# Patient Record
Sex: FEMALE | Race: WHITE | NOT HISPANIC OR LATINO
[De-identification: names, ages, dates, MRNs, and addresses within clinical notes are randomized per-mention and may not be internally consistent; named-entity substitution may affect disease eponyms.]

---

## 2017-02-07 ENCOUNTER — RX RENEWAL (OUTPATIENT)
Age: 57
End: 2017-02-07

## 2017-03-28 ENCOUNTER — APPOINTMENT (OUTPATIENT)
Dept: OBGYN | Facility: CLINIC | Age: 57
End: 2017-03-28

## 2017-03-28 VITALS
BODY MASS INDEX: 47.32 KG/M2 | WEIGHT: 284 LBS | HEART RATE: 82 BPM | HEIGHT: 65 IN | RESPIRATION RATE: 18 BRPM | SYSTOLIC BLOOD PRESSURE: 122 MMHG | DIASTOLIC BLOOD PRESSURE: 78 MMHG | OXYGEN SATURATION: 98 %

## 2017-03-28 DIAGNOSIS — Z82.0 FAMILY HISTORY OF EPILEPSY AND OTHER DISEASES OF THE NERVOUS SYSTEM: ICD-10-CM

## 2017-03-30 LAB — HPV HIGH+LOW RISK DNA PNL CVX: NEGATIVE

## 2017-04-02 LAB — CYTOLOGY CVX/VAG DOC THIN PREP: NORMAL

## 2017-04-09 LAB
A VAGINAE DNA VAG QL NAA+PROBE: NORMAL
BVAB2 DNA VAG QL NAA+PROBE: NORMAL
C KRUSEI DNA VAG QL NAA+PROBE: NEGATIVE
C TRACH RRNA SPEC QL NAA+PROBE: NEGATIVE
MEGA1 DNA VAG QL NAA+PROBE: NORMAL
N GONORRHOEA RRNA SPEC QL NAA+PROBE: NEGATIVE
T VAGINALIS RRNA SPEC QL NAA+PROBE: NEGATIVE

## 2017-07-18 ENCOUNTER — APPOINTMENT (OUTPATIENT)
Dept: INTERNAL MEDICINE | Facility: CLINIC | Age: 57
End: 2017-07-18

## 2017-07-18 VITALS
HEIGHT: 65 IN | SYSTOLIC BLOOD PRESSURE: 128 MMHG | DIASTOLIC BLOOD PRESSURE: 78 MMHG | BODY MASS INDEX: 47.48 KG/M2 | RESPIRATION RATE: 14 BRPM | HEART RATE: 84 BPM | WEIGHT: 285 LBS

## 2017-07-18 RX ORDER — CLOTRIMAZOLE AND BETAMETHASONE DIPROPIONATE 10; .5 MG/G; MG/G
1-0.05 CREAM TOPICAL
Qty: 1 | Refills: 6 | Status: DISCONTINUED | COMMUNITY
Start: 2017-03-28 | End: 2017-07-18

## 2017-07-24 LAB
25(OH)D3 SERPL-MCNC: 18.8 NG/ML
ALBUMIN SERPL ELPH-MCNC: 4.3 G/DL
ALP BLD-CCNC: 106 U/L
ALT SERPL-CCNC: 37 U/L
ANION GAP SERPL CALC-SCNC: 20 MMOL/L
APPEARANCE: CLEAR
AST SERPL-CCNC: 28 U/L
BASOPHILS # BLD AUTO: 0.06 K/UL
BASOPHILS NFR BLD AUTO: 0.6 %
BILIRUB SERPL-MCNC: 0.8 MG/DL
BILIRUBIN URINE: NEGATIVE
BLOOD URINE: NEGATIVE
BUN SERPL-MCNC: 18 MG/DL
CALCIUM SERPL-MCNC: 10.2 MG/DL
CHLORIDE SERPL-SCNC: 99 MMOL/L
CHOLEST SERPL-MCNC: 186 MG/DL
CHOLEST/HDLC SERPL: 4.3 RATIO
CO2 SERPL-SCNC: 23 MMOL/L
COLOR: YELLOW
CREAT SERPL-MCNC: 0.71 MG/DL
CRP SERPL HS-MCNC: 3.3 MG/L
EOSINOPHIL # BLD AUTO: 0.33 K/UL
EOSINOPHIL NFR BLD AUTO: 3.6 %
GLUCOSE BS SERPL-MCNC: 107 MG/DL
GLUCOSE QUALITATIVE U: NORMAL MG/DL
GLUCOSE SERPL-MCNC: 86 MG/DL
HBA1C MFR BLD HPLC: 6 %
HCT VFR BLD CALC: 42.2 %
HDLC SERPL-MCNC: 43 MG/DL
HGB BLD-MCNC: 13.3 G/DL
IMM GRANULOCYTES NFR BLD AUTO: 0.5 %
KETONES URINE: NEGATIVE
LDLC SERPL CALC-MCNC: 102 MG/DL
LEUKOCYTE ESTERASE URINE: NEGATIVE
LYMPHOCYTES # BLD AUTO: 2.67 K/UL
LYMPHOCYTES NFR BLD AUTO: 28.9 %
MAN DIFF?: NORMAL
MCHC RBC-ENTMCNC: 30.3 PG
MCHC RBC-ENTMCNC: 31.5 GM/DL
MCV RBC AUTO: 96.1 FL
MONOCYTES # BLD AUTO: 0.45 K/UL
MONOCYTES NFR BLD AUTO: 4.9 %
NEUTROPHILS # BLD AUTO: 5.69 K/UL
NEUTROPHILS NFR BLD AUTO: 61.5 %
NITRITE URINE: NEGATIVE
PH URINE: 5.5
PLATELET # BLD AUTO: 445 K/UL
POTASSIUM SERPL-SCNC: 4.5 MMOL/L
PROT SERPL-MCNC: 7.3 G/DL
PROTEIN URINE: NEGATIVE MG/DL
RBC # BLD: 4.39 M/UL
RBC # FLD: 14.9 %
SODIUM SERPL-SCNC: 142 MMOL/L
SPECIFIC GRAVITY URINE: 1.02
T4 FREE SERPL-MCNC: 1.3 NG/DL
TRIGL SERPL-MCNC: 203 MG/DL
TSH SERPL-ACNC: 1.84 UIU/ML
UROBILINOGEN URINE: NORMAL MG/DL
VIT B12 SERPL-MCNC: >2000 PG/ML
WBC # FLD AUTO: 9.25 K/UL

## 2017-09-26 ENCOUNTER — RX RENEWAL (OUTPATIENT)
Age: 57
End: 2017-09-26

## 2017-11-20 ENCOUNTER — APPOINTMENT (OUTPATIENT)
Dept: INTERNAL MEDICINE | Facility: CLINIC | Age: 57
End: 2017-11-20

## 2017-11-20 ENCOUNTER — MEDICATION RENEWAL (OUTPATIENT)
Age: 57
End: 2017-11-20

## 2018-01-16 ENCOUNTER — MEDICATION RENEWAL (OUTPATIENT)
Age: 58
End: 2018-01-16

## 2018-01-23 ENCOUNTER — APPOINTMENT (OUTPATIENT)
Dept: INTERNAL MEDICINE | Facility: CLINIC | Age: 58
End: 2018-01-23
Payer: COMMERCIAL

## 2018-01-23 VITALS
HEART RATE: 76 BPM | DIASTOLIC BLOOD PRESSURE: 78 MMHG | HEIGHT: 65 IN | BODY MASS INDEX: 47.48 KG/M2 | WEIGHT: 285 LBS | RESPIRATION RATE: 14 BRPM | SYSTOLIC BLOOD PRESSURE: 126 MMHG

## 2018-01-23 DIAGNOSIS — R63.0 ANOREXIA: ICD-10-CM

## 2018-01-23 PROCEDURE — 99215 OFFICE O/P EST HI 40 MIN: CPT

## 2018-01-23 RX ORDER — VORTIOXETINE 20 MG/1
20 TABLET, FILM COATED ORAL
Refills: 0 | Status: DISCONTINUED | COMMUNITY
End: 2018-01-23

## 2018-02-05 ENCOUNTER — OUTPATIENT (OUTPATIENT)
Dept: OUTPATIENT SERVICES | Facility: HOSPITAL | Age: 58
LOS: 1 days | End: 2018-02-05
Payer: COMMERCIAL

## 2018-02-05 ENCOUNTER — APPOINTMENT (OUTPATIENT)
Dept: RADIOLOGY | Facility: HOSPITAL | Age: 58
End: 2018-02-05
Payer: COMMERCIAL

## 2018-02-05 DIAGNOSIS — Z00.8 ENCOUNTER FOR OTHER GENERAL EXAMINATION: ICD-10-CM

## 2018-02-05 DIAGNOSIS — R06.02 SHORTNESS OF BREATH: ICD-10-CM

## 2018-02-05 DIAGNOSIS — R07.9 CHEST PAIN, UNSPECIFIED: ICD-10-CM

## 2018-02-05 PROCEDURE — 71046 X-RAY EXAM CHEST 2 VIEWS: CPT | Mod: 26

## 2018-02-05 PROCEDURE — 71046 X-RAY EXAM CHEST 2 VIEWS: CPT

## 2018-03-01 LAB
25(OH)D3 SERPL-MCNC: 21.6 NG/ML
ALBUMIN SERPL ELPH-MCNC: 3.6 G/DL
ALP BLD-CCNC: 107 U/L
ALT SERPL-CCNC: 52 U/L
ANION GAP SERPL CALC-SCNC: 13 MMOL/L
APPEARANCE: CLEAR
AST SERPL-CCNC: 30 U/L
BASOPHILS # BLD AUTO: 0.04 K/UL
BASOPHILS NFR BLD AUTO: 0.4 %
BILIRUB SERPL-MCNC: 0.6 MG/DL
BILIRUBIN URINE: NEGATIVE
BLOOD URINE: NEGATIVE
BUN SERPL-MCNC: 18 MG/DL
CALCIUM SERPL-MCNC: 9.7 MG/DL
CHLORIDE SERPL-SCNC: 103 MMOL/L
CHOLEST SERPL-MCNC: 178 MG/DL
CHOLEST/HDLC SERPL: 5.2 RATIO
CO2 SERPL-SCNC: 25 MMOL/L
COLOR: YELLOW
CREAT SERPL-MCNC: 0.62 MG/DL
CRP SERPL HS-MCNC: 6 MG/L
EOSINOPHIL # BLD AUTO: 0.47 K/UL
EOSINOPHIL NFR BLD AUTO: 5.2 %
GLUCOSE BS SERPL-MCNC: 130 MG/DL
GLUCOSE QUALITATIVE U: NEGATIVE MG/DL
GLUCOSE SERPL-MCNC: 130 MG/DL
HBA1C MFR BLD HPLC: 6.8 %
HCT VFR BLD CALC: 39 %
HDLC SERPL-MCNC: 34 MG/DL
HGB BLD-MCNC: 12.6 G/DL
IMM GRANULOCYTES NFR BLD AUTO: 0.2 %
KETONES URINE: NEGATIVE
LDLC SERPL CALC-MCNC: 108 MG/DL
LEUKOCYTE ESTERASE URINE: NEGATIVE
LYMPHOCYTES # BLD AUTO: 2.86 K/UL
LYMPHOCYTES NFR BLD AUTO: 31.8 %
MAN DIFF?: NORMAL
MCHC RBC-ENTMCNC: 29.3 PG
MCHC RBC-ENTMCNC: 32.3 GM/DL
MCV RBC AUTO: 90.7 FL
MONOCYTES # BLD AUTO: 0.54 K/UL
MONOCYTES NFR BLD AUTO: 6 %
NEUTROPHILS # BLD AUTO: 5.06 K/UL
NEUTROPHILS NFR BLD AUTO: 56.4 %
NITRITE URINE: NEGATIVE
PH URINE: 6
PLATELET # BLD AUTO: 497 K/UL
POTASSIUM SERPL-SCNC: 4 MMOL/L
PROT SERPL-MCNC: 6.7 G/DL
PROTEIN URINE: NEGATIVE MG/DL
RBC # BLD: 4.3 M/UL
RBC # FLD: 15.6 %
SODIUM SERPL-SCNC: 141 MMOL/L
SPECIFIC GRAVITY URINE: 1.03
T4 FREE SERPL-MCNC: 1.2 NG/DL
TRIGL SERPL-MCNC: 179 MG/DL
TSH SERPL-ACNC: 1.72 UIU/ML
UROBILINOGEN URINE: NEGATIVE MG/DL
VIT B12 SERPL-MCNC: 920 PG/ML
WBC # FLD AUTO: 8.99 K/UL

## 2018-03-06 ENCOUNTER — OUTPATIENT (OUTPATIENT)
Dept: OUTPATIENT SERVICES | Facility: HOSPITAL | Age: 58
LOS: 1 days | End: 2018-03-06
Payer: COMMERCIAL

## 2018-03-06 ENCOUNTER — APPOINTMENT (OUTPATIENT)
Dept: MAMMOGRAPHY | Facility: HOSPITAL | Age: 58
End: 2018-03-06
Payer: COMMERCIAL

## 2018-03-06 DIAGNOSIS — Z12.31 ENCOUNTER FOR SCREENING MAMMOGRAM FOR MALIGNANT NEOPLASM OF BREAST: ICD-10-CM

## 2018-03-06 DIAGNOSIS — Z00.8 ENCOUNTER FOR OTHER GENERAL EXAMINATION: ICD-10-CM

## 2018-03-06 PROCEDURE — 77063 BREAST TOMOSYNTHESIS BI: CPT

## 2018-03-06 PROCEDURE — 77067 SCR MAMMO BI INCL CAD: CPT

## 2018-03-06 PROCEDURE — 77063 BREAST TOMOSYNTHESIS BI: CPT | Mod: 26

## 2018-03-06 PROCEDURE — 77067 SCR MAMMO BI INCL CAD: CPT | Mod: 26

## 2018-06-06 ENCOUNTER — TRANSCRIPTION ENCOUNTER (OUTPATIENT)
Age: 58
End: 2018-06-06

## 2018-06-07 ENCOUNTER — RESULT REVIEW (OUTPATIENT)
Age: 58
End: 2018-06-07

## 2018-06-07 ENCOUNTER — OUTPATIENT (OUTPATIENT)
Dept: OUTPATIENT SERVICES | Facility: HOSPITAL | Age: 58
LOS: 1 days | End: 2018-06-07
Payer: COMMERCIAL

## 2018-06-07 DIAGNOSIS — R10.13 EPIGASTRIC PAIN: ICD-10-CM

## 2018-06-07 DIAGNOSIS — D64.9 ANEMIA, UNSPECIFIED: ICD-10-CM

## 2018-06-07 DIAGNOSIS — K63.5 POLYP OF COLON: ICD-10-CM

## 2018-06-07 PROCEDURE — 45380 COLONOSCOPY AND BIOPSY: CPT

## 2018-06-07 PROCEDURE — 88305 TISSUE EXAM BY PATHOLOGIST: CPT

## 2018-06-07 PROCEDURE — 43239 EGD BIOPSY SINGLE/MULTIPLE: CPT

## 2018-06-07 PROCEDURE — 88312 SPECIAL STAINS GROUP 1: CPT | Mod: 26

## 2018-06-07 PROCEDURE — 88305 TISSUE EXAM BY PATHOLOGIST: CPT | Mod: 26

## 2018-06-07 PROCEDURE — 88312 SPECIAL STAINS GROUP 1: CPT

## 2018-06-18 DIAGNOSIS — Z91.040 LATEX ALLERGY STATUS: ICD-10-CM

## 2018-06-18 DIAGNOSIS — Z86.010 PERSONAL HISTORY OF COLONIC POLYPS: ICD-10-CM

## 2018-06-18 DIAGNOSIS — Z98.0 INTESTINAL BYPASS AND ANASTOMOSIS STATUS: ICD-10-CM

## 2018-06-18 DIAGNOSIS — D50.9 IRON DEFICIENCY ANEMIA, UNSPECIFIED: ICD-10-CM

## 2018-06-18 DIAGNOSIS — G47.33 OBSTRUCTIVE SLEEP APNEA (ADULT) (PEDIATRIC): ICD-10-CM

## 2018-06-18 DIAGNOSIS — Z79.82 LONG TERM (CURRENT) USE OF ASPIRIN: ICD-10-CM

## 2018-06-18 DIAGNOSIS — Z90.722 ACQUIRED ABSENCE OF OVARIES, BILATERAL: ICD-10-CM

## 2018-06-18 DIAGNOSIS — Z90.49 ACQUIRED ABSENCE OF OTHER SPECIFIED PARTS OF DIGESTIVE TRACT: ICD-10-CM

## 2018-06-18 DIAGNOSIS — K29.50 UNSPECIFIED CHRONIC GASTRITIS WITHOUT BLEEDING: ICD-10-CM

## 2018-06-18 DIAGNOSIS — K63.89 OTHER SPECIFIED DISEASES OF INTESTINE: ICD-10-CM

## 2018-06-18 DIAGNOSIS — I10 ESSENTIAL (PRIMARY) HYPERTENSION: ICD-10-CM

## 2018-06-18 DIAGNOSIS — K64.8 OTHER HEMORRHOIDS: ICD-10-CM

## 2018-06-18 DIAGNOSIS — K57.30 DIVERTICULOSIS OF LARGE INTESTINE WITHOUT PERFORATION OR ABSCESS WITHOUT BLEEDING: ICD-10-CM

## 2018-06-18 DIAGNOSIS — F32.9 MAJOR DEPRESSIVE DISORDER, SINGLE EPISODE, UNSPECIFIED: ICD-10-CM

## 2018-06-18 DIAGNOSIS — J45.909 UNSPECIFIED ASTHMA, UNCOMPLICATED: ICD-10-CM

## 2018-06-18 DIAGNOSIS — E03.9 HYPOTHYROIDISM, UNSPECIFIED: ICD-10-CM

## 2018-06-18 DIAGNOSIS — Z98.84 BARIATRIC SURGERY STATUS: ICD-10-CM

## 2018-06-18 DIAGNOSIS — K21.9 GASTRO-ESOPHAGEAL REFLUX DISEASE WITHOUT ESOPHAGITIS: ICD-10-CM

## 2018-07-28 PROBLEM — R63.0 POOR APPETITE: Status: ACTIVE | Noted: 2018-01-23

## 2018-08-02 ENCOUNTER — RX RENEWAL (OUTPATIENT)
Age: 58
End: 2018-08-02

## 2018-08-23 ENCOUNTER — APPOINTMENT (OUTPATIENT)
Dept: INTERNAL MEDICINE | Facility: CLINIC | Age: 58
End: 2018-08-23
Payer: COMMERCIAL

## 2018-08-23 VITALS
SYSTOLIC BLOOD PRESSURE: 126 MMHG | DIASTOLIC BLOOD PRESSURE: 78 MMHG | HEIGHT: 65 IN | RESPIRATION RATE: 15 BRPM | BODY MASS INDEX: 48.82 KG/M2 | HEART RATE: 78 BPM | WEIGHT: 293 LBS

## 2018-08-23 PROCEDURE — 99215 OFFICE O/P EST HI 40 MIN: CPT

## 2018-08-23 RX ORDER — ASPIRIN 325 MG/1
325 TABLET, FILM COATED ORAL
Refills: 0 | Status: DISCONTINUED | COMMUNITY
End: 2018-08-23

## 2018-08-23 NOTE — HISTORY OF PRESENT ILLNESS
[FreeTextEntry1] : Comes to the office for followup to review her medications and discuss her overall health major issues with depression fatigue obesity shortness of breath and chest pressure [de-identified] : 58-year-old woman comes to the office for followup with the history of depression sleep apnea hypertension hypothyroidism obesity and type 2 diabetes and complaints is that of fatigue exertional dyspnea and over the past months occasional chest pain patient has been depressed recently seen her psychiatrist regularly he denies temperature chills sweats myalgias or chest pains can be at rest they occur although with her level of obesity she does not do any heavy exertion he denies abdominal pain nausea occasionally he said no diarrhea constipation rectal bleeding or black stool. Her weight is up several pounds she has occasionally lower back and knee and hip pain is not monitoring her sugars and she is not using any apparatus for her sleep

## 2018-08-23 NOTE — HEALTH RISK ASSESSMENT
[No falls in past year] : Patient reported no falls in the past year [0] : 2) Feeling down, depressed, or hopeless: Not at all (0) [WBC2Lygdo] : 0

## 2018-08-23 NOTE — REVIEW OF SYSTEMS
[Negative] : Heme/Lymph [Fatigue] : fatigue [Chest Pain] : chest pain [Dyspnea on Exertion] : dyspnea on exertion [Nocturia] : nocturia [Joint Stiffness] : joint stiffness [Back Pain] : back pain [Anxiety] : anxiety [Depression] : depression [Fever] : no fever [Chills] : no chills [Hot Flashes] : no hot flashes [Night Sweats] : no night sweats [Recent Change In Weight] : ~T no recent weight change [Discharge] : no discharge [Pain] : no pain [Redness] : no redness [Dryness] : no dryness  [Vision Problems] : no vision problems [Itching] : no itching [Earache] : no earache [Hearing Loss] : no hearing loss [Nosebleed] : no nosebleeds [Hoarseness] : no hoarseness [Nasal Discharge] : no nasal discharge [Sore Throat] : no sore throat [Postnasal Drip] : no postnasal drip [Palpitations] : no palpitations [Leg Claudication] : no leg claudication [Lower Ext Edema] : no lower extremity edema [Orthopnea] : no orthopnea [Paroysmal Nocturnal Dyspnea] : no paroysmal nocturnal dyspnea [Shortness Of Breath] : no shortness of breath [Wheezing] : no wheezing [Cough] : no cough [Abdominal Pain] : no abdominal pain [Nausea] : no nausea [Constipation] : no constipation [Diarrhea] : diarrhea [Vomiting] : no vomiting [Heartburn] : no heartburn [Melena] : no melena [Dysuria] : no dysuria [Incontinence] : no incontinence [Poor Libido] : libido not poor [Hematuria] : no hematuria [Frequency] : no frequency [Vaginal Discharge] : no vaginal discharge [Dysmenorrhea] : no dysmenorrhea [Joint Pain] : no joint pain [Joint Swelling] : no joint swelling [Muscle Weakness] : no muscle weakness [Muscle Pain] : no muscle pain [Mole Changes] : no mole changes [Nail Changes] : no nail changes [Hair Changes] : no hair changes [Skin Rash] : no skin rash [Headache] : no headache [Dizziness] : no dizziness [Fainting] : no fainting [Confusion] : no confusion [Memory Loss] : no memory loss [Unsteady Walking] : no ataxia [Suicidal] : not suicidal [Insomnia] : no insomnia [Easy Bleeding] : no easy bleeding [Easy Bruising] : no easy bruising [Swollen Glands] : no swollen glands

## 2018-08-23 NOTE — PHYSICAL EXAM
[No Acute Distress] : no acute distress [Well Nourished] : well nourished [Well Developed] : well developed [Well-Appearing] : well-appearing [Normal Voice/Communication] : normal voice/communication [Normal Sclera/Conjunctiva] : normal sclera/conjunctiva [PERRL] : pupils equal round and reactive to light [EOMI] : extraocular movements intact [Normal Outer Ear/Nose] : the outer ears and nose were normal in appearance [Normal Oropharynx] : the oropharynx was normal [Normal TMs] : both tympanic membranes were normal [Normal Nasal Mucosa] : the nasal mucosa was normal [No JVD] : no jugular venous distention [Supple] : supple [No Lymphadenopathy] : no lymphadenopathy [Thyroid Normal, No Nodules] : the thyroid was normal and there were no nodules present [No Respiratory Distress] : no respiratory distress  [Clear to Auscultation] : lungs were clear to auscultation bilaterally [No Accessory Muscle Use] : no accessory muscle use [Normal Percussion] : the chest was normal to percussion [Normal Rate] : normal rate  [Regular Rhythm] : with a regular rhythm [Normal S1, S2] : normal S1 and S2 [No Murmur] : no murmur heard [No Carotid Bruits] : no carotid bruits [No Abdominal Bruit] : a ~M bruit was not heard ~T in the abdomen [No Varicosities] : no varicosities [Pedal Pulses Present] : the pedal pulses are present [No Edema] : there was no peripheral edema [No Extremity Clubbing/Cyanosis] : no extremity clubbing/cyanosis [No Palpable Aorta] : no palpable aorta [Declined Breast Exam] : declined breast exam  [Soft] : abdomen soft [Non Tender] : non-tender [Non-distended] : non-distended [No Masses] : no abdominal mass palpated [No HSM] : no HSM [Normal Bowel Sounds] : normal bowel sounds [No Hernias] : no hernias [Declined Rectal Exam] : declined rectal exam [Normal Supraclavicular Nodes] : no supraclavicular lymphadenopathy [Normal Axillary Nodes] : no axillary lymphadenopathy [Normal Posterior Cervical Nodes] : no posterior cervical lymphadenopathy [Normal Anterior Cervical Nodes] : no anterior cervical lymphadenopathy [Normal Inguinal Nodes] : no inguinal lymphadenopathy [No CVA Tenderness] : no CVA  tenderness [No Spinal Tenderness] : no spinal tenderness [No Joint Swelling] : no joint swelling [Grossly Normal Strength/Tone] : grossly normal strength/tone [No Rash] : no rash [Normal Gait] : normal gait [Coordination Grossly Intact] : coordination grossly intact [No Focal Deficits] : no focal deficits [Deep Tendon Reflexes (DTR)] : deep tendon reflexes were 2+ and symmetric [Speech Grossly Normal] : speech grossly normal [Memory Grossly Normal] : memory grossly normal [Normal Affect] : the affect was normal [Alert and Oriented x3] : oriented to person, place, and time [Normal Mood] : the mood was normal [Normal Insight/Judgement] : insight and judgment were intact

## 2018-08-23 NOTE — ASSESSMENT
[FreeTextEntry1] : Physical exam shows a obese woman in no acute distress weight is 305 pounds height of 5 foot 5 inches blood pressure 126/78 pulse is 78 respirations are 15 HEENT was unremarkable chest showed decreased breath sounds cardiovascular showed normal S1-S2 abdomen was soft extremities showed bilateral edema chronic neurologic nonfocal patient will be engaged the doctor that initiated her CPAP treatment he claims to be intolerable to that but is open to any new apparatus is that have become available patient is not interested in Weight Watchers are organized weight plan concerned about her shortness of breath on exertion and chest discomfort we'll make arrangements for her to see a cardiologist to investigate her symptoms if the symptoms worsen before her appointment to go immediately to the emergency room

## 2018-09-28 LAB
25(OH)D3 SERPL-MCNC: 25.6 NG/ML
ALBUMIN SERPL ELPH-MCNC: 4.1 G/DL
ALP BLD-CCNC: 119 U/L
ALT SERPL-CCNC: 47 U/L
ANION GAP SERPL CALC-SCNC: 18 MMOL/L
APPEARANCE: CLEAR
AST SERPL-CCNC: 33 U/L
BASOPHILS # BLD AUTO: 0.05 K/UL
BASOPHILS NFR BLD AUTO: 0.6 %
BILIRUB SERPL-MCNC: 0.8 MG/DL
BILIRUBIN URINE: NEGATIVE
BLOOD URINE: NEGATIVE
BUN SERPL-MCNC: 14 MG/DL
CALCIUM SERPL-MCNC: 9.8 MG/DL
CHLORIDE SERPL-SCNC: 97 MMOL/L
CHOLEST SERPL-MCNC: 175 MG/DL
CHOLEST/HDLC SERPL: 4.5 RATIO
CO2 SERPL-SCNC: 24 MMOL/L
COLOR: YELLOW
CREAT SERPL-MCNC: 0.59 MG/DL
CRP SERPL HS-MCNC: 5.5 MG/L
EOSINOPHIL # BLD AUTO: 0.19 K/UL
EOSINOPHIL NFR BLD AUTO: 2.1 %
GLUCOSE BS SERPL-MCNC: 145 MG/DL
GLUCOSE QUALITATIVE U: NEGATIVE MG/DL
GLUCOSE SERPL-MCNC: 149 MG/DL
HBA1C MFR BLD HPLC: 6.2 %
HCT VFR BLD CALC: 41.5 %
HCV AB SER QL: NONREACTIVE
HCV S/CO RATIO: 0.21 S/CO
HDLC SERPL-MCNC: 39 MG/DL
HGB BLD-MCNC: 12.6 G/DL
IMM GRANULOCYTES NFR BLD AUTO: 0.2 %
KETONES URINE: NEGATIVE
LDLC SERPL CALC-MCNC: 103 MG/DL
LEUKOCYTE ESTERASE URINE: NEGATIVE
LYMPHOCYTES # BLD AUTO: 2.26 K/UL
LYMPHOCYTES NFR BLD AUTO: 24.9 %
MAN DIFF?: NORMAL
MCHC RBC-ENTMCNC: 29 PG
MCHC RBC-ENTMCNC: 30.4 GM/DL
MCV RBC AUTO: 95.6 FL
MONOCYTES # BLD AUTO: 0.46 K/UL
MONOCYTES NFR BLD AUTO: 5.1 %
NEUTROPHILS # BLD AUTO: 6.09 K/UL
NEUTROPHILS NFR BLD AUTO: 67.1 %
NITRITE URINE: NEGATIVE
PH URINE: 6.5
PLATELET # BLD AUTO: 523 K/UL
POTASSIUM SERPL-SCNC: 3.7 MMOL/L
PROT SERPL-MCNC: 7.1 G/DL
PROTEIN URINE: NEGATIVE MG/DL
RBC # BLD: 4.34 M/UL
RBC # FLD: 15.7 %
SODIUM SERPL-SCNC: 139 MMOL/L
SPECIFIC GRAVITY URINE: 1.02
T4 FREE SERPL-MCNC: 1.3 NG/DL
TRIGL SERPL-MCNC: 164 MG/DL
TSH SERPL-ACNC: 1.45 UIU/ML
UROBILINOGEN URINE: 1 MG/DL
VIT B12 SERPL-MCNC: >2000 PG/ML
WBC # FLD AUTO: 9.07 K/UL

## 2018-12-06 ENCOUNTER — APPOINTMENT (OUTPATIENT)
Dept: CT IMAGING | Facility: HOSPITAL | Age: 58
End: 2018-12-06
Payer: COMMERCIAL

## 2018-12-06 ENCOUNTER — OUTPATIENT (OUTPATIENT)
Dept: OUTPATIENT SERVICES | Facility: HOSPITAL | Age: 58
LOS: 1 days | End: 2018-12-06
Payer: COMMERCIAL

## 2018-12-06 DIAGNOSIS — Z00.8 ENCOUNTER FOR OTHER GENERAL EXAMINATION: ICD-10-CM

## 2018-12-06 PROCEDURE — 74177 CT ABD & PELVIS W/CONTRAST: CPT

## 2018-12-06 PROCEDURE — 74177 CT ABD & PELVIS W/CONTRAST: CPT | Mod: 26

## 2018-12-06 PROCEDURE — 82565 ASSAY OF CREATININE: CPT

## 2018-12-17 ENCOUNTER — APPOINTMENT (OUTPATIENT)
Dept: PULMONOLOGY | Facility: CLINIC | Age: 58
End: 2018-12-17
Payer: COMMERCIAL

## 2018-12-17 VITALS
HEART RATE: 81 BPM | OXYGEN SATURATION: 97 % | RESPIRATION RATE: 18 BRPM | TEMPERATURE: 97.7 F | SYSTOLIC BLOOD PRESSURE: 148 MMHG | WEIGHT: 293 LBS | DIASTOLIC BLOOD PRESSURE: 84 MMHG | HEIGHT: 65 IN | BODY MASS INDEX: 48.82 KG/M2

## 2018-12-17 PROCEDURE — 99244 OFF/OP CNSLTJ NEW/EST MOD 40: CPT

## 2019-05-17 ENCOUNTER — INPATIENT (INPATIENT)
Facility: HOSPITAL | Age: 59
LOS: 3 days | Discharge: HOME | DRG: 204 | End: 2019-05-21
Attending: INTERNAL MEDICINE | Admitting: STUDENT IN AN ORGANIZED HEALTH CARE EDUCATION/TRAINING PROGRAM
Payer: COMMERCIAL

## 2019-05-17 ENCOUNTER — APPOINTMENT (OUTPATIENT)
Dept: INTERNAL MEDICINE | Facility: CLINIC | Age: 59
End: 2019-05-17
Payer: COMMERCIAL

## 2019-05-17 VITALS
DIASTOLIC BLOOD PRESSURE: 80 MMHG | RESPIRATION RATE: 17 BRPM | SYSTOLIC BLOOD PRESSURE: 150 MMHG | HEIGHT: 65 IN | WEIGHT: 293 LBS | HEART RATE: 95 BPM | TEMPERATURE: 98.2 F | OXYGEN SATURATION: 97 % | BODY MASS INDEX: 48.82 KG/M2

## 2019-05-17 VITALS
RESPIRATION RATE: 18 BRPM | SYSTOLIC BLOOD PRESSURE: 147 MMHG | TEMPERATURE: 98 F | HEART RATE: 100 BPM | OXYGEN SATURATION: 98 % | WEIGHT: 293 LBS | HEIGHT: 65 IN | DIASTOLIC BLOOD PRESSURE: 78 MMHG

## 2019-05-17 DIAGNOSIS — R06.09 OTHER FORMS OF DYSPNEA: ICD-10-CM

## 2019-05-17 DIAGNOSIS — E03.9 HYPOTHYROIDISM, UNSPECIFIED: ICD-10-CM

## 2019-05-17 DIAGNOSIS — G47.30 SLEEP APNEA, UNSPECIFIED: ICD-10-CM

## 2019-05-17 DIAGNOSIS — I10 ESSENTIAL (PRIMARY) HYPERTENSION: ICD-10-CM

## 2019-05-17 DIAGNOSIS — E11.9 TYPE 2 DIABETES MELLITUS WITHOUT COMPLICATIONS: ICD-10-CM

## 2019-05-17 LAB
ALBUMIN SERPL ELPH-MCNC: 3.1 G/DL — LOW (ref 3.3–5)
ALP SERPL-CCNC: 120 U/L — SIGNIFICANT CHANGE UP (ref 40–120)
ALT FLD-CCNC: 66 U/L DA — HIGH (ref 10–45)
ANION GAP SERPL CALC-SCNC: 11 MMOL/L — SIGNIFICANT CHANGE UP (ref 5–17)
APTT BLD: 25.1 SEC — LOW (ref 27.5–36.3)
AST SERPL-CCNC: 55 U/L — HIGH (ref 10–40)
BASOPHILS # BLD AUTO: 0.09 K/UL — SIGNIFICANT CHANGE UP (ref 0–0.2)
BASOPHILS NFR BLD AUTO: 1.1 % — SIGNIFICANT CHANGE UP (ref 0–2)
BILIRUB SERPL-MCNC: 0.7 MG/DL — SIGNIFICANT CHANGE UP (ref 0.2–1.2)
BUN SERPL-MCNC: 14 MG/DL — SIGNIFICANT CHANGE UP (ref 7–23)
CALCIUM SERPL-MCNC: 8.5 MG/DL — SIGNIFICANT CHANGE UP (ref 8.4–10.5)
CHLORIDE SERPL-SCNC: 102 MMOL/L — SIGNIFICANT CHANGE UP (ref 96–108)
CO2 SERPL-SCNC: 26 MMOL/L — SIGNIFICANT CHANGE UP (ref 22–31)
CREAT SERPL-MCNC: 0.68 MG/DL — SIGNIFICANT CHANGE UP (ref 0.5–1.3)
D DIMER BLD IA.RAPID-MCNC: 276 NG/ML DDU — HIGH
EOSINOPHIL # BLD AUTO: 0.39 K/UL — SIGNIFICANT CHANGE UP (ref 0–0.5)
EOSINOPHIL NFR BLD AUTO: 4.6 % — SIGNIFICANT CHANGE UP (ref 0–6)
GLUCOSE BLDC GLUCOMTR-MCNC: 168 MG/DL — HIGH (ref 70–99)
GLUCOSE BLDC GLUCOMTR-MCNC: 186 MG/DL — HIGH (ref 70–99)
GLUCOSE SERPL-MCNC: 238 MG/DL — HIGH (ref 70–99)
HCT VFR BLD CALC: 35.1 % — SIGNIFICANT CHANGE UP (ref 34.5–45)
HGB BLD-MCNC: 11 G/DL — LOW (ref 11.5–15.5)
IMM GRANULOCYTES NFR BLD AUTO: 0.4 % — SIGNIFICANT CHANGE UP (ref 0–1.5)
INR BLD: 0.9 RATIO — SIGNIFICANT CHANGE UP (ref 0.88–1.16)
LYMPHOCYTES # BLD AUTO: 2.35 K/UL — SIGNIFICANT CHANGE UP (ref 1–3.3)
LYMPHOCYTES # BLD AUTO: 27.9 % — SIGNIFICANT CHANGE UP (ref 13–44)
MCHC RBC-ENTMCNC: 27.7 PG — SIGNIFICANT CHANGE UP (ref 27–34)
MCHC RBC-ENTMCNC: 31.3 GM/DL — LOW (ref 32–36)
MCV RBC AUTO: 88.4 FL — SIGNIFICANT CHANGE UP (ref 80–100)
MONOCYTES # BLD AUTO: 0.5 K/UL — SIGNIFICANT CHANGE UP (ref 0–0.9)
MONOCYTES NFR BLD AUTO: 5.9 % — SIGNIFICANT CHANGE UP (ref 2–14)
NEUTROPHILS # BLD AUTO: 5.06 K/UL — SIGNIFICANT CHANGE UP (ref 1.8–7.4)
NEUTROPHILS NFR BLD AUTO: 60.1 % — SIGNIFICANT CHANGE UP (ref 43–77)
NRBC # BLD: 0 /100 WBCS — SIGNIFICANT CHANGE UP (ref 0–0)
NT-PROBNP SERPL-SCNC: 106 PG/ML — SIGNIFICANT CHANGE UP (ref 0–300)
PLATELET # BLD AUTO: 441 K/UL — HIGH (ref 150–400)
POTASSIUM SERPL-MCNC: 3.8 MMOL/L — SIGNIFICANT CHANGE UP (ref 3.5–5.3)
POTASSIUM SERPL-SCNC: 3.8 MMOL/L — SIGNIFICANT CHANGE UP (ref 3.5–5.3)
PROT SERPL-MCNC: 6.6 G/DL — SIGNIFICANT CHANGE UP (ref 6–8.3)
PROTHROM AB SERPL-ACNC: 10.1 SEC — SIGNIFICANT CHANGE UP (ref 10–12.9)
RBC # BLD: 3.97 M/UL — SIGNIFICANT CHANGE UP (ref 3.8–5.2)
RBC # FLD: 16.1 % — HIGH (ref 10.3–14.5)
SODIUM SERPL-SCNC: 139 MMOL/L — SIGNIFICANT CHANGE UP (ref 135–145)
TROPONIN I SERPL-MCNC: 0.02 NG/ML — SIGNIFICANT CHANGE UP (ref 0.02–0.06)
WBC # BLD: 8.42 K/UL — SIGNIFICANT CHANGE UP (ref 3.8–10.5)
WBC # FLD AUTO: 8.42 K/UL — SIGNIFICANT CHANGE UP (ref 3.8–10.5)

## 2019-05-17 PROCEDURE — 99213 OFFICE O/P EST LOW 20 MIN: CPT | Mod: 25

## 2019-05-17 PROCEDURE — 99223 1ST HOSP IP/OBS HIGH 75: CPT

## 2019-05-17 PROCEDURE — 71275 CT ANGIOGRAPHY CHEST: CPT | Mod: 26

## 2019-05-17 PROCEDURE — 93010 ELECTROCARDIOGRAM REPORT: CPT

## 2019-05-17 PROCEDURE — 99396 PREV VISIT EST AGE 40-64: CPT | Mod: 25

## 2019-05-17 PROCEDURE — 71045 X-RAY EXAM CHEST 1 VIEW: CPT | Mod: 26

## 2019-05-17 PROCEDURE — 99285 EMERGENCY DEPT VISIT HI MDM: CPT

## 2019-05-17 RX ORDER — HYDROCHLOROTHIAZIDE 25 MG
25 TABLET ORAL DAILY
Refills: 0 | Status: DISCONTINUED | OUTPATIENT
Start: 2019-05-17 | End: 2019-05-21

## 2019-05-17 RX ORDER — DEXTROSE 50 % IN WATER 50 %
15 SYRINGE (ML) INTRAVENOUS ONCE
Refills: 0 | Status: DISCONTINUED | OUTPATIENT
Start: 2019-05-17 | End: 2019-05-21

## 2019-05-17 RX ORDER — FLUOXETINE HYDROCHLORIDE 40 MG/1
40 CAPSULE ORAL
Refills: 0 | Status: DISCONTINUED | COMMUNITY
End: 2019-05-17

## 2019-05-17 RX ORDER — DEXTROSE 50 % IN WATER 50 %
25 SYRINGE (ML) INTRAVENOUS ONCE
Refills: 0 | Status: DISCONTINUED | OUTPATIENT
Start: 2019-05-17 | End: 2019-05-21

## 2019-05-17 RX ORDER — LEVOTHYROXINE SODIUM 125 MCG
75 TABLET ORAL DAILY
Refills: 0 | Status: DISCONTINUED | OUTPATIENT
Start: 2019-05-17 | End: 2019-05-21

## 2019-05-17 RX ORDER — DEXTROSE 50 % IN WATER 50 %
12.5 SYRINGE (ML) INTRAVENOUS ONCE
Refills: 0 | Status: DISCONTINUED | OUTPATIENT
Start: 2019-05-17 | End: 2019-05-21

## 2019-05-17 RX ORDER — LISINOPRIL 2.5 MG/1
40 TABLET ORAL DAILY
Refills: 0 | Status: DISCONTINUED | OUTPATIENT
Start: 2019-05-17 | End: 2019-05-21

## 2019-05-17 RX ORDER — INSULIN LISPRO 100/ML
VIAL (ML) SUBCUTANEOUS
Refills: 0 | Status: DISCONTINUED | OUTPATIENT
Start: 2019-05-17 | End: 2019-05-21

## 2019-05-17 RX ORDER — QUINAPRIL HYDROCHLORIDE 40 MG/1
1 TABLET, FILM COATED ORAL
Qty: 0 | Refills: 0 | DISCHARGE

## 2019-05-17 RX ORDER — SODIUM CHLORIDE 9 MG/ML
3 INJECTION INTRAMUSCULAR; INTRAVENOUS; SUBCUTANEOUS ONCE
Refills: 0 | Status: COMPLETED | OUTPATIENT
Start: 2019-05-17 | End: 2019-05-17

## 2019-05-17 RX ORDER — SODIUM CHLORIDE 9 MG/ML
1000 INJECTION, SOLUTION INTRAVENOUS
Refills: 0 | Status: DISCONTINUED | OUTPATIENT
Start: 2019-05-17 | End: 2019-05-21

## 2019-05-17 RX ORDER — INSULIN LISPRO 100/ML
VIAL (ML) SUBCUTANEOUS AT BEDTIME
Refills: 0 | Status: DISCONTINUED | OUTPATIENT
Start: 2019-05-17 | End: 2019-05-21

## 2019-05-17 RX ORDER — ENOXAPARIN SODIUM 100 MG/ML
40 INJECTION SUBCUTANEOUS EVERY 12 HOURS
Refills: 0 | Status: DISCONTINUED | OUTPATIENT
Start: 2019-05-17 | End: 2019-05-21

## 2019-05-17 RX ORDER — GLUCAGON INJECTION, SOLUTION 0.5 MG/.1ML
1 INJECTION, SOLUTION SUBCUTANEOUS ONCE
Refills: 0 | Status: DISCONTINUED | OUTPATIENT
Start: 2019-05-17 | End: 2019-05-21

## 2019-05-17 RX ADMIN — SODIUM CHLORIDE 3 MILLILITER(S): 9 INJECTION INTRAMUSCULAR; INTRAVENOUS; SUBCUTANEOUS at 13:40

## 2019-05-17 NOTE — HISTORY OF PRESENT ILLNESS
[FreeTextEntry1] : Comes to the office for a comprehensive physical examination to review her medications and discuss her overall health patient has recently been experiencing shortness of breath with minimal exertion [de-identified] : 59-year-old woman with a history of morbid obesity status post bariatric surgery in the past hypothyroidism hypertension sleep apnea prediabetes and chronic nausea and severe office for a comprehensive physical examination patient's main complaint has been that over the past 2 weeks she has been fatigued and with just minimal exertion short of breath the past he would take more aggressive exertion but now just walking across a room will make her short of breath she denies pains in legs whether these are chronically swollen denies temperature chills sweats or myalgias she says the headache sinus congestion sore throat cough wheezing pleurisy or chest pain she has had no lightheadedness or palpitations denies vertigo or syncope she has chronic intermittent nausea denies abdominal pain diarrhea constipation or blood per rectum or black stools her appetite has been good her weight is persistent around 310 pounds

## 2019-05-17 NOTE — H&P ADULT - NSHPLABSRESULTS_GEN_ALL_CORE
LABS:                        11.0   8.42  )-----------( 441      ( 17 May 2019 13:25 )             35.1     05-17    139  |  102  |  14  ----------------------------<  238<H>  3.8   |  26  |  0.68    Ca    8.5      17 May 2019 13:25    TPro  6.6  /  Alb  3.1<L>  /  TBili  0.7  /  DBili  x   /  AST  55<H>  /  ALT  66<H>  /  AlkPhos  120  05-17    PT/INR - ( 17 May 2019 13:25 )   PT: 10.1 sec;   INR: 0.90 ratio         PTT - ( 17 May 2019 13:25 )  PTT:25.1 sec     CAPILLARY BLOOD GLUCOSE      POCT Blood Glucose.: 168 mg/dL (17 May 2019 16:52)            RADIOLOGY & ADDITIONAL TESTS:  < from: CT Angio Chest w/ IV Cont (05.17.19 @ 14:28) >    INTERPRETATION:  CT angiogram chest with contrast    History shortness of breath    Contrast Omnipaque 90 cc; 10 cc discarded    Axial MIPS included    There is no pulmonary embolism. There is moderate cardiomegaly. There is   no acute consolidation or maikol central edema or pleural or pericardial   effusion. There is no hilar or mediastinal adenopathy. Incidental note is   made of a tiny 3 x 5 mm subpleural smooth oval nodule in the right lung   on image 54 of series 2, stable since 08/08/16.    IMPRESSION:    No acute findings.    Tiny right lung nodule, benign by appearance and stability    < end of copied text >      Consultant(s) Notes Reviewed:  [x ] YES  [ ] NO  Care Discussed with Consultants/Other Providers [ x] YES  [ ] NO  Imaging Personally Reviewed:  [ ] YES  [ ] NO

## 2019-05-17 NOTE — ED PROVIDER NOTE - OBJECTIVE STATEMENT
Patient presents c/o sob on exertion. She notes its onset 2 weeks ago but has been progressively worsening. H/O HTN , Obstructive sleep apnea, and bariatric surgery several years ago. No fever or cough .No chest pain. Pt notes she cannot perform minimal exertion without getting sob. She remains overweight at this time.   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   The primary concern is to r/o PE. She has not had a cardiac workup since her clearance for surgery several years ago.     Non smoker.

## 2019-05-17 NOTE — ED PROVIDER NOTE - CLINICAL SUMMARY MEDICAL DECISION MAKING FREE TEXT BOX
Patient presents c/o sob on exertion. She notes its onset 2 weeks ago but has been progressively worsening. H/O HTN , Obstructive sleep apnea, and bariatric surgery several years ago. No fever or cough .No chest pain. Pt notes she cannot perform minimal exertion without getting sob. She remains overweight at this time.   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   The primary concern is to r/o PE. She has not had a cardiac workup since her clearance for surgery several years ago.   Pt with elevated ddimer. normal trop and normal probnp. cxr with ? infiltrate. CT performed. No PE. No infiltrate; + stable nodule.   Will admit per Jose Fuller reccs. Pt needs cardiac w/u. Moderate to high risk. She has No exercise tolerance at this time.

## 2019-05-17 NOTE — HEALTH RISK ASSESSMENT
[No falls in past year] : Patient reported no falls in the past year [0] : 2) Feeling down, depressed, or hopeless: Not at all (0) [HIV test declined] : HIV test declined [Hepatitis C test declined] : Hepatitis C test declined [MammogramDate] : 03/18 [[id number]] : 0 [ColonoscopyDate] : 06/18

## 2019-05-17 NOTE — H&P ADULT - NSHPPHYSICALEXAM_GEN_ALL_CORE
T(C): 36.8 (05-17-19 @ 17:23), Max: 36.8 (05-17-19 @ 17:23)  HR: 93 (05-17-19 @ 17:23) (93 - 100)  BP: 133/68 (05-17-19 @ 17:23) (133/68 - 156/80)  RR: 16 (05-17-19 @ 17:23) (16 - 18)  SpO2: 96% (05-17-19 @ 17:23) (96% - 99%)  Wt(kg): --Vital Signs Last 24 Hrs  T(C): 36.8 (17 May 2019 17:23), Max: 36.8 (17 May 2019 17:23)  T(F): 98.3 (17 May 2019 17:23), Max: 98.3 (17 May 2019 17:23)  HR: 93 (17 May 2019 17:23) (93 - 100)  BP: 133/68 (17 May 2019 17:23) (133/68 - 156/80)  BP(mean): --  RR: 16 (17 May 2019 17:23) (16 - 18)  SpO2: 96% (17 May 2019 17:23) (96% - 99%)    PHYSICAL EXAM:  GENERAL: NAD, well-groomed, well-developed  HEAD:  Atraumatic, Normocephalic  EYES: EOMI, PERRLA, conjunctiva and sclera clear  ENMT: No tonsillar erythema, exudates, or enlargement; Moist mucous membranes, Good dentition, No lesions  NECK: Supple, No JVD, Normal thyroid  NERVOUS SYSTEM:  Alert & Oriented X3, Good concentration; Motor Strength 5/5 B/L upper and lower extremities;   CHEST/LUNG: Clear to percussion bilaterally; No rales, rhonchi, wheezing, or rubs  HEART: Regular rate and rhythm; 2/6 systolic murmur, rubs, or gallops  ABDOMEN: Soft, Nontender, Nondistended; morbid obesity  EXTREMITIES:  2+ Peripheral Pulses, No clubbing, cyanosis, or +2 edema  LYMPH: No lymphadenopathy noted  SKIN: No rashes or lesions

## 2019-05-17 NOTE — H&P ADULT - ASSESSMENT
Patient presents c/o sob on exertion. She notes its onset 2 weeks ago but has been progressively worsening. H/O HTN , Obstructive sleep apnea, and bariatric surgery several years ago. No fever or cough .No chest pain. Pt notes she cannot perform minimal exertion without getting sob. She remains overweight at this time.   	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   	The primary concern is to r/o PE which the ED has ruled out.     CAPRINI SCORE [CLOT]    AGE RELATED RISK FACTORS                                                       MOBILITY RELATED FACTORS  [ x] Age 41-60 years                                            (1 Point)                  [ ] Bed rest                                                        (1 Point)  [ ] Age: 61-74 years                                           (2 Points)                 [ ] Plaster cast                                                   (2 Points)  [ ] Age= 75 years                                              (3 Points)                 [ ] Bed bound for more than 72 hours                 (2 Points)    DISEASE RELATED RISK FACTORS                                               GENDER SPECIFIC FACTORS  [x ] Edema in the lower extremities                       (1 Point)                  [ ] Pregnancy                                                     (1 Point)  [ ] Varicose veins                                               (1 Point)                  [ ] Post-partum < 6 weeks                                   (1 Point)             [x ] BMI > 25 Kg/m2                                            (1 Point)                  [ ] Hormonal therapy  or oral contraception          (1 Point)                 [ ] Sepsis (in the previous month)                        (1 Point)                  [ ] History of pregnancy complications                 (1 point)  [ ] Pneumonia or serious lung disease                                               [ ] Unexplained or recurrent                     (1 Point)           (in the previous month)                               (1 Point)  [ ] Abnormal pulmonary function test                     (1 Point)                 SURGERY RELATED RISK FACTORS  [ ] Acute myocardial infarction                              (1 Point)                 [ ]  Section                                             (1 Point)  [ ] Congestive heart failure (in the previous month)  (1 Point)               [ ] Minor surgery                                                  (1 Point)   [ ] Inflammatory bowel disease                             (1 Point)                 [ ] Arthroscopic surgery                                        (2 Points)  [ ] Central venous access                                      (2 Points)                [ ] General surgery lasting more than 45 minutes   (2 Points)       [ ] Stroke (in the previous month)                          (5 Points)               [ ] Elective arthroplasty                                         (5 Points)                                                                                                                                               HEMATOLOGY RELATED FACTORS                                                 TRAUMA RELATED RISK FACTORS  [ ] Prior episodes of VTE                                     (3 Points)                [ ] Fracture of the hip, pelvis, or leg                       (5 Points)  [ ] Positive family history for VTE                         (3 Points)                 [ ] Acute spinal cord injury (in the previous month)  (5 Points)  [ ] Prothrombin 05095 A                                     (3 Points)                 [ ] Paralysis  (less than 1 month)                             (5 Points)  [ ] Factor V Leiden                                             (3 Points)                  [ ] Multiple Trauma within 1 month                        (5 Points)  [ ] Lupus anticoagulants                                     (3 Points)                                                           [ ] Anticardiolipin antibodies                               (3 Points)                                                       [ ] High homocysteine in the blood                      (3 Points)                                             [ ] Other congenital or acquired thrombophilia      (3 Points)                                                [ ] Heparin induced thrombocytopenia                  (3 Points)                                          Total Score [   3       ]-lovenox

## 2019-05-17 NOTE — H&P ADULT - NSHPREVIEWOFSYSTEMS_GEN_ALL_CORE
REVIEW OF SYSTEMS:  CONSTITUTIONAL: No fever, reports weight gain, +fatigue  EYES: No eye pain, visual disturbances, or discharge  ENMT:  No difficulty hearing, tinnitus, vertigo; No sinus or throat pain  NECK: No pain or stiffness  BREASTS: No pain, masses, or nipple discharge  RESPIRATORY: No cough, wheezing, chills or hemoptysis; progressive shortness of breath over the last 3-4 months  CARDIOVASCULAR: No chest pain, palpitations, dizziness, or leg swelling  GASTROINTESTINAL: No abdominal or epigastric pain. No nausea, vomiting, or hematemesis; No diarrhea or constipation. No melena or hematochezia.  GENITOURINARY: No dysuria, frequency, hematuria, or incontinence  NEUROLOGICAL: No headaches, memory loss, loss of strength, numbness, or tremors  SKIN: No itching, burning, rashes, or lesions   LYMPH NODES: No enlarged glands  ENDOCRINE: No heat or cold intolerance; No hair loss  MUSCULOSKELETAL: No joint pain or swelling; No muscle, back, or extremity pain  PSYCHIATRIC: No depression, anxiety, mood swings, or difficulty sleeping  HEME/LYMPH: No easy bruising, or bleeding gums  ALLERY AND IMMUNOLOGIC: No hives or eczema    ALL ROS REVIEWED AND NORMAL EXCEPT AS STATED ABOVE

## 2019-05-17 NOTE — H&P ADULT - HISTORY OF PRESENT ILLNESS
Patient presents c/o sob on exertion. She notes its onset 2 weeks ago but has been progressively worsening. H/O HTN , Obstructive sleep apnea, and bariatric surgery several years ago. No fever or cough .No chest pain. Pt notes she cannot perform minimal exertion without getting sob. She remains overweight at this time.   	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   	The primary concern is to r/o PE. She has not had a cardiac workup since her clearance for surgery several years ago. Patient presents c/o sob on exertion. She notes its onset 2 weeks ago but has been progressively worsening. H/O HTN , Obstructive sleep apnea, and bariatric surgery several years ago. No fever or cough .No chest pain. Pt notes she cannot perform minimal exertion without getting sob. She remains overweight at this time.   	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   	The primary concern is to r/o PE which the ED has ruled out.

## 2019-05-17 NOTE — H&P ADULT - PROBLEM SELECTOR PLAN 1
Tele monitor  TTE ordered  Pulm consult  Cards consult  PT consult CT angio in ED did not show any PE  Tele monitor  TTE ordered  Pulm consult  Cards consult  PT consult

## 2019-05-17 NOTE — PHYSICAL EXAM
[No Acute Distress] : no acute distress [Well Nourished] : well nourished [Well Developed] : well developed [Well-Appearing] : well-appearing [Normal Voice/Communication] : normal voice/communication [Normal Sclera/Conjunctiva] : normal sclera/conjunctiva [EOMI] : extraocular movements intact [PERRL] : pupils equal round and reactive to light [Normal Outer Ear/Nose] : the outer ears and nose were normal in appearance [Normal Oropharynx] : the oropharynx was normal [Normal TMs] : both tympanic membranes were normal [Normal Nasal Mucosa] : the nasal mucosa was normal [No JVD] : no jugular venous distention [Supple] : supple [No Lymphadenopathy] : no lymphadenopathy [Thyroid Normal, No Nodules] : the thyroid was normal and there were no nodules present [No Respiratory Distress] : no respiratory distress  [No Accessory Muscle Use] : no accessory muscle use [Clear to Auscultation] : lungs were clear to auscultation bilaterally [Normal Percussion] : the chest was normal to percussion [Normal Rate] : normal rate  [Normal S1, S2] : normal S1 and S2 [Regular Rhythm] : with a regular rhythm [No Murmur] : no murmur heard [No Abdominal Bruit] : a ~M bruit was not heard ~T in the abdomen [No Carotid Bruits] : no carotid bruits [Pedal Pulses Present] : the pedal pulses are present [No Varicosities] : no varicosities [No Extremity Clubbing/Cyanosis] : no extremity clubbing/cyanosis [No Palpable Aorta] : no palpable aorta [Declined Breast Exam] : declined breast exam  [Non Tender] : non-tender [Soft] : abdomen soft [No Masses] : no abdominal mass palpated [No HSM] : no HSM [Non-distended] : non-distended [No Hernias] : no hernias [Normal Bowel Sounds] : normal bowel sounds [Normal Supraclavicular Nodes] : no supraclavicular lymphadenopathy [Declined Rectal Exam] : declined rectal exam [Normal Axillary Nodes] : no axillary lymphadenopathy [Normal Posterior Cervical Nodes] : no posterior cervical lymphadenopathy [Normal Anterior Cervical Nodes] : no anterior cervical lymphadenopathy [Normal Inguinal Nodes] : no inguinal lymphadenopathy [No CVA Tenderness] : no CVA  tenderness [No Spinal Tenderness] : no spinal tenderness [No Joint Swelling] : no joint swelling [Grossly Normal Strength/Tone] : grossly normal strength/tone [No Rash] : no rash [Normal Gait] : normal gait [No Focal Deficits] : no focal deficits [Coordination Grossly Intact] : coordination grossly intact [Deep Tendon Reflexes (DTR)] : deep tendon reflexes were 2+ and symmetric [Speech Grossly Normal] : speech grossly normal [Memory Grossly Normal] : memory grossly normal [Normal Affect] : the affect was normal [Alert and Oriented x3] : oriented to person, place, and time [Normal Mood] : the mood was normal [Normal Insight/Judgement] : insight and judgment were intact [de-identified] : bilat edema

## 2019-05-17 NOTE — REVIEW OF SYSTEMS
[Fatigue] : fatigue [Shortness Of Breath] : shortness of breath [Dyspnea on Exertion] : dyspnea on exertion [Nocturia] : nocturia [Joint Stiffness] : joint stiffness [Back Pain] : back pain [Depression] : depression [Anxiety] : anxiety [Insomnia] : insomnia [Negative] : Heme/Lymph [Fever] : no fever [Chills] : no chills [Night Sweats] : no night sweats [Hot Flashes] : no hot flashes [Recent Change In Weight] : ~T no recent weight change [Discharge] : no discharge [Redness] : no redness [Pain] : no pain [Itching] : no itching [Dryness] : no dryness  [Vision Problems] : no vision problems [Hearing Loss] : no hearing loss [Nosebleed] : no nosebleeds [Earache] : no earache [Nasal Discharge] : no nasal discharge [Hoarseness] : no hoarseness [Postnasal Drip] : no postnasal drip [Sore Throat] : no sore throat [Chest Pain] : no chest pain [Palpitations] : no palpitations [Lower Ext Edema] : no lower extremity edema [Leg Claudication] : no leg claudication [Orthopnea] : no orthopnea [Cough] : no cough [Wheezing] : no wheezing [Paroysmal Nocturnal Dyspnea] : no paroysmal nocturnal dyspnea [Nausea] : no nausea [Constipation] : no constipation [Abdominal Pain] : no abdominal pain [Heartburn] : no heartburn [Diarrhea] : diarrhea [Vomiting] : no vomiting [Incontinence] : no incontinence [Melena] : no melena [Dysuria] : no dysuria [Poor Libido] : libido not poor [Hematuria] : no hematuria [Frequency] : no frequency [Vaginal Discharge] : no vaginal discharge [Joint Pain] : no joint pain [Dysmenorrhea] : no dysmenorrhea [Muscle Weakness] : no muscle weakness [Joint Swelling] : no joint swelling [Muscle Pain] : no muscle pain [Nail Changes] : no nail changes [Mole Changes] : no mole changes [Skin Rash] : no skin rash [Hair Changes] : no hair changes [Fainting] : no fainting [Dizziness] : no dizziness [Headache] : no headache [Confusion] : no confusion [Memory Loss] : no memory loss [Unsteady Walking] : no ataxia [Easy Bleeding] : no easy bleeding [Suicidal] : not suicidal [Easy Bruising] : no easy bruising [Swollen Glands] : no swollen glands [FreeTextEntry6] : minimal exertion

## 2019-05-17 NOTE — ASSESSMENT
[FreeTextEntry1] : Physical exam shows a obese female in no acute distress blood pressure was 150/80 height 5 foot 5 inches weight 11 pounds BMI 51.75 is afebrile at 98.2°F orally heart rate was between 95 and 100 respiratory rate of 18 HEENT was unremarkable chest showed decreased breath sounds no rales wheezing or dullness to percussion cardiovascular exam was tachycardic abdomen was soft extremities showed 1+ edema to the knee neurologic exam was nonfocal concerned in this morbidly obese female with her shortness of breath as this is pulmonary embolus until proven otherwise cardiac disease and lung disease are also possible she was sent immediately to the local emergency room for evaluation telephone call to the emergency room I spoke to the ER attending Dr. Sheehan.She also is behind on visits to the ophthalmologist gynecologist and dermatologist which she will cigarette once the acute situation is over

## 2019-05-18 LAB
ANION GAP SERPL CALC-SCNC: 10 MMOL/L — SIGNIFICANT CHANGE UP (ref 5–17)
BUN SERPL-MCNC: 11 MG/DL — SIGNIFICANT CHANGE UP (ref 7–23)
CALCIUM SERPL-MCNC: 9.3 MG/DL — SIGNIFICANT CHANGE UP (ref 8.4–10.5)
CHLORIDE SERPL-SCNC: 102 MMOL/L — SIGNIFICANT CHANGE UP (ref 96–108)
CO2 BLDA-SCNC: 30 MMOL/L — SIGNIFICANT CHANGE UP (ref 22–30)
CO2 SERPL-SCNC: 28 MMOL/L — SIGNIFICANT CHANGE UP (ref 22–31)
CREAT SERPL-MCNC: 0.63 MG/DL — SIGNIFICANT CHANGE UP (ref 0.5–1.3)
GAS PNL BLDA: SIGNIFICANT CHANGE UP
GLUCOSE BLDC GLUCOMTR-MCNC: 149 MG/DL — HIGH (ref 70–99)
GLUCOSE BLDC GLUCOMTR-MCNC: 165 MG/DL — HIGH (ref 70–99)
GLUCOSE BLDC GLUCOMTR-MCNC: 165 MG/DL — HIGH (ref 70–99)
GLUCOSE BLDC GLUCOMTR-MCNC: 216 MG/DL — HIGH (ref 70–99)
GLUCOSE SERPL-MCNC: 183 MG/DL — HIGH (ref 70–99)
HCT VFR BLD CALC: 36.3 % — SIGNIFICANT CHANGE UP (ref 34.5–45)
HCV AB S/CO SERPL IA: 0.12 S/CO — SIGNIFICANT CHANGE UP (ref 0–0.99)
HCV AB SERPL-IMP: SIGNIFICANT CHANGE UP
HGB BLD-MCNC: 11.6 G/DL — SIGNIFICANT CHANGE UP (ref 11.5–15.5)
MCHC RBC-ENTMCNC: 27.9 PG — SIGNIFICANT CHANGE UP (ref 27–34)
MCHC RBC-ENTMCNC: 32 GM/DL — SIGNIFICANT CHANGE UP (ref 32–36)
MCV RBC AUTO: 87.3 FL — SIGNIFICANT CHANGE UP (ref 80–100)
NRBC # BLD: 0 /100 WBCS — SIGNIFICANT CHANGE UP (ref 0–0)
PCO2 BLDA: 42 MMHG — SIGNIFICANT CHANGE UP (ref 32–46)
PH BLDA: 7.44 — SIGNIFICANT CHANGE UP (ref 7.35–7.45)
PLATELET # BLD AUTO: 475 K/UL — HIGH (ref 150–400)
PO2 BLDA: 77 MMHG — SIGNIFICANT CHANGE UP (ref 74–108)
POTASSIUM SERPL-MCNC: 3.4 MMOL/L — LOW (ref 3.5–5.3)
POTASSIUM SERPL-SCNC: 3.4 MMOL/L — LOW (ref 3.5–5.3)
RBC # BLD: 4.16 M/UL — SIGNIFICANT CHANGE UP (ref 3.8–5.2)
RBC # FLD: 16.5 % — HIGH (ref 10.3–14.5)
SAO2 % BLDA: 92 % — SIGNIFICANT CHANGE UP (ref 92–96)
SODIUM SERPL-SCNC: 140 MMOL/L — SIGNIFICANT CHANGE UP (ref 135–145)
TSH SERPL-MCNC: 1.83 UIU/ML — SIGNIFICANT CHANGE UP (ref 0.27–4.2)
WBC # BLD: 9.7 K/UL — SIGNIFICANT CHANGE UP (ref 3.8–10.5)
WBC # FLD AUTO: 9.7 K/UL — SIGNIFICANT CHANGE UP (ref 3.8–10.5)

## 2019-05-18 PROCEDURE — 99254 IP/OBS CNSLTJ NEW/EST MOD 60: CPT

## 2019-05-18 PROCEDURE — 99235 HOSP IP/OBS SAME DATE MOD 70: CPT

## 2019-05-18 RX ORDER — ACETAMINOPHEN 500 MG
650 TABLET ORAL EVERY 6 HOURS
Refills: 0 | Status: DISCONTINUED | OUTPATIENT
Start: 2019-05-18 | End: 2019-05-21

## 2019-05-18 RX ADMIN — Medication 650 MILLIGRAM(S): at 07:01

## 2019-05-18 RX ADMIN — Medication 1: at 07:43

## 2019-05-18 RX ADMIN — Medication 2: at 17:33

## 2019-05-18 RX ADMIN — Medication 650 MILLIGRAM(S): at 21:23

## 2019-05-18 RX ADMIN — ENOXAPARIN SODIUM 40 MILLIGRAM(S): 100 INJECTION SUBCUTANEOUS at 17:33

## 2019-05-18 RX ADMIN — LISINOPRIL 40 MILLIGRAM(S): 2.5 TABLET ORAL at 05:43

## 2019-05-18 RX ADMIN — ENOXAPARIN SODIUM 40 MILLIGRAM(S): 100 INJECTION SUBCUTANEOUS at 05:42

## 2019-05-18 RX ADMIN — Medication 650 MILLIGRAM(S): at 19:38

## 2019-05-18 RX ADMIN — Medication 25 MILLIGRAM(S): at 05:42

## 2019-05-18 RX ADMIN — Medication 1: at 12:00

## 2019-05-18 RX ADMIN — Medication 650 MILLIGRAM(S): at 08:00

## 2019-05-18 RX ADMIN — Medication 75 MICROGRAM(S): at 05:43

## 2019-05-18 NOTE — CONSULT NOTE ADULT - ASSESSMENT
Assessment and Plan:    Assessment:  · Assessment		   Patient presents c/o sob on exertion. She notes its onset 2 weeks ago but has been progressively worsening. H/O HTN , Obstructive sleep apnea, and bariatric surgery several years ago. No fever or cough .No chest pain. Pt notes she cannot perform minimal exertion without getting sob. She remains overweight at this time.   	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   	The primary concern is to r/o PE which the ED has ruled out.     CAPRINI SCORE [CLOT]    AGE RELATED RISK FACTORS                                                       MOBILITY RELATED FACTORS  [ x] Age 41-60 years                                            (1 Point)                  [ ] Bed rest                                                        (1 Point)  [ ] Age: 61-74 years                                           (2 Points)                 [ ] Plaster cast                                                   (2 Points)  [ ] Age= 75 years                                              (3 Points)                 [ ] Bed bound for more than 72 hours                 (2 Points)    DISEASE RELATED RISK FACTORS                                               GENDER SPECIFIC FACTORS  [x ] Edema in the lower extremities                       (1 Point)                  [ ] Pregnancy                                                     (1 Point)  [ ] Varicose veins                                               (1 Point)                  [ ] Post-partum < 6 weeks                                   (1 Point)             [x ] BMI > 25 Kg/m2                                            (1 Point)                  [ ] Hormonal therapy  or oral contraception          (1 Point)                 [ ] Sepsis (in the previous month)                        (1 Point)                  [ ] History of pregnancy complications                 (1 point)  [ ] Pneumonia or serious lung disease                                               [ ] Unexplained or recurrent                     (1 Point)           (in the previous month)                               (1 Point)  [ ] Abnormal pulmonary function test                     (1 Point)                 SURGERY RELATED RISK FACTORS  [ ] Acute myocardial infarction                              (1 Point)                 [ ]  Section                                             (1 Point)  [ ] Congestive heart failure (in the previous month)  (1 Point)               [ ] Minor surgery                                                  (1 Point)   [ ] Inflammatory bowel disease                             (1 Point)                 [ ] Arthroscopic surgery                                        (2 Points)  [ ] Central venous access                                      (2 Points)                [ ] General surgery lasting more than 45 minutes   (2 Points)       [ ] Stroke (in the previous month)                          (5 Points)               [ ] Elective arthroplasty                                         (5 Points)                                                                                                                                               HEMATOLOGY RELATED FACTORS                                                 TRAUMA RELATED RISK FACTORS  [ ] Prior episodes of VTE                                     (3 Points)                [ ] Fracture of the hip, pelvis, or leg                       (5 Points)  [ ] Positive family history for VTE                         (3 Points)                 [ ] Acute spinal cord injury (in the previous month)  (5 Points)  [ ] Prothrombin 04383 A                                     (3 Points)                 [ ] Paralysis  (less than 1 month)                             (5 Points)  [ ] Factor V Leiden                                             (3 Points)                  [ ] Multiple Trauma within 1 month                        (5 Points)  [ ] Lupus anticoagulants                                     (3 Points)                                                           [ ] Anticardiolipin antibodies                               (3 Points)                                                       [ ] High homocysteine in the blood                      (3 Points)                                             [ ] Other congenital or acquired thrombophilia      (3 Points)                                                [ ] Heparin induced thrombocytopenia                  (3 Points)                                          Total Score [   3       ]-lovenox     Problem/Plan - 1:  ·  Problem: Dyspnea on exertion.  Plan: CT angio in ED did not show any PE  Tele monitor  TTE ordered  Pulm consult  PT consult.pharmacological stress test to r/o CAD     Problem/Plan - 2:  ·  Problem: Essential hypertension.  Plan: continue home diuretic and ace.      Problem/Plan - 3:  ·  Problem: Hypothyroid.  Plan: AM TSH   continue home levo.      Problem/Plan - 4:  ·  Problem: Sleep apnea.  Plan: Pulm consult  AM abg ordred.      Problem/Plan - 5:  ·  Problem: Diabetes.  Plan: Not on any diabetic meds  Hem a1c in the AM  Insulin coverage written for.

## 2019-05-18 NOTE — PHYSICAL THERAPY INITIAL EVALUATION ADULT - ADDITIONAL COMMENTS
Pt lives in house with brother, 3 FLACO with 1 rail, 12 steps inside with 2 rails.  Pt was independent with ambulation, but recently becoming SOB with walking.  Independent ADLs, works and drives.

## 2019-05-18 NOTE — DIETITIAN INITIAL EVALUATION ADULT. - SIGNS/SYMPTOMS
AEB BMI 51.9, reports of overconsumption of high calorie food, hx of obesity (had bariatric surgery) Unknown if ever smoked

## 2019-05-18 NOTE — CONSULT NOTE ADULT - SUBJECTIVE AND OBJECTIVE BOX
PULMONARY CONSULT  Location of Patient :  [facility]LE 0225 D1 ( TELE)  Attending requesting Consult:Simone Fuller  Chief Complaint : NELSON  Reason For consult : NELSON      Initial HPI on admission:  HPI:  59 year old female with h/o Morbid obesity, ABBIE off Cpap 12 for over 1-2 years, h/o bariatric surgery, HTN, Hypothyroidism who p/w increasing NELSON x few weeks.  states unable to catch breath after few feet of walking. denies wheezing, chest pain.   states + Weight gain 30-40 llbs over past 6 mo  states has sedentary lifestyle at work,   denies smoking but  + second hand as child   ? h/o asthma where used nebs but never used for long time.    at this time she is at rest has no acute complaints.    PAST MEDICAL & SURGICAL HISTORY:  Diabetes: denies a past medical problem  Hypothyroid  Hypertension    Allergies    latex (Rash)  No Known Drug Allergies    Intolerances      FAMILY HISTORY:    Social history:      Smoking: never     Drinking: denies     Drug use:denies    Review of Systems:  CONSTITUTIONAL: No fever, No chills, +fatigue  EYES: No eye pain, No visual disturbances, No discharge  ENMT:  No difficulty hearing, No tinnitus, No vertigo; No sinus or throat pain  NECK: No pain or stiffness  RESPIRATORY: Per above  CARDIOVASCULAR: No chest pain, +palpitations, No dizziness, or +leg swelling  GASTROINTESTINAL: No abdominal or epigastric pain. No nausea, No vomiting, No hematemesis; No diarrhea or constipation. No melena, No hematochezia.  GENITOURINARY: No dysuria, No frequency, No hematuria, No incontinence  NEUROLOGICAL: No headaches, No memory loss, No loss of strength, No numbness, No tremors  SKIN: No itching, No burning, No rashes, No lesions   MUSCULOSKELETAL: No joint pain or swelling; No muscle, back, No extremity pain  PSYCHIATRIC: No depression, No anxiety, No mood swings, No difficulty sleeping    Medications:  MEDICATIONS  (STANDING):  dextrose 5%. 1000 milliLiter(s) (50 mL/Hr) IV Continuous <Continuous>  dextrose 50% Injectable 12.5 Gram(s) IV Push once  dextrose 50% Injectable 25 Gram(s) IV Push once  dextrose 50% Injectable 25 Gram(s) IV Push once  enoxaparin Injectable 40 milliGRAM(s) SubCutaneous every 12 hours  hydrochlorothiazide 25 milliGRAM(s) Oral daily  insulin lispro (HumaLOG) corrective regimen sliding scale   SubCutaneous three times a day before meals  insulin lispro (HumaLOG) corrective regimen sliding scale   SubCutaneous at bedtime  levothyroxine 75 MICROGram(s) Oral daily  lisinopril 40 milliGRAM(s) Oral daily    MEDICATIONS  (PRN):  acetaminophen   Tablet .. 650 milliGRAM(s) Oral every 6 hours PRN Temp greater or equal to 38C (100.4F), Mild Pain (1 - 3)  dextrose 40% Gel 15 Gram(s) Oral once PRN Blood Glucose LESS THAN 70 milliGRAM(s)/deciliter  glucagon  Injectable 1 milliGRAM(s) IntraMuscular once PRN Glucose LESS THAN 70 milligrams/deciliter      Home Medications:  Last Order Reconciliation Date: 05-17-19 @ 19:13 (Admission Reconciliation)  Accupril 40 mg oral tablet: 1 tab(s) orally once a day (05-17-19 @ 19:12)  hydroCHLOROthiazide 25 mg oral tablet: 1 tab(s) orally once a day (05-17-19 @ 16:04)  quinapril 40 mg oral tablet: 1 tab(s) orally once a day (05-17-19 @ 19:13)  Synthroid 75 mcg (0.075 mg) oral tablet: 1 tab(s) orally once a day (05-17-19 @ 16:04)      LABS:                        11.6   9.70  )-----------( 475      ( 18 May 2019 07:49 )             36.3     05-18    140  |  102  |  11  ----------------------------<  183<H>  3.4<L>   |  28  |  0.63    Ca    9.3      18 May 2019 07:35    TPro  6.6  /  Alb  3.1<L>  /  TBili  0.7  /  DBili  x   /  AST  55<H>  /  ALT  66<H>  /  AlkPhos  120  05-17    HIT ab -- 05-17 @ 13:25  HIT ab EIA --  D Dimer -276    CARDIAC MARKERS ( 17 May 2019 13:25 )  .019 ng/mL / x     / x     / x     / x            PT/INR - ( 17 May 2019 13:25 )   PT: 10.1 sec;   INR: 0.90 ratio         PTT - ( 17 May 2019 13:25 )  PTT:25.1 sec      Serum Pro-Brain Natriuretic Peptide: 106 pg/mL (05-17-19 @ 13:25)        CULTURES: (if applicable)        ABG - ( 18 May 2019 06:55 )  pH, Arterial: 7.44  pH, Blood: x     /  pCO2: 42    /  pO2: 77    / HCO3: x     / Base Excess: x     /  SaO2: 92                CAPILLARY BLOOD GLUCOSE      POCT Blood Glucose.: 165 mg/dL (18 May 2019 07:38)        RADIOLOGY  CXR:  < from: Xray Chest 1 View AP/PA (05.17.19 @ 13:47) >    Portable chest x-ray is compared to a previous examination dated 2/5/2018.    Impression: New density projecting over the left lower lung zone may be due to overlying soft tissue or may represent pulmonary infiltrate. If clinically indicated, PA and lateral chest x-rays may be pursued for   better evaluation. This finding is communicatedwith the emergency department via the PACS communication system.    No evidence for pleural effusion or pneumothorax.    The trachea is midline.    Cardiac silhouette is within normal limits.    < end of copied text >      CT:  < from: CT Angio Chest w/ IV Cont (05.17.19 @ 14:28) >  EXAM:  CT ANGIO CHEST (W)AW IC      PROCEDURE DATE:  05/17/2019        INTERPRETATION:  CT angiogram chest with contrast    History shortness of breath    Contrast Omnipaque 90 cc; 10 cc discarded    Axial MIPS included    There is no pulmonary embolism. There is moderate cardiomegaly. There is no acute consolidation or maikol central edema or pleural or pericardial effusion. There is no hilar or mediastinal adenopathy. Incidental note is   made of a tiny 3 x 5 mm subpleural smooth oval nodule in the right lung on image 54 of series 2, stable since 08/08/16.    IMPRESSION:    No acute findings.    Tiny right lung nodule, benign by appearance and stability    < end of copied text >    ECHO:  < from: TTE Echo Complete w/Doppler (05.18.19 @ 08:01) >  Summary:   1. Left ventricular ejection fraction, by visual estimation, is 50 to 55%.   2. Normal global left ventricular systolic function.   3. Normal left ventricular size and wall thicknesses, with normal systolic and diastolic function.   4. Spectral Doppler shows impaired relaxation pattern of left ventricular myocardial filling (Grade I diastolic dysfunction).   5. There is mild septal left ventricular hypertrophy.   6. There is no evidence of pericardial effusion.   7. Mild mitral annular calcification.   8. Thickening of the anterior and posterior mitral valve leaflets.   9. Structurally normal pulmonic valve, with normal leaflet excursion.  < end of copied text >        VITALS:  T(C): 37 (05-18-19 @ 07:39), Max: 37 (05-18-19 @ 06:00)  T(F): 98.6 (05-18-19 @ 07:39), Max: 98.6 (05-18-19 @ 06:00)  HR: 80 (05-18-19 @ 07:39) (79 - 100)  BP: 144/87 (05-18-19 @ 07:39) (118/64 - 156/80)  BP(mean): --  ABP: --  ABP(mean): --  RR: 15 (05-18-19 @ 07:39) (15 - 18)  SpO2: 97% (05-18-19 @ 07:39) (96% - 99%)  CVP(mm Hg): --  CVP(cm H2O): --    Ins and Outs       Height (cm): 165.1 (05-18-19 @ 09:15)  Weight (kg): 141.6 (05-18-19 @ 09:15)  BMI (kg/m2): 51.9 (05-18-19 @ 09:15)        I&O's Detail
Chief Complaint:     HPI:   History of Present Illness:  Reason for Admission: shortness of breath with ambulation	  History of Present Illness: 	   Patient presents c/o sob on exertion. She notes its onset 2 weeks ago but has been progressively worsening. H/O HTN , Obstructive sleep apnea, and bariatric surgery several years ago. No fever or cough .No chest pain. Pt notes she cannot perform minimal exertion without getting sob. She remains overweight at this time.   	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   	The primary concern is to r/o PE which the ED has ruled out.       PMH:   Diabetes  Hypothyroid  Hypertension    PSH:     Family History:  FAMILY HISTORY:      Social History:  Smoking:  Alcohol:  Drugs:    Allergies:  latex (Rash)  No Known Drug Allergies      Medications:  acetaminophen   Tablet .. 650 milliGRAM(s) Oral every 6 hours PRN  dextrose 40% Gel 15 Gram(s) Oral once PRN  dextrose 5%. 1000 milliLiter(s) IV Continuous <Continuous>  dextrose 50% Injectable 12.5 Gram(s) IV Push once  dextrose 50% Injectable 25 Gram(s) IV Push once  dextrose 50% Injectable 25 Gram(s) IV Push once  enoxaparin Injectable 40 milliGRAM(s) SubCutaneous every 12 hours  glucagon  Injectable 1 milliGRAM(s) IntraMuscular once PRN  hydrochlorothiazide 25 milliGRAM(s) Oral daily  insulin lispro (HumaLOG) corrective regimen sliding scale   SubCutaneous three times a day before meals  insulin lispro (HumaLOG) corrective regimen sliding scale   SubCutaneous at bedtime  levothyroxine 75 MICROGram(s) Oral daily  lisinopril 40 milliGRAM(s) Oral daily      REVIEW OF SYSTEMS:  CONSTITUTIONAL: No fever, weight loss, or fatigue  EYES: No eye pain, visual disturbances, or discharge  ENMT:  No difficulty hearing, tinnitus, vertigo; No sinus or throat pain  NECK: No pain or stiffness  BREASTS: No pain, masses, or nipple discharge  RESPIRATORY: No cough, wheezing, chills or hemoptysis; No shortness of breath  CARDIOVASCULAR: No chest pain, palpitations, dizziness, or leg swelling  GASTROINTESTINAL: No abdominal or epigastric pain. No nausea, vomiting, or hematemesis; No diarrhea or constipation. No melena or hematochezia.  GENITOURINARY: No dysuria, frequency, hematuria, or incontinence  NEUROLOGICAL: No headaches, memory loss, loss of strength, numbness, or tremors  SKIN: No itching, burning, rashes, or lesions   LYMPH NODES: No enlarged glands  ENDOCRINE: No heat or cold intolerance; No hair loss  MUSCULOSKELETAL: No joint pain or swelling; No muscle, back, or extremity pain  PSYCHIATRIC: No depression, anxiety, mood swings, or difficulty sleeping  HEME/LYMPH: No easy bruising, or bleeding gums  ALLERY AND IMMUNOLOGIC: No hives or eczema    Physical Exam:  T(C): 37 (05-18-19 @ 20:26), Max: 37 (05-18-19 @ 06:00)  HR: 91 (05-18-19 @ 20:26) (79 - 91)  BP: 142/88 (05-18-19 @ 20:26) (125/63 - 160/93)  RR: 15 (05-18-19 @ 20:26) (15 - 16)  SpO2: 96% (05-18-19 @ 20:26) (96% - 99%)  Wt(kg): --    GENERAL: NAD, well-groomed, well-developed  HEAD:  Atraumatic, Normocephalic  EYES: EOMI, conjunctiva and sclera clear  ENT: Moist mucous membranes,  NECK: Supple, No JVD, no bruits  CHEST/LUNG: Clear to percussion bilaterally; No rales, rhonchi, wheezing, or rubs  HEART: Regular rate and rhythm; No murmurs, rubs, or gallops PMI non displaced.  ABDOMEN: Soft, Nontender, Nondistended; Bowel sounds present  EXTREMITIES:  2+ Peripheral Pulses, No clubbing, cyanosis, or edema  SKIN: No rashes or lesions  NERVOUS SYSTEM:  Alert & Oriented X3, Good concentration; Motor Strength 5/5 B/L upper and lower extremities; DTRs 2+ intact and symmetric    Cardiovascular Diagnostic Testing:  ECG:  < from: 12 Lead ECG (05.17.19 @ 13:06) >  Diagnosis Line Normal sinus rhythm  Nonspecific ST abnormality  Abnormal ECG  When compared with ECG of 08-AUG-2016 17:10,  No significant change was found  Confirmed by HU NAGEL MD ([id number]) on 5/18/2019 1:13:54 PM    < end of copied text >      Labs:                        11.6   9.70  )-----------( 475      ( 18 May 2019 07:49 )             36.3     05-18    140  |  102  |  11  ----------------------------<  183<H>  3.4<L>   |  28  |  0.63    Ca    9.3      18 May 2019 07:35    TPro  6.6  /  Alb  3.1<L>  /  TBili  0.7  /  DBili  x   /  AST  55<H>  /  ALT  66<H>  /  AlkPhos  120  05-17    PT/INR - ( 17 May 2019 13:25 )   PT: 10.1 sec;   INR: 0.90 ratio         PTT - ( 17 May 2019 13:25 )  PTT:25.1 sec  CARDIAC MARKERS ( 17 May 2019 13:25 )  .019 ng/mL / x     / x     / x     / x          Serum Pro-Brain Natriuretic Peptide: 106 pg/mL (05-17 @ 13:25)        Thyroid Stimulating Hormone, Serum: 1.83 uIU/mL (05-18 @ 07:35)      Imaging:    ASSESMENT AND PLAN:

## 2019-05-18 NOTE — CONSULT NOTE ADULT - ASSESSMENT
Physical Examination:  GENERAL:               Alert, Oriented, No acute distress.    HEENT:                   enlarged neck circumference No JVD, Moist MM  PULM:                     Bilateral air entry, Clear to auscultation bilaterally, no significant sputum production, No Rales, No Rhonchi, No Wheezing  CVS:                         S1, S2,  2/6 Murmur  ABD:                        Soft, obese  nondistended, nontender, normoactive bowel sounds,   EXT:                         mild edema, nontender, No Cyanosis or Clubbing   Vascular:                Warm Extremities, Normal Capillary refill, Normal Distal Pulses  NEURO:                  Alert, oriented, interactive, nonfocal, follows commands  PSYC:                      Calm, + Insight.      Assessment  Dyspnea on Excretion  ABBIE  Morbid obesity s/p bariatric surgery  Chronic Lung nodule unchanged un repeat CT - no further workup    underlying HTN, Hypothyroidism    Plan  at this time workup grossly negative for cause.     CTA no PE/ PNA/ Atelectasis identified, Echo No acute CHF noted, ABG no chronic Hypercarbia noted.   would get Cardiac work up with Stress test  to evaluate for cardiac ischemic heart disease  will get PFT to evaluate for restrictive lung disease  will restart pt on nightly CPAP while in hospital PS 12  will follow. Physical Examination:  GENERAL:               Alert, Oriented, No acute distress.    HEENT:                   enlarged neck circumference No JVD, Moist MM  PULM:                     Bilateral air entry, Clear to auscultation bilaterally, no significant sputum production, No Rales, No Rhonchi, No Wheezing  CVS:                         S1, S2,  2/6 Murmur  ABD:                        Soft, obese  nondistended, nontender, normoactive bowel sounds,   EXT:                         mild edema, nontender, No Cyanosis or Clubbing   Vascular:                Warm Extremities, Normal Capillary refill, Normal Distal Pulses  NEURO:                  Alert, oriented, interactive, nonfocal, follows commands  PSYC:                      Calm, + Insight.      Assessment  Dyspnea on Excretion  ABBIE  Morbid obesity s/p bariatric surgery  Chronic Lung nodule unchanged un repeat CT - no further workup    underlying HTN, Hypothyroidism    Plan  at this time workup grossly negative for cause.     CTA no PE/ PNA/ Atelectasis identified, Echo No acute CHF noted, ABG no chronic Hypercarbia noted.   would get Cardiac work up with Stress test  to evaluate for cardiac ischemic heart disease  f/u overnight pulse oxymetry done 5/17-5/18 overnight - however unable to review results till Monday as per respiratory   will get PFT to evaluate for restrictive lung disease  will restart pt on nightly CPAP while in hospital PS 12  will follow.

## 2019-05-18 NOTE — DIETITIAN INITIAL EVALUATION ADULT. - PROBLEM SELECTOR PLAN 1
CT angio in ED did not show any PE  Tele monitor  TTE ordered  Pulm consult  Cards consult  PT consult

## 2019-05-18 NOTE — PROGRESS NOTE ADULT - ASSESSMENT
Chart reviewed and patient examined in with dyspnea on minimal exertion CTA reportedly negative for PE echocardiogram being done cardiology and pulmonary consults  pending concerned about underlying coronary artery disease

## 2019-05-18 NOTE — DIETITIAN INITIAL EVALUATION ADULT. - PERTINENT LABORATORY DATA
(5/18) Hgb 11.6 wnl, Hct 36.3 wnl, (5/17) Na 139 wnl, Cl 102 wnl, CO2 26 wnl, BUN 14 wnl, Creat 0.68 wnl, Glu 238 H, POCT 165 H

## 2019-05-18 NOTE — CHART NOTE - NSCHARTNOTEFT_GEN_A_CORE
Labs and radiology reviewed:    58 yo Female with h/o obesity and ABBIE presents with NELSON on minimal exertion x 2 weeks. CTA chest negative for PE. Walked with PT and O2 sat remained 94% RA with ambulation    Plan:  NELSON:  PE ruled out  TTE with EF 50-55%, nml; LV fxn  Appreciate pulm consult  May  need pulm PT  Cardiology consult       Plan d/w Dr Fuller  Anticipate discharge within 24 hrs Labs and radiology reviewed:    60 yo Female with h/o obesity and ABBIE presents with NELSON on minimal exertion x 2 weeks. CTA chest negative for PE. Walked with PT and O2 sat remained 94% RA with ambulation    Plan:  NELSON:  PE ruled out  TTE with EF 50-55%, nml; LV fxn  Appreciate pulm consult  May  need pulm PT  Cardiology consult   May need stress test     Plan d/w Dr Fuller  Anticipate discharge within 24 hrs

## 2019-05-18 NOTE — DIETITIAN INITIAL EVALUATION ADULT. - OTHER INFO
58 y/o F w/ hx of baritatric surgery seen by RD d/t obese BMI (51.9). Pt has past hx of DM; denies this. No HbA1c to assess at this time. Per chart review, pt gained 30-40 lbs over the past 6 months; discussed wt hx w/ pt who stated she wants to lose weight but has been feeling overwhelmed/no time to cook @ home; low energy d/t difficulty breathing. Admits to overeating high calorie/high salt foods for several months PTA. 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 Pt on DASH/TLC diet w/ good PO intakes per chart review. Discussed DASH/TLC diet; pt receptive to nutrition education; provided diet recommendations for pt after d/c (recommend pt continue on DASH/TLC diet & monitor portion sizes). No edema per chart review. Skin intact of PU. No c/o n/v/d/c per pt.

## 2019-05-19 LAB
GLUCOSE BLDC GLUCOMTR-MCNC: 122 MG/DL — HIGH (ref 70–99)
GLUCOSE BLDC GLUCOMTR-MCNC: 144 MG/DL — HIGH (ref 70–99)
GLUCOSE BLDC GLUCOMTR-MCNC: 164 MG/DL — HIGH (ref 70–99)
GLUCOSE BLDC GLUCOMTR-MCNC: 164 MG/DL — HIGH (ref 70–99)
HBA1C BLD-MCNC: 7.7 % — HIGH (ref 4–5.6)

## 2019-05-19 PROCEDURE — 99233 SBSQ HOSP IP/OBS HIGH 50: CPT

## 2019-05-19 RX ORDER — REGADENOSON 0.08 MG/ML
0.4 INJECTION, SOLUTION INTRAVENOUS ONCE
Refills: 0 | Status: COMPLETED | OUTPATIENT
Start: 2019-05-19 | End: 2019-05-20

## 2019-05-19 RX ADMIN — Medication 1: at 11:36

## 2019-05-19 RX ADMIN — Medication 75 MICROGRAM(S): at 05:34

## 2019-05-19 RX ADMIN — ENOXAPARIN SODIUM 40 MILLIGRAM(S): 100 INJECTION SUBCUTANEOUS at 17:00

## 2019-05-19 RX ADMIN — ENOXAPARIN SODIUM 40 MILLIGRAM(S): 100 INJECTION SUBCUTANEOUS at 05:34

## 2019-05-19 RX ADMIN — LISINOPRIL 40 MILLIGRAM(S): 2.5 TABLET ORAL at 05:34

## 2019-05-19 RX ADMIN — Medication 25 MILLIGRAM(S): at 05:34

## 2019-05-19 RX ADMIN — Medication 1: at 08:24

## 2019-05-19 NOTE — PROGRESS NOTE ADULT - SUBJECTIVE AND OBJECTIVE BOX
Patient is a 59y old  Female who presents with a chief complaint of shortness of breath with ambulation (18 May 2019 22:47)      INTERVAL HPI/OVERNIGHT EVENTS: No complaints this morning      REVIEW OF SYSTEMS:  CONSTITUTIONAL: No fever, weight loss, or fatigue  EYES: No eye pain, visual disturbances, or discharge  ENMT:  No difficulty hearing, tinnitus, vertigo; No sinus or throat pain  NECK: No pain or stiffness  BREASTS: No pain, masses, or nipple discharge  RESPIRATORY: No cough, wheezing, chills or hemoptysis; No shortness of breath  CARDIOVASCULAR: No chest pain, palpitations, dizziness, or leg swelling  GASTROINTESTINAL: No abdominal or epigastric pain. No nausea, vomiting, or hematemesis; No diarrhea or constipation. No melena or hematochezia.  GENITOURINARY: No dysuria, frequency, hematuria, or incontinence  NEUROLOGICAL: No headaches, memory loss, loss of strength, numbness, or tremors  SKIN: No itching, burning, rashes, or lesions   LYMPH NODES: No enlarged glands  ENDOCRINE: No heat or cold intolerance; No hair loss  MUSCULOSKELETAL: No joint pain or swelling; No muscle, back, or extremity pain  PSYCHIATRIC: No depression, anxiety, mood swings, or difficulty sleeping  HEME/LYMPH: No easy bruising, or bleeding gums  ALLERY AND IMMUNOLOGIC: No hives or eczema  FAMILY HISTORY:    T(C): 36.6 (05-19-19 @ 08:07), Max: 37 (05-18-19 @ 20:26)  HR: 81 (05-19-19 @ 08:07) (74 - 91)  BP: 144/84 (05-19-19 @ 08:07) (131/78 - 160/93)  RR: 15 (05-19-19 @ 08:07) (15 - 16)  SpO2: 97% (05-19-19 @ 08:07) (96% - 100%)  Wt(kg): --Vital Signs Last 24 Hrs  T(C): 36.6 (19 May 2019 08:07), Max: 37 (18 May 2019 20:26)  T(F): 97.9 (19 May 2019 08:07), Max: 98.6 (18 May 2019 20:26)  HR: 81 (19 May 2019 08:07) (74 - 91)  BP: 144/84 (19 May 2019 08:07) (131/78 - 160/93)  BP(mean): --  RR: 15 (19 May 2019 08:07) (15 - 16)  SpO2: 97% (19 May 2019 08:07) (96% - 100%)    T(F): 97.9 (05-19-19 @ 08:07), Max: 98.6 (05-18-19 @ 06:00)  HR: 81 (05-19-19 @ 08:07) (74 - 100)  BP: 144/84 (05-19-19 @ 08:07) (118/64 - 160/93)  RR: 15 (05-19-19 @ 08:07) (15 - 18)  SpO2: 97% (05-19-19 @ 08:07) (96% - 100%)    PHYSICAL EXAM:  GENERAL: NAD, well-groomed, well-developed  HEAD:  Atraumatic, Normocephalic  EYES: EOMI, PERRLA, conjunctiva and sclera clear  ENMT: No tonsillar erythema, exudates, or enlargement; Moist mucous membranes, Good dentition, No lesions  NECK: Supple, No JVD, Normal thyroid  NERVOUS SYSTEM:  Alert & Oriented X3, Good concentration; Motor Strength 5/5 B/L upper and lower extremities; DTRs 2+ intact and symmetric  CHEST/LUNG: Clear to percussion bilaterally; No rales, rhonchi, wheezing, or rubs  HEART: Regular rate and rhythm; No murmurs, rubs, or gallops  ABDOMEN: Soft, Nontender, Nondistended; Bowel sounds present  EXTREMITIES:  2+ Peripheral Pulses, No clubbing, cyanosis, or edema  LYMPH: No lymphadenopathy noted  SKIN: No rashes or lesions    Consultant(s) Notes Reviewed:  [x ] YES  [ ] NO  Care Discussed with Consultants/Other Providers [ x] YES  [ ] NO    LABS:  05-18    140  |  102  |  11  ----------------------------<  183<H>  3.4<L>   |  28  |  0.63    Ca    9.3      18 May 2019 07:35    TPro  6.6  /  Alb  3.1<L>  /  TBili  0.7  /  DBili  x   /  AST  55<H>  /  ALT  66<H>  /  AlkPhos  120  05-17                          11.6   9.70  )-----------( 475      ( 18 May 2019 07:49 )             36.3       Thyroid Stimulating Hormone, Serum: 1.83 uIU/mL (05-18 @ 07:35)    PT/INR - ( 17 May 2019 13:25 )   PT: 10.1 sec;   INR: 0.90 ratio         PTT - ( 17 May 2019 13:25 )  PTT:25.1 sec  LIVER FUNCTIONS - ( 17 May 2019 13:25 )  Alb: 3.1 g/dL / Pro: 6.6 g/dL / ALK PHOS: 120 U/L / ALT: 66 U/L DA / AST: 55 U/L / GGT: x           CARDIAC MARKERS ( 17 May 2019 13:25 )  .019 ng/mL / x     / x     / x     / x                       11.6   9.70  )-----------( 475      ( 05-18 @ 07:49 )             36.3                11.0   8.42  )-----------( 441      ( 05-17 @ 13:25 )             35.1               RADIOLOGY & ADDITIONAL TESTS:    Imaging Personally Reviewed:  [ ] YES  [ ] NO  acetaminophen   Tablet .. 650 milliGRAM(s) Oral every 6 hours PRN  dextrose 40% Gel 15 Gram(s) Oral once PRN  dextrose 5%. 1000 milliLiter(s) IV Continuous <Continuous>  dextrose 50% Injectable 12.5 Gram(s) IV Push once  dextrose 50% Injectable 25 Gram(s) IV Push once  dextrose 50% Injectable 25 Gram(s) IV Push once  enoxaparin Injectable 40 milliGRAM(s) SubCutaneous every 12 hours  glucagon  Injectable 1 milliGRAM(s) IntraMuscular once PRN  hydrochlorothiazide 25 milliGRAM(s) Oral daily  insulin lispro (HumaLOG) corrective regimen sliding scale   SubCutaneous three times a day before meals  insulin lispro (HumaLOG) corrective regimen sliding scale   SubCutaneous at bedtime  levothyroxine 75 MICROGram(s) Oral daily  lisinopril 40 milliGRAM(s) Oral daily  regadenoson Injectable 0.4 milliGRAM(s) IV Push once      HEALTH ISSUES - PROBLEM Dx:  Diabetes: Diabetes  Sleep apnea: Sleep apnea  Hypothyroid: Hypothyroid  Essential hypertension: Essential hypertension  Dyspnea on exertion: Dyspnea on exertion

## 2019-05-19 NOTE — CHART NOTE - NSCHARTNOTEFT_GEN_A_CORE
Labs and radiology reviewed:    60 yo Female with h/o obesity and ABBIE presents with NELSON on minimal exertion x 2 weeks. CTA chest negative for PE. Walked with PT and O2 sat remained 94% RA with ambulation    Plan:  NELSON:  PE ruled out  TTE with EF 50-55%, nml; LV fxn  Appreciate pulm consult  Nuclear stress tomorrow  Cardiology consult   PFT to eval for restrictive lung dz    Plan d/w Dr Fuller  Anticipate discharge within 24 hrs.

## 2019-05-19 NOTE — PROGRESS NOTE ADULT - SUBJECTIVE AND OBJECTIVE BOX
Follow-up Pulmonary Progress Note  Chief Complaint : Other form of dyspnea      pt seen and examined comfortable.   states used CPAP at night and this am states feeling much more energized and less sob/boss        Allergies :latex (Rash)  No Known Drug Allergies      PAST MEDICAL & SURGICAL HISTORY:  Diabetes: denies a past medical problem  Hypothyroid  Hypertension      Medications:  MEDICATIONS  (STANDING):  dextrose 5%. 1000 milliLiter(s) (50 mL/Hr) IV Continuous <Continuous>  dextrose 50% Injectable 12.5 Gram(s) IV Push once  dextrose 50% Injectable 25 Gram(s) IV Push once  dextrose 50% Injectable 25 Gram(s) IV Push once  enoxaparin Injectable 40 milliGRAM(s) SubCutaneous every 12 hours  hydrochlorothiazide 25 milliGRAM(s) Oral daily  insulin lispro (HumaLOG) corrective regimen sliding scale   SubCutaneous three times a day before meals  insulin lispro (HumaLOG) corrective regimen sliding scale   SubCutaneous at bedtime  levothyroxine 75 MICROGram(s) Oral daily  lisinopril 40 milliGRAM(s) Oral daily  regadenoson Injectable 0.4 milliGRAM(s) IV Push once    MEDICATIONS  (PRN):  acetaminophen   Tablet .. 650 milliGRAM(s) Oral every 6 hours PRN Temp greater or equal to 38C (100.4F), Mild Pain (1 - 3)  dextrose 40% Gel 15 Gram(s) Oral once PRN Blood Glucose LESS THAN 70 milliGRAM(s)/deciliter  glucagon  Injectable 1 milliGRAM(s) IntraMuscular once PRN Glucose LESS THAN 70 milligrams/deciliter      LABS:                        11.6   9.70  )-----------( 475      ( 18 May 2019 07:49 )             36.3     05-18    140  |  102  |  11  ----------------------------<  183<H>  3.4<L>   |  28  |  0.63    Ca    9.3      18 May 2019 07:35    TPro  6.6  /  Alb  3.1<L>  /  TBili  0.7  /  DBili  x   /  AST  55<H>  /  ALT  66<H>  /  AlkPhos  120  05-17    HIT ab -- 05-17 @ 13:25  HIT ab EIA --  D Dimer -276    CARDIAC MARKERS ( 17 May 2019 13:25 )  .019 ng/mL / x     / x     / x     / x            PT/INR - ( 17 May 2019 13:25 )   PT: 10.1 sec;   INR: 0.90 ratio         PTT - ( 17 May 2019 13:25 )  PTT:25.1 sec      Serum Pro-Brain Natriuretic Peptide: 106 pg/mL (05-17-19 @ 13:25)        CULTURES: (if applicable)        ABG - ( 18 May 2019 06:55 )  pH, Arterial: 7.44  pH, Blood: x     /  pCO2: 42    /  pO2: 77    / HCO3: x     / Base Excess: x     /  SaO2: 92                CAPILLARY BLOOD GLUCOSE      POCT Blood Glucose.: 164 mg/dL (19 May 2019 08:11)      VITALS:  T(C): 36.6 (05-19-19 @ 08:07), Max: 37 (05-18-19 @ 20:26)  T(F): 97.9 (05-19-19 @ 08:07), Max: 98.6 (05-18-19 @ 20:26)  HR: 81 (05-19-19 @ 08:07) (74 - 91)  BP: 144/84 (05-19-19 @ 08:07) (131/78 - 160/93)  BP(mean): --  ABP: --  ABP(mean): --  RR: 15 (05-19-19 @ 08:07) (15 - 16)  SpO2: 97% (05-19-19 @ 08:07) (96% - 100%)  CVP(mm Hg): --  CVP(cm H2O): --    Ins and Outs       Height (cm): 165.1 (05-19-19 @ 09:31)  Weight (kg): 141.6 (05-19-19 @ 09:31)  BMI (kg/m2): 51.9 (05-19-19 @ 09:31)        I&O's Detail  .

## 2019-05-19 NOTE — PROGRESS NOTE ADULT - ASSESSMENT
Physical Examination:  GENERAL:               Alert, Oriented, No acute distress.    HEENT:                   enlarged neck circumference No JVD, Moist MM  PULM:                     Bilateral air entry, Clear to auscultation bilaterally, no significant sputum production, No Rales, No Rhonchi, No Wheezing  CVS:                         S1, S2,  2/6 Murmur  ABD:                        Soft, obese  nondistended, nontender, normoactive bowel sounds,   EXT:                         mild edema, nontender, No Cyanosis or Clubbing   NEURO:                  Alert, oriented, interactive, nonfocal, follows commands  PSYC:                      Calm, + Insight.      Assessment  Dyspnea on Excretion  ABBIE  Morbid obesity s/p bariatric surgery  Chronic Lung nodule unchanged un repeat CT - no further workup    underlying HTN, Hypothyroidism    Plan  at this time workup grossly negative for cause.     CTA no PE/ PNA/ Atelectasis identified, Echo No acute CHF noted, ABG no chronic Hypercarbia noted.   would get Cardiac work up with Stress test  to evaluate for cardiac ischemic heart disease  f/u overnight pulse oxymetry done 5/17-5/18 overnight - however unable to review results till Monday as per respiratory   will get PFT to evaluate for restrictive lung disease to be done in AM  Continue CPAP while in hospital PS 12    as patient has clinically feels better with CPAP, will need new Titration study on discharge to be able to get CPAP at home    she agrees she needs it and wants to use it on discharge  Dr. Alina Leon.

## 2019-05-19 NOTE — PROGRESS NOTE ADULT - ASSESSMENT
Cardiology and pulmonary notes appreciated or chemically induced stress test on Monday and further workup pending

## 2019-05-20 LAB
GLUCOSE BLDC GLUCOMTR-MCNC: 152 MG/DL — HIGH (ref 70–99)
GLUCOSE BLDC GLUCOMTR-MCNC: 155 MG/DL — HIGH (ref 70–99)
GLUCOSE BLDC GLUCOMTR-MCNC: 156 MG/DL — HIGH (ref 70–99)
GLUCOSE BLDC GLUCOMTR-MCNC: 173 MG/DL — HIGH (ref 70–99)

## 2019-05-20 PROCEDURE — 99232 SBSQ HOSP IP/OBS MODERATE 35: CPT | Mod: 25

## 2019-05-20 PROCEDURE — 78452 HT MUSCLE IMAGE SPECT MULT: CPT | Mod: 26

## 2019-05-20 PROCEDURE — 93016 CV STRESS TEST SUPVJ ONLY: CPT

## 2019-05-20 PROCEDURE — 99232 SBSQ HOSP IP/OBS MODERATE 35: CPT

## 2019-05-20 PROCEDURE — 93018 CV STRESS TEST I&R ONLY: CPT

## 2019-05-20 RX ORDER — ALBUTEROL 90 UG/1
2.5 AEROSOL, METERED ORAL ONCE
Refills: 0 | Status: COMPLETED | OUTPATIENT
Start: 2019-05-20 | End: 2019-05-20

## 2019-05-20 RX ADMIN — Medication 1: at 17:30

## 2019-05-20 RX ADMIN — ENOXAPARIN SODIUM 40 MILLIGRAM(S): 100 INJECTION SUBCUTANEOUS at 17:30

## 2019-05-20 RX ADMIN — ALBUTEROL 2.5 MILLIGRAM(S): 90 AEROSOL, METERED ORAL at 11:41

## 2019-05-20 RX ADMIN — REGADENOSON 0.4 MILLIGRAM(S): 0.08 INJECTION, SOLUTION INTRAVENOUS at 10:55

## 2019-05-20 RX ADMIN — Medication 25 MILLIGRAM(S): at 05:46

## 2019-05-20 RX ADMIN — Medication 1: at 12:30

## 2019-05-20 RX ADMIN — ENOXAPARIN SODIUM 40 MILLIGRAM(S): 100 INJECTION SUBCUTANEOUS at 05:47

## 2019-05-20 RX ADMIN — Medication 650 MILLIGRAM(S): at 17:30

## 2019-05-20 RX ADMIN — LISINOPRIL 40 MILLIGRAM(S): 2.5 TABLET ORAL at 05:47

## 2019-05-20 RX ADMIN — Medication 1: at 05:55

## 2019-05-20 RX ADMIN — Medication 75 MICROGRAM(S): at 05:46

## 2019-05-20 NOTE — PROGRESS NOTE ADULT - SUBJECTIVE AND OBJECTIVE BOX
Follow up for cardiac assessment  SUBJ: patient feeling well. no symptoms at rest  PMH  Diabetes  Hypothyroid  Hypertension      MEDICATIONS  (STANDING):  ALBUTerol    0.083%. 2.5 milliGRAM(s) Nebulizer once  dextrose 5%. 1000 milliLiter(s) (50 mL/Hr) IV Continuous <Continuous>  dextrose 50% Injectable 12.5 Gram(s) IV Push once  dextrose 50% Injectable 25 Gram(s) IV Push once  dextrose 50% Injectable 25 Gram(s) IV Push once  enoxaparin Injectable 40 milliGRAM(s) SubCutaneous every 12 hours  hydrochlorothiazide 25 milliGRAM(s) Oral daily  insulin lispro (HumaLOG) corrective regimen sliding scale   SubCutaneous three times a day before meals  insulin lispro (HumaLOG) corrective regimen sliding scale   SubCutaneous at bedtime  levothyroxine 75 MICROGram(s) Oral daily  lisinopril 40 milliGRAM(s) Oral daily  regadenoson Injectable 0.4 milliGRAM(s) IV Push once    MEDICATIONS  (PRN):  acetaminophen   Tablet .. 650 milliGRAM(s) Oral every 6 hours PRN Temp greater or equal to 38C (100.4F), Mild Pain (1 - 3)  dextrose 40% Gel 15 Gram(s) Oral once PRN Blood Glucose LESS THAN 70 milliGRAM(s)/deciliter  glucagon  Injectable 1 milliGRAM(s) IntraMuscular once PRN Glucose LESS THAN 70 milligrams/deciliter        PHYSICAL EXAM:  Vital Signs Last 24 Hrs  T(C): 36.6 (20 May 2019 08:38), Max: 37.2 (19 May 2019 20:16)  T(F): 97.8 (20 May 2019 08:38), Max: 98.9 (19 May 2019 20:16)  HR: 85 (20 May 2019 08:38) (73 - 85)  BP: 152/86 (20 May 2019 08:38) (126/57 - 155/94)  BP(mean): --  RR: 14 (20 May 2019 08:38) (14 - 16)  SpO2: 100% (20 May 2019 08:38) (95% - 100%)    GENERAL: NAD, well-groomed, well-developed  HEAD:  Atraumatic, Normocephalic  EYES: EOMI, PERRLA, conjunctiva and sclera clear  ENT: Moist mucous membranes,  NECK: Supple, No JVD, no bruits  CHEST/LUNG: Clear to percussion bilaterally; No rales, rhonchi, wheezing, or rubs  HEART: Regular rate and rhythm; No murmurs, rubs, or gallops PMI non displaced.  ABDOMEN: Soft, Nontender, Nondistended; Bowel sounds present  EXTREMITIES:  2+ Peripheral Pulses, No clubbing, cyanosis, or edema  SKIN: No rashes or lesions  NERVOUS SYSTEM:  Alert & Oriented X3, Good concentration; Motor Strength 5/5 B/L upper and lower extremities; DTRs 2+ intact and symmetric      TELEMETRY:rsr    ECG:    LABS:                  I&O's Summary    BNP    RADIOLOGY & ADDITIONAL STUDIES:    ECHO:

## 2019-05-20 NOTE — PROGRESS NOTE ADULT - SUBJECTIVE AND OBJECTIVE BOX
Patient is a 59y old  Female who presents with a chief complaint of shortness of breath with ambulation (19 May 2019 10:17)      INTERVAL HPI/OVERNIGHT EVENTS: No complaints for nuclear stress test today and tomorrow      REVIEW OF SYSTEMS:  CONSTITUTIONAL: No fever, weight loss, or fatigue  EYES: No eye pain, visual disturbances, or discharge  ENMT:  No difficulty hearing, tinnitus, vertigo; No sinus or throat pain  NECK: No pain or stiffness  BREASTS: No pain, masses, or nipple discharge  RESPIRATORY: No cough, wheezing, chills or hemoptysis; No shortness of breath  CARDIOVASCULAR: No chest pain, palpitations, dizziness, or leg swelling  GASTROINTESTINAL: No abdominal or epigastric pain. No nausea, vomiting, or hematemesis; No diarrhea or constipation. No melena or hematochezia.  GENITOURINARY: No dysuria, frequency, hematuria, or incontinence  NEUROLOGICAL: No headaches, memory loss, loss of strength, numbness, or tremors  SKIN: No itching, burning, rashes, or lesions   LYMPH NODES: No enlarged glands  ENDOCRINE: No heat or cold intolerance; No hair loss  MUSCULOSKELETAL: No joint pain or swelling; No muscle, back, or extremity pain  PSYCHIATRIC: No depression, anxiety, mood swings, or difficulty sleeping  HEME/LYMPH: No easy bruising, or bleeding gums  ALLERY AND IMMUNOLOGIC: No hives or eczema  FAMILY HISTORY:    T(C): 36.6 (05-20-19 @ 08:38), Max: 37.2 (05-19-19 @ 20:16)  HR: 85 (05-20-19 @ 08:38) (73 - 85)  BP: 152/86 (05-20-19 @ 08:38) (126/57 - 155/94)  RR: 14 (05-20-19 @ 08:38) (14 - 16)  SpO2: 100% (05-20-19 @ 08:38) (95% - 100%)  Wt(kg): --Vital Signs Last 24 Hrs  T(C): 36.6 (20 May 2019 08:38), Max: 37.2 (19 May 2019 20:16)  T(F): 97.8 (20 May 2019 08:38), Max: 98.9 (19 May 2019 20:16)  HR: 85 (20 May 2019 08:38) (73 - 85)  BP: 152/86 (20 May 2019 08:38) (126/57 - 155/94)  BP(mean): --  RR: 14 (20 May 2019 08:38) (14 - 16)  SpO2: 100% (20 May 2019 08:38) (95% - 100%)    T(F): 97.8 (05-20-19 @ 08:38), Max: 98.9 (05-19-19 @ 20:16)  HR: 85 (05-20-19 @ 08:38) (73 - 100)  BP: 152/86 (05-20-19 @ 08:38) (118/64 - 160/93)  RR: 14 (05-20-19 @ 08:38) (14 - 18)  SpO2: 100% (05-20-19 @ 08:38) (95% - 100%)    PHYSICAL EXAM:  GENERAL: NAD, well-groomed, well-developed  HEAD:  Atraumatic, Normocephalic  EYES: EOMI, PERRLA, conjunctiva and sclera clear  ENMT: No tonsillar erythema, exudates, or enlargement; Moist mucous membranes, Good dentition, No lesions  NECK: Supple, No JVD, Normal thyroid  NERVOUS SYSTEM:  Alert & Oriented X3, Good concentration; Motor Strength 5/5 B/L upper and lower extremities; DTRs 2+ intact and symmetric  CHEST/LUNG: Clear to percussion bilaterally; No rales, rhonchi, wheezing, or rubs  HEART: Regular rate and rhythm; No murmurs, rubs, or gallops  ABDOMEN: Soft, Nontender, Nondistended; Bowel sounds present  EXTREMITIES:  2+ Peripheral Pulses, No clubbing, cyanosis, or edema  LYMPH: No lymphadenopathy noted  SKIN: No rashes or lesions    Consultant(s) Notes Reviewed:  [x ] YES  [ ] NO  Care Discussed with Consultants/Other Providers [ x] YES  [ ] NO    LABS:            Thyroid Stimulating Hormone, Serum: 1.83 uIU/mL (05-18 @ 07:35)                         11.6   9.70  )-----------( 475      ( 05-18 @ 07:49 )             36.3                11.0   8.42  )-----------( 441      ( 05-17 @ 13:25 )             35.1       Hemoglobin A1C, Whole Blood: 7.7 % (05-18-19 @ 07:35)          RADIOLOGY & ADDITIONAL TESTS:    Imaging Personally Reviewed:  [ ] YES  [ ] NO  acetaminophen   Tablet .. 650 milliGRAM(s) Oral every 6 hours PRN  ALBUTerol    0.083%. 2.5 milliGRAM(s) Nebulizer once  dextrose 40% Gel 15 Gram(s) Oral once PRN  dextrose 5%. 1000 milliLiter(s) IV Continuous <Continuous>  dextrose 50% Injectable 12.5 Gram(s) IV Push once  dextrose 50% Injectable 25 Gram(s) IV Push once  dextrose 50% Injectable 25 Gram(s) IV Push once  enoxaparin Injectable 40 milliGRAM(s) SubCutaneous every 12 hours  glucagon  Injectable 1 milliGRAM(s) IntraMuscular once PRN  hydrochlorothiazide 25 milliGRAM(s) Oral daily  insulin lispro (HumaLOG) corrective regimen sliding scale   SubCutaneous three times a day before meals  insulin lispro (HumaLOG) corrective regimen sliding scale   SubCutaneous at bedtime  levothyroxine 75 MICROGram(s) Oral daily  lisinopril 40 milliGRAM(s) Oral daily  regadenoson Injectable 0.4 milliGRAM(s) IV Push once      HEALTH ISSUES - PROBLEM Dx:  Diabetes: Diabetes  Sleep apnea: Sleep apnea  Hypothyroid: Hypothyroid  Essential hypertension: Essential hypertension  Dyspnea on exertion: Dyspnea on exertion

## 2019-05-20 NOTE — PROGRESS NOTE ADULT - ASSESSMENT
Physical Examination:  GENERAL:               Alert, Oriented, No acute distress.    HEENT:                   enlarged neck circumference No JVD, Moist MM  PULM:                     Bilateral air entry, Clear to auscultation bilaterally, no significant sputum production, No Rales, No Rhonchi, No Wheezing  CVS:                         S1, S2,  2/6 Murmur  ABD:                        Soft, obese  nondistended, nontender, normoactive bowel sounds,   NEURO:                  Alert, oriented, interactive, nonfocal, follows commands  PSYC:                      Calm, + Insight.      Assessment  Dyspnea on Excretion  ABBIE  Morbid obesity s/p bariatric surgery  Chronic Lung nodule unchanged un repeat CT - no further workup    underlying HTN, Hypothyroidism    Plan  at this time workup grossly negative for cause.   PFT today shows mild restriction with FVC- 73%, FEV1 - 79% wiht FEV/FVC 78,  SVC 67%, DLCO 61% DLCO/  Overnight pulse ox noted 48 Desaturations lowest to 81%  CTA no PE/ PNA/ Atelectasis identified, Echo No acute CHF noted, ABG no chronic Hypercarbia noted.   would get Cardiac work up with Stress test  to evaluate for cardiac ischemic heart disease  Continue CPAP while in hospital PS 12    as patient has clinically feels better with CPAP, will need new Titration study on discharge to be able to get CPAP at home    she agrees she needs it and wants to use it on discharge  If cardiac workup negative pt likely deconditioned

## 2019-05-20 NOTE — CHART NOTE - NSCHARTNOTEFT_GEN_A_CORE
Labs and radiology reviewed:    60 yo Female with h/o obesity and ABBIE presents with NELSON on minimal exertion x 2 weeks. CTA chest negative for PE. Walked with PT and O2 sat remained 94% RA with ambulation    Plan:  NELSON:  PE ruled out  TTE with EF 50-55%, nml; LV fxn  Appreciate pulm consult  First part of nuclear stress today--second part tomorrow  Cardiology consult   PFT today shows mild restriction with FVC- 73%, FEV1 - 79% wiht FEV/FVC 78,  SVC 67%, DLCO 61% DLCO/      Plan d/w Dr Fuller  Anticipate discharge within 24 hrs. Will need new titration study for CPAP at discharge

## 2019-05-20 NOTE — PROGRESS NOTE ADULT - ASSESSMENT
Imp  Patient admitted for dyspnea and hypoxemia.  underwent vasodilator myocardial perfusion imaging  results pending

## 2019-05-20 NOTE — PROGRESS NOTE ADULT - SUBJECTIVE AND OBJECTIVE BOX
Follow-up Pulmonary Progress Note  Chief Complaint : Other form of dyspnea      used cpap overnight liked it and states feeling better  no cp, sob, palp  NELSON slightly improved    first part of stress test done today, second part in am planned      Allergies :latex (Rash)  No Known Drug Allergies      PAST MEDICAL & SURGICAL HISTORY:  Diabetes: denies a past medical problem  Hypothyroid  Hypertension      Medications:  MEDICATIONS  (STANDING):  dextrose 5%. 1000 milliLiter(s) (50 mL/Hr) IV Continuous <Continuous>  dextrose 50% Injectable 12.5 Gram(s) IV Push once  dextrose 50% Injectable 25 Gram(s) IV Push once  dextrose 50% Injectable 25 Gram(s) IV Push once  enoxaparin Injectable 40 milliGRAM(s) SubCutaneous every 12 hours  hydrochlorothiazide 25 milliGRAM(s) Oral daily  insulin lispro (HumaLOG) corrective regimen sliding scale   SubCutaneous three times a day before meals  insulin lispro (HumaLOG) corrective regimen sliding scale   SubCutaneous at bedtime  levothyroxine 75 MICROGram(s) Oral daily  lisinopril 40 milliGRAM(s) Oral daily    MEDICATIONS  (PRN):  acetaminophen   Tablet .. 650 milliGRAM(s) Oral every 6 hours PRN Temp greater or equal to 38C (100.4F), Mild Pain (1 - 3)  dextrose 40% Gel 15 Gram(s) Oral once PRN Blood Glucose LESS THAN 70 milliGRAM(s)/deciliter  glucagon  Injectable 1 milliGRAM(s) IntraMuscular once PRN Glucose LESS THAN 70 milligrams/deciliter      LABS:          HIT ab -- 05-17 @ 13:25  HIT ab EIA --  D Dimer -276    VITALS:  T(C): 36.6 (05-20-19 @ 08:38), Max: 37.2 (05-19-19 @ 20:16)  T(F): 97.8 (05-20-19 @ 08:38), Max: 98.9 (05-19-19 @ 20:16)  HR: 85 (05-20-19 @ 08:38) (73 - 85)  BP: 152/86 (05-20-19 @ 08:38) (126/57 - 152/86)  BP(mean): --  ABP: --  ABP(mean): --  RR: 14 (05-20-19 @ 08:38) (14 - 16)  SpO2: 100% (05-20-19 @ 08:38) (96% - 100%)  CVP(mm Hg): --  CVP(cm H2O): --    Ins and Outs       Height (cm): 165.1 (05-20-19 @ 10:01)  Weight (kg): 141.6 (05-20-19 @ 10:01)  BMI (kg/m2): 51.9 (05-20-19 @ 10:01)        I&O's Detail

## 2019-05-21 ENCOUNTER — TRANSCRIPTION ENCOUNTER (OUTPATIENT)
Age: 59
End: 2019-05-21

## 2019-05-21 VITALS
DIASTOLIC BLOOD PRESSURE: 76 MMHG | TEMPERATURE: 98 F | SYSTOLIC BLOOD PRESSURE: 117 MMHG | HEART RATE: 80 BPM | OXYGEN SATURATION: 97 % | RESPIRATION RATE: 16 BRPM

## 2019-05-21 LAB
ANION GAP SERPL CALC-SCNC: 7 MMOL/L — SIGNIFICANT CHANGE UP (ref 5–17)
BUN SERPL-MCNC: 14 MG/DL — SIGNIFICANT CHANGE UP (ref 7–23)
CALCIUM SERPL-MCNC: 9.5 MG/DL — SIGNIFICANT CHANGE UP (ref 8.4–10.5)
CHLORIDE SERPL-SCNC: 104 MMOL/L — SIGNIFICANT CHANGE UP (ref 96–108)
CO2 SERPL-SCNC: 29 MMOL/L — SIGNIFICANT CHANGE UP (ref 22–31)
CREAT SERPL-MCNC: 0.6 MG/DL — SIGNIFICANT CHANGE UP (ref 0.5–1.3)
GLUCOSE BLDC GLUCOMTR-MCNC: 147 MG/DL — HIGH (ref 70–99)
GLUCOSE BLDC GLUCOMTR-MCNC: 152 MG/DL — HIGH (ref 70–99)
GLUCOSE SERPL-MCNC: 179 MG/DL — HIGH (ref 70–99)
HCT VFR BLD CALC: 35.6 % — SIGNIFICANT CHANGE UP (ref 34.5–45)
HGB BLD-MCNC: 11.4 G/DL — LOW (ref 11.5–15.5)
MCHC RBC-ENTMCNC: 27.8 PG — SIGNIFICANT CHANGE UP (ref 27–34)
MCHC RBC-ENTMCNC: 32 GM/DL — SIGNIFICANT CHANGE UP (ref 32–36)
MCV RBC AUTO: 86.8 FL — SIGNIFICANT CHANGE UP (ref 80–100)
NRBC # BLD: 0 /100 WBCS — SIGNIFICANT CHANGE UP (ref 0–0)
PLATELET # BLD AUTO: 430 K/UL — HIGH (ref 150–400)
POTASSIUM SERPL-MCNC: 3.6 MMOL/L — SIGNIFICANT CHANGE UP (ref 3.5–5.3)
POTASSIUM SERPL-SCNC: 3.6 MMOL/L — SIGNIFICANT CHANGE UP (ref 3.5–5.3)
RBC # BLD: 4.1 M/UL — SIGNIFICANT CHANGE UP (ref 3.8–5.2)
RBC # FLD: 16.5 % — HIGH (ref 10.3–14.5)
SODIUM SERPL-SCNC: 140 MMOL/L — SIGNIFICANT CHANGE UP (ref 135–145)
WBC # BLD: 8.86 K/UL — SIGNIFICANT CHANGE UP (ref 3.8–10.5)
WBC # FLD AUTO: 8.86 K/UL — SIGNIFICANT CHANGE UP (ref 3.8–10.5)

## 2019-05-21 PROCEDURE — 86803 HEPATITIS C AB TEST: CPT

## 2019-05-21 PROCEDURE — 94729 DIFFUSING CAPACITY: CPT

## 2019-05-21 PROCEDURE — 93005 ELECTROCARDIOGRAM TRACING: CPT

## 2019-05-21 PROCEDURE — 93017 CV STRESS TEST TRACING ONLY: CPT

## 2019-05-21 PROCEDURE — 78452 HT MUSCLE IMAGE SPECT MULT: CPT

## 2019-05-21 PROCEDURE — 82803 BLOOD GASES ANY COMBINATION: CPT

## 2019-05-21 PROCEDURE — 83036 HEMOGLOBIN GLYCOSYLATED A1C: CPT

## 2019-05-21 PROCEDURE — 99285 EMERGENCY DEPT VISIT HI MDM: CPT | Mod: 25

## 2019-05-21 PROCEDURE — 94640 AIRWAY INHALATION TREATMENT: CPT

## 2019-05-21 PROCEDURE — 94762 N-INVAS EAR/PLS OXIMTRY CONT: CPT

## 2019-05-21 PROCEDURE — 85027 COMPLETE CBC AUTOMATED: CPT

## 2019-05-21 PROCEDURE — 82962 GLUCOSE BLOOD TEST: CPT

## 2019-05-21 PROCEDURE — 84443 ASSAY THYROID STIM HORMONE: CPT

## 2019-05-21 PROCEDURE — 85730 THROMBOPLASTIN TIME PARTIAL: CPT

## 2019-05-21 PROCEDURE — 99239 HOSP IP/OBS DSCHRG MGMT >30: CPT

## 2019-05-21 PROCEDURE — 94060 EVALUATION OF WHEEZING: CPT

## 2019-05-21 PROCEDURE — 99233 SBSQ HOSP IP/OBS HIGH 50: CPT

## 2019-05-21 PROCEDURE — 94726 PLETHYSMOGRAPHY LUNG VOLUMES: CPT

## 2019-05-21 PROCEDURE — 71275 CT ANGIOGRAPHY CHEST: CPT

## 2019-05-21 PROCEDURE — 71045 X-RAY EXAM CHEST 1 VIEW: CPT

## 2019-05-21 PROCEDURE — 83880 ASSAY OF NATRIURETIC PEPTIDE: CPT

## 2019-05-21 PROCEDURE — 80053 COMPREHEN METABOLIC PANEL: CPT

## 2019-05-21 PROCEDURE — 94660 CPAP INITIATION&MGMT: CPT

## 2019-05-21 PROCEDURE — 80048 BASIC METABOLIC PNL TOTAL CA: CPT

## 2019-05-21 PROCEDURE — 97161 PT EVAL LOW COMPLEX 20 MIN: CPT

## 2019-05-21 PROCEDURE — 85610 PROTHROMBIN TIME: CPT

## 2019-05-21 PROCEDURE — 36415 COLL VENOUS BLD VENIPUNCTURE: CPT

## 2019-05-21 PROCEDURE — A9500: CPT

## 2019-05-21 PROCEDURE — 93306 TTE W/DOPPLER COMPLETE: CPT

## 2019-05-21 PROCEDURE — 85379 FIBRIN DEGRADATION QUANT: CPT

## 2019-05-21 PROCEDURE — 84484 ASSAY OF TROPONIN QUANT: CPT

## 2019-05-21 RX ORDER — METFORMIN HYDROCHLORIDE 850 MG/1
1 TABLET ORAL
Qty: 30 | Refills: 0
Start: 2019-05-21 | End: 2019-06-19

## 2019-05-21 RX ORDER — HYDROCHLOROTHIAZIDE 25 MG
1 TABLET ORAL
Refills: 0 | DISCHARGE
Start: 2019-05-21

## 2019-05-21 RX ADMIN — Medication 75 MICROGRAM(S): at 05:21

## 2019-05-21 RX ADMIN — LISINOPRIL 40 MILLIGRAM(S): 2.5 TABLET ORAL at 05:21

## 2019-05-21 RX ADMIN — Medication 650 MILLIGRAM(S): at 10:21

## 2019-05-21 RX ADMIN — Medication 650 MILLIGRAM(S): at 15:09

## 2019-05-21 RX ADMIN — Medication 650 MILLIGRAM(S): at 15:44

## 2019-05-21 RX ADMIN — Medication 650 MILLIGRAM(S): at 07:37

## 2019-05-21 RX ADMIN — ENOXAPARIN SODIUM 40 MILLIGRAM(S): 100 INJECTION SUBCUTANEOUS at 05:20

## 2019-05-21 RX ADMIN — Medication 1: at 05:20

## 2019-05-21 RX ADMIN — Medication 25 MILLIGRAM(S): at 05:20

## 2019-05-21 NOTE — PROGRESS NOTE ADULT - SUBJECTIVE AND OBJECTIVE BOX
Follow-up Pulmonary Progress Note  Chief Complaint : Other form of dyspnea      pt feeling better   NELSON improving  no cough, palp, n/v      Allergies :latex (Rash)  No Known Drug Allergies      PAST MEDICAL & SURGICAL HISTORY:  Diabetes: denies a past medical problem  Hypothyroid  Hypertension      Medications:  MEDICATIONS  (STANDING):  dextrose 5%. 1000 milliLiter(s) (50 mL/Hr) IV Continuous <Continuous>  dextrose 50% Injectable 12.5 Gram(s) IV Push once  dextrose 50% Injectable 25 Gram(s) IV Push once  dextrose 50% Injectable 25 Gram(s) IV Push once  enoxaparin Injectable 40 milliGRAM(s) SubCutaneous every 12 hours  hydrochlorothiazide 25 milliGRAM(s) Oral daily  insulin lispro (HumaLOG) corrective regimen sliding scale   SubCutaneous three times a day before meals  insulin lispro (HumaLOG) corrective regimen sliding scale   SubCutaneous at bedtime  levothyroxine 75 MICROGram(s) Oral daily  lisinopril 40 milliGRAM(s) Oral daily    MEDICATIONS  (PRN):  acetaminophen   Tablet .. 650 milliGRAM(s) Oral every 6 hours PRN Temp greater or equal to 38C (100.4F), Mild Pain (1 - 3)  dextrose 40% Gel 15 Gram(s) Oral once PRN Blood Glucose LESS THAN 70 milliGRAM(s)/deciliter  glucagon  Injectable 1 milliGRAM(s) IntraMuscular once PRN Glucose LESS THAN 70 milligrams/deciliter      LABS:                        11.4   8.86  )-----------( 430      ( 21 May 2019 06:18 )             35.6     05-21    140  |  104  |  14  ----------------------------<  179<H>  3.6   |  29  |  0.60    Ca    9.5      21 May 2019 06:18      HIT ab -- 05-17 @ 13:25  HIT ab EIA --  D Dimer -276    VITALS:  T(C): 36.6 (05-21-19 @ 11:07), Max: 36.7 (05-20-19 @ 21:56)  T(F): 97.9 (05-21-19 @ 11:07), Max: 98.1 (05-20-19 @ 21:56)  HR: 75 (05-21-19 @ 11:07) (75 - 82)  BP: 111/63 (05-21-19 @ 11:07) (107/65 - 142/80)  BP(mean): --  ABP: --  ABP(mean): --  RR: 16 (05-21-19 @ 11:07) (14 - 18)  SpO2: 97% (05-21-19 @ 11:07) (95% - 100%)  CVP(mm Hg): --  CVP(cm H2O): --    Ins and Outs     05-20-19 @ 07:01  -  05-21-19 @ 07:00  --------------------------------------------------------  IN: 300 mL / OUT: 0 mL / NET: 300 mL        Height (cm): 165.1 (05-21-19 @ 09:26)  Weight (kg): 141.6 (05-21-19 @ 09:26)  BMI (kg/m2): 51.9 (05-21-19 @ 09:26)        I&O's Detail    20 May 2019 07:01  -  21 May 2019 07:00  --------------------------------------------------------  IN:    Oral Fluid: 300 mL  Total IN: 300 mL    OUT:  Total OUT: 0 mL    Total NET: 300 mL      .

## 2019-05-21 NOTE — PROGRESS NOTE ADULT - ASSESSMENT
Physical Examination:  GENERAL:               Alert, Oriented, No acute distress.    HEENT:                   enlarged neck circumference No JVD, Moist MM  PULM:                     Bilateral air entry, Clear to auscultation bilaterally, no significant sputum production, No Rales, No Rhonchi, No Wheezing  CVS:                         S1, S2,  2/6 Murmur  ABD:                        Soft, obese  nondistended, nontender, normoactive bowel sounds,   NEURO:                  Alert, oriented, interactive, nonfocal, follows commands  PSYC:                      Calm, + Insight.      Assessment  Dyspnea on Excretion  ABBIE  Morbid obesity s/p bariatric surgery  Chronic Lung nodule unchanged un repeat CT - no further workup    underlying HTN, Hypothyroidism    Plan  at this time workup grossly negative for cause.   PFT today shows mild restriction with FVC- 73%, FEV1 - 79% wiht FEV/FVC 78,  SVC 67%, DLCO 61% DLCO/  Overnight pulse ox noted 48 Desaturations lowest to 81%  CTA no PE/ PNA/ Atelectasis identified, Echo No acute CHF noted, ABG no chronic Hypercarbia noted.   would get Cardiac work up with Stress test  to evaluate for cardiac ischemic heart disease  Continue CPAP while in hospital PS 12    as patient has clinically feels better with CPAP, will need new Titration study on discharge to be able to get CPAP at home    she agrees she needs it and wants to use it on discharge  If cardiac workup negative pt likely deconditioned    awaiting stress test results, dipo home based on results.

## 2019-05-21 NOTE — DISCHARGE NOTE PROVIDER - NSDCCPCAREPLAN_GEN_ALL_CORE_FT
PRINCIPAL DISCHARGE DIAGNOSIS  Diagnosis: Dyspnea on exertion  Assessment and Plan of Treatment: Continue with Ace and Diuretic   weight loss program   continue to follow with cardio and pulmonary      SECONDARY DISCHARGE DIAGNOSES  Diagnosis: Morbid obesity  Assessment and Plan of Treatment: weight loss and excerise program , Dietary instruction   BMI 47.7    Diagnosis: Hypothyroid  Assessment and Plan of Treatment: Continue with synthroid and monitor TSH last 1.87    Diagnosis: HTN (hypertension)  Assessment and Plan of Treatment: continue ace and diuretics    Diagnosis: Obstructive sleep apnea  Assessment and Plan of Treatment: continue current plan    Diagnosis: DM (diabetes mellitus)  Assessment and Plan of Treatment: Hgba1c at 7.7   control and follow sugars    endocrine referral PRINCIPAL DISCHARGE DIAGNOSIS  Diagnosis: Dyspnea on exertion  Assessment and Plan of Treatment: Continue with Ace and Diuretic   weight loss program   continue to follow with cardio and pulmonary      SECONDARY DISCHARGE DIAGNOSES  Diagnosis: Morbid obesity  Assessment and Plan of Treatment: weight loss and excerise program , Dietary instruction   BMI 47.7    Diagnosis: Hypothyroid  Assessment and Plan of Treatment: Continue with synthroid and monitor TSH last 1.87    Diagnosis: HTN (hypertension)  Assessment and Plan of Treatment: continue ace and diuretics    Diagnosis: Obstructive sleep apnea  Assessment and Plan of Treatment: continue current plan    Diagnosis: DM (diabetes mellitus)  Assessment and Plan of Treatment: Hgba1c at 7.7   control and follow sugars    will start on glucophage  daily   possible endocrine referral

## 2019-05-21 NOTE — DISCHARGE NOTE PROVIDER - HOSPITAL COURSE
· Assessment        58 y/o female presents c/o sob on exertion.  Onset 2 weeks ago with progressive worsening. H/O HTN , Obstructive sleep apnea, and bariatric surgery several years ago. No fever or cough .No chest pain. Pt notes she cannot perform minimal exertion without getting sob. She remains overweight at this time.   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 Pt Had CTA to r/o PEwas followed by cardio, with serial trops  EKGs Echo and Nuclear stress test Pt also was seen by Pulmonary .  Pt had nuclear stress which is normal · Assessment        60 y/o female presents c/o sob on exertion.  Onset 2 weeks ago with progressive worsening. H/O HTN , Obstructive sleep apnea, and bariatric surgery several years ago. No fever or cough .No chest pain. Pt notes she cannot perform minimal exertion without getting sob. She remains overweight at this time.   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 Pt Had CTA to r/o PEwas followed by cardio, with serial trops  EKGs Echo and Nuclear stress test Pt also was seen by Pulmonary .  Pt had nuclear stress which is normal    added glucophage to po med regimen     Discussed with Dr Fuller and will follow in office

## 2019-05-21 NOTE — PROGRESS NOTE ADULT - SUBJECTIVE AND OBJECTIVE BOX
Follow up for sob  SUBJ:    no current cardia complains    PMH  Diabetes  Hypothyroid  Hypertension      MEDICATIONS  (STANDING):  dextrose 5%. 1000 milliLiter(s) (50 mL/Hr) IV Continuous <Continuous>  dextrose 50% Injectable 12.5 Gram(s) IV Push once  dextrose 50% Injectable 25 Gram(s) IV Push once  dextrose 50% Injectable 25 Gram(s) IV Push once  enoxaparin Injectable 40 milliGRAM(s) SubCutaneous every 12 hours  hydrochlorothiazide 25 milliGRAM(s) Oral daily  insulin lispro (HumaLOG) corrective regimen sliding scale   SubCutaneous three times a day before meals  insulin lispro (HumaLOG) corrective regimen sliding scale   SubCutaneous at bedtime  levothyroxine 75 MICROGram(s) Oral daily  lisinopril 40 milliGRAM(s) Oral daily    MEDICATIONS  (PRN):  acetaminophen   Tablet .. 650 milliGRAM(s) Oral every 6 hours PRN Temp greater or equal to 38C (100.4F), Mild Pain (1 - 3)  dextrose 40% Gel 15 Gram(s) Oral once PRN Blood Glucose LESS THAN 70 milliGRAM(s)/deciliter  glucagon  Injectable 1 milliGRAM(s) IntraMuscular once PRN Glucose LESS THAN 70 milligrams/deciliter        PHYSICAL EXAM:  Vital Signs Last 24 Hrs  T(C): 36.6 (21 May 2019 16:09), Max: 36.7 (20 May 2019 21:56)  T(F): 97.8 (21 May 2019 16:09), Max: 98.1 (20 May 2019 21:56)  HR: 80 (21 May 2019 16:09) (75 - 82)  BP: 117/76 (21 May 2019 16:09) (107/65 - 142/80)  BP(mean): --  RR: 16 (21 May 2019 16:09) (16 - 18)  SpO2: 97% (21 May 2019 16:09) (95% - 100%)    GENERAL: NAD, well-groomed, well-developed  HEAD:  Atraumatic, Normocephalic  EYES: EOMI, PERRLA, conjunctiva and sclera clear  ENT: Moist mucous membranes,  NECK: Supple, No JVD, no bruits  CHEST/LUNG: Clear to percussion bilaterally; No rales, rhonchi, wheezing, or rubs  HEART: Regular rate and rhythm; No murmurs, rubs, or gallops PMI non displaced.  ABDOMEN: Soft, Nontender, Nondistended; Bowel sounds present  EXTREMITIES:  2+ Peripheral Pulses, No clubbing, cyanosis, or edema  SKIN: No rashes or lesions  NERVOUS SYSTEM:  Cranial Nerves II-XII intact      TELEMETRY:    ECG:    < from: 12 Lead ECG (05.17.19 @ 13:06) >  Ventricular Rate 94 BPM    Atrial Rate 94 BPM    P-R Interval 146 ms    QRS Duration 90 ms    Q-T Interval 372 ms    QTC Calculation(Bezet) 465 ms    P Axis 32 degrees    R Axis -9 degrees    T Axis 32 degrees    Diagnosis Line Normal sinus rhythm  Nonspecific ST abnormality  Abnormal ECG  When compared with ECG of 08-AUG-2016 17:10,  No significant change was found  Confirmed by ROBE PATEL, HU SANTO ([id number]) on 5/18/2019 1:13:54 PM    < end of copied text >    ECHO:    < from: TTE Echo Complete w/Doppler (05.18.19 @ 08:01) >  Summary:   1. Left ventricular ejection fraction, by visual estimation, is 50 to   55%.   2. Normal global left ventricular systolic function.   3. Normal left ventricular size and wall thicknesses, with normal   systolic and diastolic function.   4. Spectral Doppler shows impaired relaxation pattern of left   ventricular myocardial filling (Grade I diastolic dysfunction).   5. There is mild septal left ventricular hypertrophy.   6. There is no evidence of pericardial effusion.   7. Mild mitral annular calcification.   8. Thickening of the anterior and posterior mitral valve leaflets.   9. Structurally normal pulmonic valve, with normal leaflet excursion.    [id number] Hu Diez MD, [facility] , Electronically signed on 5/18/2019 at   9:28:30 AM         *** Final ***    < end of copied text >      LABS:                        11.4   8.86  )-----------( 430      ( 21 May 2019 06:18 )             35.6     05-21    140  |  104  |  14  ----------------------------<  179<H>  3.6   |  29  |  0.60    Ca    9.5      21 May 2019 06:18              I&O's Summary    20 May 2019 07:01  -  21 May 2019 07:00  --------------------------------------------------------  IN: 300 mL / OUT: 0 mL / NET: 300 mL    21 May 2019 07:01  -  21 May 2019 16:35  --------------------------------------------------------  IN: 120 mL / OUT: 0 mL / NET: 120 mL      BNP    RADIOLOGY & ADDITIONAL STUDIES:    < from: NM Nuclear Stress Pharmacologic Multiple (05.21.19 @ 11:03) >  EXAM:  NM NUCLEAR STRESS MULTI PHARM    EXAM:  NM MULTI DAY PROCEDURE      PROCEDURE DATE:  05/21/2019        INTERPRETATION:  Two Day Study  Agent:  Tc99m Sestamibi (rest)     Dose:  29.8 mCi  Agent:  Tc99m Sestamibi (Regadenoson)     Dose:  29.9 mCi    History:  This is a 59 year old woman with dyspnea.    Procedure:  Following injection of radiotracer and 0.4 mg of Regadenoson,   post vasodilator and post rest imaging of the heart was obtained in 180   degrees and reconstructed into three orthogonal planes and slices.    Post Vasodilator :  There is homogeneous appearance of radiotracer in the   left ventricle myocardium. No defect is noted.    Post Rest:  There is homogeneous appearance of radiotracer in the left   ventricular myocardium.  No defect is noted.    Resting gated cardiac wall motion was performed and shows normal   thickening and brightening of the left ventricular endocardium.  The left   ventricular ejection fraction is 75%.    Impression:  Normal cardiac perfusion rest and vasodilator study         SUDEEP JEFF M.D., ATTENDING CARDIOLOGIST  This document has been electronically signed. May 21 2019  2:21PM    < end of copied text >      < from: Xray Chest 1 View AP/PA (05.17.19 @ 13:47) >  EXAM:  XR CHEST AP OR PA 1V      PROCEDURE DATE:  05/17/2019        INTERPRETATION:  Portable chest x-ray    Indication: Exertional shortness of breath.    Portable chest x-ray is compared to a previous examination dated 2/5/2018.    Impression: New density projecting over the left lower lung zone may be   due to overlying soft tissue or may represent pulmonary infiltrate. If   clinically indicated, PA and lateral chest x-rays may be pursued for   better evaluation. This finding is communicated with the emergency   department via the PACS communication system.    No evidence for pleural effusion or pneumothorax.    The trachea is midline.    Cardiac silhouette is within normal limits.          JESSICA NOGUEIRA M.D., ATTENDING RADIOLOGIST  This document has been electronically signed. May 17 2019  2:45PM    < end of copied text >      ECHO:

## 2019-05-21 NOTE — DISCHARGE NOTE NURSING/CASE MANAGEMENT/SOCIAL WORK - NSDCDPATPORTLINK_GEN_ALL_CORE
You can access the "[employer] Patient Portal, offered by [facility], by registering with the following website: [email or web address]

## 2019-05-21 NOTE — DISCHARGE NOTE PROVIDER - CARE PROVIDER_API CALL
Simone León)  Gastroenterology; Internal Medicine  [address] [place] [address], [address]  [place], [place] [id number]  Phone: [phone]  Fax: [phone]  Follow Up Time:

## 2019-05-21 NOTE — PROGRESS NOTE ADULT - SUBJECTIVE AND OBJECTIVE BOX
Patient is a 59y old  Female who presents with a chief complaint of shortness of breath with ambulation (20 May 2019 13:35)      INTERVAL HPI/OVERNIGHT EVENTS:   no complaints      REVIEW OF SYSTEMS:  CONSTITUTIONAL: No fever, weight loss, or fatigue  EYES: No eye pain, visual disturbances, or discharge  ENMT:  No difficulty hearing, tinnitus, vertigo; No sinus or throat pain  NECK: No pain or stiffness  BREASTS: No pain, masses, or nipple discharge  RESPIRATORY: No cough, wheezing, chills or hemoptysis; No shortness of breath  CARDIOVASCULAR: No chest pain, palpitations, dizziness, or leg swelling  GASTROINTESTINAL: No abdominal or epigastric pain. No nausea, vomiting, or hematemesis; No diarrhea or constipation. No melena or hematochezia.  GENITOURINARY: No dysuria, frequency, hematuria, or incontinence  NEUROLOGICAL: No headaches, memory loss, loss of strength, numbness, or tremors  SKIN: No itching, burning, rashes, or lesions   LYMPH NODES: No enlarged glands  ENDOCRINE: No heat or cold intolerance; No hair loss  MUSCULOSKELETAL: No joint pain or swelling; No muscle, back, or extremity pain  PSYCHIATRIC: No depression, anxiety, mood swings, or difficulty sleeping  HEME/LYMPH: No easy bruising, or bleeding gums  ALLERY AND IMMUNOLOGIC: No hives or eczema  FAMILY HISTORY:    T(C): 36.4 (05-21-19 @ 05:16), Max: 36.7 (05-20-19 @ 21:56)  HR: 80 (05-21-19 @ 05:16) (80 - 82)  BP: 107/65 (05-21-19 @ 05:16) (107/65 - 142/80)  RR: 16 (05-21-19 @ 05:16) (14 - 18)  SpO2: 100% (05-21-19 @ 05:16) (95% - 100%)  Wt(kg): --Vital Signs Last 24 Hrs  T(C): 36.4 (21 May 2019 05:16), Max: 36.7 (20 May 2019 21:56)  T(F): 97.5 (21 May 2019 05:16), Max: 98.1 (20 May 2019 21:56)  HR: 80 (21 May 2019 05:16) (80 - 82)  BP: 107/65 (21 May 2019 05:16) (107/65 - 142/80)  BP(mean): --  RR: 16 (21 May 2019 05:16) (14 - 18)  SpO2: 100% (21 May 2019 05:16) (95% - 100%)    T(F): 97.5 (05-21-19 @ 05:16), Max: 98.9 (05-19-19 @ 20:16)  HR: 80 (05-21-19 @ 05:16) (73 - 91)  BP: 107/65 (05-21-19 @ 05:16) (107/65 - 160/93)  RR: 16 (05-21-19 @ 05:16) (14 - 18)  SpO2: 100% (05-21-19 @ 05:16) (95% - 100%)    PHYSICAL EXAM:  GENERAL: NAD, well-groomed, well-developed  HEAD:  Atraumatic, Normocephalic  EYES: EOMI, PERRLA, conjunctiva and sclera clear  ENMT: No tonsillar erythema, exudates, or enlargement; Moist mucous membranes, Good dentition, No lesions  NECK: Supple, No JVD, Normal thyroid  NERVOUS SYSTEM:  Alert & Oriented X3, Good concentration; Motor Strength 5/5 B/L upper and lower extremities; DTRs 2+ intact and symmetric  CHEST/LUNG: Clear to percussion bilaterally; No rales, rhonchi, wheezing, or rubs  HEART: Regular rate and rhythm; No murmurs, rubs, or gallops  ABDOMEN: Soft, Nontender, Nondistended; Bowel sounds present  EXTREMITIES:  2+ Peripheral Pulses, No clubbing, cyanosis, or edema  LYMPH: No lymphadenopathy noted  SKIN: No rashes or lesions    Consultant(s) Notes Reviewed:  [x ] YES  [ ] NO  Care Discussed with Consultants/Other Providers [ x] YES  [ ] NO    LABS:  05-21    140  |  104  |  14  ----------------------------<  179<H>  3.6   |  29  |  0.60    Ca    9.5      21 May 2019 06:18                            11.4   8.86  )-----------( 430      ( 21 May 2019 06:18 )             35.6       Thyroid Stimulating Hormone, Serum: 1.83 uIU/mL (05-18 @ 07:35)                         11.4   8.86  )-----------( 430      ( 05-21 @ 06:18 )             35.6                11.6   9.70  )-----------( 475      ( 05-18 @ 07:49 )             36.3                11.0   8.42  )-----------( 441      ( 05-17 @ 13:25 )             35.1       Hemoglobin A1C, Whole Blood: 7.7 % (05-18-19 @ 07:35)          RADIOLOGY & ADDITIONAL TESTS:    Imaging Personally Reviewed:  [ ] YES  [ ] NO  acetaminophen   Tablet .. 650 milliGRAM(s) Oral every 6 hours PRN  dextrose 40% Gel 15 Gram(s) Oral once PRN  dextrose 5%. 1000 milliLiter(s) IV Continuous <Continuous>  dextrose 50% Injectable 12.5 Gram(s) IV Push once  dextrose 50% Injectable 25 Gram(s) IV Push once  dextrose 50% Injectable 25 Gram(s) IV Push once  enoxaparin Injectable 40 milliGRAM(s) SubCutaneous every 12 hours  glucagon  Injectable 1 milliGRAM(s) IntraMuscular once PRN  hydrochlorothiazide 25 milliGRAM(s) Oral daily  insulin lispro (HumaLOG) corrective regimen sliding scale   SubCutaneous three times a day before meals  insulin lispro (HumaLOG) corrective regimen sliding scale   SubCutaneous at bedtime  levothyroxine 75 MICROGram(s) Oral daily  lisinopril 40 milliGRAM(s) Oral daily      HEALTH ISSUES - PROBLEM Dx:  Diabetes: Diabetes  Sleep apnea: Sleep apnea  Hypothyroid: Hypothyroid  Essential hypertension: Essential hypertension  Dyspnea on exertion: Dyspnea on exertion

## 2019-05-21 NOTE — CHART NOTE - NSCHARTNOTEFT_GEN_A_CORE
Reviewed prior progress notes, labs and imaging.    Discussed with Mau    Primary Diagnosis: Dyspnea on exertion       Plan: Nuclear stress today   Cardiology following         Anticipated Discharge: too soon await nuclear stress

## 2019-05-21 NOTE — PROGRESS NOTE ADULT - REASON FOR ADMISSION
shortness of breath with ambulation

## 2019-05-24 PROBLEM — E03.9 HYPOTHYROIDISM, UNSPECIFIED: Chronic | Status: ACTIVE | Noted: 2019-05-17

## 2019-05-24 PROBLEM — I10 ESSENTIAL (PRIMARY) HYPERTENSION: Chronic | Status: ACTIVE | Noted: 2019-05-17

## 2019-05-29 ENCOUNTER — RX RENEWAL (OUTPATIENT)
Age: 59
End: 2019-05-29

## 2019-05-30 ENCOUNTER — APPOINTMENT (OUTPATIENT)
Dept: INTERNAL MEDICINE | Facility: CLINIC | Age: 59
End: 2019-05-30
Payer: COMMERCIAL

## 2019-05-30 VITALS
WEIGHT: 293 LBS | DIASTOLIC BLOOD PRESSURE: 84 MMHG | RESPIRATION RATE: 16 BRPM | HEART RATE: 88 BPM | HEIGHT: 65 IN | BODY MASS INDEX: 48.82 KG/M2 | SYSTOLIC BLOOD PRESSURE: 138 MMHG

## 2019-05-30 DIAGNOSIS — Z09 ENCOUNTER FOR FOLLOW-UP EXAMINATION AFTER COMPLETED TREATMENT FOR CONDITIONS OTHER THAN MALIGNANT NEOPLASM: ICD-10-CM

## 2019-05-30 PROCEDURE — 99495 TRANSJ CARE MGMT MOD F2F 14D: CPT

## 2019-05-30 NOTE — HISTORY OF PRESENT ILLNESS
[Post-hospitalization from ___ Hospital] : Post-hospitalization from [unfilled] Hospital [Discharged on ___] : discharged on [unfilled] [FreeTextEntry2] : 59-year-old woman comes to the office for transitional care after an admission to [facility] for shortness of breath patient's workup included a CT angiogram of the chest which ruled out pulmonary embolus and chemical-induced stress test which ruled out ischemia and an echocardiogram patient improved in the hospital somewhat on CPAP machine she had not been using her CPAP at home for many years he is in the process of reestablishing connections to a CPAP machine and she is in a progressive mode to lose weight having already lost 10 pounds in the last month she denies temperature chills sweats or myalgias she said no headache sinus congestion sore throat cough wheezing pleurisy chest pain shortness of breath exertional dyspnea lightheadedness palpitations dizziness vertigo or syncope. She denies abdominal pain nausea vomiting diarrhea constipation bright red blood per rectum or black stools she is severely reducing portions and making better choices in attempt to lose weight she has had no problems urinating she denies claudication

## 2019-05-30 NOTE — PHYSICAL EXAM
[No Acute Distress] : no acute distress [Well Nourished] : well nourished [Well Developed] : well developed [Well-Appearing] : well-appearing [Normal Voice/Communication] : normal voice/communication [Normal Sclera/Conjunctiva] : normal sclera/conjunctiva [PERRL] : pupils equal round and reactive to light [EOMI] : extraocular movements intact [Normal Outer Ear/Nose] : the outer ears and nose were normal in appearance [Normal Oropharynx] : the oropharynx was normal [Normal TMs] : both tympanic membranes were normal [Normal Nasal Mucosa] : the nasal mucosa was normal [No JVD] : no jugular venous distention [Supple] : supple [No Lymphadenopathy] : no lymphadenopathy [Thyroid Normal, No Nodules] : the thyroid was normal and there were no nodules present [No Respiratory Distress] : no respiratory distress  [Clear to Auscultation] : lungs were clear to auscultation bilaterally [No Accessory Muscle Use] : no accessory muscle use [Normal Percussion] : the chest was normal to percussion [Normal Rate] : normal rate  [Regular Rhythm] : with a regular rhythm [Normal S1, S2] : normal S1 and S2 [No Murmur] : no murmur heard [No Carotid Bruits] : no carotid bruits [No Abdominal Bruit] : a ~M bruit was not heard ~T in the abdomen [No Varicosities] : no varicosities [Pedal Pulses Present] : the pedal pulses are present [No Extremity Clubbing/Cyanosis] : no extremity clubbing/cyanosis [No Palpable Aorta] : no palpable aorta [Declined Breast Exam] : declined breast exam  [Soft] : abdomen soft [Non Tender] : non-tender [Non-distended] : non-distended [No Masses] : no abdominal mass palpated [No HSM] : no HSM [Normal Bowel Sounds] : normal bowel sounds [No Hernias] : no hernias [Declined Rectal Exam] : declined rectal exam [Normal Supraclavicular Nodes] : no supraclavicular lymphadenopathy [Normal Axillary Nodes] : no axillary lymphadenopathy [Normal Posterior Cervical Nodes] : no posterior cervical lymphadenopathy [Normal Anterior Cervical Nodes] : no anterior cervical lymphadenopathy [Normal Inguinal Nodes] : no inguinal lymphadenopathy [No CVA Tenderness] : no CVA  tenderness [No Spinal Tenderness] : no spinal tenderness [No Joint Swelling] : no joint swelling [Grossly Normal Strength/Tone] : grossly normal strength/tone [No Rash] : no rash [Normal Gait] : normal gait [Coordination Grossly Intact] : coordination grossly intact [No Focal Deficits] : no focal deficits [Deep Tendon Reflexes (DTR)] : deep tendon reflexes were 2+ and symmetric [Speech Grossly Normal] : speech grossly normal [Memory Grossly Normal] : memory grossly normal [Normal Affect] : the affect was normal [Alert and Oriented x3] : oriented to person, place, and time [Normal Mood] : the mood was normal [Normal Insight/Judgement] : insight and judgment were intact [de-identified] : bilat edema

## 2019-05-30 NOTE — ASSESSMENT
[FreeTextEntry1] : Physical exam shows an obese woman in no acute distress at rest blood pressure 138/84 height 5 feet 5 inches weight 298 pounds BMI of 49.59... pulse of 88 respirations of 16 HEENT was unremarkable chest was clear cardiovascular exam shows a normal S1 and S2 without murmurs abdomen was obese soft extremities show trace bilateral edema neurologic exam was nonfocal.............. patient with extensive cardiopulmonary workup while in hospital patient realizes that she needs to lose weight and get back on her CPAP machine as soon as possible she is up-to-date with her ophthalmologist..She needs to make an appointment with her gynecologist Dr. Clayton to set up a mammogram ...colonoscopy was performed within the last several years

## 2019-05-30 NOTE — REVIEW OF SYSTEMS
[Fatigue] : fatigue [Shortness Of Breath] : shortness of breath [Dyspnea on Exertion] : dyspnea on exertion [Nocturia] : nocturia [Joint Stiffness] : joint stiffness [Back Pain] : back pain [Insomnia] : insomnia [Anxiety] : anxiety [Depression] : depression [Negative] : Heme/Lymph [Chills] : no chills [Hot Flashes] : no hot flashes [Night Sweats] : no night sweats [Recent Change In Weight] : ~T no recent weight change [Discharge] : no discharge [Pain] : no pain [Redness] : no redness [Dryness] : no dryness  [Vision Problems] : no vision problems [Itching] : no itching [Earache] : no earache [Hearing Loss] : no hearing loss [Nosebleed] : no nosebleeds [Hoarseness] : no hoarseness [Nasal Discharge] : no nasal discharge [Sore Throat] : no sore throat [Postnasal Drip] : no postnasal drip [Chest Pain] : no chest pain [Palpitations] : no palpitations [Leg Claudication] : no leg claudication [Lower Ext Edema] : no lower extremity edema [Orthopnea] : no orthopnea [Paroysmal Nocturnal Dyspnea] : no paroysmal nocturnal dyspnea [Wheezing] : no wheezing [Cough] : no cough [Abdominal Pain] : no abdominal pain [Nausea] : no nausea [Constipation] : no constipation [Diarrhea] : diarrhea [Vomiting] : no vomiting [Heartburn] : no heartburn [Melena] : no melena [Dysuria] : no dysuria [Incontinence] : no incontinence [Poor Libido] : libido not poor [Hematuria] : no hematuria [Frequency] : no frequency [Vaginal Discharge] : no vaginal discharge [Dysmenorrhea] : no dysmenorrhea [Joint Pain] : no joint pain [Joint Swelling] : no joint swelling [Muscle Weakness] : no muscle weakness [Muscle Pain] : no muscle pain [Mole Changes] : no mole changes [Nail Changes] : no nail changes [Hair Changes] : no hair changes [Skin Rash] : no skin rash [Headache] : no headache [Dizziness] : no dizziness [Fainting] : no fainting [Confusion] : no confusion [Memory Loss] : no memory loss [Unsteady Walking] : no ataxia [Suicidal] : not suicidal [Easy Bleeding] : no easy bleeding [Easy Bruising] : no easy bruising [Swollen Glands] : no swollen glands [FreeTextEntry6] : minimal exertion

## 2019-05-30 NOTE — HEALTH RISK ASSESSMENT
[No falls in past year] : Patient reported no falls in the past year [0] : 2) Feeling down, depressed, or hopeless: Not at all (0) [HIV test declined] : HIV test declined [Hepatitis C test declined] : Hepatitis C test declined [[id number]] : 0 [MammogramDate] : 03/18 [ColonoscopyDate] : 01/19

## 2019-06-21 ENCOUNTER — RX RENEWAL (OUTPATIENT)
Age: 59
End: 2019-06-21

## 2019-08-19 ENCOUNTER — MEDICATION RENEWAL (OUTPATIENT)
Age: 59
End: 2019-08-19

## 2019-08-21 ENCOUNTER — MEDICATION RENEWAL (OUTPATIENT)
Age: 59
End: 2019-08-21

## 2019-10-03 ENCOUNTER — APPOINTMENT (OUTPATIENT)
Dept: SLEEP CENTER | Facility: CLINIC | Age: 59
End: 2019-10-03
Payer: COMMERCIAL

## 2019-10-03 ENCOUNTER — OUTPATIENT (OUTPATIENT)
Dept: OUTPATIENT SERVICES | Facility: HOSPITAL | Age: 59
LOS: 1 days | End: 2019-10-03
Payer: COMMERCIAL

## 2019-10-03 PROCEDURE — 95810 POLYSOM 6/> YRS 4/> PARAM: CPT

## 2019-10-03 PROCEDURE — 95810 POLYSOM 6/> YRS 4/> PARAM: CPT | Mod: 26

## 2019-10-04 DIAGNOSIS — G47.33 OBSTRUCTIVE SLEEP APNEA (ADULT) (PEDIATRIC): ICD-10-CM

## 2019-10-17 ENCOUNTER — APPOINTMENT (OUTPATIENT)
Dept: INTERNAL MEDICINE | Facility: CLINIC | Age: 59
End: 2019-10-17
Payer: COMMERCIAL

## 2019-10-17 VITALS
OXYGEN SATURATION: 95 % | RESPIRATION RATE: 15 BRPM | HEART RATE: 83 BPM | TEMPERATURE: 98.3 F | WEIGHT: 290 LBS | DIASTOLIC BLOOD PRESSURE: 88 MMHG | HEIGHT: 65 IN | SYSTOLIC BLOOD PRESSURE: 140 MMHG | BODY MASS INDEX: 48.32 KG/M2

## 2019-10-17 VITALS
SYSTOLIC BLOOD PRESSURE: 132 MMHG | RESPIRATION RATE: 15 BRPM | HEART RATE: 82 BPM | DIASTOLIC BLOOD PRESSURE: 78 MMHG | WEIGHT: 293 LBS | HEIGHT: 65 IN | BODY MASS INDEX: 48.82 KG/M2

## 2019-10-17 PROCEDURE — 90686 IIV4 VACC NO PRSV 0.5 ML IM: CPT

## 2019-10-17 PROCEDURE — G0447 BEHAVIOR COUNSEL OBESITY 15M: CPT

## 2019-10-17 PROCEDURE — G0008: CPT

## 2019-10-17 PROCEDURE — 99215 OFFICE O/P EST HI 40 MIN: CPT | Mod: 25

## 2019-10-17 NOTE — HISTORY OF PRESENT ILLNESS
[FreeTextEntry1] : Constantly office for followup to review her medications and discuss her overall health look for complete blood test as well as getting the influenza vaccine [de-identified] : 59-year-old female comes to the office for followup with a history of morbid obesity hypertension hypothyroidism sleep apnea type 2 diabetes. She has been fatigued denies temperature chills sweats or myalgias she has had no headache sinus congestion sore throat cough wheezing pleurisy chest pain shortness of breath exertional dyspnea lightheadedness palpitations dizziness vertigo or syncope denies abdominal pain has mild nausea at times but no vomiting denies diarrhea constipation bright red blood per rectum or black stools her appetite has been good her weight has been stable she denies significant musculoskeletal complaints she has had no urinary symptoms leg edema or recent skin rashes

## 2019-10-17 NOTE — HEALTH RISK ASSESSMENT
[No] : No [No falls in past year] : Patient reported no falls in the past year [0] : 2) Feeling down, depressed, or hopeless: Not at all (0) [] : No [[id number]] : 0

## 2019-10-17 NOTE — REVIEW OF SYSTEMS
[Fatigue] : fatigue [Shortness Of Breath] : shortness of breath [Dyspnea on Exertion] : dyspnea on exertion [Nocturia] : nocturia [Joint Stiffness] : joint stiffness [Back Pain] : back pain [Insomnia] : insomnia [Depression] : depression [Anxiety] : anxiety [Negative] : Heme/Lymph [Chills] : no chills [Hot Flashes] : no hot flashes [Night Sweats] : no night sweats [Recent Change In Weight] : ~T no recent weight change [Pain] : no pain [Discharge] : no discharge [Redness] : no redness [Vision Problems] : no vision problems [Itching] : no itching [Dryness] : no dryness  [Earache] : no earache [Hearing Loss] : no hearing loss [Nosebleed] : no nosebleeds [Nasal Discharge] : no nasal discharge [Hoarseness] : no hoarseness [Sore Throat] : no sore throat [Postnasal Drip] : no postnasal drip [Chest Pain] : no chest pain [Palpitations] : no palpitations [Lower Ext Edema] : no lower extremity edema [Orthopnea] : no orthopnea [Leg Claudication] : no leg claudication [Cough] : no cough [Wheezing] : no wheezing [Abdominal Pain] : no abdominal pain [Nausea] : no nausea [Constipation] : no constipation [Diarrhea] : diarrhea [Heartburn] : no heartburn [Vomiting] : no vomiting [Dysuria] : no dysuria [Melena] : no melena [Incontinence] : no incontinence [Poor Libido] : libido not poor [Hematuria] : no hematuria [Vaginal Discharge] : no vaginal discharge [Frequency] : no frequency [Dysmenorrhea] : no dysmenorrhea [Joint Pain] : no joint pain [Joint Swelling] : no joint swelling [Muscle Weakness] : no muscle weakness [Mole Changes] : no mole changes [Muscle Pain] : no muscle pain [Skin Rash] : no skin rash [Hair Changes] : no hair changes [Nail Changes] : no nail changes [Headache] : no headache [Dizziness] : no dizziness [Fainting] : no fainting [Confusion] : no confusion [Memory Loss] : no memory loss [Unsteady Walking] : no ataxia [Suicidal] : not suicidal [Swollen Glands] : no swollen glands [Easy Bleeding] : no easy bleeding [Easy Bruising] : no easy bruising [FreeTextEntry6] : minimal exertion

## 2019-10-17 NOTE — ASSESSMENT
[FreeTextEntry1] : Physical exam she is a morbidly obese female in no acute distress blood pressure 140/88 repeated 132/78 pulse 83 respiratory rate of 15 HEENT was unremarkable chest was clear cardiovascular was regular abdomen soft extremities show trace bilateral edema neurologic exam was nonfocal long discussion with patient greater than 20 minutes concerning her weight and options she is aware of the serious medical consequences of her obesity will cause she is also experiencing orthopedic discomforts and most if not all joints bariatric surgery was also discussed patient claims that she will successfully lose weight with caution management proper choices challenged her to Weight Watchers may be an idea that she should consider a slip was given for complete blood tests including CBC comprehensive metabolic profile hemoglobin A1c lipid profile thyroid profile urinalysis CRP vitamin D3 B12 and measles mumps and rubella antibodies she is up-to-date with her ophthalmologist and dermatologist a slip was given for mammography and breast sonography . She is up to date she claims with her colonoscopies performed by Dr. Ruvalcaba  the influenza vaccine was administered

## 2019-10-17 NOTE — COUNSELING
[Benefits of weight loss discussed] : Benefits of weight loss discussed [Potential consequences of obesity discussed] : Potential consequences of obesity discussed [Encouraged to increase physical activity] : Encouraged to increase physical activity [Structured Weight Management Program suggested:] : Structured weight management program suggested [Good understanding] : Patient has a good understanding of disease, goals and obesity follow-up plan [FreeTextEntry1] : weight watchers [FreeTextEntry4] : 15

## 2019-10-17 NOTE — PHYSICAL EXAM
[No Acute Distress] : no acute distress [Well Nourished] : well nourished [Well Developed] : well developed [Well-Appearing] : well-appearing [Normal Voice/Communication] : normal voice/communication [Normal Sclera/Conjunctiva] : normal sclera/conjunctiva [PERRL] : pupils equal round and reactive to light [EOMI] : extraocular movements intact [Normal Outer Ear/Nose] : the outer ears and nose were normal in appearance [Normal Oropharynx] : the oropharynx was normal [Normal TMs] : both tympanic membranes were normal [Normal Nasal Mucosa] : the nasal mucosa was normal [No JVD] : no jugular venous distention [No Lymphadenopathy] : no lymphadenopathy [Supple] : supple [No Respiratory Distress] : no respiratory distress  [Thyroid Normal, No Nodules] : the thyroid was normal and there were no nodules present [No Accessory Muscle Use] : no accessory muscle use [Clear to Auscultation] : lungs were clear to auscultation bilaterally [Normal Rate] : normal rate  [Regular Rhythm] : with a regular rhythm [No Murmur] : no murmur heard [Normal S1, S2] : normal S1 and S2 [No Carotid Bruits] : no carotid bruits [No Abdominal Bruit] : a ~M bruit was not heard ~T in the abdomen [Pedal Pulses Present] : the pedal pulses are present [No Varicosities] : no varicosities [No Edema] : there was no peripheral edema [No Extremity Clubbing/Cyanosis] : no extremity clubbing/cyanosis [No Palpable Aorta] : no palpable aorta [Declined Breast Exam] : declined breast exam  [Soft] : abdomen soft [Non Tender] : non-tender [No Masses] : no abdominal mass palpated [Non-distended] : non-distended [Normal Bowel Sounds] : normal bowel sounds [No Hernias] : no hernias [No HSM] : no HSM [Normal Supraclavicular Nodes] : no supraclavicular lymphadenopathy [Declined Rectal Exam] : declined rectal exam [Normal Posterior Cervical Nodes] : no posterior cervical lymphadenopathy [Normal Axillary Nodes] : no axillary lymphadenopathy [Normal Femoral Nodes] : no femoral lymphadenopathy [Normal Anterior Cervical Nodes] : no anterior cervical lymphadenopathy [Normal Inguinal Nodes] : no inguinal lymphadenopathy [No CVA Tenderness] : no CVA  tenderness [No Spinal Tenderness] : no spinal tenderness [No Joint Swelling] : no joint swelling [No Rash] : no rash [Grossly Normal Strength/Tone] : grossly normal strength/tone [Coordination Grossly Intact] : coordination grossly intact [No Focal Deficits] : no focal deficits [Normal Gait] : normal gait [Memory Grossly Normal] : memory grossly normal [Speech Grossly Normal] : speech grossly normal [Deep Tendon Reflexes (DTR)] : deep tendon reflexes were 2+ and symmetric [Normal Affect] : the affect was normal [Normal Mood] : the mood was normal [Alert and Oriented x3] : oriented to person, place, and time [Normal Insight/Judgement] : insight and judgment were intact [Kyphosis] : no kyphosis [Scoliosis] : no scoliosis

## 2019-10-27 ENCOUNTER — INPATIENT (INPATIENT)
Facility: HOSPITAL | Age: 59
LOS: 7 days | Discharge: HOME | DRG: 176 | End: 2019-11-04
Attending: INTERNAL MEDICINE | Admitting: STUDENT IN AN ORGANIZED HEALTH CARE EDUCATION/TRAINING PROGRAM
Payer: COMMERCIAL

## 2019-10-27 VITALS
DIASTOLIC BLOOD PRESSURE: 97 MMHG | SYSTOLIC BLOOD PRESSURE: 164 MMHG | OXYGEN SATURATION: 93 % | HEART RATE: 105 BPM | RESPIRATION RATE: 22 BRPM

## 2019-10-27 DIAGNOSIS — G47.30 SLEEP APNEA, UNSPECIFIED: ICD-10-CM

## 2019-10-27 DIAGNOSIS — E11.9 TYPE 2 DIABETES MELLITUS WITHOUT COMPLICATIONS: ICD-10-CM

## 2019-10-27 DIAGNOSIS — I26.94 MULTIPLE SUBSEGMENTAL PULMONARY EMBOLI WITHOUT ACUTE COR PULMONALE: ICD-10-CM

## 2019-10-27 DIAGNOSIS — I26.99 OTHER PULMONARY EMBOLISM WITHOUT ACUTE COR PULMONALE: ICD-10-CM

## 2019-10-27 DIAGNOSIS — I10 ESSENTIAL (PRIMARY) HYPERTENSION: ICD-10-CM

## 2019-10-27 DIAGNOSIS — E03.9 HYPOTHYROIDISM, UNSPECIFIED: ICD-10-CM

## 2019-10-27 LAB
25(OH)D3 SERPL-MCNC: 19.8 NG/ML
ALBUMIN SERPL ELPH-MCNC: 3.2 G/DL — LOW (ref 3.3–5)
ALBUMIN SERPL ELPH-MCNC: 4.1 G/DL
ALP BLD-CCNC: 123 U/L
ALP SERPL-CCNC: 130 U/L — HIGH (ref 40–120)
ALT FLD-CCNC: 50 U/L — HIGH (ref 10–45)
ALT SERPL-CCNC: 48 U/L
ANION GAP SERPL CALC-SCNC: 10 MMOL/L — SIGNIFICANT CHANGE UP (ref 5–17)
ANION GAP SERPL CALC-SCNC: 15 MMOL/L
APPEARANCE: CLEAR
APTT BLD: 113.5 SEC — HIGH (ref 27.5–36.3)
APTT BLD: 27.2 SEC — LOW (ref 27.5–36.3)
APTT BLD: 71.1 SEC — HIGH (ref 27.5–36.3)
AST SERPL-CCNC: 39 U/L
AST SERPL-CCNC: 39 U/L — SIGNIFICANT CHANGE UP (ref 10–40)
BASOPHILS # BLD AUTO: 0.11 K/UL
BASOPHILS NFR BLD AUTO: 1.2 %
BILIRUB SERPL-MCNC: 0.6 MG/DL
BILIRUB SERPL-MCNC: 0.7 MG/DL — SIGNIFICANT CHANGE UP (ref 0.2–1.2)
BILIRUBIN URINE: NEGATIVE
BLOOD URINE: NEGATIVE
BUN SERPL-MCNC: 16 MG/DL — SIGNIFICANT CHANGE UP (ref 7–23)
BUN SERPL-MCNC: 17 MG/DL
CALCIUM SERPL-MCNC: 10.5 MG/DL
CALCIUM SERPL-MCNC: 9.3 MG/DL — SIGNIFICANT CHANGE UP (ref 8.4–10.5)
CHLORIDE SERPL-SCNC: 103 MMOL/L — SIGNIFICANT CHANGE UP (ref 96–108)
CHLORIDE SERPL-SCNC: 99 MMOL/L
CHOLEST SERPL-MCNC: 180 MG/DL
CHOLEST/HDLC SERPL: 4.4 RATIO
CO2 SERPL-SCNC: 25 MMOL/L
CO2 SERPL-SCNC: 26 MMOL/L — SIGNIFICANT CHANGE UP (ref 22–31)
COLOR: NORMAL
CREAT SERPL-MCNC: 0.54 MG/DL
CREAT SERPL-MCNC: 0.73 MG/DL — SIGNIFICANT CHANGE UP (ref 0.5–1.3)
CRP SERPL HS-MCNC: 4.45 MG/L
EOSINOPHIL # BLD AUTO: 0.3 K/UL
EOSINOPHIL NFR BLD AUTO: 3.2 %
ESTIMATED AVERAGE GLUCOSE: 177 MG/DL
GLUCOSE BLDC GLUCOMTR-MCNC: 150 MG/DL — HIGH (ref 70–99)
GLUCOSE BLDC GLUCOMTR-MCNC: 165 MG/DL — HIGH (ref 70–99)
GLUCOSE BS SERPL-MCNC: 146 MG/DL
GLUCOSE QUALITATIVE U: NEGATIVE
GLUCOSE SERPL-MCNC: 146 MG/DL
GLUCOSE SERPL-MCNC: 202 MG/DL — HIGH (ref 70–99)
HBA1C MFR BLD HPLC: 7.8 %
HCT VFR BLD CALC: 34.2 % — LOW (ref 34.5–45)
HCT VFR BLD CALC: 36 % — SIGNIFICANT CHANGE UP (ref 34.5–45)
HCT VFR BLD CALC: 39.8 %
HDLC SERPL-MCNC: 41 MG/DL
HGB BLD-MCNC: 11.1 G/DL — LOW (ref 11.5–15.5)
HGB BLD-MCNC: 11.6 G/DL — SIGNIFICANT CHANGE UP (ref 11.5–15.5)
HGB BLD-MCNC: 12.7 G/DL
IMM GRANULOCYTES NFR BLD AUTO: 0.3 %
INR BLD: 1 RATIO — SIGNIFICANT CHANGE UP (ref 0.88–1.16)
KETONES URINE: NEGATIVE
LDLC SERPL CALC-MCNC: 95 MG/DL
LEUKOCYTE ESTERASE URINE: NEGATIVE
LIDOCAIN IGE QN: 172 U/L — SIGNIFICANT CHANGE UP (ref 73–393)
LYMPHOCYTES # BLD AUTO: 2.88 K/UL
LYMPHOCYTES NFR BLD AUTO: 30.7 %
MAGNESIUM SERPL-MCNC: 1.7 MG/DL — SIGNIFICANT CHANGE UP (ref 1.6–2.6)
MAN DIFF?: NORMAL
MCHC RBC-ENTMCNC: 28.7 PG
MCHC RBC-ENTMCNC: 29 PG — SIGNIFICANT CHANGE UP (ref 27–34)
MCHC RBC-ENTMCNC: 29.2 PG — SIGNIFICANT CHANGE UP (ref 27–34)
MCHC RBC-ENTMCNC: 31.9 GM/DL
MCHC RBC-ENTMCNC: 32.2 GM/DL — SIGNIFICANT CHANGE UP (ref 32–36)
MCHC RBC-ENTMCNC: 32.5 GM/DL — SIGNIFICANT CHANGE UP (ref 32–36)
MCV RBC AUTO: 90 FL
MCV RBC AUTO: 90 FL — SIGNIFICANT CHANGE UP (ref 80–100)
MCV RBC AUTO: 90 FL — SIGNIFICANT CHANGE UP (ref 80–100)
MEV IGG FLD QL IA: >300 AU/ML
MEV IGG+IGM SER-IMP: POSITIVE
MONOCYTES # BLD AUTO: 0.53 K/UL
MONOCYTES NFR BLD AUTO: 5.6 %
MUV AB SER-ACNC: NEGATIVE
MUV IGG SER QL IA: 6.3 AU/ML
NEUTROPHILS # BLD AUTO: 5.54 K/UL
NEUTROPHILS NFR BLD AUTO: 59 %
NITRITE URINE: NEGATIVE
NRBC # BLD: 0 /100 WBCS — SIGNIFICANT CHANGE UP (ref 0–0)
NRBC # BLD: 0 /100 WBCS — SIGNIFICANT CHANGE UP (ref 0–0)
NT-PROBNP SERPL-SCNC: 508 PG/ML — HIGH (ref 0–300)
PH URINE: 6
PLATELET # BLD AUTO: 221 K/UL — SIGNIFICANT CHANGE UP (ref 150–400)
PLATELET # BLD AUTO: 248 K/UL — SIGNIFICANT CHANGE UP (ref 150–400)
PLATELET # BLD AUTO: 486 K/UL
POTASSIUM SERPL-MCNC: 3.8 MMOL/L — SIGNIFICANT CHANGE UP (ref 3.5–5.3)
POTASSIUM SERPL-SCNC: 3.8 MMOL/L — SIGNIFICANT CHANGE UP (ref 3.5–5.3)
POTASSIUM SERPL-SCNC: 4.1 MMOL/L
PROT SERPL-MCNC: 6.5 G/DL
PROT SERPL-MCNC: 6.8 G/DL — SIGNIFICANT CHANGE UP (ref 6–8.3)
PROTEIN URINE: NEGATIVE
PROTHROM AB SERPL-ACNC: 11.2 SEC — SIGNIFICANT CHANGE UP (ref 10–12.9)
RBC # BLD: 3.8 M/UL — SIGNIFICANT CHANGE UP (ref 3.8–5.2)
RBC # BLD: 4 M/UL — SIGNIFICANT CHANGE UP (ref 3.8–5.2)
RBC # BLD: 4.42 M/UL
RBC # FLD: 14.8 %
RBC # FLD: 15.4 % — HIGH (ref 10.3–14.5)
RBC # FLD: 15.5 % — HIGH (ref 10.3–14.5)
RUBV IGG FLD-ACNC: 25.2 INDEX
RUBV IGG SER-IMP: POSITIVE
SODIUM SERPL-SCNC: 139 MMOL/L
SODIUM SERPL-SCNC: 139 MMOL/L — SIGNIFICANT CHANGE UP (ref 135–145)
SPECIFIC GRAVITY URINE: 1.02
T4 FREE SERPL-MCNC: 1.3 NG/DL
TRIGL SERPL-MCNC: 220 MG/DL
TROPONIN I SERPL-MCNC: 0.02 NG/ML — SIGNIFICANT CHANGE UP (ref 0.02–0.06)
TSH SERPL-ACNC: 1.48 UIU/ML
UROBILINOGEN URINE: NORMAL
VIT B12 SERPL-MCNC: 723 PG/ML
WBC # BLD: 11.78 K/UL — HIGH (ref 3.8–10.5)
WBC # BLD: 13.22 K/UL — HIGH (ref 3.8–10.5)
WBC # FLD AUTO: 11.78 K/UL — HIGH (ref 3.8–10.5)
WBC # FLD AUTO: 13.22 K/UL — HIGH (ref 3.8–10.5)
WBC # FLD AUTO: 9.39 K/UL

## 2019-10-27 PROCEDURE — 99285 EMERGENCY DEPT VISIT HI MDM: CPT

## 2019-10-27 PROCEDURE — 99223 1ST HOSP IP/OBS HIGH 75: CPT

## 2019-10-27 PROCEDURE — 93010 ELECTROCARDIOGRAM REPORT: CPT

## 2019-10-27 PROCEDURE — 71275 CT ANGIOGRAPHY CHEST: CPT | Mod: 26

## 2019-10-27 RX ORDER — SODIUM CHLORIDE 9 MG/ML
1000 INJECTION, SOLUTION INTRAVENOUS
Refills: 0 | Status: DISCONTINUED | OUTPATIENT
Start: 2019-10-27 | End: 2019-11-04

## 2019-10-27 RX ORDER — HEPARIN SODIUM 5000 [USP'U]/ML
10000 INJECTION INTRAVENOUS; SUBCUTANEOUS EVERY 6 HOURS
Refills: 0 | Status: DISCONTINUED | OUTPATIENT
Start: 2019-10-27 | End: 2019-10-29

## 2019-10-27 RX ORDER — LEVOTHYROXINE SODIUM 125 MCG
75 TABLET ORAL DAILY
Refills: 0 | Status: DISCONTINUED | OUTPATIENT
Start: 2019-10-27 | End: 2019-11-04

## 2019-10-27 RX ORDER — DEXTROSE 50 % IN WATER 50 %
25 SYRINGE (ML) INTRAVENOUS ONCE
Refills: 0 | Status: DISCONTINUED | OUTPATIENT
Start: 2019-10-27 | End: 2019-11-04

## 2019-10-27 RX ORDER — HEPARIN SODIUM 5000 [USP'U]/ML
10000 INJECTION INTRAVENOUS; SUBCUTANEOUS ONCE
Refills: 0 | Status: COMPLETED | OUTPATIENT
Start: 2019-10-27 | End: 2019-10-27

## 2019-10-27 RX ORDER — GLUCAGON INJECTION, SOLUTION 0.5 MG/.1ML
1 INJECTION, SOLUTION SUBCUTANEOUS ONCE
Refills: 0 | Status: DISCONTINUED | OUTPATIENT
Start: 2019-10-27 | End: 2019-11-04

## 2019-10-27 RX ORDER — HEPARIN SODIUM 5000 [USP'U]/ML
5000 INJECTION INTRAVENOUS; SUBCUTANEOUS EVERY 6 HOURS
Refills: 0 | Status: DISCONTINUED | OUTPATIENT
Start: 2019-10-27 | End: 2019-10-29

## 2019-10-27 RX ORDER — INSULIN LISPRO 100/ML
VIAL (ML) SUBCUTANEOUS AT BEDTIME
Refills: 0 | Status: DISCONTINUED | OUTPATIENT
Start: 2019-10-27 | End: 2019-11-04

## 2019-10-27 RX ORDER — DEXTROSE 50 % IN WATER 50 %
15 SYRINGE (ML) INTRAVENOUS ONCE
Refills: 0 | Status: DISCONTINUED | OUTPATIENT
Start: 2019-10-27 | End: 2019-11-04

## 2019-10-27 RX ORDER — HYDROCHLOROTHIAZIDE 25 MG
25 TABLET ORAL DAILY
Refills: 0 | Status: DISCONTINUED | OUTPATIENT
Start: 2019-10-27 | End: 2019-11-04

## 2019-10-27 RX ORDER — HEPARIN SODIUM 5000 [USP'U]/ML
INJECTION INTRAVENOUS; SUBCUTANEOUS
Qty: 25000 | Refills: 0 | Status: DISCONTINUED | OUTPATIENT
Start: 2019-10-27 | End: 2019-10-29

## 2019-10-27 RX ORDER — INSULIN LISPRO 100/ML
VIAL (ML) SUBCUTANEOUS
Refills: 0 | Status: DISCONTINUED | OUTPATIENT
Start: 2019-10-27 | End: 2019-11-04

## 2019-10-27 RX ORDER — DEXTROSE 50 % IN WATER 50 %
12.5 SYRINGE (ML) INTRAVENOUS ONCE
Refills: 0 | Status: DISCONTINUED | OUTPATIENT
Start: 2019-10-27 | End: 2019-11-04

## 2019-10-27 RX ORDER — LISINOPRIL 2.5 MG/1
40 TABLET ORAL DAILY
Refills: 0 | Status: DISCONTINUED | OUTPATIENT
Start: 2019-10-27 | End: 2019-11-04

## 2019-10-27 RX ADMIN — HEPARIN SODIUM 2100 UNIT(S)/HR: 5000 INJECTION INTRAVENOUS; SUBCUTANEOUS at 20:41

## 2019-10-27 RX ADMIN — HEPARIN SODIUM 2100 UNIT(S)/HR: 5000 INJECTION INTRAVENOUS; SUBCUTANEOUS at 19:32

## 2019-10-27 RX ADMIN — HEPARIN SODIUM 10000 UNIT(S): 5000 INJECTION INTRAVENOUS; SUBCUTANEOUS at 14:05

## 2019-10-27 RX ADMIN — HEPARIN SODIUM 2400 UNIT(S)/HR: 5000 INJECTION INTRAVENOUS; SUBCUTANEOUS at 14:05

## 2019-10-27 NOTE — H&P ADULT - PROBLEM SELECTOR PLAN 5
Currently not on any home meds  patient reports recent outpatient hema1c taken by PCP  Will start with monitoring and coverage

## 2019-10-27 NOTE — H&P ADULT - NSICDXPASTMEDICALHX_GEN_ALL_CORE_FT
PAST MEDICAL HISTORY:  Diabetes denies a past medical problem    Hypertension     Hypothyroid     Sleep apnea

## 2019-10-27 NOTE — ED PROVIDER NOTE - OBJECTIVE STATEMENT
Patient presents to the ED with reports of sudden onset SOB, tachycardia and hypoxia since Tuesday. Since then, with exertion, there is worsening and severe dyspnea.  Now, while at rest, she still feels some dyspnea and has baseline tachycardia while at rest. She denies chest pain but notes that she is extremely sedentary. No recent travel or hospitalizations. PCP Dr León.     No leg pain or swelling. No new back pain. No hematuria. No trauma. No fever or cough.

## 2019-10-27 NOTE — ED ADULT NURSE NOTE - OBJECTIVE STATEMENT
Py came in complaining of sudden onset of shortness of breath and tachycardia with hypoxia for since Tuesday. Pt states she has worsening dyspnea on excretion. Pt states she was seen by MD in july for similar symptoms and r/o PE but testing came back negative. pt states symptoms are worse at this time then in july. Pt with o2 sat 94% on room air, MD espinal at bedside. pt denies any O2 use at home. Pt with PMH of sleep apnea, DM2, HTN and hypothyroid. Pt denies any n/v/d, chest pain, blurred vision, headache, dizziness, fever, chills or any other complaint at this time.

## 2019-10-27 NOTE — H&P ADULT - ASSESSMENT
59 year old history of HTN, sleep apnea, bariatric surgery comes with 5 day history of progressive tachycardia, tachypnea, exercise intolerance and is found to have bilateral pulmonary emboli.  Patient states recently having a colonscopy, she states she is due for mammogram, never had any issues with her cervix.  IMPROVE VTE Individual Risk Assessment    RISK                                                                Points    [  ] Previous VTE                                                  3    [  ] Thrombophilia                                               2    [  ] Lower limb paralysis                                      2        (unable to hold up >15 seconds)      [  ] Current Cancer                                              2         (within 6 months)    [  1] Immobilization > 24 hrs                                1    [  ] ICU/CCU stay > 24 hours                              1    [  ] Age > 60                                                      1    IMPROVE VTE Score _______1-heaprin drip__    IMPROVE Score 0-1: Low Risk, No VTE prophylaxis required for most patients, encourage ambulation.   IMPROVE Score 2-3: At risk, pharmacologic VTE prophylaxis is indicated for most patients (in the absence of a contraindication)  IMPROVE Score > or = 4: High Risk, pharmacologic VTE prophylaxis is indicated for most patients (in the absence of a contraindication)

## 2019-10-27 NOTE — ED PROVIDER NOTE - CLINICAL SUMMARY MEDICAL DECISION MAKING FREE TEXT BOX
Patient presents to the ED with reports of sudden onset SOB, tachycardia and hypoxia since Tuesday. Since then, with exertion, there is worsening and severe dyspnea.  Now, while at rest, she still feels some dyspnea and has baseline tachycardia while at rest. She denies chest pain but notes that she is extremely sedentary. No recent travel or hospitalizations. PCP Dr León.     No leg pain or swelling. No new back pain. No hematuria. No trauma. No fever or cough.    Pt history is concerning for PE> WIll not get CXR. Lungs clear on examination. Will r/o PE with CT. No need for DDimer.     +PE. Heparin started. Admitted since multiple. No fam history.

## 2019-10-27 NOTE — H&P ADULT - NSHPREVIEWOFSYSTEMS_GEN_ALL_CORE
REVIEW OF SYSTEMS:  CONSTITUTIONAL: No fever, weight loss, or fatigue  EYES: No eye pain, visual disturbances, or discharge  ENMT:  No difficulty hearing, tinnitus, vertigo; No sinus or throat pain  NECK: No pain or stiffness  BREASTS: No pain, masses, or nipple discharge  RESPIRATORY: No cough, wheezing, chills or hemoptysis; + shortness of breath  CARDIOVASCULAR: No chest pain, +palpitations, no dizziness, or leg swelling  GASTROINTESTINAL: No abdominal or epigastric pain. No nausea, vomiting, or hematemesis; No diarrhea or constipation. No melena or hematochezia.  GENITOURINARY: No dysuria, frequency, hematuria, or incontinence  NEUROLOGICAL: No headaches, memory loss, loss of strength, numbness, or tremors  SKIN: No itching, burning, rashes, or lesions   LYMPH NODES: No enlarged glands  ENDOCRINE: No heat or cold intolerance; No hair loss  MUSCULOSKELETAL: No joint pain or swelling; No muscle, back, or extremity pain  PSYCHIATRIC: No depression, anxiety, mood swings, or difficulty sleeping  HEME/LYMPH: No easy bruising, or bleeding gums  ALLERY AND IMMUNOLOGIC: No hives or eczema    ALL ROS REVIEWED AND NORMAL EXCEPT AS STATED ABOVE

## 2019-10-27 NOTE — H&P ADULT - NSICDXPASTSURGICALHX_GEN_ALL_CORE_FT
Was the patient seen in the last year in this department? Yes     Does patient have an active prescription for medications requested? Yes     Received Request Via: Pharmacy   PAST SURGICAL HISTORY:  No significant past surgical history

## 2019-10-27 NOTE — H&P ADULT - HISTORY OF PRESENT ILLNESS
59 year old history of HTN, sleep apnea, bariatric surgery comes with 5 day history of progressive tachycardia, tachypnea, exercise intolerance and is found to have bilateral pulmonary emboli. 59 year old history of HTN, sleep apnea, bariatric surgery comes with 5 day history of progressive tachycardia, tachypnea, exercise intolerance and is found to have bilateral pulmonary emboli.  Patient states recently having a colonscopy, she states she is due for mammogram, never had any issues with her cervix.

## 2019-10-27 NOTE — H&P ADULT - NSHPPHYSICALEXAM_GEN_ALL_CORE
T(C): 36.6 (10-27-19 @ 11:18), Max: 36.6 (10-27-19 @ 11:18)  HR: 95 (10-27-19 @ 14:04) (95 - 112)  BP: 149/96 (10-27-19 @ 14:04) (127/80 - 175/112)  RR: 23 (10-27-19 @ 14:04) (20 - 24)  SpO2: 95% (10-27-19 @ 14:04) (93% - 95%)  Wt(kg): --Vital Signs Last 24 Hrs  T(C): 36.6 (27 Oct 2019 11:18), Max: 36.6 (27 Oct 2019 11:18)  T(F): 97.9 (27 Oct 2019 11:18), Max: 97.9 (27 Oct 2019 11:18)  HR: 95 (27 Oct 2019 14:04) (95 - 112)  BP: 149/96 (27 Oct 2019 14:04) (127/80 - 175/112)  BP(mean): 102 (27 Oct 2019 14:04) (92 - 102)  RR: 23 (27 Oct 2019 14:04) (20 - 24)  SpO2: 95% (27 Oct 2019 14:04) (93% - 95%)    PHYSICAL EXAM:  GENERAL: NAD, well-groomed, well-developed  HEAD:  Atraumatic, Normocephalic  EYES: EOMI, PERRLA, conjunctiva and sclera clear  ENMT: No tonsillar erythema, exudates, or enlargement; Moist mucous membranes, Good dentition, No lesions  NECK: Supple, No JVD, Normal thyroid  NERVOUS SYSTEM:  Alert & Oriented X3, Good concentration; Motor Strength 5/5 B/L upper and lower extremities  CHEST/LUNG: Clear to percussion bilaterally; No rales, rhonchi, wheezing, or rubs  HEART: Regular rate and rhythm; No murmurs, rubs, or gallops  ABDOMEN: Soft, Nontender, Nondistended; obese  EXTREMITIES:  2+ Peripheral Pulses, No clubbing, cyanosis, trace bilateral edema  LYMPH: No lymphadenopathy noted  SKIN: No rashes or lesions

## 2019-10-28 DIAGNOSIS — E66.9 OBESITY, UNSPECIFIED: ICD-10-CM

## 2019-10-28 LAB
ANION GAP SERPL CALC-SCNC: 12 MMOL/L — SIGNIFICANT CHANGE UP (ref 5–17)
APTT BLD: 59.2 SEC — HIGH (ref 27.5–36.3)
APTT BLD: 60 SEC — HIGH (ref 27.5–36.3)
BUN SERPL-MCNC: 14 MG/DL — SIGNIFICANT CHANGE UP (ref 7–23)
CALCIUM SERPL-MCNC: 9.5 MG/DL — SIGNIFICANT CHANGE UP (ref 8.4–10.5)
CHLORIDE SERPL-SCNC: 100 MMOL/L — SIGNIFICANT CHANGE UP (ref 96–108)
CO2 SERPL-SCNC: 26 MMOL/L — SIGNIFICANT CHANGE UP (ref 22–31)
CREAT SERPL-MCNC: 0.72 MG/DL — SIGNIFICANT CHANGE UP (ref 0.5–1.3)
GLUCOSE BLDC GLUCOMTR-MCNC: 133 MG/DL — HIGH (ref 70–99)
GLUCOSE BLDC GLUCOMTR-MCNC: 147 MG/DL — HIGH (ref 70–99)
GLUCOSE BLDC GLUCOMTR-MCNC: 164 MG/DL — HIGH (ref 70–99)
GLUCOSE BLDC GLUCOMTR-MCNC: 179 MG/DL — HIGH (ref 70–99)
GLUCOSE SERPL-MCNC: 194 MG/DL — HIGH (ref 70–99)
HBA1C BLD-MCNC: 7.9 % — HIGH (ref 4–5.6)
HCT VFR BLD CALC: 37.7 % — SIGNIFICANT CHANGE UP (ref 34.5–45)
HGB BLD-MCNC: 12.1 G/DL — SIGNIFICANT CHANGE UP (ref 11.5–15.5)
MCHC RBC-ENTMCNC: 28.9 PG — SIGNIFICANT CHANGE UP (ref 27–34)
MCHC RBC-ENTMCNC: 32.1 GM/DL — SIGNIFICANT CHANGE UP (ref 32–36)
MCV RBC AUTO: 90.2 FL — SIGNIFICANT CHANGE UP (ref 80–100)
NRBC # BLD: 0 /100 WBCS — SIGNIFICANT CHANGE UP (ref 0–0)
PLATELET # BLD AUTO: 291 K/UL — SIGNIFICANT CHANGE UP (ref 150–400)
POTASSIUM SERPL-MCNC: 3.6 MMOL/L — SIGNIFICANT CHANGE UP (ref 3.5–5.3)
POTASSIUM SERPL-SCNC: 3.6 MMOL/L — SIGNIFICANT CHANGE UP (ref 3.5–5.3)
RBC # BLD: 4.18 M/UL — SIGNIFICANT CHANGE UP (ref 3.8–5.2)
RBC # FLD: 15.4 % — HIGH (ref 10.3–14.5)
SODIUM SERPL-SCNC: 138 MMOL/L — SIGNIFICANT CHANGE UP (ref 135–145)
WBC # BLD: 12.87 K/UL — HIGH (ref 3.8–10.5)
WBC # FLD AUTO: 12.87 K/UL — HIGH (ref 3.8–10.5)

## 2019-10-28 PROCEDURE — 93970 EXTREMITY STUDY: CPT | Mod: 26

## 2019-10-28 PROCEDURE — 99235 HOSP IP/OBS SAME DATE MOD 70: CPT

## 2019-10-28 PROCEDURE — 93306 TTE W/DOPPLER COMPLETE: CPT | Mod: 26

## 2019-10-28 RX ORDER — ACETAMINOPHEN 500 MG
650 TABLET ORAL EVERY 6 HOURS
Refills: 0 | Status: DISCONTINUED | OUTPATIENT
Start: 2019-10-28 | End: 2019-11-04

## 2019-10-28 RX ORDER — ACETAMINOPHEN 500 MG
650 TABLET ORAL ONCE
Refills: 0 | Status: COMPLETED | OUTPATIENT
Start: 2019-10-28 | End: 2019-10-28

## 2019-10-28 RX ADMIN — Medication 650 MILLIGRAM(S): at 03:49

## 2019-10-28 RX ADMIN — LISINOPRIL 40 MILLIGRAM(S): 2.5 TABLET ORAL at 05:28

## 2019-10-28 RX ADMIN — Medication 1: at 11:50

## 2019-10-28 RX ADMIN — Medication 75 MICROGRAM(S): at 05:28

## 2019-10-28 RX ADMIN — Medication 650 MILLIGRAM(S): at 12:16

## 2019-10-28 RX ADMIN — Medication 25 MILLIGRAM(S): at 05:28

## 2019-10-28 RX ADMIN — Medication 650 MILLIGRAM(S): at 13:00

## 2019-10-28 RX ADMIN — Medication 1: at 07:32

## 2019-10-28 RX ADMIN — Medication 650 MILLIGRAM(S): at 04:55

## 2019-10-28 RX ADMIN — HEPARIN SODIUM 2100 UNIT(S)/HR: 5000 INJECTION INTRAVENOUS; SUBCUTANEOUS at 03:46

## 2019-10-28 NOTE — CONSULT NOTE ADULT - ASSESSMENT
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 She never had a clotting problem and is not on hormonal agents. The pt is obese and weighs about 290 lbs and had a bypass stomach procedure a few years ago. The pt has no family past of thrombophilia. The inr and the ptt were normal here and the platelets are normal and she has review of systems that is negative for searching for a malignancy. The pt was brought into the Er with 5 days of shortness of breath without chest pain and was found on cta to have bilateral PEs without RV strain. The pt was placed on iv heparin and the question now is mgt from here on in.      The pt never had a clot and with the labs above, the use of a thrombophilic panel here would in my opinion not be helpful long term. The fact that she had a gastric bypass makes the use of noacs not reliable as this class of drugs need stomach acid and stomach acid activation. Also she is above the 250 lb level that we use to reliably recommend these drugs to pts. She also is about 130 kg and this is above the 120 mg sq Lovenox dose we would use here twice a day. 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 She will try to lose some weight as this appears to be the main cause for her clotting issues now and at the end of 3 months her situation with clotting meds can be reassessed. The pt should have a doppler of the LEs as well while here.
Physical Examination:  GENERAL:               Alert, Oriented, No acute distress.    HEENT:                   No JVD, Moist MM  PULM:                     Bilateral air entry, Clear to auscultation bilaterally, no significant sputum production, No Rales, No Rhonchi, No Wheezing  CVS:                         S1, S2,  No Murmur  ABD:                        Soft, nondistended, nontender, normoactive bowel sounds, obese  EXT:                         mild edema, nontender, No Cyanosis or Clubbing   Vascular:                Warm Extremities, Normal Capillary refill, Normal Distal Pulses  SKIN:                       Warm and well perfused, no rashes noted.   NEURO:                  Alert, oriented, interactive, nonfocal, follows commands  PSYC:                      Calm, + Insight.      Assessment  Acute B/L PE   ABBIE not on home CPAP - awaiting home titration study   Morbid obesity s/p bariatric surgery  Chronic Lung nodule unchanged un repeat CT - no further workup    underlying HTN, Hypothyroidism    Plan  Appreciate Heme onc eval - will need a/c with Lovenox for at least 3 months  Check O2 on RA and ambulation, taper fio2 to off.   will check echo and US LE  pt with low PESI Score can have out pt f/u.

## 2019-10-28 NOTE — PROGRESS NOTE ADULT - SUBJECTIVE AND OBJECTIVE BOX
Patient is a 59y old  Female who presents with a chief complaint of tachycardia, tachypnea (27 Oct 2019 15:07)      INTERVAL HPI/OVERNIGHT EVENTS:  Short of breath with minimal excursion      REVIEW OF SYSTEMS:  CONSTITUTIONAL: No fever, weight loss, or fatigue  EYES: No eye pain, visual disturbances, or discharge  ENMT:  No difficulty hearing, tinnitus, vertigo; No sinus or throat pain  NECK: No pain or stiffness  BREASTS: No pain, masses, or nipple discharge  RESPIRATORY: No cough, wheezing, chills or hemoptysis; No shortness of breath  CARDIOVASCULAR: No chest pain, palpitations, dizziness, or leg swelling  GASTROINTESTINAL: No abdominal or epigastric pain. No nausea, vomiting, or hematemesis; No diarrhea or constipation. No melena or hematochezia.  GENITOURINARY: No dysuria, frequency, hematuria, or incontinence  NEUROLOGICAL: No headaches, memory loss, loss of strength, numbness, or tremors  SKIN: No itching, burning, rashes, or lesions   LYMPH NODES: No enlarged glands  ENDOCRINE: No heat or cold intolerance; No hair loss  MUSCULOSKELETAL: No joint pain or swelling; No muscle, back, or extremity pain  PSYCHIATRIC: No depression, anxiety, mood swings, or difficulty sleeping  HEME/LYMPH: No easy bruising, or bleeding gums  ALLERY AND IMMUNOLOGIC: No hives or eczema  FAMILY HISTORY:    T(C): 36.7 (10-28-19 @ 05:21), Max: 37 (10-27-19 @ 16:10)  HR: 86 (10-28-19 @ 05:21) (86 - 112)  BP: 139/78 (10-28-19 @ 05:21) (110/72 - 175/112)  RR: 18 (10-28-19 @ 05:21) (18 - 24)  SpO2: 96% (10-28-19 @ 05:21) (92% - 98%)  Wt(kg): --Vital Signs Last 24 Hrs  T(C): 36.7 (28 Oct 2019 05:21), Max: 37 (27 Oct 2019 16:10)  T(F): 98 (28 Oct 2019 05:21), Max: 98.6 (27 Oct 2019 16:10)  HR: 86 (28 Oct 2019 05:21) (86 - 112)  BP: 139/78 (28 Oct 2019 05:21) (110/72 - 175/112)  BP(mean): 109 (27 Oct 2019 16:10) (92 - 109)  RR: 18 (28 Oct 2019 05:21) (18 - 24)  SpO2: 96% (28 Oct 2019 05:21) (92% - 98%)    T(F): 98 (10-28-19 @ 05:21), Max: 98.6 (10-27-19 @ 16:10)  HR: 86 (10-28-19 @ 05:21) (86 - 112)  BP: 139/78 (10-28-19 @ 05:21) (110/72 - 175/112)  RR: 18 (10-28-19 @ 05:21) (18 - 24)  SpO2: 96% (10-28-19 @ 05:21) (92% - 98%)    PHYSICAL EXAM:  GENERAL: NAD, well-groomed, well-developed  HEAD:  Atraumatic, Normocephalic  EYES: EOMI, PERRLA, conjunctiva and sclera clear  ENMT: No tonsillar erythema, exudates, or enlargement; Moist mucous membranes, Good dentition, No lesions  NECK: Supple, No JVD, Normal thyroid  NERVOUS SYSTEM:  Alert & Oriented X3, Good concentration; Motor Strength 5/5 B/L upper and lower extremities; DTRs 2+ intact and symmetric  CHEST/LUNG: Clear to percussion bilaterally; No rales, rhonchi, wheezing, or rubs  HEART: Regular rate and rhythm; No murmurs, rubs, or gallops  ABDOMEN: Soft, Nontender, Nondistended; Bowel sounds present  EXTREMITIES:  2+ Peripheral Pulses, No clubbing, cyanosis, or edema  LYMPH: No lymphadenopathy noted  SKIN: No rashes or lesions    Consultant(s) Notes Reviewed:  [x ] YES  [ ] NO  Care Discussed with Consultants/Other Providers [ x] YES  [ ] NO    LABS:  10-28    138  |  100  |  14  ----------------------------<  194<H>  3.6   |  26  |  0.72    Ca    9.5      28 Oct 2019 05:41  Mg     1.7     10-27    TPro  6.8  /  Alb  3.2<L>  /  TBili  0.7  /  DBili  x   /  AST  39  /  ALT  50<H>  /  AlkPhos  130<H>  10-27                          12.1   12.87 )-----------( 291      ( 28 Oct 2019 05:41 )             37.7         PT/INR - ( 27 Oct 2019 11:50 )   PT: 11.2 sec;   INR: 1.00 ratio         PTT - ( 28 Oct 2019 05:40 )  PTT:59.2 sec  LIVER FUNCTIONS - ( 27 Oct 2019 11:50 )  Alb: 3.2 g/dL / Pro: 6.8 g/dL / ALK PHOS: 130 U/L / ALT: 50 U/L / AST: 39 U/L / GGT: x           CARDIAC MARKERS ( 27 Oct 2019 11:50 )  .024 ng/mL / x     / x     / x     / x                       12.1   12.87 )-----------( 291      ( 10-28 @ 05:41 )             37.7                11.1   13.22 )-----------( 221      ( 10-27 @ 20:05 )             34.2                11.6   11.78 )-----------( 248      ( 10-27 @ 11:50 )             36.0       Hemoglobin A1C, Whole Blood: 7.9 % (10-28-19 @ 05:41)  Hemoglobin A1C, Whole Blood: 7.7 % (05-18-19 @ 07:35)          RADIOLOGY & ADDITIONAL TESTS:    Imaging Personally Reviewed:  [ ] YES  [ ] NO  dextrose 40% Gel 15 Gram(s) Oral once PRN  dextrose 5%. 1000 milliLiter(s) IV Continuous <Continuous>  dextrose 50% Injectable 12.5 Gram(s) IV Push once  dextrose 50% Injectable 25 Gram(s) IV Push once  dextrose 50% Injectable 25 Gram(s) IV Push once  glucagon  Injectable 1 milliGRAM(s) IntraMuscular once PRN  heparin  Infusion.  Unit(s)/Hr IV Continuous <Continuous>  heparin  Injectable 13816 Unit(s) IV Push every 6 hours PRN  heparin  Injectable 5000 Unit(s) IV Push every 6 hours PRN  hydrochlorothiazide 25 milliGRAM(s) Oral daily  insulin lispro (HumaLOG) corrective regimen sliding scale   SubCutaneous three times a day before meals  insulin lispro (HumaLOG) corrective regimen sliding scale   SubCutaneous at bedtime  levothyroxine 75 MICROGram(s) Oral daily  lisinopril 40 milliGRAM(s) Oral daily      HEALTH ISSUES - PROBLEM Dx:  Diabetes: Diabetes  Hypothyroid: Hypothyroid  Hypertension: Hypertension  Sleep apnea: Sleep apnea  Multiple subsegmental pulmonary emboli without acute cor pulmonale: Multiple subsegmental pulmonary emboli without acute cor pulmonale  Pulmonary thromboembolism

## 2019-10-28 NOTE — PROGRESS NOTE ADULT - ASSESSMENT
Chart reviewed and patient examined discussed with pulmonary CTA showing bilateral pulmonary emboli await lower extremity ultrasound probably etiology related to her weight and venous stasis will also ask hematology to evaluate for hypercoagulable states will eventually switched to p.o. anticoagulation

## 2019-10-28 NOTE — CHART NOTE - NSCHARTNOTEFT_GEN_A_CORE
Reason for Admission: Bilateral PE    59 year old history of HTN, sleep apnea, hypothyroidism, bariatric surgery comes with 5 day history of progressive tachycardia, tachypnea, exercise intolerance and is found to have bilateral pulmonary emboli. Pt reports that she feels better this AM    #Pulmonary Embolism  - Continue heparin infusion   - Pulmonary Consult  - Hem-onc consult  - LE ultrasound negative for DVT  - f/u repeat echo    #HTN  - c/w home meds HCTZ and lisinopril    #Hypothyroidism  - c/w synthroid     #Type 2 DM  - HbAIC 7.9  - C/w ISS    #Sleep apnea  - F/u outpatient    Discussed with Dr Fuller Reason for Admission: Bilateral PE    59 year old history of HTN, sleep apnea, hypothyroidism, bariatric surgery comes with 5 day history of progressive tachycardia, tachypnea, exercise intolerance and is found to have bilateral pulmonary emboli. Pt reports that she feels better this AM    #Pulmonary Embolism  - Continue heparin infusion   - Pulmonary Consult  - Hem-onc consult  - LE ultrasound negative for DVT  - echo    #HTN  - c/w home meds HCTZ and lisinopril    #Hypothyroidism  - c/w synthroid     #Type 2 DM  - HbAIC 7.9  - C/w ISS    #Sleep apnea  - F/u outpatient    Discussed with Dr Fuller Reason for Admission: Bilateral PE    59 year old history of HTN, sleep apnea, hypothyroidism, bariatric surgery comes with 5 day history of progressive tachycardia, tachypnea, exercise intolerance and is found to have bilateral pulmonary emboli. Pt reports that she feels better this AM    #Pulmonary Embolism  - Continue heparin infusion   - Pulmonary Consult  - Hem-onc consult  - LE ultrasound negative for DVT  - echo indicates pulmonary htn     #HTN  - c/w home meds HCTZ and lisinopril    #Hypothyroidism  - c/w synthroid     #Type 2 DM  - HbAIC 7.9  - C/w ISS    #Sleep apnea  - F/u outpatient    Discussed with Dr Fuller

## 2019-10-29 LAB
APTT BLD: 57.6 SEC — HIGH (ref 27.5–36.3)
GLUCOSE BLDC GLUCOMTR-MCNC: 124 MG/DL — HIGH (ref 70–99)
GLUCOSE BLDC GLUCOMTR-MCNC: 125 MG/DL — HIGH (ref 70–99)
GLUCOSE BLDC GLUCOMTR-MCNC: 137 MG/DL — HIGH (ref 70–99)
GLUCOSE BLDC GLUCOMTR-MCNC: 186 MG/DL — HIGH (ref 70–99)
HCT VFR BLD CALC: 36.6 % — SIGNIFICANT CHANGE UP (ref 34.5–45)
HGB BLD-MCNC: 11.6 G/DL — SIGNIFICANT CHANGE UP (ref 11.5–15.5)
INR BLD: 1.04 RATIO — SIGNIFICANT CHANGE UP (ref 0.88–1.16)
MCHC RBC-ENTMCNC: 28.7 PG — SIGNIFICANT CHANGE UP (ref 27–34)
MCHC RBC-ENTMCNC: 31.7 GM/DL — LOW (ref 32–36)
MCV RBC AUTO: 90.6 FL — SIGNIFICANT CHANGE UP (ref 80–100)
NRBC # BLD: 0 /100 WBCS — SIGNIFICANT CHANGE UP (ref 0–0)
PLATELET # BLD AUTO: 295 K/UL — SIGNIFICANT CHANGE UP (ref 150–400)
PROTHROM AB SERPL-ACNC: 11.7 SEC — SIGNIFICANT CHANGE UP (ref 10–12.9)
RBC # BLD: 4.04 M/UL — SIGNIFICANT CHANGE UP (ref 3.8–5.2)
RBC # FLD: 15.1 % — HIGH (ref 10.3–14.5)
WBC # BLD: 10.18 K/UL — SIGNIFICANT CHANGE UP (ref 3.8–10.5)
WBC # FLD AUTO: 10.18 K/UL — SIGNIFICANT CHANGE UP (ref 3.8–10.5)

## 2019-10-29 PROCEDURE — 99233 SBSQ HOSP IP/OBS HIGH 50: CPT

## 2019-10-29 RX ORDER — ENOXAPARIN SODIUM 100 MG/ML
120 INJECTION SUBCUTANEOUS
Refills: 0 | Status: DISCONTINUED | OUTPATIENT
Start: 2019-10-29 | End: 2019-11-04

## 2019-10-29 RX ORDER — WARFARIN SODIUM 2.5 MG/1
10 TABLET ORAL ONCE
Refills: 0 | Status: COMPLETED | OUTPATIENT
Start: 2019-10-29 | End: 2019-10-29

## 2019-10-29 RX ADMIN — WARFARIN SODIUM 10 MILLIGRAM(S): 2.5 TABLET ORAL at 22:44

## 2019-10-29 RX ADMIN — ENOXAPARIN SODIUM 120 MILLIGRAM(S): 100 INJECTION SUBCUTANEOUS at 13:45

## 2019-10-29 RX ADMIN — Medication 75 MICROGRAM(S): at 07:09

## 2019-10-29 RX ADMIN — HEPARIN SODIUM 2100 UNIT(S)/HR: 5000 INJECTION INTRAVENOUS; SUBCUTANEOUS at 00:40

## 2019-10-29 RX ADMIN — LISINOPRIL 40 MILLIGRAM(S): 2.5 TABLET ORAL at 07:09

## 2019-10-29 RX ADMIN — Medication 25 MILLIGRAM(S): at 07:09

## 2019-10-29 RX ADMIN — Medication 1: at 08:25

## 2019-10-29 NOTE — PROGRESS NOTE ADULT - SUBJECTIVE AND OBJECTIVE BOX
Patient is a 59y old  Female who presents with a chief complaint of tachycardia, tachypnea (28 Oct 2019 09:24)      HPI:  59 year old history of HTN, sleep apnea, bariatric surgery comes with 5 day history of progressive tachycardia, tachypnea, exercise intolerance and is found to have bilateral pulmonary emboli.  Patient states recently having a colonscopy, she states she is due for mammogram, never had any issues with her cervix. 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 She never had a clotting problem and is not on hormonal agents. The pt is obese and weighs about 290 lbs and had a bypass stomach procedure a few years ago. The pt has no family past of thrombophilia. The inr and the ptt were normal here and the platelets are normal and she has review of systems that is negative for searching for a malignancy. The pt was brought into the Er with 5 days of shortness of breath without chest pain and was found on cta to have bilateral PEs without RV strain. The pt was placed on iv heparin and the question now is mgt from here on in.      The pt never had a clot and with the labs above, the use of a thrombophilic panel here would in my opinion not be helpful long term. The fact that she had a gastric bypass makes the use of noacs not reliable as this class of drugs need stomach acid and stomach acid activation. Also she is above the 250 lb level that we use to reliably recommend these drugs to pts. She also is about 130 kg and this is above the 120 mg sq Lovenox dose we would use here twice a day. 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 She will try to lose some weight as this appears to be the main cause for her clotting issues now and at the end of 3 months her situation with clotting meds can be reassessed. The pt should have a doppler of the LEs as well while here.      10/29 pt's insurance company will not approve the Lovenox so she will be started on coumadin and this was discussed with her and the medical team.    MEDICATIONS  (STANDING):  dextrose 5%. 1000 milliLiter(s) (50 mL/Hr) IV Continuous <Continuous>  dextrose 50% Injectable 12.5 Gram(s) IV Push once  dextrose 50% Injectable 25 Gram(s) IV Push once  dextrose 50% Injectable 25 Gram(s) IV Push once  heparin  Infusion.  Unit(s)/Hr (24 mL/Hr) IV Continuous <Continuous>  hydrochlorothiazide 25 milliGRAM(s) Oral daily  insulin lispro (HumaLOG) corrective regimen sliding scale   SubCutaneous three times a day before meals  insulin lispro (HumaLOG) corrective regimen sliding scale   SubCutaneous at bedtime  levothyroxine 75 MICROGram(s) Oral daily  lisinopril 40 milliGRAM(s) Oral daily    MEDICATIONS  (PRN):  dextrose 40% Gel 15 Gram(s) Oral once PRN Blood Glucose LESS THAN 70 milliGRAM(s)/deciliter  glucagon  Injectable 1 milliGRAM(s) IntraMuscular once PRN Glucose LESS THAN 70 milligrams/deciliter  heparin  Injectable 83009 Unit(s) IV Push every 6 hours PRN For aPTT less than 40  heparin  Injectable 5000 Unit(s) IV Push every 6 hours PRN For aPTT between 40 - 57  ICU Vital Signs Last 24 Hrs  T(C): 36.4 (29 Oct 2019 10:27), Max: 36.9 (29 Oct 2019 05:21)  T(F): 97.6 (29 Oct 2019 10:27), Max: 98.5 (29 Oct 2019 05:21)  HR: 81 (29 Oct 2019 10:27) (77 - 86)  BP: 109/81 (29 Oct 2019 10:27) (109/81 - 147/74)  BP(mean): --  ABP: --  ABP(mean): --  RR: 16 (29 Oct 2019 10:27) (16 - 18)  SpO2: 66% (29 Oct 2019 10:27) (66% - 98%)    LABS:                          11.6   10.18 )-----------( 295      ( 29 Oct 2019 06:30 )             36.6     10-28    138  |  100  |  14  ----------------------------<  194<H>  3.6   |  26  |  0.72    Ca    9.5      28 Oct 2019 05:41  Mg     1.7     10-27    TPro  6.8  /  Alb  3.2<L>  /  TBili  0.7  /  DBili  x   /  AST  39  /  ALT  50<H>  /  AlkPhos  130<H>  10-27    PT/INR - ( 27 Oct 2019 11:50 )   PT: 11.2 sec;   INR: 1.00 ratio         PTT - ( 28 Oct 2019 05:40 )  PTT:59.2 sec      ROS:  Negative except for:    PAST MEDICAL & SURGICAL HISTORY:  Sleep apnea  Diabetes: denies a past medical problem  Hypothyroid  Hypertension  No significant past surgical history      SOCIAL HISTORY:    FAMILY HISTORY:      Allergies    latex (Rash)  No Known Drug Allergies    Intolerances        PHYSICAL EXAM  General: adult in NAD  HEENT: clear oropharynx, anicteric sclera, pink conjunctivae  Neck: supple  CV: normal S1S2 with no murmur rubs or gallops  Lungs: clear to auscultation, no wheezes, no rales  Abdomen: soft non-tender non-distended, no hepatosplenomegaly  Ext: no clubbing cyanosis or edema  Skin: no rashes and no petechiae  Neuro: alert and oriented X3 no focal deficits

## 2019-10-29 NOTE — PROGRESS NOTE ADULT - SUBJECTIVE AND OBJECTIVE BOX
Follow-up Pulmonary Progress Note  Chief Complaint : Multiple subsegmental pulmonary emboli without acute cor pulmonale      pt feeling ok  no cp, palp, palpitations  no weakness.       Allergies :latex (Rash)  No Known Drug Allergies      PAST MEDICAL & SURGICAL HISTORY:  Sleep apnea  Diabetes: denies a past medical problem  Hypothyroid  Hypertension  No significant past surgical history      Medications:  MEDICATIONS  (STANDING):  dextrose 5%. 1000 milliLiter(s) (50 mL/Hr) IV Continuous <Continuous>  dextrose 50% Injectable 12.5 Gram(s) IV Push once  dextrose 50% Injectable 25 Gram(s) IV Push once  dextrose 50% Injectable 25 Gram(s) IV Push once  heparin  Infusion.  Unit(s)/Hr (24 mL/Hr) IV Continuous <Continuous>  hydrochlorothiazide 25 milliGRAM(s) Oral daily  insulin lispro (HumaLOG) corrective regimen sliding scale   SubCutaneous three times a day before meals  insulin lispro (HumaLOG) corrective regimen sliding scale   SubCutaneous at bedtime  levothyroxine 75 MICROGram(s) Oral daily  lisinopril 40 milliGRAM(s) Oral daily    MEDICATIONS  (PRN):  acetaminophen   Tablet .. 650 milliGRAM(s) Oral every 6 hours PRN Temp greater or equal to 38C (100.4F), Mild Pain (1 - 3), Moderate Pain (4 - 6)  dextrose 40% Gel 15 Gram(s) Oral once PRN Blood Glucose LESS THAN 70 milliGRAM(s)/deciliter  glucagon  Injectable 1 milliGRAM(s) IntraMuscular once PRN Glucose LESS THAN 70 milligrams/deciliter  heparin  Injectable 79598 Unit(s) IV Push every 6 hours PRN For aPTT less than 40  heparin  Injectable 5000 Unit(s) IV Push every 6 hours PRN For aPTT between 40 - 57      LABS:                        11.6   10.18 )-----------( 295      ( 29 Oct 2019 06:30 )             36.6     10-28    138  |  100  |  14  ----------------------------<  194<H>  3.6   |  26  |  0.72    Ca    9.5      28 Oct 2019 05:41  Mg     1.7     10-27    TPro  6.8  /  Alb  3.2<L>  /  TBili  0.7  /  DBili  x   /  AST  39  /  ALT  50<H>  /  AlkPhos  130<H>  10-27      CARDIAC MARKERS ( 27 Oct 2019 11:50 )  .024 ng/mL / x     / x     / x     / x            PT/INR - ( 29 Oct 2019 05:26 )   PT: 11.7 sec;   INR: 1.04 ratio         PTT - ( 29 Oct 2019 05:26 )  PTT:57.6 sec      Serum Pro-Brain Natriuretic Peptide: 508 pg/mL (10-27-19 @ 11:50)        CULTURES: (if applicable)          CAPILLARY BLOOD GLUCOSE      POCT Blood Glucose.: 186 mg/dL (29 Oct 2019 08:02)      RADIOLOGY  US:  < from: US Duplex Venous Lower Ext Complete, Bilateral (10.28.19 @ 13:02) >    IMPRESSION:  No ultrasound evidence of DVT.    Thank you for this referral.    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 Left ventricular ejection fraction, by visual estimation, is 63%.   3. Normal global left ventricular systolic function.   4. Spectral Doppler shows impaired relaxation pattern of left ventricular myocardial filling (Grade I diastolic dysfunction).   5. Estimated pulmonary artery systolic pressure is 47.0 mmHg assuming a right atrial pressure of 10 mmHg, which is consistent with mild pulmonary hypertension.   6. Pulmonary hypertension is present.   7. LA volume Index is 25.3 ml/m² ml/m2.    < end of copied text >    VITALS:  T(C): 36.4 (10-29-19 @ 10:27), Max: 36.9 (10-29-19 @ 05:21)  T(F): 97.6 (10-29-19 @ 10:27), Max: 98.5 (10-29-19 @ 05:21)  HR: 81 (10-29-19 @ 10:27) (77 - 86)  BP: 109/81 (10-29-19 @ 10:27) (109/81 - 147/74)  BP(mean): --  ABP: --  ABP(mean): --  RR: 16 (10-29-19 @ 10:27) (16 - 18)  SpO2: 66% (10-29-19 @ 10:27) (66% - 98%)  CVP(mm Hg): --  CVP(cm H2O): --    Ins and Outs     10-28-19 @ 07:01  -  10-29-19 @ 07:00  --------------------------------------------------------  IN: 650 mL / OUT: 2 mL / NET: 648 mL    10-29-19 @ 07:01  -  10-29-19 @ 11:13  --------------------------------------------------------  IN: 360 mL / OUT: 0 mL / NET: 360 mL        Height (cm): 165.1 (10-29-19 @ 07:52)  Weight (kg): 132 (10-29-19 @ 07:52)  BMI (kg/m2): 48.4 (10-29-19 @ 07:52)        I&O's Detail    28 Oct 2019 07:01  -  29 Oct 2019 07:00  --------------------------------------------------------  IN:    heparin  Infusion.: 250 mL    Oral Fluid: 400 mL  Total IN: 650 mL    OUT:    Voided: 2 mL  Total OUT: 2 mL    Total NET: 648 mL      29 Oct 2019 07:01  -  29 Oct 2019 11:13  --------------------------------------------------------  IN:    Oral Fluid: 360 mL  Total IN: 360 mL    OUT:  Total OUT: 0 mL    Total NET: 360 mL

## 2019-10-29 NOTE — PROGRESS NOTE ADULT - SUBJECTIVE AND OBJECTIVE BOX
Patient is a 59y old  Female who presents with a chief complaint of tachycardia, tachypnea (28 Oct 2019 12:55)      INTERVAL HPI/OVERNIGHT EVENTS: still claims to be short of breath with minimal exertion      REVIEW OF SYSTEMS:  CONSTITUTIONAL: No fever, weight loss, or fatigue  EYES: No eye pain, visual disturbances, or discharge  ENMT:  No difficulty hearing, tinnitus, vertigo; No sinus or throat pain  NECK: No pain or stiffness  BREASTS: No pain, masses, or nipple discharge  RESPIRATORY: No cough, wheezing, chills or hemoptysis; No shortness of breath  CARDIOVASCULAR: No chest pain, palpitations, dizziness, or leg swelling  GASTROINTESTINAL: No abdominal or epigastric pain. No nausea, vomiting, or hematemesis; No diarrhea or constipation. No melena or hematochezia.  GENITOURINARY: No dysuria, frequency, hematuria, or incontinence  NEUROLOGICAL: No headaches, memory loss, loss of strength, numbness, or tremors  SKIN: No itching, burning, rashes, or lesions   LYMPH NODES: No enlarged glands  ENDOCRINE: No heat or cold intolerance; No hair loss  MUSCULOSKELETAL: No joint pain or swelling; No muscle, back, or extremity pain  PSYCHIATRIC: No depression, anxiety, mood swings, or difficulty sleeping  HEME/LYMPH: No easy bruising, or bleeding gums  ALLERY AND IMMUNOLOGIC: No hives or eczema  FAMILY HISTORY:    T(C): 36.9 (10-29-19 @ 05:21), Max: 36.9 (10-29-19 @ 05:21)  HR: 81 (10-29-19 @ 05:21) (77 - 86)  BP: 144/98 (10-29-19 @ 05:21) (134/83 - 147/74)  RR: 16 (10-29-19 @ 05:21) (16 - 18)  SpO2: 98% (10-29-19 @ 05:21) (97% - 98%)  Wt(kg): --Vital Signs Last 24 Hrs  T(C): 36.9 (29 Oct 2019 05:21), Max: 36.9 (29 Oct 2019 05:21)  T(F): 98.5 (29 Oct 2019 05:21), Max: 98.5 (29 Oct 2019 05:21)  HR: 81 (29 Oct 2019 05:21) (77 - 86)  BP: 144/98 (29 Oct 2019 05:21) (134/83 - 147/74)  BP(mean): --  RR: 16 (29 Oct 2019 05:21) (16 - 18)  SpO2: 98% (29 Oct 2019 05:21) (97% - 98%)    T(F): 98.5 (10-29-19 @ 05:21), Max: 98.6 (10-27-19 @ 16:10)  HR: 81 (10-29-19 @ 05:21) (77 - 112)  BP: 144/98 (10-29-19 @ 05:21) (110/72 - 175/112)  RR: 16 (10-29-19 @ 05:21) (16 - 24)  SpO2: 98% (10-29-19 @ 05:21) (92% - 98%)    PHYSICAL EXAM:  GENERAL: NAD, well-groomed, well-developed  HEAD:  Atraumatic, Normocephalic  EYES: EOMI, PERRLA, conjunctiva and sclera clear  ENMT: No tonsillar erythema, exudates, or enlargement; Moist mucous membranes, Good dentition, No lesions  NECK: Supple, No JVD, Normal thyroid  NERVOUS SYSTEM:  Alert & Oriented X3, Good concentration; Motor Strength 5/5 B/L upper and lower extremities; DTRs 2+ intact and symmetric  CHEST/LUNG: Clear to percussion bilaterally; No rales, rhonchi, wheezing, or rubs  HEART: Regular rate and rhythm; No murmurs, rubs, or gallops  ABDOMEN: Soft, Nontender, Nondistended; Bowel sounds present  EXTREMITIES:  2+ Peripheral Pulses, No clubbing, cyanosis, or edema  LYMPH: No lymphadenopathy noted  SKIN: No rashes or lesions    Consultant(s) Notes Reviewed:  [x ] YES  [ ] NO  Care Discussed with Consultants/Other Providers [ x] YES  [ ] NO    LABS:  10-28    138  |  100  |  14  ----------------------------<  194<H>  3.6   |  26  |  0.72    Ca    9.5      28 Oct 2019 05:41  Mg     1.7     10-27    TPro  6.8  /  Alb  3.2<L>  /  TBili  0.7  /  DBili  x   /  AST  39  /  ALT  50<H>  /  AlkPhos  130<H>  10-27                          11.6   10.18 )-----------( 295      ( 29 Oct 2019 06:30 )             36.6         PT/INR - ( 27 Oct 2019 11:50 )   PT: 11.2 sec;   INR: 1.00 ratio         PTT - ( 29 Oct 2019 05:26 )  PTT:57.6 sec  LIVER FUNCTIONS - ( 27 Oct 2019 11:50 )  Alb: 3.2 g/dL / Pro: 6.8 g/dL / ALK PHOS: 130 U/L / ALT: 50 U/L / AST: 39 U/L / GGT: x           CARDIAC MARKERS ( 27 Oct 2019 11:50 )  .024 ng/mL / x     / x     / x     / x                       11.6   10.18 )-----------( 295      ( 10-29 @ 06:30 )             36.6                12.1   12.87 )-----------( 291      ( 10-28 @ 05:41 )             37.7                11.1   13.22 )-----------( 221      ( 10-27 @ 20:05 )             34.2                11.6   11.78 )-----------( 248      ( 10-27 @ 11:50 )             36.0       Hemoglobin A1C, Whole Blood: 7.9 % (10-28-19 @ 05:41)  Hemoglobin A1C, Whole Blood: 7.7 % (05-18-19 @ 07:35)          RADIOLOGY & ADDITIONAL TESTS:    Imaging Personally Reviewed:  [ ] YES  [ ] NO  acetaminophen   Tablet .. 650 milliGRAM(s) Oral every 6 hours PRN  dextrose 40% Gel 15 Gram(s) Oral once PRN  dextrose 5%. 1000 milliLiter(s) IV Continuous <Continuous>  dextrose 50% Injectable 12.5 Gram(s) IV Push once  dextrose 50% Injectable 25 Gram(s) IV Push once  dextrose 50% Injectable 25 Gram(s) IV Push once  glucagon  Injectable 1 milliGRAM(s) IntraMuscular once PRN  heparin  Infusion.  Unit(s)/Hr IV Continuous <Continuous>  heparin  Injectable 05011 Unit(s) IV Push every 6 hours PRN  heparin  Injectable 5000 Unit(s) IV Push every 6 hours PRN  hydrochlorothiazide 25 milliGRAM(s) Oral daily  insulin lispro (HumaLOG) corrective regimen sliding scale   SubCutaneous three times a day before meals  insulin lispro (HumaLOG) corrective regimen sliding scale   SubCutaneous at bedtime  levothyroxine 75 MICROGram(s) Oral daily  lisinopril 40 milliGRAM(s) Oral daily      HEALTH ISSUES - PROBLEM Dx:  Obesity: Obesity  Diabetes: Diabetes  Hypothyroid: Hypothyroid  Hypertension: Hypertension  Sleep apnea: Sleep apnea  Multiple subsegmental pulmonary emboli without acute cor pulmonale: Multiple subsegmental pulmonary emboli without acute cor pulmonale  Pulmonary thromboembolism

## 2019-10-29 NOTE — CHART NOTE - NSCHARTNOTEFT_GEN_A_CORE
Reason for Admission: Bilateral PE    59 year old history of HTN, sleep apnea, hypothyroidism, bariatric surgery comes with 5 day history of progressive tachycardia, tachypnea, exercise intolerance and is found to have bilateral pulmonary emboli.     #Pulmonary Embolism  - DC heparin infusion, transition to lovenox for coumadin bridge  - Will start coumadin   - F/u Pulmonary Consult  - F/u Hem-onc consult  - LE ultrasound negative for DVT  - echo indicates pulmonary htn     #HTN  - c/w home meds HCTZ and lisinopril    #Hypothyroidism  - c/w synthroid     #Type 2 DM  - HbAIC 7.9  - C/w ISS    #Sleep apnea  - F/u outpatient    Discussed with Dr Fuller

## 2019-10-29 NOTE — PROGRESS NOTE ADULT - ASSESSMENT
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 She never had a clotting problem and is not on hormonal agents. The pt is obese and weighs about 290 lbs and had a bypass stomach procedure a few years ago. The pt has no family past of thrombophilia. The inr and the ptt were normal here and the platelets are normal and she has review of systems that is negative for searching for a malignancy. The pt was brought into the Er with 5 days of shortness of breath without chest pain and was found on cta to have bilateral PEs without RV strain. The pt was placed on iv heparin and the question now is mgt from here on in.      The pt never had a clot and with the labs above, the use of a thrombophilic panel here would in my opinion not be helpful long term. The fact that she had a gastric bypass makes the use of noacs not reliable as this class of drugs need stomach acid and stomach acid activation. Also she is above the 250 lb level that we use to reliably recommend these drugs to pts. She also is about 130 kg and this is above the 120 mg sq Lovenox dose we would use here twice a day. 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 She will try to lose some weight as this appears to be the main cause for her clotting issues now and at the end of 3 months her situation with clotting meds can be reassessed. The pt should have a doppler of the LEs as well while here.    10/29 pt's insurance company will not approve the Lovenox so she will be started on coumadin and this was discussed with her and the medical team.

## 2019-10-29 NOTE — PROGRESS NOTE ADULT - ASSESSMENT
Long discussion with hematology as patient is status post bariatric surgery limits coagulation to Lovenox heparin or warfarin patient contacting insurance today to see about coverage for Lovenox lower extremity Doppler negative

## 2019-10-29 NOTE — PROGRESS NOTE ADULT - ASSESSMENT
Physical Examination:  GENERAL:               Alert, Oriented, No acute distress.    HEENT:                   No JVD, Moist MM  PULM:                     Bilateral air entry, Clear to auscultation bilaterally, no significant sputum production, No Rales, No Rhonchi, No Wheezing  CVS:                         S1, S2,  No Murmur  ABD:                        Soft, nondistended, nontender, normoactive bowel sounds, obese  EXT:                         mild edema, nontender, No Cyanosis or Clubbing   SKIN:                       Warm and well perfused, no rashes noted.   NEURO:                  Alert, oriented, interactive, nonfocal, follows commands  PSYC:                      Calm, + Insight.      Assessment  Acute B/L PE   ABBIE not on home CPAP - awaiting home titration study   Morbid obesity s/p bariatric surgery  Chronic Lung nodule unchanged un repeat CT - no further workup    underlying HTN, Hypothyroidism    Plan  Appreciate Heme onc eval -d/w Dr. Vaca this am  pt not a candidate for NOAC due to bariatric surgery and insurance will not pay for lovenox as out pt  currently on heparin drip, will recommend to switch over to Lovenox with coumadin bridge  while in hospital   will need 48hr overlap with INR 2-3   Check O2 on RA and ambulation, taper fio2 to off.   will follow

## 2019-10-30 LAB
APTT BLD: 34.3 SEC — SIGNIFICANT CHANGE UP (ref 27.5–36.3)
GLUCOSE BLDC GLUCOMTR-MCNC: 120 MG/DL — HIGH (ref 70–99)
GLUCOSE BLDC GLUCOMTR-MCNC: 123 MG/DL — HIGH (ref 70–99)
GLUCOSE BLDC GLUCOMTR-MCNC: 123 MG/DL — HIGH (ref 70–99)
GLUCOSE BLDC GLUCOMTR-MCNC: 156 MG/DL — HIGH (ref 70–99)
HCT VFR BLD CALC: 37.8 % — SIGNIFICANT CHANGE UP (ref 34.5–45)
HGB BLD-MCNC: 12.3 G/DL — SIGNIFICANT CHANGE UP (ref 11.5–15.5)
INR BLD: 1.03 RATIO — SIGNIFICANT CHANGE UP (ref 0.88–1.16)
MCHC RBC-ENTMCNC: 28.7 PG — SIGNIFICANT CHANGE UP (ref 27–34)
MCHC RBC-ENTMCNC: 32.5 GM/DL — SIGNIFICANT CHANGE UP (ref 32–36)
MCV RBC AUTO: 88.1 FL — SIGNIFICANT CHANGE UP (ref 80–100)
NRBC # BLD: 0 /100 WBCS — SIGNIFICANT CHANGE UP (ref 0–0)
PLATELET # BLD AUTO: 319 K/UL — SIGNIFICANT CHANGE UP (ref 150–400)
PROTHROM AB SERPL-ACNC: 11.5 SEC — SIGNIFICANT CHANGE UP (ref 10–12.9)
RBC # BLD: 4.29 M/UL — SIGNIFICANT CHANGE UP (ref 3.8–5.2)
RBC # FLD: 15.6 % — HIGH (ref 10.3–14.5)
WBC # BLD: 10.86 K/UL — HIGH (ref 3.8–10.5)
WBC # FLD AUTO: 10.86 K/UL — HIGH (ref 3.8–10.5)

## 2019-10-30 PROCEDURE — 99232 SBSQ HOSP IP/OBS MODERATE 35: CPT

## 2019-10-30 PROCEDURE — 76700 US EXAM ABDOM COMPLETE: CPT | Mod: 26

## 2019-10-30 PROCEDURE — 76856 US EXAM PELVIC COMPLETE: CPT | Mod: 26

## 2019-10-30 PROCEDURE — 76830 TRANSVAGINAL US NON-OB: CPT | Mod: 26

## 2019-10-30 RX ORDER — WARFARIN SODIUM 2.5 MG/1
7.5 TABLET ORAL ONCE
Refills: 0 | Status: COMPLETED | OUTPATIENT
Start: 2019-10-30 | End: 2019-10-30

## 2019-10-30 RX ADMIN — ENOXAPARIN SODIUM 120 MILLIGRAM(S): 100 INJECTION SUBCUTANEOUS at 01:03

## 2019-10-30 RX ADMIN — ENOXAPARIN SODIUM 120 MILLIGRAM(S): 100 INJECTION SUBCUTANEOUS at 12:23

## 2019-10-30 RX ADMIN — WARFARIN SODIUM 7.5 MILLIGRAM(S): 2.5 TABLET ORAL at 21:26

## 2019-10-30 RX ADMIN — Medication 1: at 07:03

## 2019-10-30 RX ADMIN — Medication 75 MICROGRAM(S): at 06:40

## 2019-10-30 RX ADMIN — Medication 650 MILLIGRAM(S): at 16:55

## 2019-10-30 RX ADMIN — Medication 650 MILLIGRAM(S): at 15:55

## 2019-10-30 RX ADMIN — Medication 25 MILLIGRAM(S): at 06:40

## 2019-10-30 RX ADMIN — LISINOPRIL 40 MILLIGRAM(S): 2.5 TABLET ORAL at 06:40

## 2019-10-30 NOTE — PROGRESS NOTE ADULT - SUBJECTIVE AND OBJECTIVE BOX
Patient is a 59y old  Female who presents with a chief complaint of tachycardia, tachypnea (28 Oct 2019 09:24)      HPI:  59 year old history of HTN, sleep apnea, bariatric surgery comes with 5 day history of progressive tachycardia, tachypnea, exercise intolerance and is found to have bilateral pulmonary emboli.  Patient states recently having a colonscopy, she states she is due for mammogram, never had any issues with her cervix. 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 The pt is obese and weighs about 290 lbs and had a bypass stomach procedure a few years ago. The pt has no family past of thrombophilia. The inr and the ptt were normal here and the platelets are normal and she has review of systems that is negative for searching for a malignancy. The pt was brought into the Er with 5 days of shortness of breath without chest pain and was found on cta to have bilateral PEs without RV strain. The pt was placed on iv heparin and the question now is mgt from here on in.      The pt never had a clot and with the labs above, the use of a thrombophilic panel here would in my opinion not be helpful long term. The fact that she had a gastric bypass makes the use of noacs not reliable as this class of drugs need stomach acid and stomach acid activation. Also she is above the 250 lb level that we use to reliably recommend these drugs to pts. She also is about 130 kg and this is above the 120 mg sq Lovenox dose we would use here twice a day. 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 She will try to lose some weight as this appears to be the main cause for her clotting issues now and at the end of 3 months her situation with clotting meds can be reassessed. The pt should have a doppler of the LEs as well while here.      10/29 pt's insurance company will not approve the Lovenox so she will be started on coumadin and this was discussed with her and the medical team. 10/30 pt is stable and is less short of breath and will continue on the coumadin and will have dopplers of the LEs.    MEDICATIONS  (STANDING):  dextrose 5%. 1000 milliLiter(s) (50 mL/Hr) IV Continuous <Continuous>  dextrose 50% Injectable 12.5 Gram(s) IV Push once  dextrose 50% Injectable 25 Gram(s) IV Push once  dextrose 50% Injectable 25 Gram(s) IV Push once  heparin  Infusion.  Unit(s)/Hr (24 mL/Hr) IV Continuous <Continuous>  hydrochlorothiazide 25 milliGRAM(s) Oral daily  insulin lispro (HumaLOG) corrective regimen sliding scale   SubCutaneous three times a day before meals  insulin lispro (HumaLOG) corrective regimen sliding scale   SubCutaneous at bedtime  levothyroxine 75 MICROGram(s) Oral daily  lisinopril 40 milliGRAM(s) Oral daily    MEDICATIONS  (PRN):  dextrose 40% Gel 15 Gram(s) Oral once PRN Blood Glucose LESS THAN 70 milliGRAM(s)/deciliter  glucagon  Injectable 1 milliGRAM(s) IntraMuscular once PRN Glucose LESS THAN 70 milligrams/deciliter  heparin  Injectable 02558 Unit(s) IV Push every 6 hours PRN For aPTT less than 40  heparin  Injectable 5000 Unit(s) IV Push every 6 hours PRN For aPTT between 40 - 57  ICU Vital Signs Last 24 Hrs  T(C): 36.4 (29 Oct 2019 10:27), Max: 36.9 (29 Oct 2019 05:21)  T(F): 97.6 (29 Oct 2019 10:27), Max: 98.5 (29 Oct 2019 05:21)  HR: 81 (29 Oct 2019 10:27) (77 - 86)  BP: 109/81 (29 Oct 2019 10:27) (109/81 - 147/74)  BP(mean): --  ABP: --  ABP(mean): --  RR: 16 (29 Oct 2019 10:27) (16 - 18)  SpO2: 66% (29 Oct 2019 10:27) (66% - 98%)    LABS:                            12.3   10.86 )-----------( 319      ( 30 Oct 2019 06:05 )             37.8     10-28    138  |  100  |  14  ----------------------------<  194<H>  3.6   |  26  |  0.72    Ca    9.5      28 Oct 2019 05:41  Mg     1.7     10-27    TPro  6.8  /  Alb  3.2<L>  /  TBili  0.7  /  DBili  x   /  AST  39  /  ALT  50<H>  /  AlkPhos  130<H>  10-27    PT/INR - ( 27 Oct 2019 11:50 )   PT: 11.2 sec;   INR: 1.00 ratio         PTT - ( 28 Oct 2019 05:40 )  PTT:59.2 sec      ROS:  Negative except for:    PAST MEDICAL & SURGICAL HISTORY:  Sleep apnea  Diabetes: denies a past medical problem  Hypothyroid  Hypertension  No significant past surgical history      SOCIAL HISTORY:    FAMILY HISTORY:      Allergies    latex (Rash)  No Known Drug Allergies    Intolerances        PHYSICAL EXAM  General: adult in NAD  HEENT: clear oropharynx, anicteric sclera, pink conjunctivae  Neck: supple  CV: normal S1S2 with no murmur rubs or gallops  Lungs: clear to auscultation, no wheezes, no rales  Abdomen: soft non-tender non-distended, no hepatosplenomegaly  Ext: no clubbing cyanosis or edema  Skin: no rashes and no petechiae  Neuro: alert and oriented X3 no focal deficits

## 2019-10-30 NOTE — PROGRESS NOTE ADULT - ASSESSMENT
Patient receiving second dose of warfarin today continue on Lovenox injections patient with lower abdominal complaints would like sonography discussed with Dr. Vaca /hematology

## 2019-10-30 NOTE — PROGRESS NOTE ADULT - SUBJECTIVE AND OBJECTIVE BOX
Follow-up Critical Care Progress Note  Chief Complaint : Multiple subsegmental pulmonary emboli without acute cor pulmonale      pt seen and examined   states pt had episode of hypoxia overnight while sleeping and placed on n/c  pt comfortable at this time sat 97% RA        Allergies :latex (Rash)  No Known Drug Allergies      PAST MEDICAL & SURGICAL HISTORY:  Sleep apnea  Diabetes: denies a past medical problem  Hypothyroid  Hypertension  No significant past surgical history      Medications:  MEDICATIONS  (STANDING):  dextrose 5%. 1000 milliLiter(s) (50 mL/Hr) IV Continuous <Continuous>  dextrose 50% Injectable 12.5 Gram(s) IV Push once  dextrose 50% Injectable 25 Gram(s) IV Push once  dextrose 50% Injectable 25 Gram(s) IV Push once  enoxaparin Injectable 120 milliGRAM(s) SubCutaneous <User Schedule>  hydrochlorothiazide 25 milliGRAM(s) Oral daily  insulin lispro (HumaLOG) corrective regimen sliding scale   SubCutaneous three times a day before meals  insulin lispro (HumaLOG) corrective regimen sliding scale   SubCutaneous at bedtime  levothyroxine 75 MICROGram(s) Oral daily  lisinopril 40 milliGRAM(s) Oral daily  warfarin 7.5 milliGRAM(s) Oral once    MEDICATIONS  (PRN):  acetaminophen   Tablet .. 650 milliGRAM(s) Oral every 6 hours PRN Temp greater or equal to 38C (100.4F), Mild Pain (1 - 3), Moderate Pain (4 - 6)  dextrose 40% Gel 15 Gram(s) Oral once PRN Blood Glucose LESS THAN 70 milliGRAM(s)/deciliter  glucagon  Injectable 1 milliGRAM(s) IntraMuscular once PRN Glucose LESS THAN 70 milligrams/deciliter      LABS:                        12.3   10.86 )-----------( 319      ( 30 Oct 2019 06:05 )             37.8                   PT/INR - ( 30 Oct 2019 06:05 )   PT: 11.5 sec;   INR: 1.03 ratio         PTT - ( 30 Oct 2019 06:05 )  PTT:34.3 sec      Serum Pro-Brain Natriuretic Peptide: 508 pg/mL (10-27-19 @ 11:50)        VITALS:  T(C): 36.6 (10-30-19 @ 05:00), Max: 36.8 (10-29-19 @ 21:00)  T(F): 97.8 (10-30-19 @ 05:00), Max: 98.3 (10-29-19 @ 21:00)  HR: 72 (10-30-19 @ 05:00) (72 - 84)  BP: 130/85 (10-30-19 @ 05:00) (128/85 - 130/85)  BP(mean): --  ABP: --  ABP(mean): --  RR: 14 (10-30-19 @ 05:00) (14 - 16)  SpO2: 97% (10-30-19 @ 05:00) (94% - 97%)  CVP(mm Hg): --  CVP(cm H2O): --    Ins and Outs     10-29-19 @ 07:01  -  10-30-19 @ 07:00  --------------------------------------------------------  IN: 700 mL / OUT: 0 mL / NET: 700 mL        Height (cm): 165.1 (10-29-19 @ 11:17)  Weight (kg): 132 (10-29-19 @ 11:17)  BMI (kg/m2): 48.4 (10-29-19 @ 11:17)        I&O's Detail    29 Oct 2019 07:01  -  30 Oct 2019 07:00  --------------------------------------------------------  IN:    Oral Fluid: 700 mL  Total IN: 700 mL    OUT:  Total OUT: 0 mL    Total NET: 700 mL

## 2019-10-30 NOTE — PROGRESS NOTE ADULT - SUBJECTIVE AND OBJECTIVE BOX
Patient is a 59y old  Female who presents with a chief complaint of tachycardia, tachypnea (29 Oct 2019 11:17)      INTERVAL HPI/OVERNIGHT EVENTS:  without complaints      REVIEW OF SYSTEMS:  CONSTITUTIONAL: No fever, weight loss, or fatigue  EYES: No eye pain, visual disturbances, or discharge  ENMT:  No difficulty hearing, tinnitus, vertigo; No sinus or throat pain  NECK: No pain or stiffness  BREASTS: No pain, masses, or nipple discharge  RESPIRATORY: No cough, wheezing, chills or hemoptysis; No shortness of breath  CARDIOVASCULAR: No chest pain, palpitations, dizziness, or leg swelling  GASTROINTESTINAL: No abdominal or epigastric pain. No nausea, vomiting, or hematemesis; No diarrhea or constipation. No melena or hematochezia.  GENITOURINARY: No dysuria, frequency, hematuria, or incontinence  NEUROLOGICAL: No headaches, memory loss, loss of strength, numbness, or tremors  SKIN: No itching, burning, rashes, or lesions   LYMPH NODES: No enlarged glands  ENDOCRINE: No heat or cold intolerance; No hair loss  MUSCULOSKELETAL: No joint pain or swelling; No muscle, back, or extremity pain  PSYCHIATRIC: No depression, anxiety, mood swings, or difficulty sleeping  HEME/LYMPH: No easy bruising, or bleeding gums  ALLERY AND IMMUNOLOGIC: No hives or eczema  FAMILY HISTORY:    T(C): 36.6 (10-30-19 @ 05:00), Max: 36.8 (10-29-19 @ 21:00)  HR: 72 (10-30-19 @ 05:00) (72 - 84)  BP: 130/85 (10-30-19 @ 05:00) (128/85 - 130/85)  RR: 14 (10-30-19 @ 05:00) (14 - 16)  SpO2: 97% (10-30-19 @ 05:00) (94% - 97%)  Wt(kg): --Vital Signs Last 24 Hrs  T(C): 36.6 (30 Oct 2019 05:00), Max: 36.8 (29 Oct 2019 21:00)  T(F): 97.8 (30 Oct 2019 05:00), Max: 98.3 (29 Oct 2019 21:00)  HR: 72 (30 Oct 2019 05:00) (72 - 84)  BP: 130/85 (30 Oct 2019 05:00) (128/85 - 130/85)  BP(mean): --  RR: 14 (30 Oct 2019 05:00) (14 - 16)  SpO2: 97% (30 Oct 2019 05:00) (94% - 97%)    T(F): 97.8 (10-30-19 @ 05:00), Max: 98.6 (10-27-19 @ 16:10)  HR: 72 (10-30-19 @ 05:00) (72 - 112)  BP: 130/85 (10-30-19 @ 05:00) (109/81 - 175/112)  RR: 14 (10-30-19 @ 05:00) (14 - 24)  SpO2: 97% (10-30-19 @ 05:00) (66% - 98%)    PHYSICAL EXAM:  GENERAL: NAD, well-groomed, well-developed  HEAD:  Atraumatic, Normocephalic  EYES: EOMI, PERRLA, conjunctiva and sclera clear  ENMT: No tonsillar erythema, exudates, or enlargement; Moist mucous membranes, Good dentition, No lesions  NECK: Supple, No JVD, Normal thyroid  NERVOUS SYSTEM:  Alert & Oriented X3, Good concentration; Motor Strength 5/5 B/L upper and lower extremities; DTRs 2+ intact and symmetric  CHEST/LUNG: Clear to percussion bilaterally; No rales, rhonchi, wheezing, or rubs  HEART: Regular rate and rhythm; No murmurs, rubs, or gallops  ABDOMEN: Soft, Nontender, Nondistended; Bowel sounds present  EXTREMITIES:  2+ Peripheral Pulses, No clubbing, cyanosis, or edema  LYMPH: No lymphadenopathy noted  SKIN: No rashes or lesions    Consultant(s) Notes Reviewed:  [x ] YES  [ ] NO  Care Discussed with Consultants/Other Providers [ x] YES  [ ] NO    LABS:                              12.3   10.86 )-----------( 319      ( 30 Oct 2019 06:05 )             37.8         PT/INR - ( 30 Oct 2019 06:05 )   PT: 11.5 sec;   INR: 1.03 ratio         PTT - ( 30 Oct 2019 06:05 )  PTT:34.3 sec                     12.3   10.86 )-----------( 319      ( 10-30 @ 06:05 )             37.8                11.6   10.18 )-----------( 295      ( 10-29 @ 06:30 )             36.6                12.1   12.87 )-----------( 291      ( 10-28 @ 05:41 )             37.7                11.1   13.22 )-----------( 221      ( 10-27 @ 20:05 )             34.2                11.6   11.78 )-----------( 248      ( 10-27 @ 11:50 )             36.0       Hemoglobin A1C, Whole Blood: 7.9 % (10-28-19 @ 05:41)  Hemoglobin A1C, Whole Blood: 7.7 % (05-18-19 @ 07:35)          RADIOLOGY & ADDITIONAL TESTS:    Imaging Personally Reviewed:  [ ] YES  [ ] NO  acetaminophen   Tablet .. 650 milliGRAM(s) Oral every 6 hours PRN  dextrose 40% Gel 15 Gram(s) Oral once PRN  dextrose 5%. 1000 milliLiter(s) IV Continuous <Continuous>  dextrose 50% Injectable 12.5 Gram(s) IV Push once  dextrose 50% Injectable 25 Gram(s) IV Push once  dextrose 50% Injectable 25 Gram(s) IV Push once  enoxaparin Injectable 120 milliGRAM(s) SubCutaneous <User Schedule>  glucagon  Injectable 1 milliGRAM(s) IntraMuscular once PRN  hydrochlorothiazide 25 milliGRAM(s) Oral daily  insulin lispro (HumaLOG) corrective regimen sliding scale   SubCutaneous three times a day before meals  insulin lispro (HumaLOG) corrective regimen sliding scale   SubCutaneous at bedtime  levothyroxine 75 MICROGram(s) Oral daily  lisinopril 40 milliGRAM(s) Oral daily  warfarin 7.5 milliGRAM(s) Oral once      HEALTH ISSUES - PROBLEM Dx:  Obesity: Obesity  Diabetes: Diabetes  Hypothyroid: Hypothyroid  Hypertension: Hypertension  Sleep apnea: Sleep apnea  Multiple subsegmental pulmonary emboli without acute cor pulmonale: Multiple subsegmental pulmonary emboli without acute cor pulmonale  Pulmonary thromboembolism

## 2019-10-30 NOTE — PROGRESS NOTE ADULT - ASSESSMENT
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 The pt is obese and weighs about 290 lbs and had a bypass stomach procedure a few years ago. The pt has no family past of thrombophilia. The inr and the ptt were normal here and the platelets are normal and she has review of systems that is negative for searching for a malignancy. The pt was brought into the Er with 5 days of shortness of breath without chest pain and was found on cta to have bilateral PEs without RV strain. The pt was placed on iv heparin and the question now is mgt from here on in.      The pt never had a clot and with the labs above, the use of a thrombophilic panel here would in my opinion not be helpful long term. The fact that she had a gastric bypass makes the use of noacs not reliable as this class of drugs need stomach acid and stomach acid activation. Also she is above the 250 lb level that we use to reliably recommend these drugs to pts. She also is about 130 kg and this is above the 120 mg sq Lovenox dose we would use here twice a day. 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 She will try to lose some weight as this appears to be the main cause for her clotting issues now and at the end of 3 months her situation with clotting meds can be reassessed. The pt should have a doppler of the LEs as well while here.    10/29 pt's insurance company will not approve the Lovenox so she will be started on coumadin and this was discussed with her and the medical team.   10/30 pt is stable and is less short of breath and will continue on the coumadin and will have dopplers of the LEs.

## 2019-10-30 NOTE — CHART NOTE - NSCHARTNOTEFT_GEN_A_CORE
Reason for Admission: Bilateral PE    59 year old history of HTN, sleep apnea, hypothyroidism, bariatric surgery comes with 5 day history of progressive tachycardia, tachypnea, exercise intolerance and is found to have bilateral pulmonary emboli. Patient reports that she had episode of desaturation last night to 88%, improved with oxygen. Currently resting comfortably     #Pulmonary Embolism  - On lovenox for coumadin bridge until INR therapeutic  - Will continue coumadin - got 10mg last night, will get 7.5 mg tonight  - INR this AM was 1.03, will check again tomorrow    #HTN  - c/w home meds HCTZ and lisinopril    #Hypothyroidism  - c/w synthroid     #Type 2 DM  - HbAIC 7.9  - C/w ISS    #Sleep apnea  - F/u outpatient    Discussed with Dr Fuller

## 2019-10-30 NOTE — PROGRESS NOTE ADULT - ASSESSMENT
Physical Examination:  GENERAL:               Alert, Oriented, No acute distress.    HEENT:                   No JVD, Moist MM  PULM:                     Bilateral air entry, Clear to auscultation bilaterally, no significant sputum production, No Rales, No Rhonchi, No Wheezing  CVS:                         S1, S2,  No Murmur  ABD:                        Soft, nondistended, nontender, normoactive bowel sounds, obese  EXT:                         mild edema, nontender, No Cyanosis or Clubbing   SKIN:                       Warm and well perfused, no rashes noted.   NEURO:                  Alert, oriented, interactive, nonfocal, follows commands  PSYC:                      Calm, + Insight.      Assessment  Acute B/L PE   ABBIE not on home CPAP - awaiting home titration study   Morbid obesity s/p bariatric surgery  Chronic Lung nodule unchanged un repeat CT - no further workup    underlying HTN, Hypothyroidism    Plan  CPAP qhs for hypoxia at night, suspect from abbie    awaiting titration study.     pt not a candidate for NOAC due to bariatric surgery and insurance will not pay for Lovenox as out pt  Coumadin /  Lovenox bridge  will need 48hr overlap with INR 2-3     Check O2 on RA and ambulation, taper fio2 to off.     will follow

## 2019-10-31 DIAGNOSIS — D21.9 BENIGN NEOPLASM OF CONNECTIVE AND OTHER SOFT TISSUE, UNSPECIFIED: ICD-10-CM

## 2019-10-31 PROBLEM — G47.30 SLEEP APNEA, UNSPECIFIED: Chronic | Status: ACTIVE | Noted: 2019-10-27

## 2019-10-31 LAB
GLUCOSE BLDC GLUCOMTR-MCNC: 121 MG/DL — HIGH (ref 70–99)
GLUCOSE BLDC GLUCOMTR-MCNC: 126 MG/DL — HIGH (ref 70–99)
GLUCOSE BLDC GLUCOMTR-MCNC: 133 MG/DL — HIGH (ref 70–99)
GLUCOSE BLDC GLUCOMTR-MCNC: 150 MG/DL — HIGH (ref 70–99)
HCT VFR BLD CALC: 38.9 % — SIGNIFICANT CHANGE UP (ref 34.5–45)
HGB BLD-MCNC: 12.5 G/DL — SIGNIFICANT CHANGE UP (ref 11.5–15.5)
INR BLD: 1.13 RATIO — SIGNIFICANT CHANGE UP (ref 0.88–1.16)
MCHC RBC-ENTMCNC: 28.4 PG — SIGNIFICANT CHANGE UP (ref 27–34)
MCHC RBC-ENTMCNC: 32.1 GM/DL — SIGNIFICANT CHANGE UP (ref 32–36)
MCV RBC AUTO: 88.4 FL — SIGNIFICANT CHANGE UP (ref 80–100)
NRBC # BLD: 0 /100 WBCS — SIGNIFICANT CHANGE UP (ref 0–0)
PLATELET # BLD AUTO: 297 K/UL — SIGNIFICANT CHANGE UP (ref 150–400)
PROTHROM AB SERPL-ACNC: 12.7 SEC — SIGNIFICANT CHANGE UP (ref 10–12.9)
RBC # BLD: 4.4 M/UL — SIGNIFICANT CHANGE UP (ref 3.8–5.2)
RBC # FLD: 15.4 % — HIGH (ref 10.3–14.5)
WBC # BLD: 8.81 K/UL — SIGNIFICANT CHANGE UP (ref 3.8–10.5)
WBC # FLD AUTO: 8.81 K/UL — SIGNIFICANT CHANGE UP (ref 3.8–10.5)

## 2019-10-31 PROCEDURE — 99231 SBSQ HOSP IP/OBS SF/LOW 25: CPT

## 2019-10-31 PROCEDURE — 99232 SBSQ HOSP IP/OBS MODERATE 35: CPT

## 2019-10-31 RX ORDER — WARFARIN SODIUM 2.5 MG/1
10 TABLET ORAL ONCE
Refills: 0 | Status: COMPLETED | OUTPATIENT
Start: 2019-10-31 | End: 2019-10-31

## 2019-10-31 RX ADMIN — LISINOPRIL 40 MILLIGRAM(S): 2.5 TABLET ORAL at 05:34

## 2019-10-31 RX ADMIN — ENOXAPARIN SODIUM 120 MILLIGRAM(S): 100 INJECTION SUBCUTANEOUS at 11:48

## 2019-10-31 RX ADMIN — Medication 75 MICROGRAM(S): at 05:33

## 2019-10-31 RX ADMIN — ENOXAPARIN SODIUM 120 MILLIGRAM(S): 100 INJECTION SUBCUTANEOUS at 00:33

## 2019-10-31 RX ADMIN — Medication 25 MILLIGRAM(S): at 05:34

## 2019-10-31 RX ADMIN — ENOXAPARIN SODIUM 120 MILLIGRAM(S): 100 INJECTION SUBCUTANEOUS at 23:44

## 2019-10-31 RX ADMIN — WARFARIN SODIUM 10 MILLIGRAM(S): 2.5 TABLET ORAL at 21:59

## 2019-10-31 NOTE — PROGRESS NOTE ADULT - SUBJECTIVE AND OBJECTIVE BOX
Patient is a 59y old  Female who presents with a chief complaint of tachycardia, tachypnea (31 Oct 2019 09:05)      INTERVAL HPI/OVERNIGHT EVENTS:no new complaints      REVIEW OF SYSTEMS:  CONSTITUTIONAL: No fever, weight loss, or fatigue  EYES: No eye pain, visual disturbances, or discharge  ENMT:  No difficulty hearing, tinnitus, vertigo; No sinus or throat pain  NECK: No pain or stiffness  BREASTS: No pain, masses, or nipple discharge  RESPIRATORY: No cough, wheezing, chills or hemoptysis; No shortness of breath  CARDIOVASCULAR: No chest pain, palpitations, dizziness, or leg swelling  GASTROINTESTINAL: No abdominal or epigastric pain. No nausea, vomiting, or hematemesis; No diarrhea or constipation. No melena or hematochezia.  GENITOURINARY: No dysuria, frequency, hematuria, or incontinence  NEUROLOGICAL: No headaches, memory loss, loss of strength, numbness, or tremors  SKIN: No itching, burning, rashes, or lesions   LYMPH NODES: No enlarged glands  ENDOCRINE: No heat or cold intolerance; No hair loss  MUSCULOSKELETAL: No joint pain or swelling; No muscle, back, or extremity pain  PSYCHIATRIC: No depression, anxiety, mood swings, or difficulty sleeping  HEME/LYMPH: No easy bruising, or bleeding gums  ALLERY AND IMMUNOLOGIC: No hives or eczema  FAMILY HISTORY:    T(C): 36.8 (10-31-19 @ 05:28), Max: 37 (10-30-19 @ 11:09)  HR: 86 (10-31-19 @ 05:28) (75 - 86)  BP: 138/71 (10-31-19 @ 05:28) (112/71 - 138/71)  RR: 16 (10-31-19 @ 05:28) (16 - 16)  SpO2: 98% (10-31-19 @ 05:28) (95% - 98%)  Wt(kg): --Vital Signs Last 24 Hrs  T(C): 36.8 (31 Oct 2019 05:28), Max: 37 (30 Oct 2019 11:09)  T(F): 98.2 (31 Oct 2019 05:28), Max: 98.6 (30 Oct 2019 11:09)  HR: 86 (31 Oct 2019 05:28) (75 - 86)  BP: 138/71 (31 Oct 2019 05:28) (112/71 - 138/71)  BP(mean): --  RR: 16 (31 Oct 2019 05:28) (16 - 16)  SpO2: 98% (31 Oct 2019 05:28) (95% - 98%)    T(F): 98.2 (10-31-19 @ 05:28), Max: 98.6 (10-30-19 @ 11:09)  HR: 86 (10-31-19 @ 05:28) (72 - 86)  BP: 138/71 (10-31-19 @ 05:28) (109/81 - 147/74)  RR: 16 (10-31-19 @ 05:28) (14 - 18)  SpO2: 98% (10-31-19 @ 05:28) (66% - 98%)    PHYSICAL EXAM:  GENERAL: NAD, well-groomed, well-developed  HEAD:  Atraumatic, Normocephalic  EYES: EOMI, PERRLA, conjunctiva and sclera clear  ENMT: No tonsillar erythema, exudates, or enlargement; Moist mucous membranes, Good dentition, No lesions  NECK: Supple, No JVD, Normal thyroid  NERVOUS SYSTEM:  Alert & Oriented X3, Good concentration; Motor Strength 5/5 B/L upper and lower extremities; DTRs 2+ intact and symmetric  CHEST/LUNG: Clear to percussion bilaterally; No rales, rhonchi, wheezing, or rubs  HEART: Regular rate and rhythm; No murmurs, rubs, or gallops  ABDOMEN: Soft, Nontender, Nondistended; Bowel sounds present  EXTREMITIES:  2+ Peripheral Pulses, No clubbing, cyanosis, or edema  LYMPH: No lymphadenopathy noted  SKIN: No rashes or lesions    Consultant(s) Notes Reviewed:  [x ] YES  [ ] NO  Care Discussed with Consultants/Other Providers [ x] YES  [ ] NO    LABS:                              12.5   8.81  )-----------( 297      ( 31 Oct 2019 05:30 )             38.9         PT/INR - ( 31 Oct 2019 05:30 )   PT: 12.7 sec;   INR: 1.13 ratio         PTT - ( 30 Oct 2019 06:05 )  PTT:34.3 sec                     12.5   8.81  )-----------( 297      ( 10-31 @ 05:30 )             38.9                12.3   10.86 )-----------( 319      ( 10-30 @ 06:05 )             37.8                11.6   10.18 )-----------( 295      ( 10-29 @ 06:30 )             36.6                12.1   12.87 )-----------( 291      ( 10-28 @ 05:41 )             37.7                11.1   13.22 )-----------( 221      ( 10-27 @ 20:05 )             34.2                11.6   11.78 )-----------( 248      ( 10-27 @ 11:50 )             36.0       Hemoglobin A1C, Whole Blood: 7.9 % (10-28-19 @ 05:41)  Hemoglobin A1C, Whole Blood: 7.7 % (05-18-19 @ 07:35)          RADIOLOGY & ADDITIONAL TESTS:    Imaging Personally Reviewed:  [ ] YES  [ ] NO  acetaminophen   Tablet .. 650 milliGRAM(s) Oral every 6 hours PRN  dextrose 40% Gel 15 Gram(s) Oral once PRN  dextrose 5%. 1000 milliLiter(s) IV Continuous <Continuous>  dextrose 50% Injectable 12.5 Gram(s) IV Push once  dextrose 50% Injectable 25 Gram(s) IV Push once  dextrose 50% Injectable 25 Gram(s) IV Push once  enoxaparin Injectable 120 milliGRAM(s) SubCutaneous <User Schedule>  glucagon  Injectable 1 milliGRAM(s) IntraMuscular once PRN  hydrochlorothiazide 25 milliGRAM(s) Oral daily  insulin lispro (HumaLOG) corrective regimen sliding scale   SubCutaneous three times a day before meals  insulin lispro (HumaLOG) corrective regimen sliding scale   SubCutaneous at bedtime  levothyroxine 75 MICROGram(s) Oral daily  lisinopril 40 milliGRAM(s) Oral daily  warfarin 10 milliGRAM(s) Oral once      HEALTH ISSUES - PROBLEM Dx:  Obesity: Obesity  Diabetes: Diabetes  Hypothyroid: Hypothyroid  Hypertension: Hypertension  Sleep apnea: Sleep apnea  Multiple subsegmental pulmonary emboli without acute cor pulmonale: Multiple subsegmental pulmonary emboli without acute cor pulmonale  Pulmonary thromboembolism

## 2019-10-31 NOTE — PROGRESS NOTE ADULT - SUBJECTIVE AND OBJECTIVE BOX
Follow-up Pulmonary Progress Note  Chief Complaint : Multiple subsegmental pulmonary emboli without acute cor pulmonale      pt seen and examined  comfortable   didnt use bipap overnight  states sob much improved  feels better using o2       Allergies :latex (Rash)  No Known Drug Allergies      PAST MEDICAL & SURGICAL HISTORY:  Sleep apnea  Diabetes: denies a past medical problem  Hypothyroid  Hypertension  No significant past surgical history      Medications:  MEDICATIONS  (STANDING):  dextrose 5%. 1000 milliLiter(s) (50 mL/Hr) IV Continuous <Continuous>  dextrose 50% Injectable 12.5 Gram(s) IV Push once  dextrose 50% Injectable 25 Gram(s) IV Push once  dextrose 50% Injectable 25 Gram(s) IV Push once  enoxaparin Injectable 120 milliGRAM(s) SubCutaneous <User Schedule>  hydrochlorothiazide 25 milliGRAM(s) Oral daily  insulin lispro (HumaLOG) corrective regimen sliding scale   SubCutaneous three times a day before meals  insulin lispro (HumaLOG) corrective regimen sliding scale   SubCutaneous at bedtime  levothyroxine 75 MICROGram(s) Oral daily  lisinopril 40 milliGRAM(s) Oral daily  warfarin 10 milliGRAM(s) Oral once    MEDICATIONS  (PRN):  acetaminophen   Tablet .. 650 milliGRAM(s) Oral every 6 hours PRN Temp greater or equal to 38C (100.4F), Mild Pain (1 - 3), Moderate Pain (4 - 6)  dextrose 40% Gel 15 Gram(s) Oral once PRN Blood Glucose LESS THAN 70 milliGRAM(s)/deciliter  glucagon  Injectable 1 milliGRAM(s) IntraMuscular once PRN Glucose LESS THAN 70 milligrams/deciliter      LABS:                        12.5   8.81  )-----------( 297      ( 31 Oct 2019 05:30 )             38.9                   PT/INR - ( 31 Oct 2019 05:30 )   PT: 12.7 sec;   INR: 1.13 ratio         PTT - ( 30 Oct 2019 06:05 )  PTT:34.3 sec      < from: US Pelvis Complete (10.30.19 @ 18:06) >  Impression: Fibroid uterus.    Apparent new small fluid in the endometrium and this is abnormal for postmenopausal status. If clinically indicated hysteroscopy may be pursued to rule out an obstructive endometrial mass/cancer.    < end of copied text >        VITALS:  T(C): 36.8 (10-31-19 @ 05:28), Max: 37 (10-30-19 @ 11:09)  T(F): 98.2 (10-31-19 @ 05:28), Max: 98.6 (10-30-19 @ 11:09)  HR: 86 (10-31-19 @ 05:28) (75 - 86)  BP: 138/71 (10-31-19 @ 05:28) (112/71 - 138/71)  BP(mean): --  ABP: --  ABP(mean): --  RR: 16 (10-31-19 @ 05:28) (16 - 16)  SpO2: 98% (10-31-19 @ 05:28) (95% - 98%)  CVP(mm Hg): --  CVP(cm H2O): --    Ins and Outs     10-30-19 @ 07:01  -  10-31-19 @ 07:00  --------------------------------------------------------  IN: 1080 mL / OUT: 0 mL / NET: 1080 mL        Height (cm): 165.1 (10-30-19 @ 13:28)  Weight (kg): 132 (10-30-19 @ 13:28)  BMI (kg/m2): 48.4 (10-30-19 @ 13:28)        I&O's Detail    30 Oct 2019 07:01  -  31 Oct 2019 07:00  --------------------------------------------------------  IN:    Oral Fluid: 1080 mL  Total IN: 1080 mL    OUT:  Total OUT: 0 mL    Total NET: 1080 mL

## 2019-10-31 NOTE — PROGRESS NOTE ADULT - ASSESSMENT
Physical Examination:  GENERAL:               Alert, Oriented, No acute distress.    HEENT:                   No JVD, Moist MM  PULM:                     Bilateral air entry, Clear to auscultation bilaterally, no significant sputum production, No Rales, No Rhonchi, No Wheezing  CVS:                         S1, S2,  No Murmur  ABD:                        Soft, nondistended, nontender, normoactive bowel sounds, obese  EXT:                         mild edema, nontender, No Cyanosis or Clubbing   SKIN:                       Warm and well perfused, no rashes noted.   NEURO:                  Alert, oriented, interactive, nonfocal, follows commands  PSYC:                      Calm, + Insight.      Assessment  Acute B/L PE   ABBIE not on home CPAP - awaiting home titration study   Morbid obesity s/p bariatric surgery  Chronic Lung nodule unchanged un repeat CT - no further workup    underlying HTN, Hypothyroidism    Plan  CPAP qhs for hypoxia at night, suspect from abbie    awaiting titration study.   Check o2 on RA rest and ambulation daily  pt not a candidate for NOAC due to bariatric surgery and insurance will not pay for Lovenox as out pt  Coumadin /  Lovenox bridge  will need 48hr overlap with INR 2-3     Noted abnormal US pelvis - GYN eval    Check O2 on RA and ambulation, taper fio2 to off.     will follow Physical Examination:  GENERAL:               Alert, Oriented, No acute distress.    HEENT:                   No JVD, Moist MM  PULM:                     Bilateral air entry, Clear to auscultation bilaterally, no significant sputum production, No Rales, No Rhonchi, No Wheezing  CVS:                         S1, S2,  No Murmur  ABD:                        Soft, nondistended, nontender, normoactive bowel sounds, obese  EXT:                         mild edema, nontender, No Cyanosis or Clubbing   SKIN:                       Warm and well perfused, no rashes noted.   NEURO:                  Alert, oriented, interactive, nonfocal, follows commands  PSYC:                      Calm, + Insight.      Assessment  Acute B/L PE   ABBIE not on home CPAP - awaiting home titration study   Morbid obesity s/p bariatric surgery  Chronic Lung nodule unchanged un repeat CT - no further workup    underlying HTN, Hypothyroidism    Plan  CPAP qhs for hypoxia at night, suspect from abbie    awaiting titration study.   Check o2 on RA rest and ambulation daily  pt not a candidate for NOAC due to bariatric surgery and insurance will not pay for Lovenox as out pt  Coumadin /  Lovenox bridge  will need 48hr overlap with INR 2-3     Noted abnormal US pelvis - GYN eval    Check O2 on RA and ambulation, taper fio2 to off.     will follow   D/w Dr. Fuller this AM

## 2019-10-31 NOTE — CHART NOTE - NSCHARTNOTEFT_GEN_A_CORE
Reviewed prior progress notes, labs and imaging.    Discussed with Dr Fuller    Primary Diagnosis:  Bilateral PE    59 year old history of HTN, sleep apnea, hypothyroidism, bariatric surgery comes with 5 day history of progressive tachycardia, tachypnea, exercise intolerance and is found to have bilateral pulmonary emboli. Currently resting comfortably.  Pt had pelvic sono yesterday with finding of fibroid uterus and free fluid in uterus-  to be worked up as out pt    #Pulmonary Embolism  - On lovenox for coumadin bridge until INR therapeutic  - Will continue coumadin - got 7.5 mg last night, will get 10mg  mg tonight  - INR this AM was 1.13, will continue to follow     #HTN  - c/w home meds HCTZ and lisinopril    #Hypothyroidism  - c/w synthroid     #Type 2 DM  - HbAIC 7.9  - C/w ISS    #Sleep apnea  - F/u outpatient    # morbid obesity  BMI 48.8   - weight loss regimen      Discussed with Dr Fuller.          Continue coumadin Lovenox bridge until theraputic         Anticipated Discharge:

## 2019-10-31 NOTE — PROGRESS NOTE ADULT - ASSESSMENT
INR 1.1 further doses of Coumadin and continue Lovenox therapeutic abdominal and pelvic ultrasounds reviewed fibroid uterus fluid in the endometrium sloping directly to her gynecologist Dr. Kiran Clayton who will consult and advise

## 2019-11-01 DIAGNOSIS — N85.00 ENDOMETRIAL HYPERPLASIA, UNSPECIFIED: ICD-10-CM

## 2019-11-01 LAB
GLUCOSE BLDC GLUCOMTR-MCNC: 113 MG/DL — HIGH (ref 70–99)
GLUCOSE BLDC GLUCOMTR-MCNC: 137 MG/DL — HIGH (ref 70–99)
GLUCOSE BLDC GLUCOMTR-MCNC: 140 MG/DL — HIGH (ref 70–99)
GLUCOSE BLDC GLUCOMTR-MCNC: 147 MG/DL — HIGH (ref 70–99)
HCT VFR BLD CALC: 39.1 % — SIGNIFICANT CHANGE UP (ref 34.5–45)
HGB BLD-MCNC: 12.3 G/DL — SIGNIFICANT CHANGE UP (ref 11.5–15.5)
INR BLD: 1.32 RATIO — HIGH (ref 0.88–1.16)
MCHC RBC-ENTMCNC: 28.6 PG — SIGNIFICANT CHANGE UP (ref 27–34)
MCHC RBC-ENTMCNC: 31.5 GM/DL — LOW (ref 32–36)
MCV RBC AUTO: 90.9 FL — SIGNIFICANT CHANGE UP (ref 80–100)
NRBC # BLD: 0 /100 WBCS — SIGNIFICANT CHANGE UP (ref 0–0)
PLATELET # BLD AUTO: 331 K/UL — SIGNIFICANT CHANGE UP (ref 150–400)
PROTHROM AB SERPL-ACNC: 14.9 SEC — HIGH (ref 10–12.9)
RBC # BLD: 4.3 M/UL — SIGNIFICANT CHANGE UP (ref 3.8–5.2)
RBC # FLD: 15.5 % — HIGH (ref 10.3–14.5)
WBC # BLD: 9.43 K/UL — SIGNIFICANT CHANGE UP (ref 3.8–10.5)
WBC # FLD AUTO: 9.43 K/UL — SIGNIFICANT CHANGE UP (ref 3.8–10.5)

## 2019-11-01 PROCEDURE — 99232 SBSQ HOSP IP/OBS MODERATE 35: CPT

## 2019-11-01 RX ORDER — WARFARIN SODIUM 2.5 MG/1
10 TABLET ORAL ONCE
Refills: 0 | Status: COMPLETED | OUTPATIENT
Start: 2019-11-01 | End: 2019-11-01

## 2019-11-01 RX ADMIN — ENOXAPARIN SODIUM 120 MILLIGRAM(S): 100 INJECTION SUBCUTANEOUS at 11:30

## 2019-11-01 RX ADMIN — Medication 75 MICROGRAM(S): at 06:02

## 2019-11-01 RX ADMIN — WARFARIN SODIUM 10 MILLIGRAM(S): 2.5 TABLET ORAL at 21:20

## 2019-11-01 RX ADMIN — LISINOPRIL 40 MILLIGRAM(S): 2.5 TABLET ORAL at 06:02

## 2019-11-01 NOTE — CONSULT NOTE ADULT - CONSULT REASON
Endometrial fluid
PE
bilateral PE in the setting of obesity and no family history of thrombophilia. She has a past that includes bypass surgery

## 2019-11-01 NOTE — PROGRESS NOTE ADULT - SUBJECTIVE AND OBJECTIVE BOX
Follow-up Critical Care Progress Note  Chief Complaint : Multiple subsegmental pulmonary emboli without acute cor pulmonale      pt feels well  no complaints  awaiting therapeutic inr  does not want to use CPAP here in hospital       Allergies :latex (Rash)  No Known Drug Allergies      PAST MEDICAL & SURGICAL HISTORY:  Sleep apnea  Diabetes: denies a past medical problem  Hypothyroid  Hypertension  No significant past surgical history      Medications:  MEDICATIONS  (STANDING):  dextrose 5%. 1000 milliLiter(s) (50 mL/Hr) IV Continuous <Continuous>  dextrose 50% Injectable 12.5 Gram(s) IV Push once  dextrose 50% Injectable 25 Gram(s) IV Push once  dextrose 50% Injectable 25 Gram(s) IV Push once  enoxaparin Injectable 120 milliGRAM(s) SubCutaneous <User Schedule>  hydrochlorothiazide 25 milliGRAM(s) Oral daily  insulin lispro (HumaLOG) corrective regimen sliding scale   SubCutaneous three times a day before meals  insulin lispro (HumaLOG) corrective regimen sliding scale   SubCutaneous at bedtime  levothyroxine 75 MICROGram(s) Oral daily  lisinopril 40 milliGRAM(s) Oral daily  warfarin 10 milliGRAM(s) Oral once    MEDICATIONS  (PRN):  acetaminophen   Tablet .. 650 milliGRAM(s) Oral every 6 hours PRN Temp greater or equal to 38C (100.4F), Mild Pain (1 - 3), Moderate Pain (4 - 6)  dextrose 40% Gel 15 Gram(s) Oral once PRN Blood Glucose LESS THAN 70 milliGRAM(s)/deciliter  glucagon  Injectable 1 milliGRAM(s) IntraMuscular once PRN Glucose LESS THAN 70 milligrams/deciliter      LABS:                        12.3   9.43  )-----------( 331      ( 01 Nov 2019 06:06 )             39.1                   PT/INR - ( 01 Nov 2019 05:06 )   PT: 14.9 sec;   INR: 1.32 ratio           VITALS:  T(C): 36.4 (11-01-19 @ 04:56), Max: 36.8 (10-31-19 @ 10:49)  T(F): 97.5 (11-01-19 @ 04:56), Max: 98.2 (10-31-19 @ 10:49)  HR: 77 (11-01-19 @ 04:56) (77 - 89)  BP: 98/72 (11-01-19 @ 04:56) (98/72 - 129/91)  BP(mean): --  ABP: --  ABP(mean): --  RR: 16 (11-01-19 @ 04:56) (16 - 16)  SpO2: 99% (11-01-19 @ 04:56) (96% - 99%)  CVP(mm Hg): --  CVP(cm H2O): --    Ins and Outs     10-31-19 @ 07:01  -  11-01-19 @ 07:00  --------------------------------------------------------  IN: 1100 mL / OUT: 0 mL / NET: 1100 mL        Height (cm): 165.1 (10-31-19 @ 09:11)  Weight (kg): 132 (10-31-19 @ 09:11)  BMI (kg/m2): 48.4 (10-31-19 @ 09:11)        I&O's Detail    31 Oct 2019 07:01  -  01 Nov 2019 07:00  --------------------------------------------------------  IN:    Oral Fluid: 1100 mL  Total IN: 1100 mL    OUT:  Total OUT: 0 mL    Total NET: 1100 mL

## 2019-11-01 NOTE — CHART NOTE - NSCHARTNOTEFT_GEN_A_CORE
Reviewed prior progress notes, labs and imaging.    Discussed with Dr Fuller.    Primary Diagnosis:  Bilateral PE    59 year old history of HTN, sleep apnea, hypothyroidism, bariatric surgery comes with 5 day history of progressive tachycardia, tachypnea, exercise intolerance and is found to have bilateral pulmonary emboli. Currently resting comfortably.  Pt had pelvic sono yesterday with finding of fibroid uterus and free fluid in uterus-  to be worked up as out pt    #Pulmonary Embolism  - On lovenox for coumadin bridge until INR therapeutic  - Will continue coumadin - got 10 mg last night, will get 10mg  mg tonight  - INR this AM was 1.33, will continue to follow  until theraputic     #ovarian fluid   - gyn consult pending     #HTN  - c/w home meds HCTZ and lisinopril    #Hypothyroidism  - c/w synthroid     #Type 2 DM  - HbAIC 7.9  - C/w ISS    #Sleep apnea  - F/u outpatient    # morbid obesity  BMI 48.8   - weight loss regimen       Discussed with Dr Fuller.          Plan:        Anticipated Discharge: Reviewed prior progress notes, labs and imaging.    Discussed with Dr Fuller.    Primary Diagnosis:  Bilateral PE    59 year old history of HTN, sleep apnea, hypothyroidism, bariatric surgery comes with 5 day history of progressive tachycardia, tachypnea, exercise intolerance and is found to have bilateral pulmonary emboli. Currently resting comfortably.  Pt had pelvic sono yesterday with finding of fibroid uterus and free fluid in uterus-  to be worked up as out pt    #Pulmonary Embolism  - On lovenox for coumadin bridge until INR therapeutic  - Will continue coumadin - got 10 mg last night, will get 10mg  mg tonight  - INR this AM was 1.33, will continue to follow  until theraputic     #ovarian fluid   - gyn consult appreciated   -Endometrial hyperplasia. Recommendation: Outpatient evaluation  (Acute operative diagnostic evaluation in setting of recent PE is contraindicated.)    #HTN  - c/w home meds HCTZ and lisinopril    #Hypothyroidism  - c/w synthroid     #Type 2 DM  - HbAIC 7.9  - C/w ISS    #Sleep apnea  - F/u outpatient    # morbid obesity  BMI 48.8   - weight loss regimen       Discussed with Dr Fuller.          Plan:        Anticipated Discharge:

## 2019-11-01 NOTE — CONSULT NOTE ADULT - SUBJECTIVE AND OBJECTIVE BOX
PULMONARY CONSULT  Location of Patient :  [facility] W[facility] ( TELE)  Attending requesting Consult:Lee Ann Brunson  Chief Complaint :  SOB  Reason For consult : PE      Initial HPI on admission:  HPI:  59 year old female with h/o Morbid obesity, ABBIE was on cpap 12 x 1-2 years now off, h/o bariatric surg, HTN, hypothyroidism who p/w increasing NELSON, and increasing sob.  denies any travel, sick contacts, wheezing, le swelling, trauma.   work up shows PE and started on heparin drip    pt at this time feels better no complaints.    PAST MEDICAL & SURGICAL HISTORY:  Sleep apnea  Diabetes: denies a past medical problem  Hypothyroid  Hypertension  No significant past surgical history    Allergies    latex (Rash)  No Known Drug Allergies    Intolerances      FAMILY HISTORY:    Social history:      Smoking: denies      Drinking: denies      Drug use: denies    Review of Systems: as stated above    CONSTITUTIONAL: No fever, No chills, No fatigue  EYES: No eye pain, No visual disturbances, No discharge  ENMT:  No difficulty hearing, No tinnitus, No vertigo; No sinus or throat pain  NECK: No pain, No stiffness  RESPIRATORY: No Cough, +SOB, No Secretions  CARDIOVASCULAR: No chest pain, No palpitations, No dizziness, or No leg swelling  GASTROINTESTINAL: No abdominal or epigastric pain. No nausea, No vomiting, No hematemesis; No diarrhea, No constipation. No melena, No hematochezia.  GENITOURINARY: No dysuria, No frequency, No hematuria, No incontinence  NEUROLOGICAL: No headaches, No memory loss, No loss of strength, No numbness, No tremors  SKIN: No itching, No burning, No rashes, No lesions   MUSCULOSKELETAL: No joint pain or swelling; No muscle, back, No extremity pain  PSYCHIATRIC: No depression, No anxiety, No mood swings, No difficulty sleeping      Medications:  MEDICATIONS  (STANDING):  dextrose 5%. 1000 milliLiter(s) (50 mL/Hr) IV Continuous <Continuous>  dextrose 50% Injectable 12.5 Gram(s) IV Push once  dextrose 50% Injectable 25 Gram(s) IV Push once  dextrose 50% Injectable 25 Gram(s) IV Push once  heparin  Infusion.  Unit(s)/Hr (24 mL/Hr) IV Continuous <Continuous>  hydrochlorothiazide 25 milliGRAM(s) Oral daily  insulin lispro (HumaLOG) corrective regimen sliding scale   SubCutaneous three times a day before meals  insulin lispro (HumaLOG) corrective regimen sliding scale   SubCutaneous at bedtime  levothyroxine 75 MICROGram(s) Oral daily  lisinopril 40 milliGRAM(s) Oral daily    MEDICATIONS  (PRN):  acetaminophen   Tablet .. 650 milliGRAM(s) Oral every 6 hours PRN Temp greater or equal to 38C (100.4F), Mild Pain (1 - 3), Moderate Pain (4 - 6)  dextrose 40% Gel 15 Gram(s) Oral once PRN Blood Glucose LESS THAN 70 milliGRAM(s)/deciliter  glucagon  Injectable 1 milliGRAM(s) IntraMuscular once PRN Glucose LESS THAN 70 milligrams/deciliter  heparin  Injectable 42087 Unit(s) IV Push every 6 hours PRN For aPTT less than 40  heparin  Injectable 5000 Unit(s) IV Push every 6 hours PRN For aPTT between 40 - 57      Home Medications:  Last Order Reconciliation Date: 10-27-19 @ 15:13 (Admission Reconciliation)  hydroCHLOROthiazide 25 mg oral tablet: 1 tab(s) orally once a day (10-27-19 @ 12:04)  quinapril 40 mg oral tablet: 1 tab(s) orally once a day (10-27-19 @ 12:04)  Synthroid 75 mcg (0.075 mg) oral tablet: 1 tab(s) orally once a day (10-27-19 @ 12:04)      LABS:                        12.1   12.87 )-----------( 291      ( 28 Oct 2019 05:41 )             37.7     10-28    138  |  100  |  14  ----------------------------<  194<H>  3.6   |  26  |  0.72    Ca    9.5      28 Oct 2019 05:41  Mg     1.7     10-27    TPro  6.8  /  Alb  3.2<L>  /  TBili  0.7  /  DBili  x   /  AST  39  /  ALT  50<H>  /  AlkPhos  130<H>  10-27      CARDIAC MARKERS ( 27 Oct 2019 11:50 )  .024 ng/mL / x     / x     / x     / x            PT/INR - ( 27 Oct 2019 11:50 )   PT: 11.2 sec;   INR: 1.00 ratio         PTT - ( 28 Oct 2019 05:40 )  PTT:59.2 sec      Serum Pro-Brain Natriuretic Peptide: 508 pg/mL (10-27-19 @ 11:50)        CULTURES: (if applicable)          CAPILLARY BLOOD GLUCOSE      POCT Blood Glucose.: 164 mg/dL (28 Oct 2019 11:22)      RADIOLOGY  CT:    < from: CT Angio Chest w/ IV Cont (10.27.19 @ 13:35) >    EXAM:  CT ANGIO CHEST (W)AW IC      PROCEDURE DATE:  10/27/2019        INTERPRETATION:  CLINICAL HISTORY:  Shortness of breath, tachycardia. Evaluate for pulmonary embolism.    Multiple axial images of the chest were obtained from the lung apices through upper abdomen with the administration of intravascular contrast. 90cc of Omnipaque 350 was administered intravenously without   complication. Reformatted coronal and sagittal images are submitted. MIP images were created utilizing dedicated computer software.    COMPARISON: 5/17/2019.    FINDINGS: Bilateral pulmonary emboli are identified without CT evidence for significant enlargement of the right ventricle with deviation of the cardiac intraventricular septum.    No abnormallyenlarged mediastinal or hilar lymph nodes are noted. There is no evidence for axillary lymphadenopathy. The heart size appears within normal limits. No pericardial effusion is identified. Thoracic   aortic caliber demonstrates unremarkable caliber.    The central bronchial anatomy appears patent.    There is no evidence for lung parenchymal infiltrate, consolidation or suspicious mass lesion. There are stable scattered nodular foci noted within the bilateral lung parenchyma measuring up to 4 mm. There is no   evidence for pleural effusion or pneumothorax. No mediastinal shift is noted.    The patient is status post gastric revision surgery. A 1.5 cm left adrenal nodule, likely adrenal adenoma, is stable. Nodular prominence of the visualized portion of the right adrenal gland is stable. Degenerative   changes of the thoracic spine noted.    IMPRESSION:  Bilateral pulmonary emboli. No evidence for lung parenchymal infiltrate, consolidation or suspicious mass lesion.    Critical result discussed by telephone with Dr. Sheehan at 1:42 PM with read back.    < end of copied text >      ECHO:  awaiting Echo repeat    VITALS:  T(C): 36.7 (10-28-19 @ 05:21), Max: 37 (10-27-19 @ 16:10)  T(F): 98 (10-28-19 @ 05:21), Max: 98.6 (10-27-19 @ 16:10)  HR: 86 (10-28-19 @ 05:21) (86 - 96)  BP: 139/78 (10-28-19 @ 05:21) (110/72 - 151/91)  BP(mean): 109 (10-27-19 @ 16:10) (92 - 109)  ABP: --  ABP(mean): --  RR: 18 (10-28-19 @ 05:21) (18 - 23)  SpO2: 96% (10-28-19 @ 05:21) (92% - 98%)  CVP(mm Hg): --  CVP(cm H2O): --    Ins and Outs     10-28-19 @ 07:01  -  10-28-19 @ 12:55  --------------------------------------------------------  IN: 100 mL / OUT: 2 mL / NET: 98 mL        Height (cm): 165.1 (10-28-19 @ 09:24)  Weight (kg): 132 (10-28-19 @ 09:24)  BMI (kg/m2): 48.4 (10-28-19 @ 09:24)        I&O's Detail    28 Oct 2019 07:01  -  28 Oct 2019 12:55  --------------------------------------------------------  IN:    Oral Fluid: 100 mL  Total IN: 100 mL    OUT:    Voided: 2 mL  Total OUT: 2 mL    Total NET: 98 mL
SHABBIR YOUNG  59y  Female [id number]    CC:  Patient is a 59y old  Female P 0000 admitted 10/27 for symptomatic PE  Found to have 4 mm endometrium with fluid in endometrial cavity  Patient denies vaginal bleeding      HPI:  Patient known to me.  Last pap 2017 was WNL        REVIEW OF SYSTEMS:  CONSTITUTIONAL: No weakness, fevers or chills  EYES/ENT: No visual changes;  No vertigo or throat pain   NECK: No pain or stiffness  RESPIRATORY: No cough, wheezing, hemoptysis; No shortness of breath  CARDIOVASCULAR: No chest pain or palpitations  GASTROINTESTINAL: No abdominal or epigastric pain. No nausea, vomiting, or hematemesis; No diarrhea or constipation. No melena or hematochezia.  GENITOURINARY: No dysuria, frequency or hematuria  NEUROLOGICAL: No numbness or weakness  SKIN: No itching, burning, rashes, or lesions   All other review of systems is negative unless indicated above    MEDICATIONS  (STANDING):  dextrose 5%. 1000 milliLiter(s) (50 mL/Hr) IV Continuous <Continuous>  dextrose 50% Injectable 12.5 Gram(s) IV Push once  dextrose 50% Injectable 25 Gram(s) IV Push once  dextrose 50% Injectable 25 Gram(s) IV Push once  enoxaparin Injectable 120 milliGRAM(s) SubCutaneous <User Schedule>  hydrochlorothiazide 25 milliGRAM(s) Oral daily  insulin lispro (HumaLOG) corrective regimen sliding scale   SubCutaneous three times a day before meals  insulin lispro (HumaLOG) corrective regimen sliding scale   SubCutaneous at bedtime  levothyroxine 75 MICROGram(s) Oral daily  lisinopril 40 milliGRAM(s) Oral daily  warfarin 10 milliGRAM(s) Oral once    MEDICATIONS  (PRN):  acetaminophen   Tablet .. 650 milliGRAM(s) Oral every 6 hours PRN Temp greater or equal to 38C (100.4F), Mild Pain (1 - 3), Moderate Pain (4 - 6)  dextrose 40% Gel 15 Gram(s) Oral once PRN Blood Glucose LESS THAN 70 milliGRAM(s)/deciliter  glucagon  Injectable 1 milliGRAM(s) IntraMuscular once PRN Glucose LESS THAN 70 milligrams/deciliter      Allergies    latex (Rash)  No Known Drug Allergies    Intolerances        PAST MEDICAL & SURGICAL HISTORY:  Sleep apnea  Diabetes: denies a past medical problem  Hypothyroid  Hypertension  No significant past surgical history      OB/GYN HISTORY:   Menarchy           Cycle Length              Days Flow                                       Last Menstrual Period                                         G    P                                       Last Pap smear:   [id number]                                            FAMILY HISTORY:      SOCIAL HISTORY:    Vital Signs Last 24 Hrs  T(C): 36.7 (01 Nov 2019 09:00), Max: 36.8 (31 Oct 2019 10:49)  T(F): 98 (01 Nov 2019 09:00), Max: 98.2 (31 Oct 2019 10:49)  HR: 90 (01 Nov 2019 09:00) (77 - 90)  BP: 94/75 (01 Nov 2019 09:00) (94/75 - 129/91)  BP(mean): --  RR: 16 (01 Nov 2019 09:00) (16 - 16)  SpO2: 94% (01 Nov 2019 09:00) (94% - 99%)    Last Menstrual Period      PHYSICAL EXAM:  Constitutional: NAD, awake and alert, well-developed  HEENT: PERR, EOMI, Normal Hearing, MMM  Neck: Soft and supple, No LAD, No JVD  Respiratory: Breath sounds are clear bilaterally, No wheezing, rales or rhonchi  Cardiovascular: S1 and S2, regular rate and rhythm, no Murmurs, gallops or rubs  Gastrointestinal: Bowel Sounds present, soft, nontender, nondistended, no guarding, no rebound  Extremities: No peripheral edema  Vascular: 2+ peripheral pulses  Neurological: A/O x 3, no focal deficits  Musculoskeletal: 5/5 strength b/l upper and lower extremities  Skin: No rashes    LABS:  Pregnancy Test:                        12.3   9.43  )-----------( 331      ( 01 Nov 2019 06:06 )             39.1           I&O's Detail    31 Oct 2019 07:01  -  01 Nov 2019 07:00  --------------------------------------------------------  IN:    Oral Fluid: 1100 mL  Total IN: 1100 mL    OUT:  Total OUT: 0 mL    Total NET: 1100 mL      < from: US Transvaginal (10.30.19 @ 18:03) >    EXAM:  US TRANSVAGINAL    EXAM:  US PELVIC COMPLETE      PROCEDURE DATE:  10/30/2019        INTERPRETATION:  Transabdominal and transvaginal pelvic ultrasounds    Indication: Intermittent pelvic pain.    Transabdominal and transvaginal pelvic ultrasounds are performed and they   are reported together. Comparison is made with a previous examination   dated 8/28/2012. The uterus measures 6.8 x 4.1 x 5.5 cm. The endometrial   stripe measures 4 mm in thickness which is within normal limits. Apparent   new small fluid in the endometrium and this is abnormal for   postmenopausal status. Multiple uterine fibroids are again seen with the   largest measuring up to 2.3 cm on the right.    The right ovary measures 1.8 x 1.4 x 1.9 cm and appears grossly   unremarkable. Duplex Doppler flow is demonstrated for the right ovary.   The left ovary is not visualized.    Trace pelvic free fluid.    Impression: Fibroid uterus.    Apparent new small fluid in the endometrium and this is abnormal for   postmenopausal status. If clinically indicated hysteroscopy may be   pursued to rule out an obstructive endometrial mass/cancer.                  JESSICA NOGUEIRA M.D., ATTENDING RADIOLOGIST  This document has been electronically signed. Oct 30 2019  6:29PM    < end of copied text >    PT/INR - ( 01 Nov 2019 05:06 )   PT: 14.9 sec;   INR: 1.32 ratio                     RADIOLOGY & ADDITIONAL STUDIES:
Patient is a 59y old  Female who presents with a chief complaint of tachycardia, tachypnea (28 Oct 2019 09:24)      HPI:  59 year old history of HTN, sleep apnea, bariatric surgery comes with 5 day history of progressive tachycardia, tachypnea, exercise intolerance and is found to have bilateral pulmonary emboli.  Patient states recently having a colonscopy, she states she is due for mammogram, never had any issues with her cervix. 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. She never had a clotting problem and is not on hormonal agents. The pt is obese and weighs about 290 lbs and had a bypass stomach procedure a few years ago. The pt has no family past of thrombophilia. The inr and the ptt were normal here and the platelets are normal and she has review of systems that is negative for searching for a malignancy. The pt was brought into the Er with 5 days of shortness of breath without chest pain and was found on cta to have bilateral PEs without RV strain. The pt was placed on iv heparin and the question now is mgt from here on in.      The pt never had a clot and with the labs above, the use of a thrombophilic panel here would in my opinion not be helpful long term. The fact that she had a gastric bypass makes the use of noacs not reliable as this class of drugs need stomach acid and stomach acid activation. Also she is above the 250 lb level that we use to reliably recommend these drugs to pts. She also is about 130 kg and this is above the 120 mg sq Lovenox dose we would use here twice a day. 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 She will try to lose some weight as this appears to be the main cause for her clotting issues now and at the end of 3 months her situation with clotting meds can be reassessed. The pt should have a doppler of the LEs as well while here.       MEDICATIONS  (STANDING):  dextrose 5%. 1000 milliLiter(s) (50 mL/Hr) IV Continuous <Continuous>  dextrose 50% Injectable 12.5 Gram(s) IV Push once  dextrose 50% Injectable 25 Gram(s) IV Push once  dextrose 50% Injectable 25 Gram(s) IV Push once  heparin  Infusion.  Unit(s)/Hr (24 mL/Hr) IV Continuous <Continuous>  hydrochlorothiazide 25 milliGRAM(s) Oral daily  insulin lispro (HumaLOG) corrective regimen sliding scale   SubCutaneous three times a day before meals  insulin lispro (HumaLOG) corrective regimen sliding scale   SubCutaneous at bedtime  levothyroxine 75 MICROGram(s) Oral daily  lisinopril 40 milliGRAM(s) Oral daily    MEDICATIONS  (PRN):  dextrose 40% Gel 15 Gram(s) Oral once PRN Blood Glucose LESS THAN 70 milliGRAM(s)/deciliter  glucagon  Injectable 1 milliGRAM(s) IntraMuscular once PRN Glucose LESS THAN 70 milligrams/deciliter  heparin  Injectable 86501 Unit(s) IV Push every 6 hours PRN For aPTT less than 40  heparin  Injectable 5000 Unit(s) IV Push every 6 hours PRN For aPTT between 40 - 57      T(F): 98 (10-28-19 @ 05:21), Max: 98.6 (10-27-19 @ 16:10)  HR: 86 (10-28-19 @ 05:21) (86 - 112)  BP: 139/78 (10-28-19 @ 05:21) (110/72 - 175/112)  RR: 18 (10-28-19 @ 05:21) (18 - 24)  SpO2: 96% (10-28-19 @ 05:21) (92% - 98%)    LABS:    CBC Full  -  ( 28 Oct 2019 05:41 )  WBC Count : 12.87 K/uL  RBC Count : 4.18 M/uL  Hemoglobin : 12.1 g/dL  Hematocrit : 37.7 %  Platelet Count - Automated : 291 K/uL  Mean Cell Volume : 90.2 fl  Mean Cell Hemoglobin : 28.9 pg  Mean Cell Hemoglobin Concentration : 32.1 gm/dL  Auto Neutrophil # : x  Auto Lymphocyte # : x  Auto Monocyte # : x  Auto Eosinophil # : x  Auto Basophil # : x  Auto Neutrophil % : x  Auto Lymphocyte % : x  Auto Monocyte % : x  Auto Eosinophil % : x  Auto Basophil % : x    10-28    138  |  100  |  14  ----------------------------<  194<H>  3.6   |  26  |  0.72    Ca    9.5      28 Oct 2019 05:41  Mg     1.7     10-27    TPro  6.8  /  Alb  3.2<L>  /  TBili  0.7  /  DBili  x   /  AST  39  /  ALT  50<H>  /  AlkPhos  130<H>  10-27    PT/INR - ( 27 Oct 2019 11:50 )   PT: 11.2 sec;   INR: 1.00 ratio         PTT - ( 28 Oct 2019 05:40 )  PTT:59.2 sec      ROS:  Negative except for:    PAST MEDICAL & SURGICAL HISTORY:  Sleep apnea  Diabetes: denies a past medical problem  Hypothyroid  Hypertension  No significant past surgical history      SOCIAL HISTORY:    FAMILY HISTORY:      Allergies    latex (Rash)  No Known Drug Allergies    Intolerances        PHYSICAL EXAM  General: adult in NAD  HEENT: clear oropharynx, anicteric sclera, pink conjunctivae  Neck: supple  CV: normal S1S2 with no murmur rubs or gallops  Lungs: clear to auscultation, no wheezes, no rhales  Abdomen: soft non-tender non-distended, no hepato/splenomegaly  Ext: no clubbing cyanosis or edema  Skin: no rashes and no petichiae  Neuro: alert and oriented X3 no focal deficits

## 2019-11-01 NOTE — PROGRESS NOTE ADULT - ASSESSMENT
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 The pt is obese and weighs about 290 lbs and had a bypass stomach procedure a few years ago. The pt has no family past of thrombophilia. The inr and the ptt were normal here and the platelets are normal and she has review of systems that is negative for searching for a malignancy. The pt was brought into the Er with 5 days of shortness of breath without chest pain and was found on cta to have bilateral PEs without RV strain. The pt was placed on iv heparin and the question now is mgt from here on in.      The pt never had a clot and with the labs above, the use of a thrombophilic panel here would in my opinion not be helpful long term. The fact that she had a gastric bypass makes the use of noacs not reliable as this class of drugs need stomach acid and stomach acid activation. Also she is above the 250 lb level that we use to reliably recommend these drugs to pts. She also is about 130 kg and this is above the 120 mg sq Lovenox dose we would use here twice a day. 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 She will try to lose some weight as this appears to be the main cause for her clotting issues now and at the end of 3 months her situation with clotting meds can be reassessed. The pt should have a doppler of the LEs as well while here.    10/29 pt's insurance company will not approve the Lovenox so she will be started on coumadin and this was discussed with her and the medical team.   10/30 pt is stable and is less short of breath and will continue on the coumadin and will have dopplers of the LEs.  11/1 pt out of room at time of visit but she continues on coumadin and inr was noted to be 1.3. US of pelvis shows fibroids and ? fluid in uterus that will need to be followed by gyn will discuss with staff and pt when able.

## 2019-11-01 NOTE — CONSULT NOTE ADULT - PROBLEM SELECTOR RECOMMENDATION 9
Outpatient evaluation  Acute operative diagnostic evaluation in setting of recent PE is contraindicated

## 2019-11-01 NOTE — PROGRESS NOTE ADULT - SUBJECTIVE AND OBJECTIVE BOX
Patient is a 59y old  Female who presents with a chief complaint of tachycardia, tachypnea (28 Oct 2019 09:24)      HPI:  59 year old history of HTN, sleep apnea, bariatric surgery comes with 5 day history of progressive tachycardia, tachypnea, exercise intolerance and is found to have bilateral pulmonary emboli.  Patient states recently having a colonscopy, she states she is due for mammogram, never had any issues with her cervix. 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 She never had a clotting problem and is not on hormonal agents. The pt is obese and weighs about 290 lbs and had a bypass stomach procedure a few years ago. The pt has no family past of thrombophilia. The inr and the ptt were normal here and the platelets are normal and she has review of systems that is negative for searching for a malignancy. The pt was brought into the Er with 5 days of shortness of breath without chest pain and was found on cta to have bilateral PEs without RV strain. The pt was placed on iv heparin and the question now is mgt from here on in.      The pt never had a clot and with the labs above, the use of a thrombophilic panel here would in my opinion not be helpful long term. The fact that she had a gastric bypass makes the use of noacs not reliable as this class of drugs need stomach acid and stomach acid activation. Also she is above the 250 lb level that we use to reliably recommend these drugs to pts. She also is about 130 kg and this is above the 120 mg sq Lovenox dose we would use here twice a day. 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 She will try to lose some weight as this appears to be the main cause for her clotting issues now and at the end of 3 months her situation with clotting meds can be reassessed. The pt should have a doppler of the LEs as well while here.      10/29 pt's insurance company will not approve the Lovenox so she will be started on coumadin and this was discussed with her and the medical team. 10/30 pt is stable and is less short of breath and will continue on the coumadin and will have dopplers of the LEs.  MEDICATIONS  (STANDING):  dextrose 5%. 1000 milliLiter(s) (50 mL/Hr) IV Continuous <Continuous>  dextrose 50% Injectable 12.5 Gram(s) IV Push once  dextrose 50% Injectable 25 Gram(s) IV Push once  dextrose 50% Injectable 25 Gram(s) IV Push once  enoxaparin Injectable 120 milliGRAM(s) SubCutaneous <User Schedule>  hydrochlorothiazide 25 milliGRAM(s) Oral daily  insulin lispro (HumaLOG) corrective regimen sliding scale   SubCutaneous three times a day before meals  insulin lispro (HumaLOG) corrective regimen sliding scale   SubCutaneous at bedtime  levothyroxine 75 MICROGram(s) Oral daily  lisinopril 40 milliGRAM(s) Oral daily  warfarin 10 milliGRAM(s) Oral once  ICU Vital Signs Last 24 Hrs  T(C): 36.4 (01 Nov 2019 04:56), Max: 36.8 (31 Oct 2019 10:49)  T(F): 97.5 (01 Nov 2019 04:56), Max: 98.2 (31 Oct 2019 10:49)  HR: 77 (01 Nov 2019 04:56) (77 - 89)  BP: 98/72 (01 Nov 2019 04:56) (98/72 - 129/91)  BP(mean): --  ABP: --  ABP(mean): --  RR: 16 (01 Nov 2019 04:56) (16 - 16)  SpO2: 99% (01 Nov 2019 04:56) (96% - 99%)    LABS:                            12.3   10.86 )-----------( 319      ( 30 Oct 2019 06:05 )             37.8     10-28    138  |  100  |  14  ----------------------------<  194<H>  3.6   |  26  |  0.72    Ca    9.5      28 Oct 2019 05:41  Mg     1.7     10-27    TPro  6.8  /  Alb  3.2<L>  /  TBili  0.7  /  DBili  x   /  AST  39  /  ALT  50<H>  /  AlkPhos  130<H>  10-27    PT/INR - ( 01 Nov 2019 05:06 )   PT: 14.9 sec;   INR: 1.32 ratio             ROS:  Negative except for:    PAST MEDICAL & SURGICAL HISTORY:  Sleep apnea  Diabetes: denies a past medical problem  Hypothyroid  Hypertension  No significant past surgical history      SOCIAL HISTORY:    FAMILY HISTORY:      Allergies    latex (Rash)  No Known Drug Allergies    Intolerances        PHYSICAL EXAM of the floor and I will have to return.  General: adult in NAD  HEENT: clear oropharynx, anicteric sclera, pink conjunctivae  Neck: supple  CV: normal S1S2 with no murmur rubs or gallops  Lungs: clear to auscultation, no wheezes, no rales  Abdomen: soft non-tender non-distended, no hepatosplenomegaly  Ext: no clubbing cyanosis or edema  Skin: no rashes and no petechiae  Neuro: alert and oriented X3 no focal deficits

## 2019-11-01 NOTE — PROGRESS NOTE ADULT - ASSESSMENT
Physical Examination:  GENERAL:               Alert, Oriented, No acute distress.    HEENT:                   No JVD, Moist MM  PULM:                     Bilateral air entry, Clear to auscultation bilaterally, no significant sputum production, No Rales, No Rhonchi, No Wheezing  CVS:                         S1, S2,  No Murmur  ABD:                        Soft, nondistended, nontender, normoactive bowel sounds, obese  NEURO:                  Alert, oriented, interactive, nonfocal, follows commands  PSYC:                      Calm, + Insight.      Assessment  Acute B/L PE   ABBIE not on home CPAP - awaiting home titration study   Morbid obesity s/p bariatric surgery  Chronic Lung nodule unchanged un repeat CT - no further workup    underlying HTN, Hypothyroidism    Plan  Pt does not want CPAP qhs for hypoxia at night,   awaiting titration study as out patient, set up for dec 2019  Check o2 on RA rest and ambulation daily  pt not a candidate for NOAC due to bariatric surgery and insurance will not pay for Lovenox as out pt  Coumadin /  Lovenox bridge  will need 48hr overlap with INR 2-3     Noted abnormal US pelvis - GYN eval    Check O2 on RA and ambulation, taper fio2 to off.     will follow

## 2019-11-02 LAB
GLUCOSE BLDC GLUCOMTR-MCNC: 117 MG/DL — HIGH (ref 70–99)
GLUCOSE BLDC GLUCOMTR-MCNC: 117 MG/DL — HIGH (ref 70–99)
GLUCOSE BLDC GLUCOMTR-MCNC: 140 MG/DL — HIGH (ref 70–99)
GLUCOSE BLDC GLUCOMTR-MCNC: 145 MG/DL — HIGH (ref 70–99)
HCT VFR BLD CALC: 36.4 % — SIGNIFICANT CHANGE UP (ref 34.5–45)
HGB BLD-MCNC: 11.8 G/DL — SIGNIFICANT CHANGE UP (ref 11.5–15.5)
INR BLD: 1.72 RATIO — HIGH (ref 0.88–1.16)
MCHC RBC-ENTMCNC: 28.9 PG — SIGNIFICANT CHANGE UP (ref 27–34)
MCHC RBC-ENTMCNC: 32.4 GM/DL — SIGNIFICANT CHANGE UP (ref 32–36)
MCV RBC AUTO: 89 FL — SIGNIFICANT CHANGE UP (ref 80–100)
NRBC # BLD: 0 /100 WBCS — SIGNIFICANT CHANGE UP (ref 0–0)
PLATELET # BLD AUTO: 329 K/UL — SIGNIFICANT CHANGE UP (ref 150–400)
PROTHROM AB SERPL-ACNC: 19.6 SEC — HIGH (ref 10–12.9)
RBC # BLD: 4.09 M/UL — SIGNIFICANT CHANGE UP (ref 3.8–5.2)
RBC # FLD: 15.3 % — HIGH (ref 10.3–14.5)
WBC # BLD: 9.18 K/UL — SIGNIFICANT CHANGE UP (ref 3.8–10.5)
WBC # FLD AUTO: 9.18 K/UL — SIGNIFICANT CHANGE UP (ref 3.8–10.5)

## 2019-11-02 PROCEDURE — 99232 SBSQ HOSP IP/OBS MODERATE 35: CPT

## 2019-11-02 RX ORDER — WARFARIN SODIUM 2.5 MG/1
7.5 TABLET ORAL ONCE
Refills: 0 | Status: COMPLETED | OUTPATIENT
Start: 2019-11-02 | End: 2019-11-02

## 2019-11-02 RX ADMIN — Medication 75 MICROGRAM(S): at 05:32

## 2019-11-02 RX ADMIN — ENOXAPARIN SODIUM 120 MILLIGRAM(S): 100 INJECTION SUBCUTANEOUS at 00:32

## 2019-11-02 RX ADMIN — Medication 25 MILLIGRAM(S): at 05:32

## 2019-11-02 RX ADMIN — WARFARIN SODIUM 7.5 MILLIGRAM(S): 2.5 TABLET ORAL at 21:31

## 2019-11-02 RX ADMIN — ENOXAPARIN SODIUM 120 MILLIGRAM(S): 100 INJECTION SUBCUTANEOUS at 11:56

## 2019-11-02 RX ADMIN — LISINOPRIL 40 MILLIGRAM(S): 2.5 TABLET ORAL at 05:32

## 2019-11-02 NOTE — PROGRESS NOTE ADULT - SUBJECTIVE AND OBJECTIVE BOX
Patient is a 59y old  Female who presents with a chief complaint of tachycardia, tachypnea (01 Nov 2019 10:33)      INTERVAL HPI/OVERNIGHT EVENTS: no complaints      REVIEW OF SYSTEMS:  CONSTITUTIONAL: No fever, weight loss, or fatigue  EYES: No eye pain, visual disturbances, or discharge  ENMT:  No difficulty hearing, tinnitus, vertigo; No sinus or throat pain  NECK: No pain or stiffness  BREASTS: No pain, masses, or nipple discharge  RESPIRATORY: No cough, wheezing, chills or hemoptysis; No shortness of breath  CARDIOVASCULAR: No chest pain, palpitations, dizziness, or leg swelling  GASTROINTESTINAL: No abdominal or epigastric pain. No nausea, vomiting, or hematemesis; No diarrhea or constipation. No melena or hematochezia.  GENITOURINARY: No dysuria, frequency, hematuria, or incontinence  NEUROLOGICAL: No headaches, memory loss, loss of strength, numbness, or tremors  SKIN: No itching, burning, rashes, or lesions   LYMPH NODES: No enlarged glands  ENDOCRINE: No heat or cold intolerance; No hair loss  MUSCULOSKELETAL: No joint pain or swelling; No muscle, back, or extremity pain  PSYCHIATRIC: No depression, anxiety, mood swings, or difficulty sleeping  HEME/LYMPH: No easy bruising, or bleeding gums  ALLERY AND IMMUNOLOGIC: No hives or eczema  FAMILY HISTORY:    T(C): 36.6 (11-02-19 @ 05:20), Max: 36.7 (11-01-19 @ 09:00)  HR: 82 (11-02-19 @ 05:20) (76 - 90)  BP: 122/78 (11-02-19 @ 05:20) (94/75 - 126/70)  RR: 16 (11-02-19 @ 05:20) (16 - 16)  SpO2: 95% (11-02-19 @ 05:20) (94% - 98%)  Wt(kg): --Vital Signs Last 24 Hrs  T(C): 36.6 (02 Nov 2019 05:20), Max: 36.7 (01 Nov 2019 09:00)  T(F): 97.9 (02 Nov 2019 05:20), Max: 98 (01 Nov 2019 09:00)  HR: 82 (02 Nov 2019 05:20) (76 - 90)  BP: 122/78 (02 Nov 2019 05:20) (94/75 - 126/70)  BP(mean): --  RR: 16 (02 Nov 2019 05:20) (16 - 16)  SpO2: 95% (02 Nov 2019 05:20) (94% - 98%)    T(F): 97.9 (11-02-19 @ 05:20), Max: 98.6 (10-30-19 @ 11:09)  HR: 82 (11-02-19 @ 05:20) (75 - 90)  BP: 122/78 (11-02-19 @ 05:20) (94/75 - 138/71)  RR: 16 (11-02-19 @ 05:20) (16 - 16)  SpO2: 95% (11-02-19 @ 05:20) (94% - 99%)    PHYSICAL EXAM:  GENERAL: NAD, well-groomed, well-developed  HEAD:  Atraumatic, Normocephalic  EYES: EOMI, PERRLA, conjunctiva and sclera clear  ENMT: No tonsillar erythema, exudates, or enlargement; Moist mucous membranes, Good dentition, No lesions  NECK: Supple, No JVD, Normal thyroid  NERVOUS SYSTEM:  Alert & Oriented X3, Good concentration; Motor Strength 5/5 B/L upper and lower extremities; DTRs 2+ intact and symmetric  CHEST/LUNG: Clear to percussion bilaterally; No rales, rhonchi, wheezing, or rubs  HEART: Regular rate and rhythm; No murmurs, rubs, or gallops  ABDOMEN: Soft, Nontender, Nondistended; Bowel sounds present  EXTREMITIES:  2+ Peripheral Pulses, No clubbing, cyanosis, or edema  LYMPH: No lymphadenopathy noted  SKIN: No rashes or lesions    Consultant(s) Notes Reviewed:  [x ] YES  [ ] NO  Care Discussed with Consultants/Other Providers [ x] YES  [ ] NO    LABS:                              11.8   9.18  )-----------( 329      ( 02 Nov 2019 06:03 )             36.4         PT/INR - ( 02 Nov 2019 06:03 )   PT: 19.6 sec;   INR: 1.72 ratio                              11.8   9.18  )-----------( 329      ( 11-02 @ 06:03 )             36.4                12.3   9.43  )-----------( 331      ( 11-01 @ 06:06 )             39.1                12.5   8.81  )-----------( 297      ( 10-31 @ 05:30 )             38.9                12.3   10.86 )-----------( 319      ( 10-30 @ 06:05 )             37.8                11.6   10.18 )-----------( 295      ( 10-29 @ 06:30 )             36.6                12.1   12.87 )-----------( 291      ( 10-28 @ 05:41 )             37.7                11.1   13.22 )-----------( 221      ( 10-27 @ 20:05 )             34.2                11.6   11.78 )-----------( 248      ( 10-27 @ 11:50 )             36.0       Hemoglobin A1C, Whole Blood: 7.9 % (10-28-19 @ 05:41)  Hemoglobin A1C, Whole Blood: 7.7 % (05-18-19 @ 07:35)          RADIOLOGY & ADDITIONAL TESTS:    Imaging Personally Reviewed:  [ ] YES  [ ] NO  acetaminophen   Tablet .. 650 milliGRAM(s) Oral every 6 hours PRN  dextrose 40% Gel 15 Gram(s) Oral once PRN  dextrose 5%. 1000 milliLiter(s) IV Continuous <Continuous>  dextrose 50% Injectable 12.5 Gram(s) IV Push once  dextrose 50% Injectable 25 Gram(s) IV Push once  dextrose 50% Injectable 25 Gram(s) IV Push once  enoxaparin Injectable 120 milliGRAM(s) SubCutaneous <User Schedule>  glucagon  Injectable 1 milliGRAM(s) IntraMuscular once PRN  hydrochlorothiazide 25 milliGRAM(s) Oral daily  insulin lispro (HumaLOG) corrective regimen sliding scale   SubCutaneous three times a day before meals  insulin lispro (HumaLOG) corrective regimen sliding scale   SubCutaneous at bedtime  levothyroxine 75 MICROGram(s) Oral daily  lisinopril 40 milliGRAM(s) Oral daily  warfarin 7.5 milliGRAM(s) Oral once      HEALTH ISSUES - PROBLEM Dx:  Endometrial hyperplasia: Endometrial hyperplasia  Fibroids: Fibroids  Obesity: Obesity  Diabetes: Diabetes  Hypothyroid: Hypothyroid  Hypertension: Hypertension  Sleep apnea: Sleep apnea  Multiple subsegmental pulmonary emboli without acute cor pulmonale: Multiple subsegmental pulmonary emboli without acute cor pulmonale  Pulmonary thromboembolism

## 2019-11-02 NOTE — PROGRESS NOTE ADULT - SUBJECTIVE AND OBJECTIVE BOX
Follow-up Pulmonary Progress Note  Chief Complaint : Multiple subsegmental pulmonary emboli without acute cor pulmonale        No new events overnight.  Denies SOB/CP.   Comfortable   no complaints      Allergies :latex (Rash)  No Known Drug Allergies      PAST MEDICAL & SURGICAL HISTORY:  Sleep apnea  Diabetes: denies a past medical problem  Hypothyroid  Hypertension  No significant past surgical history      Medications:  MEDICATIONS  (STANDING):  dextrose 5%. 1000 milliLiter(s) (50 mL/Hr) IV Continuous <Continuous>  dextrose 50% Injectable 12.5 Gram(s) IV Push once  dextrose 50% Injectable 25 Gram(s) IV Push once  dextrose 50% Injectable 25 Gram(s) IV Push once  enoxaparin Injectable 120 milliGRAM(s) SubCutaneous <User Schedule>  hydrochlorothiazide 25 milliGRAM(s) Oral daily  insulin lispro (HumaLOG) corrective regimen sliding scale   SubCutaneous three times a day before meals  insulin lispro (HumaLOG) corrective regimen sliding scale   SubCutaneous at bedtime  levothyroxine 75 MICROGram(s) Oral daily  lisinopril 40 milliGRAM(s) Oral daily  warfarin 7.5 milliGRAM(s) Oral once    MEDICATIONS  (PRN):  acetaminophen   Tablet .. 650 milliGRAM(s) Oral every 6 hours PRN Temp greater or equal to 38C (100.4F), Mild Pain (1 - 3), Moderate Pain (4 - 6)  dextrose 40% Gel 15 Gram(s) Oral once PRN Blood Glucose LESS THAN 70 milliGRAM(s)/deciliter  glucagon  Injectable 1 milliGRAM(s) IntraMuscular once PRN Glucose LESS THAN 70 milligrams/deciliter      LABS:                        11.8   9.18  )-----------( 329      ( 02 Nov 2019 06:03 )             36.4       VITALS:  T(C): 36.6 (11-02-19 @ 05:20), Max: 36.7 (11-01-19 @ 13:00)  T(F): 97.9 (11-02-19 @ 05:20), Max: 98 (11-01-19 @ 13:00)  HR: 82 (11-02-19 @ 05:20) (76 - 83)  BP: 122/78 (11-02-19 @ 05:20) (110/60 - 126/70)  BP(mean): --  ABP: --  ABP(mean): --  RR: 15 (11-02-19 @ 08:13) (15 - 16)  SpO2: 98% (11-02-19 @ 08:13) (95% - 98%)  CVP(mm Hg): --  CVP(cm H2O): --    Ins and Outs     11-01-19 @ 07:01  -  11-02-19 @ 07:00  --------------------------------------------------------  IN: 1260 mL / OUT: 0 mL / NET: 1260 mL    11-02-19 @ 08:01  -  11-02-19 @ 11:29  --------------------------------------------------------  IN: 200 mL / OUT: 0 mL / NET: 200 mL        Height (cm): 165.1 (11-02-19 @ 07:28)  Weight (kg): 132 (11-02-19 @ 07:28)  BMI (kg/m2): 48.4 (11-02-19 @ 07:28)        I&O's Detail    01 Nov 2019 07:01  -  02 Nov 2019 07:00  --------------------------------------------------------  IN:    Oral Fluid: 1260 mL  Total IN: 1260 mL    OUT:  Total OUT: 0 mL    Total NET: 1260 mL      02 Nov 2019 08:01  -  02 Nov 2019 11:29  --------------------------------------------------------  IN:    Oral Fluid: 200 mL  Total IN: 200 mL    OUT:  Total OUT: 0 mL    Total NET: 200 mL

## 2019-11-02 NOTE — CHART NOTE - NSCHARTNOTEFT_GEN_A_CORE
Reviewed prior progress notes, labs and imaging.        Primary Diagnosis:  Bilateral PE    59 year old history of HTN, sleep apnea, hypothyroidism, bariatric surgery comes with 5 day history of progressive tachycardia, tachypnea, exercise intolerance and is found to have bilateral pulmonary emboli. Currently resting comfortably.  Recent pelvic sono noting fibroid uterus and free fluid in uterus-  to be worked up as out-patient.    #Pulmonary Embolism  - On lovenox for coumadin bridge until INR therapeutic  - Will continue coumadin - got 10 mg last night, will get 7.5 mg tonight  - INR this AM was 1.72, will continue to follow until therapeutic     #ovarian fluid   - gyn consult appreciated   -Endometrial hyperplasia. Recommendation: Outpatient evaluation  (Acute operative diagnostic evaluation in setting of recent PE is contraindicated.)    #HTN  - continue on home meds HCTZ and lisinopril    #Hypothyroidism  - continue with synthroid     #Type 2 DM  - HbAIC 7.9  - ISS    #Sleep apnea  - F/u outpatient    # morbid obesity  BMI 48.8   - weight loss regimen       Discussed with Dr Fuller.      Anticipated Discharge: 24-48 hrs once INR is therapeutic lvl

## 2019-11-02 NOTE — DIETITIAN INITIAL EVALUATION ADULT. - OTHER INFO
59 year old history of HTN, sleep apnea, bariatric surgery comes with 5 day history of progressive tachycardia, tachypnea, exercise intolerance and is found to have bilateral pulmonary emboli.     Pt tolerating current diet with report of good appetite. Noted 100% intake of meals. Reports that she had bariatric surgery about 10 years ago. 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 States that she frequently snacks of chips, crackers, sometimes brings a sandwich and usually eats convenience foods when she gets home. Reviewed importance of eating balanced meals regularly, consistent carbohydrate plan. Current A1c 7.9% (10/28). Reviewed coumadin/vitamin K interaction +handout. Pt denies food allergies/intolerances. Denies GI distress- last BM 11/2. No chewing/swallowing difficulty.     Height: 5'5", Weight: (11/1) 292.1lbs, BMI: 48.6  Skin: intact, Edema: none  IBW range: 113-138lbs

## 2019-11-03 LAB
GLUCOSE BLDC GLUCOMTR-MCNC: 118 MG/DL — HIGH (ref 70–99)
GLUCOSE BLDC GLUCOMTR-MCNC: 121 MG/DL — HIGH (ref 70–99)
GLUCOSE BLDC GLUCOMTR-MCNC: 135 MG/DL — HIGH (ref 70–99)
GLUCOSE BLDC GLUCOMTR-MCNC: 148 MG/DL — HIGH (ref 70–99)
HCT VFR BLD CALC: 37 % — SIGNIFICANT CHANGE UP (ref 34.5–45)
HGB BLD-MCNC: 11.8 G/DL — SIGNIFICANT CHANGE UP (ref 11.5–15.5)
INR BLD: 1.9 RATIO — HIGH (ref 0.88–1.16)
MCHC RBC-ENTMCNC: 28.4 PG — SIGNIFICANT CHANGE UP (ref 27–34)
MCHC RBC-ENTMCNC: 31.9 GM/DL — LOW (ref 32–36)
MCV RBC AUTO: 88.9 FL — SIGNIFICANT CHANGE UP (ref 80–100)
NRBC # BLD: 0 /100 WBCS — SIGNIFICANT CHANGE UP (ref 0–0)
PLATELET # BLD AUTO: 338 K/UL — SIGNIFICANT CHANGE UP (ref 150–400)
PROTHROM AB SERPL-ACNC: 21.7 SEC — HIGH (ref 10–12.9)
RBC # BLD: 4.16 M/UL — SIGNIFICANT CHANGE UP (ref 3.8–5.2)
RBC # FLD: 15.4 % — HIGH (ref 10.3–14.5)
WBC # BLD: 8.26 K/UL — SIGNIFICANT CHANGE UP (ref 3.8–10.5)
WBC # FLD AUTO: 8.26 K/UL — SIGNIFICANT CHANGE UP (ref 3.8–10.5)

## 2019-11-03 PROCEDURE — 99232 SBSQ HOSP IP/OBS MODERATE 35: CPT

## 2019-11-03 RX ORDER — ALPRAZOLAM 0.25 MG
0.25 TABLET ORAL ONCE
Refills: 0 | Status: DISCONTINUED | OUTPATIENT
Start: 2019-11-03 | End: 2019-11-03

## 2019-11-03 RX ORDER — WARFARIN SODIUM 2.5 MG/1
10 TABLET ORAL ONCE
Refills: 0 | Status: COMPLETED | OUTPATIENT
Start: 2019-11-03 | End: 2019-11-03

## 2019-11-03 RX ADMIN — WARFARIN SODIUM 10 MILLIGRAM(S): 2.5 TABLET ORAL at 22:15

## 2019-11-03 RX ADMIN — Medication 25 MILLIGRAM(S): at 05:33

## 2019-11-03 RX ADMIN — Medication 75 MICROGRAM(S): at 05:33

## 2019-11-03 RX ADMIN — ENOXAPARIN SODIUM 120 MILLIGRAM(S): 100 INJECTION SUBCUTANEOUS at 11:21

## 2019-11-03 RX ADMIN — LISINOPRIL 40 MILLIGRAM(S): 2.5 TABLET ORAL at 05:33

## 2019-11-03 RX ADMIN — ENOXAPARIN SODIUM 120 MILLIGRAM(S): 100 INJECTION SUBCUTANEOUS at 00:34

## 2019-11-03 NOTE — CHART NOTE - NSCHARTNOTEFT_GEN_A_CORE
Reviewed prior progress notes, labs and imaging.    Primary Diagnosis:  Bilateral PE    59 year old history of HTN, sleep apnea, hypothyroidism, bariatric surgery comes with 5 day history of progressive tachycardia, tachypnea, exercise intolerance and is found to have bilateral pulmonary emboli. Currently resting comfortably.  Recent pelvic sono noting fibroid uterus and free fluid in uterus-  to be worked up as out-patient.    #Pulmonary Embolism  - continue on lovenox for coumadin bridge until INR therapeutic  - Will continue coumadin - got 7.5mg last night, will get 10 mg tonight  - INR this AM was 1.90, will continue to follow until therapeutic     #ovarian fluid   - gyn consult appreciated   -Endometrial hyperplasia. Recommendation: Outpatient evaluation  (Acute operative diagnostic evaluation in setting of recent PE is contraindicated.)    #HTN  - Stable (124/72)  -  c/w home meds HCTZ and lisinopril    #Hypothyroidism  - continue with synthroid     #Type 2 DM (121-139 BG) stable  - HbAIC 7.9  - c/w ISS    #Sleep apnea  - F/u outpatient    # morbid obesity  BMI 48.8   - weight loss regimen       Discussed with Dr Fuller.      Anticipated Discharge: 24-48 hrs once INR is therapeutic lvl

## 2019-11-03 NOTE — PROGRESS NOTE ADULT - SUBJECTIVE AND OBJECTIVE BOX
Follow-up Pulmonary Progress Note  Chief Complaint : Multiple subsegmental pulmonary emboli without acute cor pulmonale    pt feels well  no sob, cp.      Allergies :latex (Rash)  No Known Drug Allergies      PAST MEDICAL & SURGICAL HISTORY:  Sleep apnea  Diabetes: denies a past medical problem  Hypothyroid  Hypertension  No significant past surgical history      Medications:  MEDICATIONS  (STANDING):  dextrose 5%. 1000 milliLiter(s) (50 mL/Hr) IV Continuous <Continuous>  dextrose 50% Injectable 12.5 Gram(s) IV Push once  dextrose 50% Injectable 25 Gram(s) IV Push once  dextrose 50% Injectable 25 Gram(s) IV Push once  enoxaparin Injectable 120 milliGRAM(s) SubCutaneous <User Schedule>  hydrochlorothiazide 25 milliGRAM(s) Oral daily  insulin lispro (HumaLOG) corrective regimen sliding scale   SubCutaneous three times a day before meals  insulin lispro (HumaLOG) corrective regimen sliding scale   SubCutaneous at bedtime  levothyroxine 75 MICROGram(s) Oral daily  lisinopril 40 milliGRAM(s) Oral daily  warfarin 10 milliGRAM(s) Oral once    MEDICATIONS  (PRN):  acetaminophen   Tablet .. 650 milliGRAM(s) Oral every 6 hours PRN Temp greater or equal to 38C (100.4F), Mild Pain (1 - 3), Moderate Pain (4 - 6)  dextrose 40% Gel 15 Gram(s) Oral once PRN Blood Glucose LESS THAN 70 milliGRAM(s)/deciliter  glucagon  Injectable 1 milliGRAM(s) IntraMuscular once PRN Glucose LESS THAN 70 milligrams/deciliter      LABS:                        11.8   8.26  )-----------( 338      ( 03 Nov 2019 06:15 )             37.0     PT/INR - ( 03 Nov 2019 06:15 )   PT: 21.7 sec;   INR: 1.90 ratio             VITALS:  T(C): 36.5 (11-03-19 @ 05:20), Max: 36.7 (11-02-19 @ 21:29)  T(F): 97.7 (11-03-19 @ 05:20), Max: 98.1 (11-02-19 @ 21:29)  HR: 73 (11-03-19 @ 05:20) (73 - 79)  BP: 124/72 (11-03-19 @ 05:20) (119/70 - 124/72)  BP(mean): --  ABP: --  ABP(mean): --  RR: 16 (11-03-19 @ 05:20) (15 - 16)  SpO2: 98% (11-03-19 @ 05:20) (97% - 99%)  CVP(mm Hg): --  CVP(cm H2O): --    Ins and Outs     11-02-19 @ 08:01  -  11-03-19 @ 07:00  --------------------------------------------------------  IN: 400 mL / OUT: 0 mL / NET: 400 mL        Height (cm): 165.1 (11-03-19 @ 08:42)  Weight (kg): 132 (11-03-19 @ 08:42)  BMI (kg/m2): 48.4 (11-03-19 @ 08:42)        I&O's Detail    02 Nov 2019 08:01  -  03 Nov 2019 07:00  --------------------------------------------------------  IN:    Oral Fluid: 400 mL  Total IN: 400 mL    OUT:  Total OUT: 0 mL    Total NET: 400 mL

## 2019-11-03 NOTE — PROGRESS NOTE ADULT - SUBJECTIVE AND OBJECTIVE BOX
Patient is a 59y old  Female who presents with a chief complaint of tachycardia, tachypnea (02 Nov 2019 11:29)      INTERVAL HPI/OVERNIGHT EVENTS:no complaints      REVIEW OF SYSTEMS:  CONSTITUTIONAL: No fever, weight loss, or fatigue  EYES: No eye pain, visual disturbances, or discharge  ENMT:  No difficulty hearing, tinnitus, vertigo; No sinus or throat pain  NECK: No pain or stiffness  BREASTS: No pain, masses, or nipple discharge  RESPIRATORY: No cough, wheezing, chills or hemoptysis; No shortness of breath  CARDIOVASCULAR: No chest pain, palpitations, dizziness, or leg swelling  GASTROINTESTINAL: No abdominal or epigastric pain. No nausea, vomiting, or hematemesis; No diarrhea or constipation. No melena or hematochezia.  GENITOURINARY: No dysuria, frequency, hematuria, or incontinence  NEUROLOGICAL: No headaches, memory loss, loss of strength, numbness, or tremors  SKIN: No itching, burning, rashes, or lesions   LYMPH NODES: No enlarged glands  ENDOCRINE: No heat or cold intolerance; No hair loss  MUSCULOSKELETAL: No joint pain or swelling; No muscle, back, or extremity pain  PSYCHIATRIC: No depression, anxiety, mood swings, or difficulty sleeping  HEME/LYMPH: No easy bruising, or bleeding gums  ALLERY AND IMMUNOLOGIC: No hives or eczema  FAMILY HISTORY:    T(C): 36.5 (11-03-19 @ 05:20), Max: 36.7 (11-02-19 @ 21:29)  HR: 73 (11-03-19 @ 05:20) (73 - 79)  BP: 124/72 (11-03-19 @ 05:20) (119/70 - 124/72)  RR: 16 (11-03-19 @ 05:20) (15 - 16)  SpO2: 98% (11-03-19 @ 05:20) (97% - 99%)  Wt(kg): --Vital Signs Last 24 Hrs  T(C): 36.5 (03 Nov 2019 05:20), Max: 36.7 (02 Nov 2019 21:29)  T(F): 97.7 (03 Nov 2019 05:20), Max: 98.1 (02 Nov 2019 21:29)  HR: 73 (03 Nov 2019 05:20) (73 - 79)  BP: 124/72 (03 Nov 2019 05:20) (119/70 - 124/72)  BP(mean): --  RR: 16 (03 Nov 2019 05:20) (15 - 16)  SpO2: 98% (03 Nov 2019 05:20) (97% - 99%)    T(F): 97.7 (11-03-19 @ 05:20), Max: 98.2 (10-31-19 @ 10:49)  HR: 73 (11-03-19 @ 05:20) (73 - 90)  BP: 124/72 (11-03-19 @ 05:20) (94/75 - 129/91)  RR: 16 (11-03-19 @ 05:20) (15 - 16)  SpO2: 98% (11-03-19 @ 05:20) (94% - 99%)    PHYSICAL EXAM:  GENERAL: NAD, well-groomed, well-developed  HEAD:  Atraumatic, Normocephalic  EYES: EOMI, PERRLA, conjunctiva and sclera clear  ENMT: No tonsillar erythema, exudates, or enlargement; Moist mucous membranes, Good dentition, No lesions  NECK: Supple, No JVD, Normal thyroid  NERVOUS SYSTEM:  Alert & Oriented X3, Good concentration; Motor Strength 5/5 B/L upper and lower extremities; DTRs 2+ intact and symmetric  CHEST/LUNG: Clear to percussion bilaterally; No rales, rhonchi, wheezing, or rubs  HEART: Regular rate and rhythm; No murmurs, rubs, or gallops  ABDOMEN: Soft, Nontender, Nondistended; Bowel sounds present  EXTREMITIES:  2+ Peripheral Pulses, No clubbing, cyanosis, or edema  LYMPH: No lymphadenopathy noted  SKIN: No rashes or lesions    Consultant(s) Notes Reviewed:  [x ] YES  [ ] NO  Care Discussed with Consultants/Other Providers [ x] YES  [ ] NO    LABS:                              11.8   8.26  )-----------( 338      ( 03 Nov 2019 06:15 )             37.0         PT/INR - ( 03 Nov 2019 06:15 )   PT: 21.7 sec;   INR: 1.90 ratio                              11.8   8.26  )-----------( 338      ( 11-03 @ 06:15 )             37.0                11.8   9.18  )-----------( 329      ( 11-02 @ 06:03 )             36.4                12.3   9.43  )-----------( 331      ( 11-01 @ 06:06 )             39.1                12.5   8.81  )-----------( 297      ( 10-31 @ 05:30 )             38.9                12.3   10.86 )-----------( 319      ( 10-30 @ 06:05 )             37.8                11.6   10.18 )-----------( 295      ( 10-29 @ 06:30 )             36.6                12.1   12.87 )-----------( 291      ( 10-28 @ 05:41 )             37.7                11.1   13.22 )-----------( 221      ( 10-27 @ 20:05 )             34.2                11.6   11.78 )-----------( 248      ( 10-27 @ 11:50 )             36.0       Hemoglobin A1C, Whole Blood: 7.9 % (10-28-19 @ 05:41)  Hemoglobin A1C, Whole Blood: 7.7 % (05-18-19 @ 07:35)          RADIOLOGY & ADDITIONAL TESTS:    Imaging Personally Reviewed:  [ ] YES  [ ] NO  acetaminophen   Tablet .. 650 milliGRAM(s) Oral every 6 hours PRN  dextrose 40% Gel 15 Gram(s) Oral once PRN  dextrose 5%. 1000 milliLiter(s) IV Continuous <Continuous>  dextrose 50% Injectable 12.5 Gram(s) IV Push once  dextrose 50% Injectable 25 Gram(s) IV Push once  dextrose 50% Injectable 25 Gram(s) IV Push once  enoxaparin Injectable 120 milliGRAM(s) SubCutaneous <User Schedule>  glucagon  Injectable 1 milliGRAM(s) IntraMuscular once PRN  hydrochlorothiazide 25 milliGRAM(s) Oral daily  insulin lispro (HumaLOG) corrective regimen sliding scale   SubCutaneous three times a day before meals  insulin lispro (HumaLOG) corrective regimen sliding scale   SubCutaneous at bedtime  levothyroxine 75 MICROGram(s) Oral daily  lisinopril 40 milliGRAM(s) Oral daily  warfarin 10 milliGRAM(s) Oral once      HEALTH ISSUES - PROBLEM Dx:  Endometrial hyperplasia: Endometrial hyperplasia  Fibroids: Fibroids  Obesity: Obesity  Diabetes: Diabetes  Hypothyroid: Hypothyroid  Hypertension: Hypertension  Sleep apnea: Sleep apnea  Multiple subsegmental pulmonary emboli without acute cor pulmonale: Multiple subsegmental pulmonary emboli without acute cor pulmonale  Pulmonary thromboembolism

## 2019-11-03 NOTE — PROGRESS NOTE ADULT - ASSESSMENT
INR 1.9 warfarin 10 mg ordered will check a morning if INR greater than 2 consider dc/ing Lovenox and discharge

## 2019-11-03 NOTE — PROGRESS NOTE ADULT - ASSESSMENT
Physical Examination:  GENERAL:               Alert, Oriented, No acute distress.    HEENT:                   No JVD, Moist MM  PULM:                     Bilateral air entry, Clear to auscultation bilaterally, no significant sputum production, No Rales, No Rhonchi, No Wheezing  CVS:                         S1, S2,  No Murmur  ABD:                        Soft, nondistended, nontender, normoactive bowel sounds, obese  NEURO:                  Alert, oriented, interactive, nonfocal, follows commands  PSYC:                      Calm, + Insight.      Assessment  Acute B/L PE   ABBIE not on home CPAP - awaiting home titration study   Morbid obesity s/p bariatric surgery  Chronic Lung nodule unchanged un repeat CT - no further workup    underlying HTN, Hypothyroidism    Plan  Pt does not want CPAP qhs for hypoxia at night,   awaiting titration study as out patient, set up for dec 2019  Check o2 on RA rest and ambulation daily  pt not a candidate for NOAC due to bariatric surgery and insurance will not pay for Lovenox as out pt  Coumadin /  Lovenox bridge  will need 48hr overlap with INR 2-3     Noted abnormal US pelvis - GYN f/u as out pt       will follow

## 2019-11-04 ENCOUNTER — TRANSCRIPTION ENCOUNTER (OUTPATIENT)
Age: 59
End: 2019-11-04

## 2019-11-04 VITALS — WEIGHT: 291.01 LBS | HEIGHT: 65 IN

## 2019-11-04 LAB
GLUCOSE BLDC GLUCOMTR-MCNC: 139 MG/DL — HIGH (ref 70–99)
HCT VFR BLD CALC: 38.7 % — SIGNIFICANT CHANGE UP (ref 34.5–45)
HGB BLD-MCNC: 12.5 G/DL — SIGNIFICANT CHANGE UP (ref 11.5–15.5)
INR BLD: 1.87 RATIO — HIGH (ref 0.88–1.16)
MCHC RBC-ENTMCNC: 28.6 PG — SIGNIFICANT CHANGE UP (ref 27–34)
MCHC RBC-ENTMCNC: 32.3 GM/DL — SIGNIFICANT CHANGE UP (ref 32–36)
MCV RBC AUTO: 88.6 FL — SIGNIFICANT CHANGE UP (ref 80–100)
NRBC # BLD: 0 /100 WBCS — SIGNIFICANT CHANGE UP (ref 0–0)
PLATELET # BLD AUTO: 360 K/UL — SIGNIFICANT CHANGE UP (ref 150–400)
PROTHROM AB SERPL-ACNC: 21.4 SEC — HIGH (ref 10–12.9)
RBC # BLD: 4.37 M/UL — SIGNIFICANT CHANGE UP (ref 3.8–5.2)
RBC # FLD: 15.4 % — HIGH (ref 10.3–14.5)
WBC # BLD: 8.31 K/UL — SIGNIFICANT CHANGE UP (ref 3.8–10.5)
WBC # FLD AUTO: 8.31 K/UL — SIGNIFICANT CHANGE UP (ref 3.8–10.5)

## 2019-11-04 PROCEDURE — 36415 COLL VENOUS BLD VENIPUNCTURE: CPT

## 2019-11-04 PROCEDURE — 76830 TRANSVAGINAL US NON-OB: CPT

## 2019-11-04 PROCEDURE — 99285 EMERGENCY DEPT VISIT HI MDM: CPT | Mod: 25

## 2019-11-04 PROCEDURE — 85610 PROTHROMBIN TIME: CPT

## 2019-11-04 PROCEDURE — 93306 TTE W/DOPPLER COMPLETE: CPT

## 2019-11-04 PROCEDURE — 76856 US EXAM PELVIC COMPLETE: CPT

## 2019-11-04 PROCEDURE — 83690 ASSAY OF LIPASE: CPT

## 2019-11-04 PROCEDURE — 99239 HOSP IP/OBS DSCHRG MGMT >30: CPT

## 2019-11-04 PROCEDURE — 80053 COMPREHEN METABOLIC PANEL: CPT

## 2019-11-04 PROCEDURE — 80048 BASIC METABOLIC PNL TOTAL CA: CPT

## 2019-11-04 PROCEDURE — 36000 PLACE NEEDLE IN VEIN: CPT

## 2019-11-04 PROCEDURE — 85730 THROMBOPLASTIN TIME PARTIAL: CPT

## 2019-11-04 PROCEDURE — 83880 ASSAY OF NATRIURETIC PEPTIDE: CPT

## 2019-11-04 PROCEDURE — 83735 ASSAY OF MAGNESIUM: CPT

## 2019-11-04 PROCEDURE — 83036 HEMOGLOBIN GLYCOSYLATED A1C: CPT

## 2019-11-04 PROCEDURE — 84484 ASSAY OF TROPONIN QUANT: CPT

## 2019-11-04 PROCEDURE — 76700 US EXAM ABDOM COMPLETE: CPT

## 2019-11-04 PROCEDURE — 94660 CPAP INITIATION&MGMT: CPT

## 2019-11-04 PROCEDURE — 82962 GLUCOSE BLOOD TEST: CPT

## 2019-11-04 PROCEDURE — 85027 COMPLETE CBC AUTOMATED: CPT

## 2019-11-04 PROCEDURE — 93970 EXTREMITY STUDY: CPT

## 2019-11-04 PROCEDURE — 71275 CT ANGIOGRAPHY CHEST: CPT

## 2019-11-04 PROCEDURE — 93005 ELECTROCARDIOGRAM TRACING: CPT

## 2019-11-04 RX ORDER — ENOXAPARIN SODIUM 100 MG/ML
1 INJECTION SUBCUTANEOUS
Qty: 10 | Refills: 0
Start: 2019-11-04 | End: 2019-11-08

## 2019-11-04 RX ORDER — WARFARIN SODIUM 2.5 MG/1
5 TABLET ORAL
Qty: 5 | Refills: 0
Start: 2019-11-04 | End: 2019-11-04

## 2019-11-04 RX ORDER — WARFARIN SODIUM 2.5 MG/1
12.5 TABLET ORAL ONCE
Refills: 0 | Status: DISCONTINUED | OUTPATIENT
Start: 2019-11-04 | End: 2019-11-04

## 2019-11-04 RX ADMIN — Medication 75 MICROGRAM(S): at 05:38

## 2019-11-04 RX ADMIN — Medication 25 MILLIGRAM(S): at 09:01

## 2019-11-04 RX ADMIN — LISINOPRIL 40 MILLIGRAM(S): 2.5 TABLET ORAL at 05:38

## 2019-11-04 RX ADMIN — ENOXAPARIN SODIUM 120 MILLIGRAM(S): 100 INJECTION SUBCUTANEOUS at 00:39

## 2019-11-04 RX ADMIN — ENOXAPARIN SODIUM 120 MILLIGRAM(S): 100 INJECTION SUBCUTANEOUS at 11:44

## 2019-11-04 NOTE — DISCHARGE NOTE PROVIDER - NSDCFUSCHEDAPPT_GEN_ALL_CORE_FT
SHABBIR YOUNG ; 12/30/2019 ; N[facility] [address] SHABBIR YOUNG ; 11/08/2019 ; NPP Intmed [facility]  SHABBIR YOUNG ; 12/30/2019 ; NPP Med [facility] [address]

## 2019-11-04 NOTE — DISCHARGE NOTE NURSING/CASE MANAGEMENT/SOCIAL WORK - PATIENT PORTAL LINK FT
You can access the FollowMyHealth Patient Portal offered by [facility] by registering at the following website: [email or web address]. By joining [facility]’s FollowMyHealth portal, you will also be able to view your health information using other applications (apps) compatible with our system.

## 2019-11-04 NOTE — PROGRESS NOTE ADULT - PROBLEM SELECTOR PROBLEM 1
Multiple subsegmental pulmonary emboli without acute cor pulmonale
Pulmonary thromboembolism
Pulmonary thromboembolism
Multiple subsegmental pulmonary emboli without acute cor pulmonale

## 2019-11-04 NOTE — DISCHARGE NOTE PROVIDER - NSDCMRMEDTOKEN_GEN_ALL_CORE_FT
hydroCHLOROthiazide 25 mg oral tablet: 1 tab(s) orally once a day  Lovenox 120 mg/0.8 mL injectable solution: 1 application injectable every 12 hours   quinapril 40 mg oral tablet: 1 tab(s) orally once a day  Synthroid 75 mcg (0.075 mg) oral tablet: 1 tab(s) orally once a day  warfarin 2.5 mg oral tablet: 5 tab(s) orally once

## 2019-11-04 NOTE — PROGRESS NOTE ADULT - ASSESSMENT
INR still under 2 after 10 mg....12.5 mg given...consider lovenox inj at home until inr over 2 times two

## 2019-11-04 NOTE — PROGRESS NOTE ADULT - SUBJECTIVE AND OBJECTIVE BOX
Patient is a 59y old  Female who presents with a chief complaint of tachycardia, tachypnea (03 Nov 2019 10:18)      INTERVAL HPI/OVERNIGHT EVENTS: stable    REVIEW OF SYSTEMS:  CONSTITUTIONAL: No fever, weight loss, or fatigue  EYES: No eye pain, visual disturbances, or discharge  ENMT:  No difficulty hearing, tinnitus, vertigo; No sinus or throat pain  NECK: No pain or stiffness  BREASTS: No pain, masses, or nipple discharge  RESPIRATORY: No cough, wheezing, chills or hemoptysis; No shortness of breath  CARDIOVASCULAR: No chest pain, palpitations, dizziness, or leg swelling  GASTROINTESTINAL: No abdominal or epigastric pain. No nausea, vomiting, or hematemesis; No diarrhea or constipation. No melena or hematochezia.  GENITOURINARY: No dysuria, frequency, hematuria, or incontinence  NEUROLOGICAL: No headaches, memory loss, loss of strength, numbness, or tremors  SKIN: No itching, burning, rashes, or lesions   LYMPH NODES: No enlarged glands  ENDOCRINE: No heat or cold intolerance; No hair loss  MUSCULOSKELETAL: No joint pain or swelling; No muscle, back, or extremity pain  PSYCHIATRIC: No depression, anxiety, mood swings, or difficulty sleeping  HEME/LYMPH: No easy bruising, or bleeding gums  ALLERY AND IMMUNOLOGIC: No hives or eczema  FAMILY HISTORY:    T(C): 36.4 (11-04-19 @ 05:04), Max: 36.7 (11-03-19 @ 16:15)  HR: 78 (11-04-19 @ 05:04) (78 - 79)  BP: 108/70 (11-04-19 @ 05:04) (100/68 - 142/65)  RR: 17 (11-04-19 @ 05:04) (16 - 17)  SpO2: 99% (11-04-19 @ 05:04) (97% - 99%)  Wt(kg): --Vital Signs Last 24 Hrs  T(C): 36.4 (04 Nov 2019 05:04), Max: 36.7 (03 Nov 2019 16:15)  T(F): 97.6 (04 Nov 2019 05:04), Max: 98.1 (03 Nov 2019 21:25)  HR: 78 (04 Nov 2019 05:04) (78 - 79)  BP: 108/70 (04 Nov 2019 05:04) (100/68 - 142/65)  BP(mean): --  RR: 17 (04 Nov 2019 05:04) (16 - 17)  SpO2: 99% (04 Nov 2019 05:04) (97% - 99%)    T(F): 97.6 (11-04-19 @ 05:04), Max: 98.1 (11-02-19 @ 21:29)  HR: 78 (11-04-19 @ 05:04) (73 - 90)  BP: 108/70 (11-04-19 @ 05:04) (94/75 - 142/65)  RR: 17 (11-04-19 @ 05:04) (15 - 17)  SpO2: 99% (11-04-19 @ 05:04) (94% - 99%)    PHYSICAL EXAM:  GENERAL: NAD, well-groomed, well-developed  HEAD:  Atraumatic, Normocephalic  EYES: EOMI, PERRLA, conjunctiva and sclera clear  ENMT: No tonsillar erythema, exudates, or enlargement; Moist mucous membranes, Good dentition, No lesions  NECK: Supple, No JVD, Normal thyroid  NERVOUS SYSTEM:  Alert & Oriented X3, Good concentration; Motor Strength 5/5 B/L upper and lower extremities; DTRs 2+ intact and symmetric  CHEST/LUNG: Clear to percussion bilaterally; No rales, rhonchi, wheezing, or rubs  HEART: Regular rate and rhythm; No murmurs, rubs, or gallops  ABDOMEN: Soft, Nontender, Nondistended; Bowel sounds present  EXTREMITIES:  2+ Peripheral Pulses, No clubbing, cyanosis, or edema  LYMPH: No lymphadenopathy noted  SKIN: No rashes or lesions    Consultant(s) Notes Reviewed:  [x ] YES  [ ] NO  Care Discussed with Consultants/Other Providers [ x] YES  [ ] NO    LABS:                              12.5   8.31  )-----------( 360      ( 04 Nov 2019 05:56 )             38.7         PT/INR - ( 04 Nov 2019 05:56 )   PT: 21.4 sec;   INR: 1.87 ratio                              12.5   8.31  )-----------( 360      ( 11-04 @ 05:56 )             38.7                11.8   8.26  )-----------( 338      ( 11-03 @ 06:15 )             37.0                11.8   9.18  )-----------( 329      ( 11-02 @ 06:03 )             36.4                12.3   9.43  )-----------( 331      ( 11-01 @ 06:06 )             39.1                12.5   8.81  )-----------( 297      ( 10-31 @ 05:30 )             38.9                12.3   10.86 )-----------( 319      ( 10-30 @ 06:05 )             37.8                11.6   10.18 )-----------( 295      ( 10-29 @ 06:30 )             36.6                12.1   12.87 )-----------( 291      ( 10-28 @ 05:41 )             37.7                11.1   13.22 )-----------( 221      ( 10-27 @ 20:05 )             34.2                11.6   11.78 )-----------( 248      ( 10-27 @ 11:50 )             36.0       Hemoglobin A1C, Whole Blood: 7.9 % (10-28-19 @ 05:41)  Hemoglobin A1C, Whole Blood: 7.7 % (05-18-19 @ 07:35)          RADIOLOGY & ADDITIONAL TESTS:    Imaging Personally Reviewed:  [ ] YES  [ ] NO  acetaminophen   Tablet .. 650 milliGRAM(s) Oral every 6 hours PRN  dextrose 40% Gel 15 Gram(s) Oral once PRN  dextrose 5%. 1000 milliLiter(s) IV Continuous <Continuous>  dextrose 50% Injectable 12.5 Gram(s) IV Push once  dextrose 50% Injectable 25 Gram(s) IV Push once  dextrose 50% Injectable 25 Gram(s) IV Push once  enoxaparin Injectable 120 milliGRAM(s) SubCutaneous <User Schedule>  glucagon  Injectable 1 milliGRAM(s) IntraMuscular once PRN  hydrochlorothiazide 25 milliGRAM(s) Oral daily  insulin lispro (HumaLOG) corrective regimen sliding scale   SubCutaneous three times a day before meals  insulin lispro (HumaLOG) corrective regimen sliding scale   SubCutaneous at bedtime  levothyroxine 75 MICROGram(s) Oral daily  lisinopril 40 milliGRAM(s) Oral daily  warfarin 12.5 milliGRAM(s) Oral once      HEALTH ISSUES - PROBLEM Dx:  Endometrial hyperplasia: Endometrial hyperplasia  Fibroids: Fibroids  Obesity: Obesity  Diabetes: Diabetes  Hypothyroid: Hypothyroid  Hypertension: Hypertension  Sleep apnea: Sleep apnea  Multiple subsegmental pulmonary emboli without acute cor pulmonale: Multiple subsegmental pulmonary emboli without acute cor pulmonale  Pulmonary thromboembolism

## 2019-11-04 NOTE — PROGRESS NOTE ADULT - PROVIDER SPECIALTY LIST ADULT
Critical Care
Critical Care
Heme/Onc
Internal Medicine
Pulmonology
Heme/Onc
Internal Medicine
Internal Medicine

## 2019-11-04 NOTE — PROGRESS NOTE ADULT - ASSESSMENT
Physical Examination:  GENERAL:               Alert, Oriented, No acute distress.    HEENT:                   No JVD, Moist MM  PULM:                     Bilateral air entry, Clear to auscultation bilaterally, no significant sputum production, No Rales, No Rhonchi, No Wheezing  CVS:                         S1, S2,  No Murmur  ABD:                        Soft, nondistended, nontender, normoactive bowel sounds, obese  NEURO:                  Alert, oriented, interactive, nonfocal, follows commands  PSYC:                      Calm, + Insight.      Assessment  Acute B/L PE   ABBIE not on home CPAP - awaiting home titration study   Morbid obesity s/p bariatric surgery  Chronic Lung nodule unchanged un repeat CT - no further workup    underlying HTN, Hypothyroidism    Plan  Pt does not want CPAP qhs for hypoxia at night,   awaiting titration study as out patient, set up for dec 2019  Check o2 on RA rest and ambulation daily  pt not a candidate for NOAC due to bariatric surgery and insurance will not pay for Lovenox as out pt  Coumadin /  Lovenox bridge  will need 48hr overlap with INR 2-3   if able to get 5 day lovenox course, can have out pt f/u with Daily INR check W. Dr. Fuller  Noted abnormal US pelvis - GYN f/u as out pt   will follow

## 2019-11-04 NOTE — DISCHARGE NOTE PROVIDER - NSDCCPCAREPLAN_GEN_ALL_CORE_FT
PRINCIPAL DISCHARGE DIAGNOSIS  Diagnosis: Multiple subsegmental pulmonary emboli without acute cor pulmonale  Assessment and Plan of Treatment:

## 2019-11-04 NOTE — PROGRESS NOTE ADULT - SUBJECTIVE AND OBJECTIVE BOX
Patient is a 59y old  Female who presents with a chief complaint of tachycardia, tachypnea (28 Oct 2019 09:24)      HPI:  59 year old history of HTN, sleep apnea, bariatric surgery comes with 5 day history of progressive tachycardia, tachypnea, exercise intolerance and is found to have bilateral pulmonary emboli.  Patient states recently having a colonscopy, she states she is due for mammogram, never had any issues with her cervix. 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 She never had a clotting problem and is not on hormonal agents. The pt is obese and weighs about 290 lbs and had a bypass stomach procedure a few years ago. The pt has no family past of thrombophilia. The inr and the ptt were normal here and the platelets are normal and she has review of systems that is negative for searching for a malignancy. The pt was brought into the Er with 5 days of shortness of breath without chest pain and was found on cta to have bilateral PEs without RV strain. The pt was placed on iv heparin and the question now is mgt from here on in.      The pt never had a clot and with the labs above, the use of a thrombophilic panel here would in my opinion not be helpful long term. The fact that she had a gastric bypass makes the use of noacs not reliable as this class of drugs need stomach acid and stomach acid activation. Also she is above the 250 lb level that we use to reliably recommend these drugs to pts. She also is about 130 kg and this is above the 120 mg sq Lovenox dose we would use here twice a day. 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 She will try to lose some weight as this appears to be the main cause for her clotting issues now and at the end of 3 months her situation with clotting meds can be reassessed. The pt should have a doppler of the LEs as well while here.      10/29 pt's insurance company will not approve the Lovenox so she will be started on coumadin and this was discussed with her and the medical team. 10/30 pt is stable and is less short of breath and will continue on the coumadin and will have dopplers of the LEs. 11/4 pt is stable and coumadin still being given till inr is in range above the 2 range. Pt on sq Lovenox at this time at max dose.  ICU Vital Signs Last 24 Hrs  T(C): 36.6 (04 Nov 2019 08:54), Max: 36.7 (03 Nov 2019 16:15)  T(F): 97.9 (04 Nov 2019 08:54), Max: 98.1 (03 Nov 2019 21:25)  HR: 80 (04 Nov 2019 08:54) (78 - 80)  BP: 114/693 (04 Nov 2019 08:54) (100/68 - 142/65)  BP(mean): --  ABP: --  ABP(mean): --  RR: 17 (04 Nov 2019 08:54) (16 - 17)  SpO2: 97% (04 Nov 2019 08:54) (97% - 99%)  MEDICATIONS  (STANDING):  dextrose 5%. 1000 milliLiter(s) (50 mL/Hr) IV Continuous <Continuous>  dextrose 50% Injectable 12.5 Gram(s) IV Push once  dextrose 50% Injectable 25 Gram(s) IV Push once  dextrose 50% Injectable 25 Gram(s) IV Push once  enoxaparin Injectable 120 milliGRAM(s) SubCutaneous <User Schedule>  hydrochlorothiazide 25 milliGRAM(s) Oral daily  insulin lispro (HumaLOG) corrective regimen sliding scale   SubCutaneous three times a day before meals  insulin lispro (HumaLOG) corrective regimen sliding scale   SubCutaneous at bedtime  levothyroxine 75 MICROGram(s) Oral daily  lisinopril 40 milliGRAM(s) Oral daily  warfarin 12.5 milliGRAM(s) Oral once    LABS:                          12.5   8.31  )-----------( 360      ( 04 Nov 2019 05:56 )             38.7     10-28    138  |  100  |  14  ----------------------------<  194<H>  3.6   |  26  |  0.72    Ca    9.5      28 Oct 2019 05:41  Mg     1.7     10-27    TPro  6.8  /  Alb  3.2<L>  /  TBili  0.7  /  DBili  x   /  AST  39  /  ALT  50<H>  /  AlkPhos  130<H>  10-27    PT/INR - ( 01 Nov 2019 05:06 )   PT: 14.9 sec;   INR: 1.32 ratio             ROS:  Negative except for:    PAST MEDICAL & SURGICAL HISTORY:  Sleep apnea  Diabetes: denies a past medical problem  Hypothyroid  Hypertension  No significant past surgical history      SOCIAL HISTORY:    FAMILY HISTORY:      Allergies    latex (Rash)  No Known Drug Allergies    Intolerances        PHYSICAL EXAM of the floor and I will have to return.  General: adult in NAD  HEENT: clear oropharynx, anicteric sclera, pink conjunctivae  Neck: supple  CV: normal S1S2 with no murmur rubs or gallops  Lungs: clear to auscultation, no wheezes, no rales  Abdomen: soft non-tender non-distended, no hepatosplenomegaly  Ext: no clubbing cyanosis or edema  Skin: no rashes and no petechiae  Neuro: alert and oriented X3 no focal deficits Patient is a 59y old  Female who presents with a chief complaint of tachycardia, tachypnea (28 Oct 2019 09:24)      HPI:  59 year old history of HTN, sleep apnea, bariatric surgery comes with 5 day history of progressive tachycardia, tachypnea, exercise intolerance and is found to have bilateral pulmonary emboli.  Patient states recently having a colonscopy, she states she is due for mammogram, never had any issues with her cervix. 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 The pt is obese and weighs about 290 lbs and had a bypass stomach procedure a few years ago. The pt has no family past of thrombophilia. The inr and the ptt were normal here and the platelets are normal and she has review of systems that is negative for searching for a malignancy. The pt was brought into the Er with 5 days of shortness of breath without chest pain and was found on cta to have bilateral PEs without RV strain. The pt was placed on iv heparin and the question now is mgt from here on in.      The pt never had a clot and with the labs above, the use of a thrombophilic panel here would in my opinion not be helpful long term. The fact that she had a gastric bypass makes the use of noacs not reliable as this class of drugs need stomach acid and stomach acid activation. Also she is above the 250 lb level that we use to reliably recommend these drugs to pts. She also is about 130 kg and this is above the 120 mg sq Lovenox dose we would use here twice a day. 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 She will try to lose some weight as this appears to be the main cause for her clotting issues now and at the end of 3 months her situation with clotting meds can be reassessed. The pt should have a doppler of the LEs as well while here.      10/29 pt's insurance company will not approve the Lovenox so she will be started on coumadin and this was discussed with her and the medical team. 10/30 pt is stable and is less short of breath and will continue on the coumadin and will have dopplers of the LEs. 11/4 pt is stable and coumadin still being given till inr is in range above the 2 range. Pt on sq Lovenox at this time at max dose.   I came by and the pt was in the process of leaving the hospital and she was placed on lovenox and the case was discussed with her attending and she will follow up there for the clot evaluation and if there are problems I can be reached.	  ICU Vital Signs Last 24 Hrs  T(C): 36.6 (04 Nov 2019 08:54), Max: 36.7 (03 Nov 2019 16:15)  T(F): 97.9 (04 Nov 2019 08:54), Max: 98.1 (03 Nov 2019 21:25)  HR: 80 (04 Nov 2019 08:54) (78 - 80)  BP: 114/693 (04 Nov 2019 08:54) (100/68 - 142/65)  BP(mean): --  ABP: --  ABP(mean): --  RR: 17 (04 Nov 2019 08:54) (16 - 17)  SpO2: 97% (04 Nov 2019 08:54) (97% - 99%)  MEDICATIONS  (STANDING):  dextrose 5%. 1000 milliLiter(s) (50 mL/Hr) IV Continuous <Continuous>  dextrose 50% Injectable 12.5 Gram(s) IV Push once  dextrose 50% Injectable 25 Gram(s) IV Push once  dextrose 50% Injectable 25 Gram(s) IV Push once  enoxaparin Injectable 120 milliGRAM(s) SubCutaneous <User Schedule>  hydrochlorothiazide 25 milliGRAM(s) Oral daily  insulin lispro (HumaLOG) corrective regimen sliding scale   SubCutaneous three times a day before meals  insulin lispro (HumaLOG) corrective regimen sliding scale   SubCutaneous at bedtime  levothyroxine 75 MICROGram(s) Oral daily  lisinopril 40 milliGRAM(s) Oral daily  warfarin 12.5 milliGRAM(s) Oral once    LABS:                          12.5   8.31  )-----------( 360      ( 04 Nov 2019 05:56 )             38.7     10-28    138  |  100  |  14  ----------------------------<  194<H>  3.6   |  26  |  0.72    Ca    9.5      28 Oct 2019 05:41  Mg     1.7     10-27    TPro  6.8  /  Alb  3.2<L>  /  TBili  0.7  /  DBili  x   /  AST  39  /  ALT  50<H>  /  AlkPhos  130<H>  10-27    PT/INR - ( 01 Nov 2019 05:06 )   PT: 14.9 sec;   INR: 1.32 ratio             ROS:  Negative except for:    PAST MEDICAL & SURGICAL HISTORY:  Sleep apnea  Diabetes: denies a past medical problem  Hypothyroid  Hypertension  No significant past surgical history      SOCIAL HISTORY:    FAMILY HISTORY:      Allergies    latex (Rash)  No Known Drug Allergies    Intolerances        PHYSICAL EXAM leaving the hospital at this time and will follow with her pmd

## 2019-11-04 NOTE — DISCHARGE NOTE PROVIDER - CARE PROVIDER_API CALL
Simone León)  Gastroenterology; Internal Medicine  [address] [place] [address], [address]  [place], [place] [id number]  Phone: [phone]  Fax: [phone]  Follow Up Time: 1-3 days

## 2019-11-04 NOTE — PROGRESS NOTE ADULT - PROBLEM SELECTOR PLAN 1
lovenox to warfarin
To review CTA of the chest pulmonary to consult and advise on anticoagulation hematologist to comment Dr. Vaca
lovenox to warfarin
warfarin dose ordered check INR in a.m. if greater than 2 consider discharge
warfarin to lovenox
Continue IV heparin we'll switch to Lovenox or warfarin over the next 24/48 hrs. as per pulmonary
Continue therapeutic Lovenox Coumadin doses  being given

## 2019-11-04 NOTE — PROGRESS NOTE ADULT - SUBJECTIVE AND OBJECTIVE BOX
Follow-up Pulmonary Progress Note  Chief Complaint : Multiple subsegmental pulmonary emboli without acute cor pulmonale      pt feels well no complaints  d/w Pt, and Dr. León today, will see if can get home dose lovenox from hospital 5 day supply and d/c planning for today.     Allergies :latex (Rash)  No Known Drug Allergies      PAST MEDICAL & SURGICAL HISTORY:  Sleep apnea  Diabetes: denies a past medical problem  Hypothyroid  Hypertension  No significant past surgical history      Medications:  MEDICATIONS  (STANDING):  dextrose 5%. 1000 milliLiter(s) (50 mL/Hr) IV Continuous <Continuous>  dextrose 50% Injectable 12.5 Gram(s) IV Push once  dextrose 50% Injectable 25 Gram(s) IV Push once  dextrose 50% Injectable 25 Gram(s) IV Push once  enoxaparin Injectable 120 milliGRAM(s) SubCutaneous <User Schedule>  hydrochlorothiazide 25 milliGRAM(s) Oral daily  insulin lispro (HumaLOG) corrective regimen sliding scale   SubCutaneous three times a day before meals  insulin lispro (HumaLOG) corrective regimen sliding scale   SubCutaneous at bedtime  levothyroxine 75 MICROGram(s) Oral daily  lisinopril 40 milliGRAM(s) Oral daily  warfarin 12.5 milliGRAM(s) Oral once    MEDICATIONS  (PRN):  acetaminophen   Tablet .. 650 milliGRAM(s) Oral every 6 hours PRN Temp greater or equal to 38C (100.4F), Mild Pain (1 - 3), Moderate Pain (4 - 6)  dextrose 40% Gel 15 Gram(s) Oral once PRN Blood Glucose LESS THAN 70 milliGRAM(s)/deciliter  glucagon  Injectable 1 milliGRAM(s) IntraMuscular once PRN Glucose LESS THAN 70 milligrams/deciliter      LABS:                        12.5   8.31  )-----------( 360      ( 04 Nov 2019 05:56 )             38.7       PT/INR - ( 04 Nov 2019 05:56 )   PT: 21.4 sec;   INR: 1.87 ratio        VITALS:  T(C): 36.4 (11-04-19 @ 05:04), Max: 36.7 (11-03-19 @ 16:15)  T(F): 97.6 (11-04-19 @ 05:04), Max: 98.1 (11-03-19 @ 21:25)  HR: 78 (11-04-19 @ 05:04) (78 - 79)  BP: 108/70 (11-04-19 @ 05:04) (100/68 - 142/65)  BP(mean): --  ABP: --  ABP(mean): --  RR: 17 (11-04-19 @ 05:04) (16 - 17)  SpO2: 99% (11-04-19 @ 05:04) (97% - 99%)  CVP(mm Hg): --  CVP(cm H2O): --    Ins and Outs     11-03-19 @ 07:01  -  11-04-19 @ 07:00  --------------------------------------------------------  IN: 300 mL / OUT: 0 mL / NET: 300 mL        Height (cm): 165.1 (11-04-19 @ 08:54)  Weight (kg): 132 (11-04-19 @ 08:54)  BMI (kg/m2): 48.4 (11-04-19 @ 08:54)        I&O's Detail    03 Nov 2019 07:01  -  04 Nov 2019 07:00  --------------------------------------------------------  IN:    Oral Fluid: 300 mL  Total IN: 300 mL    OUT:  Total OUT: 0 mL    Total NET: 300 mL

## 2019-11-04 NOTE — PROGRESS NOTE ADULT - REASON FOR ADMISSION
tachycardia, tachypnea

## 2019-11-04 NOTE — DISCHARGE NOTE PROVIDER - HOSPITAL COURSE
59 year old history of HTN, sleep apnea, hypothyroidism, bariatric surgery comes with 5 day history of progressive tachycardia, tachypnea, exercise intolerance and is found to have bilateral pulmonary emboli.  Recent pelvic sono noting fibroid uterus and free fluid in uterus-  to be worked up as out-patient.            Hospital course reveals bridging coumadin with lovenox, currently on coumadin, to take 12.5 mg tonight, pt provided with Rx for PT/INR blood draw tomorrow, results to be sent to Dr. León. Pt to be sent home with lovenox injection medication from [facility] pharmacy x 5 days. pt is knowledgeable in self injections. Pt instructed to f/u with PCP Dr. León to f/u on coumadin therapy management.

## 2019-11-04 NOTE — PROGRESS NOTE ADULT - ASSESSMENT
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 The pt is obese and weighs about 290 lbs and had a bypass stomach procedure a few years ago. The pt has no family past of thrombophilia. The inr and the ptt were normal here and the platelets are normal and she has review of systems that is negative for searching for a malignancy. The pt was brought into the Er with 5 days of shortness of breath without chest pain and was found on cta to have bilateral PEs without RV strain. The pt was placed on iv heparin and the question now is mgt from here on in.      The pt never had a clot and with the labs above, the use of a thrombophilic panel here would in my opinion not be helpful long term. The fact that she had a gastric bypass makes the use of noacs not reliable as this class of drugs need stomach acid and stomach acid activation. Also she is above the 250 lb level that we use to reliably recommend these drugs to pts. She also is about 130 kg and this is above the 120 mg sq Lovenox dose we would use here twice a day. 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 She will try to lose some weight as this appears to be the main cause for her clotting issues now and at the end of 3 months her situation with clotting meds can be reassessed. The pt should have a doppler of the LEs as well while here.    10/29 pt's insurance company will not approve the Lovenox so she will be started on coumadin and this was discussed with her and the medical team.   10/30 pt is stable and is less short of breath and will continue on the coumadin and will have dopplers of the LEs.  11/1 pt out of room at time of visit but she continues on coumadin and inr was noted to be 1.3. US of pelvis shows fibroids and ? fluid in uterus that will need to be followed by gyn will discuss with staff and pt when able.   11/4 pt is stable and coumadin still being given till inr is in range above the 2 range. Pt on sq Lovenox at this time at max dose. 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 The pt is obese and weighs about 290 lbs and had a bypass stomach procedure a few years ago. The pt has no family past of thrombophilia. The inr and the ptt were normal here and the platelets are normal and she has review of systems that is negative for searching for a malignancy. The pt was brought into the Er with 5 days of shortness of breath without chest pain and was found on cta to have bilateral PEs without RV strain. The pt was placed on iv heparin and the question now is mgt from here on in.      The pt never had a clot and with the labs above, the use of a thrombophilic panel here would in my opinion not be helpful long term. The fact that she had a gastric bypass makes the use of noacs not reliable as this class of drugs need stomach acid and stomach acid activation. Also she is above the 250 lb level that we use to reliably recommend these drugs to pts. She also is about 130 kg and this is above the 120 mg sq Lovenox dose we would use here twice a day. 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 She will try to lose some weight as this appears to be the main cause for her clotting issues now and at the end of 3 months her situation with clotting meds can be reassessed. The pt should have a doppler of the LEs as well while here.    10/29 pt's insurance company will not approve the Lovenox so she will be started on coumadin and this was discussed with her and the medical team.   10/30 pt is stable and is less short of breath and will continue on the coumadin and will have dopplers of the LEs.  11/1 pt out of room at time of visit but she continues on coumadin and inr was noted to be 1.3. US of pelvis shows fibroids and ? fluid in uterus that will need to be followed by gyn will discuss with staff and pt when able.   11/4 pt is stable and coumadin still being given till inr is in range above the 2 range. Pt on sq Lovenox at this time at max dose.    I came by and the pt was in the process of leaving the hospital and she was placed on lovenox and the case was discussed with her attending and she will follow up there for the clot evaluation and if there are problems I can be reached.

## 2019-11-05 ENCOUNTER — LABORATORY RESULT (OUTPATIENT)
Age: 59
End: 2019-11-05

## 2019-11-06 ENCOUNTER — LABORATORY RESULT (OUTPATIENT)
Age: 59
End: 2019-11-06

## 2019-11-07 ENCOUNTER — LABORATORY RESULT (OUTPATIENT)
Age: 59
End: 2019-11-07

## 2019-11-08 ENCOUNTER — APPOINTMENT (OUTPATIENT)
Dept: INTERNAL MEDICINE | Facility: CLINIC | Age: 59
End: 2019-11-08
Payer: COMMERCIAL

## 2019-11-08 VITALS
RESPIRATION RATE: 15 BRPM | TEMPERATURE: 98.2 F | DIASTOLIC BLOOD PRESSURE: 70 MMHG | WEIGHT: 283 LBS | HEART RATE: 80 BPM | SYSTOLIC BLOOD PRESSURE: 130 MMHG | OXYGEN SATURATION: 96 % | BODY MASS INDEX: 47.15 KG/M2 | HEIGHT: 65 IN

## 2019-11-08 PROCEDURE — 99496 TRANSJ CARE MGMT HIGH F2F 7D: CPT | Mod: 25

## 2019-11-08 PROCEDURE — G0444 DEPRESSION SCREEN ANNUAL: CPT

## 2019-11-08 PROCEDURE — G0447 BEHAVIOR COUNSEL OBESITY 15M: CPT

## 2019-11-10 NOTE — COUNSELING
[Potential consequences of obesity discussed] : Potential consequences of obesity discussed [Benefits of weight loss discussed] : Benefits of weight loss discussed [Structured Weight Management Program suggested:] : Structured weight management program suggested [Encouraged to increase physical activity] : Encouraged to increase physical activity [Good understanding] : Patient has a good understanding of disease, goals and obesity follow-up plan [FreeTextEntry1] : Weight Watchers [FreeTextEntry4] : 15

## 2019-11-10 NOTE — HISTORY OF PRESENT ILLNESS
[Discharged on ___] : discharged on [unfilled] [Post-hospitalization from ___ Hospital] : Post-hospitalization from [unfilled] Hospital [FreeTextEntry2] : 59-year-old woman recently discharged from [facility] after she was diagnosed with pulmonary embolus her past history is of morbid obesity depression dyspnea on exertion sleep apnea type 2 diabetes hypothyroidism and hypertension patient has felt some improvement with her breathing in view of her gastric bariatric surgery warfarin was the treatment of choice she has been going for levels daily the last 3 days the INR over 2 and Lovenox was discontinued she follows with a pulmonologist she denies temperature chills sweats or myalgias she's had no headache sinus congestion sore throat cough wheezing pleurisy chest pain shortness of breath mild exertional dyspnea she has had no dizziness vertigo or syncope she denies abdominal pain nausea vomiting diarrhea or constipation bright red blood per rectum or black stress her appetite has been good her weight is stable she denies significant musculoskeletal pains . Gets up occasionally at night to urinate denies leg edema or recent skin rashes\par

## 2019-11-10 NOTE — ASSESSMENT
[FreeTextEntry1] : Physical exam shows an obese female in no acute distress blood pressure 130/70 height 5 feet 5 inches weight 283 pounds heart rate 80 BMI is 47.09 respiratory rate 16 HEENT was unremarkable chest was clear cardiovascular exam shows a normal S1 and S2 with no extra heart sounds or murmurs abdomen was soft extremities showed trace bilateral edema neurologic exam was nonfocal continue to monitor INR closely has been therapeutic for 3 straight days and Lovenox was discontinued patient saw her hematologist while in the hospital and found no reason to think she has a hypercoagulable state patient is aware of the 6 months requirement with a pulmonary embolus to stay on warfarin she will also in these months see a vascular surgeon to evaluate her venous flow in legs. Her last hemoglobin A1c was 7.8% she was then performed of that being not adequate and she will follow up with her endocrinologist who will adjust her medications she also is up to date with her ophthalmologist and needs to make an appointment to see her gynecologist Dr. Clayton  and dermatologist

## 2019-11-10 NOTE — PHYSICAL EXAM
[Well Nourished] : well nourished [No Acute Distress] : no acute distress [Well Developed] : well developed [Well-Appearing] : well-appearing [Normal Voice/Communication] : normal voice/communication [PERRL] : pupils equal round and reactive to light [Normal Sclera/Conjunctiva] : normal sclera/conjunctiva [EOMI] : extraocular movements intact [Normal Outer Ear/Nose] : the outer ears and nose were normal in appearance [Normal Oropharynx] : the oropharynx was normal [Normal TMs] : both tympanic membranes were normal [No JVD] : no jugular venous distention [Normal Nasal Mucosa] : the nasal mucosa was normal [No Lymphadenopathy] : no lymphadenopathy [Thyroid Normal, No Nodules] : the thyroid was normal and there were no nodules present [Supple] : supple [No Respiratory Distress] : no respiratory distress  [No Accessory Muscle Use] : no accessory muscle use [Normal Rate] : normal rate  [Clear to Auscultation] : lungs were clear to auscultation bilaterally [Normal S1, S2] : normal S1 and S2 [Regular Rhythm] : with a regular rhythm [No Murmur] : no murmur heard [No Carotid Bruits] : no carotid bruits [No Abdominal Bruit] : a ~M bruit was not heard ~T in the abdomen [No Varicosities] : no varicosities [Pedal Pulses Present] : the pedal pulses are present [No Edema] : there was no peripheral edema [No Palpable Aorta] : no palpable aorta [Declined Breast Exam] : declined breast exam  [No Extremity Clubbing/Cyanosis] : no extremity clubbing/cyanosis [Non Tender] : non-tender [Soft] : abdomen soft [No Masses] : no abdominal mass palpated [Non-distended] : non-distended [No HSM] : no HSM [Normal Bowel Sounds] : normal bowel sounds [No Hernias] : no hernias [Declined Rectal Exam] : declined rectal exam [Normal Supraclavicular Nodes] : no supraclavicular lymphadenopathy [Normal Axillary Nodes] : no axillary lymphadenopathy [Normal Posterior Cervical Nodes] : no posterior cervical lymphadenopathy [Normal Anterior Cervical Nodes] : no anterior cervical lymphadenopathy [Normal Inguinal Nodes] : no inguinal lymphadenopathy [Normal Femoral Nodes] : no femoral lymphadenopathy [No CVA Tenderness] : no CVA  tenderness [No Spinal Tenderness] : no spinal tenderness [Kyphosis] : no kyphosis [Scoliosis] : no scoliosis [No Joint Swelling] : no joint swelling [Grossly Normal Strength/Tone] : grossly normal strength/tone [Coordination Grossly Intact] : coordination grossly intact [No Rash] : no rash [No Focal Deficits] : no focal deficits [Normal Gait] : normal gait [Deep Tendon Reflexes (DTR)] : deep tendon reflexes were 2+ and symmetric [Speech Grossly Normal] : speech grossly normal [Memory Grossly Normal] : memory grossly normal [Normal Affect] : the affect was normal [Alert and Oriented x3] : oriented to person, place, and time [Normal Mood] : the mood was normal [Normal Insight/Judgement] : insight and judgment were intact

## 2019-11-10 NOTE — REVIEW OF SYSTEMS
[Shortness Of Breath] : shortness of breath [Dyspnea on Exertion] : dyspnea on exertion [Nocturia] : nocturia [Joint Stiffness] : joint stiffness [Back Pain] : back pain [Insomnia] : insomnia [Anxiety] : anxiety [Depression] : depression [Negative] : Heme/Lymph [Fatigue] : no fatigue [Chills] : no chills [Hot Flashes] : no hot flashes [Night Sweats] : no night sweats [Discharge] : no discharge [Recent Change In Weight] : ~T no recent weight change [Dryness] : no dryness  [Redness] : no redness [Pain] : no pain [Vision Problems] : no vision problems [Itching] : no itching [Earache] : no earache [Hearing Loss] : no hearing loss [Nosebleed] : no nosebleeds [Hoarseness] : no hoarseness [Sore Throat] : no sore throat [Nasal Discharge] : no nasal discharge [Postnasal Drip] : no postnasal drip [Chest Pain] : no chest pain [Leg Claudication] : no leg claudication [Lower Ext Edema] : no lower extremity edema [Palpitations] : no palpitations [Orthopnea] : no orthopnea [Wheezing] : no wheezing [Cough] : no cough [Abdominal Pain] : no abdominal pain [Diarrhea] : diarrhea [Constipation] : no constipation [Nausea] : no nausea [Vomiting] : no vomiting [Melena] : no melena [Heartburn] : no heartburn [Incontinence] : no incontinence [Dysuria] : no dysuria [Poor Libido] : libido not poor [Hematuria] : no hematuria [Frequency] : no frequency [Vaginal Discharge] : no vaginal discharge [Dysmenorrhea] : no dysmenorrhea [Joint Swelling] : no joint swelling [Joint Pain] : no joint pain [Muscle Weakness] : no muscle weakness [Muscle Pain] : no muscle pain [Mole Changes] : no mole changes [Hair Changes] : no hair changes [Nail Changes] : no nail changes [Headache] : no headache [Skin Rash] : no skin rash [Dizziness] : no dizziness [Fainting] : no fainting [Confusion] : no confusion [Memory Loss] : no memory loss [Suicidal] : not suicidal [Unsteady Walking] : no ataxia [Easy Bleeding] : no easy bleeding [Easy Bruising] : no easy bruising [Swollen Glands] : no swollen glands [FreeTextEntry6] : minimal exertion

## 2019-11-12 ENCOUNTER — LABORATORY RESULT (OUTPATIENT)
Age: 59
End: 2019-11-12

## 2019-11-14 ENCOUNTER — LABORATORY RESULT (OUTPATIENT)
Age: 59
End: 2019-11-14

## 2019-11-18 ENCOUNTER — LABORATORY RESULT (OUTPATIENT)
Age: 59
End: 2019-11-18

## 2019-11-21 ENCOUNTER — LABORATORY RESULT (OUTPATIENT)
Age: 59
End: 2019-11-21

## 2019-11-26 ENCOUNTER — LABORATORY RESULT (OUTPATIENT)
Age: 59
End: 2019-11-26

## 2019-11-28 ENCOUNTER — FORM ENCOUNTER (OUTPATIENT)
Age: 59
End: 2019-11-28

## 2019-11-29 ENCOUNTER — APPOINTMENT (OUTPATIENT)
Dept: ULTRASOUND IMAGING | Facility: HOSPITAL | Age: 59
End: 2019-11-29
Payer: COMMERCIAL

## 2019-11-29 ENCOUNTER — APPOINTMENT (OUTPATIENT)
Dept: MAMMOGRAPHY | Facility: HOSPITAL | Age: 59
End: 2019-11-29
Payer: COMMERCIAL

## 2019-11-29 ENCOUNTER — OUTPATIENT (OUTPATIENT)
Dept: OUTPATIENT SERVICES | Facility: HOSPITAL | Age: 59
LOS: 1 days | End: 2019-11-29
Payer: COMMERCIAL

## 2019-11-29 DIAGNOSIS — Z00.8 ENCOUNTER FOR OTHER GENERAL EXAMINATION: ICD-10-CM

## 2019-11-29 PROCEDURE — 76641 ULTRASOUND BREAST COMPLETE: CPT | Mod: 26,50

## 2019-11-29 PROCEDURE — 77067 SCR MAMMO BI INCL CAD: CPT | Mod: 26

## 2019-11-29 PROCEDURE — 77063 BREAST TOMOSYNTHESIS BI: CPT | Mod: 26

## 2019-11-29 PROCEDURE — 77067 SCR MAMMO BI INCL CAD: CPT

## 2019-11-29 PROCEDURE — 77063 BREAST TOMOSYNTHESIS BI: CPT

## 2019-11-29 PROCEDURE — 76641 ULTRASOUND BREAST COMPLETE: CPT

## 2019-12-04 ENCOUNTER — APPOINTMENT (OUTPATIENT)
Dept: SLEEP CENTER | Facility: CLINIC | Age: 59
End: 2019-12-04

## 2019-12-05 ENCOUNTER — LABORATORY RESULT (OUTPATIENT)
Age: 59
End: 2019-12-05

## 2019-12-09 ENCOUNTER — RX RENEWAL (OUTPATIENT)
Age: 59
End: 2019-12-09

## 2019-12-10 ENCOUNTER — LABORATORY RESULT (OUTPATIENT)
Age: 59
End: 2019-12-10

## 2019-12-10 ENCOUNTER — APPOINTMENT (OUTPATIENT)
Dept: INTERNAL MEDICINE | Facility: CLINIC | Age: 59
End: 2019-12-10
Payer: COMMERCIAL

## 2019-12-10 DIAGNOSIS — Z23 ENCOUNTER FOR IMMUNIZATION: ICD-10-CM

## 2019-12-10 LAB
ALBUMIN SERPL ELPH-MCNC: 4.1 G/DL
ALP BLD-CCNC: 123 U/L
ALT SERPL-CCNC: 39 U/L
ANION GAP SERPL CALC-SCNC: 13 MMOL/L
AST SERPL-CCNC: 25 U/L
BASOPHILS # BLD AUTO: 0.12 K/UL
BASOPHILS NFR BLD AUTO: 1.3 %
BILIRUB SERPL-MCNC: 0.6 MG/DL
BUN SERPL-MCNC: 17 MG/DL
CALCIUM SERPL-MCNC: 9.7 MG/DL
CHLORIDE SERPL-SCNC: 102 MMOL/L
CO2 SERPL-SCNC: 26 MMOL/L
CREAT SERPL-MCNC: 0.55 MG/DL
EOSINOPHIL # BLD AUTO: 0.21 K/UL
EOSINOPHIL NFR BLD AUTO: 2.2 %
GLUCOSE SERPL-MCNC: 218 MG/DL
HCT VFR BLD CALC: 39.8 %
HGB BLD-MCNC: 12.5 G/DL
IMM GRANULOCYTES NFR BLD AUTO: 0.6 %
LYMPHOCYTES # BLD AUTO: 2.3 K/UL
LYMPHOCYTES NFR BLD AUTO: 24.3 %
MAN DIFF?: NORMAL
MCHC RBC-ENTMCNC: 28.9 PG
MCHC RBC-ENTMCNC: 31.4 GM/DL
MCV RBC AUTO: 91.9 FL
MONOCYTES # BLD AUTO: 0.57 K/UL
MONOCYTES NFR BLD AUTO: 6 %
NEUTROPHILS # BLD AUTO: 6.19 K/UL
NEUTROPHILS NFR BLD AUTO: 65.6 %
PLATELET # BLD AUTO: 497 K/UL
POTASSIUM SERPL-SCNC: 4 MMOL/L
PROT SERPL-MCNC: 6.5 G/DL
RBC # BLD: 4.33 M/UL
RBC # FLD: 15.8 %
SODIUM SERPL-SCNC: 141 MMOL/L
TSH SERPL-ACNC: 1.83 UIU/ML
WBC # FLD AUTO: 9.45 K/UL

## 2019-12-10 PROCEDURE — 90670 PCV13 VACCINE IM: CPT

## 2019-12-10 PROCEDURE — G0009: CPT

## 2019-12-13 ENCOUNTER — MEDICATION RENEWAL (OUTPATIENT)
Age: 59
End: 2019-12-13

## 2019-12-16 ENCOUNTER — MEDICATION RENEWAL (OUTPATIENT)
Age: 59
End: 2019-12-16

## 2019-12-17 ENCOUNTER — RX RENEWAL (OUTPATIENT)
Age: 59
End: 2019-12-17

## 2019-12-24 ENCOUNTER — LABORATORY RESULT (OUTPATIENT)
Age: 59
End: 2019-12-24

## 2019-12-27 ENCOUNTER — LABORATORY RESULT (OUTPATIENT)
Age: 59
End: 2019-12-27

## 2020-01-24 ENCOUNTER — LABORATORY RESULT (OUTPATIENT)
Age: 60
End: 2020-01-24

## 2020-02-13 ENCOUNTER — TRANSCRIPTION ENCOUNTER (OUTPATIENT)
Age: 60
End: 2020-02-13

## 2020-02-15 ENCOUNTER — RX RENEWAL (OUTPATIENT)
Age: 60
End: 2020-02-15

## 2020-02-24 ENCOUNTER — RX RENEWAL (OUTPATIENT)
Age: 60
End: 2020-02-24

## 2020-02-26 ENCOUNTER — LABORATORY RESULT (OUTPATIENT)
Age: 60
End: 2020-02-26

## 2020-04-22 ENCOUNTER — APPOINTMENT (OUTPATIENT)
Dept: INTERNAL MEDICINE | Facility: CLINIC | Age: 60
End: 2020-04-22
Payer: COMMERCIAL

## 2020-04-22 PROCEDURE — 99214 OFFICE O/P EST MOD 30 MIN: CPT | Mod: 25,95

## 2020-04-22 PROCEDURE — G0444 DEPRESSION SCREEN ANNUAL: CPT | Mod: 95

## 2020-04-23 ENCOUNTER — RX RENEWAL (OUTPATIENT)
Age: 60
End: 2020-04-23

## 2020-04-23 ENCOUNTER — LABORATORY RESULT (OUTPATIENT)
Age: 60
End: 2020-04-23

## 2020-04-23 LAB
APPEARANCE: CLEAR
BASOPHILS # BLD AUTO: 0.12 K/UL
BASOPHILS NFR BLD AUTO: 1.2 %
BILIRUBIN URINE: NEGATIVE
BLOOD URINE: NEGATIVE
COLOR: YELLOW
EOSINOPHIL # BLD AUTO: 0.36 K/UL
EOSINOPHIL NFR BLD AUTO: 3.7 %
GLUCOSE QUALITATIVE U: NEGATIVE
HCT VFR BLD CALC: 41.2 %
HGB BLD-MCNC: 13 G/DL
IMM GRANULOCYTES NFR BLD AUTO: 0.3 %
KETONES URINE: NEGATIVE
LEUKOCYTE ESTERASE URINE: NEGATIVE
LYMPHOCYTES # BLD AUTO: 2.51 K/UL
LYMPHOCYTES NFR BLD AUTO: 26.1 %
MAN DIFF?: NORMAL
MCHC RBC-ENTMCNC: 28.6 PG
MCHC RBC-ENTMCNC: 31.6 GM/DL
MCV RBC AUTO: 90.7 FL
MONOCYTES # BLD AUTO: 0.5 K/UL
MONOCYTES NFR BLD AUTO: 5.2 %
NEUTROPHILS # BLD AUTO: 6.09 K/UL
NEUTROPHILS NFR BLD AUTO: 63.5 %
NITRITE URINE: NEGATIVE
PH URINE: 6
PLATELET # BLD AUTO: 481 K/UL
PROTEIN URINE: NORMAL
RBC # BLD: 4.54 M/UL
RBC # FLD: 16.3 %
SPECIFIC GRAVITY URINE: 1.03
UROBILINOGEN URINE: NORMAL
WBC # FLD AUTO: 9.61 K/UL

## 2020-04-23 NOTE — REVIEW OF SYSTEMS
[Dyspnea on Exertion] : dyspnea on exertion [Nocturia] : nocturia [Joint Stiffness] : joint stiffness [Back Pain] : back pain [Insomnia] : insomnia [Depression] : depression [Anxiety] : anxiety [Negative] : Heme/Lymph [Fever] : no fever [Chills] : no chills [Fatigue] : fatigue [Night Sweats] : no night sweats [Hot Flashes] : no hot flashes [Discharge] : no discharge [Recent Change In Weight] : ~T no recent weight change [Pain] : no pain [Dryness] : no dryness  [Redness] : no redness [Vision Problems] : no vision problems [Itching] : no itching [Nosebleed] : no nosebleeds [Earache] : no earache [Hearing Loss] : no hearing loss [Nasal Discharge] : no nasal discharge [Hoarseness] : no hoarseness [Sore Throat] : no sore throat [Postnasal Drip] : no postnasal drip [Chest Pain] : no chest pain [Palpitations] : no palpitations [Leg Claudication] : no leg claudication [Lower Ext Edema] : no lower extremity edema [Orthopnea] : no orthopnea [Shortness Of Breath] : no shortness of breath [Wheezing] : no wheezing [Cough] : no cough [Abdominal Pain] : no abdominal pain [Nausea] : no nausea [Constipation] : no constipation [Diarrhea] : diarrhea [Vomiting] : no vomiting [Heartburn] : no heartburn [Melena] : no melena [Incontinence] : no incontinence [Dysuria] : no dysuria [Poor Libido] : libido not poor [Hematuria] : no hematuria [Frequency] : no frequency [Vaginal Discharge] : no vaginal discharge [Joint Swelling] : no joint swelling [Dysmenorrhea] : no dysmenorrhea [Joint Pain] : no joint pain [Muscle Pain] : no muscle pain [Muscle Weakness] : no muscle weakness [Nail Changes] : no nail changes [Mole Changes] : no mole changes [Skin Rash] : no skin rash [Hair Changes] : no hair changes [Dizziness] : no dizziness [Headache] : no headache [Confusion] : no confusion [Memory Loss] : no memory loss [Fainting] : no fainting [Unsteady Walking] : no ataxia [Suicidal] : not suicidal [Easy Bleeding] : no easy bleeding [Easy Bruising] : no easy bruising [Swollen Glands] : no swollen glands

## 2020-04-23 NOTE — HISTORY OF PRESENT ILLNESS
[Home] : at home, [unfilled] , at the time of the visit. [Medical Office: ([facility])___] : at the medical office located in  [Patient] : the patient [Self] : self [FreeTextEntry1] : TELEVIDEO video  followup for review of her medications and discussion of her overall. Her main issues being her obesity and lack of ability to lose weight and her shortness of breath on exertion [de-identified] : televideo VISIT..... 60-year-old woman with a history of morbid obesity depression hypertension type 2 diabetes and obstructive sleep apnea performed see the video followup visit main complaints are her inability to lose weight her constant fatigue and shortness of breath with exertion she denies temperature chills sweats or myalgias she has had no headache sinus congestion sore throat cough wheezing pleurisy chest pain shortness of breath at rest lightheadedness palpitations dizziness vertigo or syncope she denies abdominal pain nausea vomiting diarrhea or constipation bright red blood per rectum or black stools her appetite has been good she denies significant musculoskeletal pain although does have occasional pain in her knees and back she is frequently but denies dysuria or gross hematuria she has chronic bilateral mild leg edema and has had no recent skin rashes

## 2020-04-23 NOTE — HEALTH RISK ASSESSMENT
[] : No [No] : No [No falls in past year] : Patient reported no falls in the past year [0] : 1) Little interest or pleasure doing things: Not at all (0) [1] : 2) Feeling down, depressed, or hopeless for several days (1) [[id number]] : 1

## 2020-04-23 NOTE — ASSESSMENT
[FreeTextEntry1] : Patient reminded of her diabetes and that her obesity is contributing and also wrist for many other cardiovascular and musculoskeletal diseases she was also informed that obesity is a  major risk for bad outcome from Corona virus. She blames her immobility because of her weight as is the time to losing weight she was reinforced that she can lose tremendous weight by adjusting what she eats and how much he eats weight watchers was suggested. bariatric surgery was also discussed with her medications were reviewed and reconciled she is up to date with her mammogram and breast sonogram having it in November of 2019 she is actively followed by a gastroenterologist Dr. Farhan WOLF is up-to-date with her ophthalmologist and needs to schedule an appointment to her dermatologist and gynecologist she is up to date with her pneumonia vaccines and with her DTaP vaccine but is due to receive her shingles vaccine which she will attempt to get sometime this year

## 2020-04-28 ENCOUNTER — RX RENEWAL (OUTPATIENT)
Age: 60
End: 2020-04-28

## 2020-05-01 ENCOUNTER — RX RENEWAL (OUTPATIENT)
Age: 60
End: 2020-05-01

## 2020-05-01 ENCOUNTER — TRANSCRIPTION ENCOUNTER (OUTPATIENT)
Age: 60
End: 2020-05-01

## 2020-05-02 ENCOUNTER — RX RENEWAL (OUTPATIENT)
Age: 60
End: 2020-05-02

## 2020-05-04 LAB
25(OH)D3 SERPL-MCNC: 26.2 NG/ML
ALBUMIN SERPL ELPH-MCNC: 4 G/DL
ALP BLD-CCNC: 123 U/L
ALT SERPL-CCNC: 34 U/L
ANION GAP SERPL CALC-SCNC: 12 MMOL/L
AST SERPL-CCNC: 23 U/L
BILIRUB SERPL-MCNC: 0.7 MG/DL
BUN SERPL-MCNC: 14 MG/DL
CALCIUM SERPL-MCNC: 9.9 MG/DL
CHLORIDE SERPL-SCNC: 98 MMOL/L
CHOLEST SERPL-MCNC: 183 MG/DL
CHOLEST/HDLC SERPL: 4.3 RATIO
CO2 SERPL-SCNC: 29 MMOL/L
CREAT SERPL-MCNC: 0.54 MG/DL
CRP SERPL HS-MCNC: 4.48 MG/L
GLUCOSE BS SERPL-MCNC: 177 MG/DL
GLUCOSE SERPL-MCNC: 184 MG/DL
HDLC SERPL-MCNC: 43 MG/DL
LDLC SERPL CALC-MCNC: 96 MG/DL
MEV IGG FLD QL IA: >300 AU/ML
MEV IGG+IGM SER-IMP: POSITIVE
MUV AB SER-ACNC: NEGATIVE
MUV IGG SER QL IA: 7.2 AU/ML
POTASSIUM SERPL-SCNC: 4.5 MMOL/L
PROT SERPL-MCNC: 6.6 G/DL
RUBV IGG FLD-ACNC: 26.9 INDEX
RUBV IGG SER-IMP: POSITIVE
SODIUM SERPL-SCNC: 139 MMOL/L
T4 FREE SERPL-MCNC: 1.3 NG/DL
TRIGL SERPL-MCNC: 219 MG/DL
TSH SERPL-ACNC: 1.99 UIU/ML
VIT B12 SERPL-MCNC: >2000 PG/ML

## 2020-05-11 ENCOUNTER — RX RENEWAL (OUTPATIENT)
Age: 60
End: 2020-05-11

## 2020-09-14 ENCOUNTER — RX RENEWAL (OUTPATIENT)
Age: 60
End: 2020-09-14

## 2020-10-14 ENCOUNTER — RX RENEWAL (OUTPATIENT)
Age: 60
End: 2020-10-14

## 2020-10-29 ENCOUNTER — APPOINTMENT (OUTPATIENT)
Dept: INTERNAL MEDICINE | Facility: CLINIC | Age: 60
End: 2020-10-29
Payer: COMMERCIAL

## 2020-10-29 VITALS
BODY MASS INDEX: 47.3 KG/M2 | OXYGEN SATURATION: 99 % | DIASTOLIC BLOOD PRESSURE: 91 MMHG | HEART RATE: 86 BPM | WEIGHT: 283.9 LBS | SYSTOLIC BLOOD PRESSURE: 150 MMHG | HEIGHT: 65 IN | TEMPERATURE: 97.3 F | RESPIRATION RATE: 18 BRPM

## 2020-10-29 DIAGNOSIS — M54.2 CERVICALGIA: ICD-10-CM

## 2020-10-29 DIAGNOSIS — Z23 ENCOUNTER FOR IMMUNIZATION: ICD-10-CM

## 2020-10-29 PROCEDURE — 90686 IIV4 VACC NO PRSV 0.5 ML IM: CPT

## 2020-10-29 PROCEDURE — 99215 OFFICE O/P EST HI 40 MIN: CPT | Mod: 25

## 2020-10-29 PROCEDURE — G0008: CPT

## 2020-10-29 PROCEDURE — 99072 ADDL SUPL MATRL&STAF TM PHE: CPT

## 2020-10-30 ENCOUNTER — MED ADMIN CHARGE (OUTPATIENT)
Age: 60
End: 2020-10-30

## 2020-10-30 VITALS
HEART RATE: 86 BPM | SYSTOLIC BLOOD PRESSURE: 150 MMHG | BODY MASS INDEX: 47.32 KG/M2 | DIASTOLIC BLOOD PRESSURE: 91 MMHG | WEIGHT: 284 LBS | HEIGHT: 65 IN | OXYGEN SATURATION: 99 % | RESPIRATION RATE: 18 BRPM

## 2020-10-30 PROBLEM — M54.2 NECK PAIN ON RIGHT SIDE: Status: ACTIVE | Noted: 2020-10-30

## 2020-10-30 NOTE — REVIEW OF SYSTEMS
[Fatigue] : fatigue [Dyspnea on Exertion] : dyspnea on exertion [Nocturia] : nocturia [Joint Stiffness] : joint stiffness [Back Pain] : back pain [Insomnia] : insomnia [Anxiety] : anxiety [Depression] : depression [Negative] : Heme/Lymph [Fever] : no fever [Chills] : no chills [Hot Flashes] : no hot flashes [Night Sweats] : no night sweats [Recent Change In Weight] : ~T no recent weight change [Discharge] : no discharge [Pain] : no pain [Redness] : no redness [Dryness] : no dryness  [Vision Problems] : no vision problems [Itching] : no itching [Earache] : no earache [Hearing Loss] : no hearing loss [Nosebleed] : no nosebleeds [Hoarseness] : no hoarseness [Nasal Discharge] : no nasal discharge [Sore Throat] : no sore throat [Postnasal Drip] : no postnasal drip [Chest Pain] : no chest pain [Palpitations] : no palpitations [Leg Claudication] : no leg claudication [Lower Ext Edema] : no lower extremity edema [Orthopnea] : no orthopnea [Shortness Of Breath] : no shortness of breath [Wheezing] : no wheezing [Cough] : no cough [Abdominal Pain] : no abdominal pain [Nausea] : no nausea [Constipation] : no constipation [Diarrhea] : diarrhea [Vomiting] : no vomiting [Heartburn] : no heartburn [Melena] : no melena [Dysuria] : no dysuria [Incontinence] : no incontinence [Poor Libido] : libido not poor [Hematuria] : no hematuria [Frequency] : no frequency [Vaginal Discharge] : no vaginal discharge [Dysmenorrhea] : no dysmenorrhea [Joint Pain] : no joint pain [Joint Swelling] : no joint swelling [Muscle Weakness] : no muscle weakness [Muscle Pain] : no muscle pain [Mole Changes] : no mole changes [Nail Changes] : no nail changes [Hair Changes] : no hair changes [Skin Rash] : no skin rash [Headache] : no headache [Dizziness] : no dizziness [Fainting] : no fainting [Confusion] : no confusion [Memory Loss] : no memory loss [Unsteady Walking] : no ataxia [Suicidal] : not suicidal [Easy Bleeding] : no easy bleeding [Easy Bruising] : no easy bruising [Swollen Glands] : no swollen glands [FreeTextEntry9] : right side of neck

## 2020-10-30 NOTE — ASSESSMENT
[FreeTextEntry1] : Exam shows an obese female in no acute distress pressure 150/91 repeated 144/86 pulse of 86 rate of 18 oxygen saturation on room air was 99% HEENT was unremarkable chest was clear cardiovascular was regular abdomen was soft extremities show trace bilateral edema neurologic exam was nonfocal patient had her last mammogram and November 2019 she is due for a bone density and has had prior gastric bypass patient had a complete work-up in 2018 with an endoscopy and capsule endoscopy and a colonoscopy secondary to iron deficiency at that time she is up-to-date with her ophthalmologist she was reminded to have an occasional visit with a dermatologist for cancer screening on discussion with the patient concerning her obesity and the medical ramifications she is aware of the risk that the weight imparts her for diabetes musculoskeletal abnormalities and problems with her heart carbohydrate restriction portion management proper selections and exercise were reviewed he has had bariatric surgery so continued dietary consultation and advice would be prudent last blood test were in April 2020 quadrivalent influenza vaccine was administered has received both the Prevnar 13 and the Pneumovax 23 vaccinations she was recommended that she should get the new shingles vaccine from her local pharmacy blood pressure adequately controlled at present patient does wear her CPAP for sleep apnea.  Examination of her right ear canal and tympanic membrane were unremarkable there was no evidence of swelling tenderness or lymph nodes in the area of her discomfort the discomfort could be reproduced by movement of the neck heating pad and an occasional Tylenol was recommended

## 2020-10-30 NOTE — HISTORY OF PRESENT ILLNESS
[FreeTextEntry1] : 60-year-old woman comes to the office for follow-up to review her medications and discuss her overall health recently complaining of a pain in the right side of her neck radiating into her ear also wishes to receive the high-dose influenza vaccine [de-identified] : Comes to the office for follow-up with a history of type 2 diabetes obstructive sleep apnea type III obesity hypothyroidism depression hypertension complaining of a discomfort in her neck on the right side involving her ear she denies temperature chills sweats or myalgias she has had no headaches sinus congestion sore throat cough wheezing pleurisy chest pain shortness of breath exertional dyspnea lightheadedness palpitations dizziness vertigo or syncope she has had no abdominal pain nausea vomiting diarrhea constipation bright red blood per rectum or black stools her appetite has been good her weight has been stable she denies dysuria or gross hematuria she has intermittent leg edema she has occasional pains in her back knees and hips he has had no recent skin rashes and usually sleeps well

## 2020-10-30 NOTE — HEALTH RISK ASSESSMENT
[No] : No [No falls in past year] : Patient reported no falls in the past year [0] : 2) Feeling down, depressed, or hopeless: Not at all (0) [] : No [VDG2Yipal] : 0

## 2020-11-03 ENCOUNTER — APPOINTMENT (OUTPATIENT)
Dept: INTERNAL MEDICINE | Facility: CLINIC | Age: 60
End: 2020-11-03
Payer: COMMERCIAL

## 2020-11-03 VITALS
RESPIRATION RATE: 16 BRPM | DIASTOLIC BLOOD PRESSURE: 92 MMHG | WEIGHT: 286 LBS | OXYGEN SATURATION: 99 % | HEART RATE: 98 BPM | HEIGHT: 65 IN | BODY MASS INDEX: 47.65 KG/M2 | TEMPERATURE: 97.9 F | SYSTOLIC BLOOD PRESSURE: 142 MMHG

## 2020-11-03 DIAGNOSIS — Z11.1 ENCOUNTER FOR SCREENING FOR RESPIRATORY TUBERCULOSIS: ICD-10-CM

## 2020-11-03 PROCEDURE — 86580 TB INTRADERMAL TEST: CPT

## 2020-11-03 PROCEDURE — G0444 DEPRESSION SCREEN ANNUAL: CPT

## 2020-11-03 PROCEDURE — G0447 BEHAVIOR COUNSEL OBESITY 15M: CPT

## 2020-11-03 PROCEDURE — 99072 ADDL SUPL MATRL&STAF TM PHE: CPT

## 2020-11-03 PROCEDURE — 99396 PREV VISIT EST AGE 40-64: CPT | Mod: 25

## 2020-11-03 NOTE — HEALTH RISK ASSESSMENT
[No] : No [No falls in past year] : Patient reported no falls in the past year [0] : 2) Feeling down, depressed, or hopeless: Not at all (0) [HIV test declined] : HIV test declined [Hepatitis C test declined] : Hepatitis C test declined [] : No [[id number]] : 0 [MammogramDate] : 11/29/19 [ColonoscopyDate] : 2018

## 2020-11-03 NOTE — HISTORY OF PRESENT ILLNESS
[FreeTextEntry1] : 70-year-old woman comes to the office for a comprehensive physical examination to review her medication and discuss her overall health patient's recent symptoms are that of fatigue and inability to lose weight [de-identified] : Comes to the office for a comprehensive physical examination history of morbid obesity obstructive sleep apnea type 2 diabetes hypertension hypothyroidism depression and pulmonary embolus requiring anticoagulation patient denies temperature chills sweats or myalgias she has chronic fatigue she denies headache sinus congestion sore throat cough wheezing pleurisy chest pain shortness of breath at rest she has mild exertional dyspnea which is chronic she has had no lightheadedness palpitations dizziness vertigo or syncope she denies abdominal pain nausea vomiting diarrhea constipation bright red blood per rectum or black stools even though she claims to be reducing her caloric intake she is not losing weight he has intermittent leg edema denies skin rashes and has been sleeping erratically

## 2020-11-03 NOTE — REVIEW OF SYSTEMS
[Fatigue] : fatigue [Dyspnea on Exertion] : dyspnea on exertion [Nocturia] : nocturia [Joint Stiffness] : joint stiffness [Back Pain] : back pain [Insomnia] : insomnia [Anxiety] : anxiety [Depression] : depression [Negative] : Heme/Lymph [Fever] : no fever [Chills] : no chills [Hot Flashes] : no hot flashes [Night Sweats] : no night sweats [Recent Change In Weight] : ~T no recent weight change [Discharge] : no discharge [Pain] : no pain [Redness] : no redness [Dryness] : no dryness  [Vision Problems] : no vision problems [Itching] : no itching [Earache] : no earache [Hearing Loss] : no hearing loss [Nosebleed] : no nosebleeds [Hoarseness] : no hoarseness [Nasal Discharge] : no nasal discharge [Sore Throat] : no sore throat [Postnasal Drip] : no postnasal drip [Chest Pain] : no chest pain [Palpitations] : no palpitations [Leg Claudication] : no leg claudication [Lower Ext Edema] : no lower extremity edema [Orthopnea] : no orthopnea [Shortness Of Breath] : no shortness of breath [Wheezing] : no wheezing [Cough] : no cough [Abdominal Pain] : no abdominal pain [Nausea] : no nausea [Constipation] : no constipation [Diarrhea] : diarrhea [Vomiting] : no vomiting [Heartburn] : no heartburn [Melena] : no melena [Dysuria] : no dysuria [Incontinence] : no incontinence [Poor Libido] : libido not poor [Hematuria] : no hematuria [Frequency] : no frequency [Vaginal Discharge] : no vaginal discharge [Dysmenorrhea] : no dysmenorrhea [Joint Pain] : no joint pain [Joint Swelling] : no joint swelling [Muscle Weakness] : no muscle weakness [Muscle Pain] : no muscle pain [Itching] : no itching [Mole Changes] : no mole changes [Nail Changes] : no nail changes [Hair Changes] : no hair changes [Skin Rash] : no skin rash [Headache] : no headache [Dizziness] : no dizziness [Fainting] : no fainting [Confusion] : no confusion [Memory Loss] : no memory loss [Unsteady Walking] : no ataxia [Suicidal] : not suicidal [Easy Bleeding] : no easy bleeding [Easy Bruising] : no easy bruising [Swollen Glands] : no swollen glands

## 2020-11-03 NOTE — COUNSELING
[Potential consequences of obesity discussed] : Potential consequences of obesity discussed [Benefits of weight loss discussed] : Benefits of weight loss discussed [Structured Weight Management Program suggested:] : Structured weight management program suggested [Encouraged to maintain food diary] : Encouraged to maintain food diary [Encouraged to increase physical activity] : Encouraged to increase physical activity [Encouraged to use exercise tracking device] : Encouraged to use exercise tracking device [Weigh Self Weekly] : weigh self weekly [Decrease Portions] : decrease portions [____ min/wk Activity] : [unfilled] min/wk activity [Good understanding] : Patient has a good understanding of disease, goals and obesity follow-up plan [FreeTextEntry1] : weight watchers [FreeTextEntry4] : 20

## 2020-11-03 NOTE — ASSESSMENT
[FreeTextEntry1] : Physical exam shows a well-developed female in no acute distress blood pressure was initially 142/92 repeated 134/86 height 5 feet 5 inches weight 286 pounds BMI 47.59 temperature of 97.9 °F heart rate of 98 per Cristian rate of 16 oxygen saturation on room air was 99% HEENT was unremarkable chest was clear cardiovascular exam was regular abdomen was soft extremities showed trace bilateral edema neurologic exam was nonfocal patient had an EKG during her hospitalization at the end of 2019 it was reviewed without acute changes patient had complete blood test in April 2020 no evidence of hemoglobin A1c was present she does not wish to obtain 1 now but we will repeat it when it is time for her blood test in April she is up-to-date with her ophthalmologist and dermatologist she had her last upper endoscopy capsule endoscopy and colonoscopy in 2018 for a period of iron deficiency anemia since last mammogram was in November 2019 and she will be scheduling 1 over the next several months Long discussion with the patient concerning her obesity and the medical ramifications that she has experienced and will experience weight watchers was suggested remembering that she has had gastric bypass surgery patient was advised to make good selections and to reduce portion size and limit carbohydrates and blood pressure was adequately controlled at the present visit patient claims that she is in a good emotional state on her present medication

## 2020-11-03 NOTE — PHYSICAL EXAM
[No Acute Distress] : no acute distress [Well Nourished] : well nourished [Well Developed] : well developed [Well-Appearing] : well-appearing [Normal Voice/Communication] : normal voice/communication [Normal Sclera/Conjunctiva] : normal sclera/conjunctiva [PERRL] : pupils equal round and reactive to light [EOMI] : extraocular movements intact [Normal Outer Ear/Nose] : the outer ears and nose were normal in appearance [Normal Oropharynx] : the oropharynx was normal [Normal TMs] : both tympanic membranes were normal [Normal Nasal Mucosa] : the nasal mucosa was normal [No JVD] : no jugular venous distention [No Lymphadenopathy] : no lymphadenopathy [Supple] : supple [Thyroid Normal, No Nodules] : the thyroid was normal and there were no nodules present [No Respiratory Distress] : no respiratory distress  [No Accessory Muscle Use] : no accessory muscle use [Clear to Auscultation] : lungs were clear to auscultation bilaterally [Normal Percussion] : the chest was normal to percussion [Normal Rate] : normal rate  [Regular Rhythm] : with a regular rhythm [Normal S1, S2] : normal S1 and S2 [No Murmur] : no murmur heard [No Carotid Bruits] : no carotid bruits [No Abdominal Bruit] : a ~M bruit was not heard ~T in the abdomen [No Varicosities] : no varicosities [Pedal Pulses Present] : the pedal pulses are present [No Edema] : there was no peripheral edema [No Palpable Aorta] : no palpable aorta [No Extremity Clubbing/Cyanosis] : no extremity clubbing/cyanosis [Declined Breast Exam] : declined breast exam  [Soft] : abdomen soft [Non Tender] : non-tender [Non-distended] : non-distended [No Masses] : no abdominal mass palpated [No HSM] : no HSM [Normal Bowel Sounds] : normal bowel sounds [No Hernias] : no hernias [Declined Rectal Exam] : declined rectal exam [Normal Supraclavicular Nodes] : no supraclavicular lymphadenopathy [Normal Axillary Nodes] : no axillary lymphadenopathy [Normal Posterior Cervical Nodes] : no posterior cervical lymphadenopathy [Normal Anterior Cervical Nodes] : no anterior cervical lymphadenopathy [Normal Inguinal Nodes] : no inguinal lymphadenopathy [Normal Femoral Nodes] : no femoral lymphadenopathy [No CVA Tenderness] : no CVA  tenderness [No Spinal Tenderness] : no spinal tenderness [No Joint Swelling] : no joint swelling [Grossly Normal Strength/Tone] : grossly normal strength/tone [No Rash] : no rash [Coordination Grossly Intact] : coordination grossly intact [No Focal Deficits] : no focal deficits [Normal Gait] : normal gait [Deep Tendon Reflexes (DTR)] : deep tendon reflexes were 2+ and symmetric [Speech Grossly Normal] : speech grossly normal [Memory Grossly Normal] : memory grossly normal [Normal Affect] : the affect was normal [Alert and Oriented x3] : oriented to person, place, and time [Normal Mood] : the mood was normal [Normal Insight/Judgement] : insight and judgment were intact [Kyphosis] : no kyphosis [Scoliosis] : no scoliosis [Acne] : no acne

## 2020-12-05 ENCOUNTER — TRANSCRIPTION ENCOUNTER (OUTPATIENT)
Age: 60
End: 2020-12-05

## 2020-12-15 ENCOUNTER — RX RENEWAL (OUTPATIENT)
Age: 60
End: 2020-12-15

## 2020-12-21 PROBLEM — Z09 ENCOUNTER FOR EXAMINATION FOLLOWING TREATMENT AT HOSPITAL: Status: RESOLVED | Noted: 2019-05-30 | Resolved: 2020-12-21

## 2020-12-23 PROBLEM — Z11.1 ENCOUNTER FOR PPD TEST: Status: RESOLVED | Noted: 2020-11-03 | Resolved: 2020-12-23

## 2021-01-07 ENCOUNTER — RX RENEWAL (OUTPATIENT)
Age: 61
End: 2021-01-07

## 2021-01-11 NOTE — PATIENT PROFILE ADULT - DO YOU FEEL UNSAFE AT WORK?
Tung and RN tried to get pt to urinate. Pt unable to urinate at this time and is still drowsy. RN to continue to try and obtain sample.    no

## 2021-03-05 ENCOUNTER — APPOINTMENT (OUTPATIENT)
Dept: INTERNAL MEDICINE | Facility: CLINIC | Age: 61
End: 2021-03-05
Payer: COMMERCIAL

## 2021-03-05 PROCEDURE — G0447 BEHAVIOR COUNSEL OBESITY 15M: CPT

## 2021-03-05 PROCEDURE — 99072 ADDL SUPL MATRL&STAF TM PHE: CPT

## 2021-03-05 PROCEDURE — 99215 OFFICE O/P EST HI 40 MIN: CPT | Mod: 25

## 2021-03-07 VITALS
SYSTOLIC BLOOD PRESSURE: 136 MMHG | HEART RATE: 88 BPM | DIASTOLIC BLOOD PRESSURE: 84 MMHG | HEIGHT: 65 IN | RESPIRATION RATE: 15 BRPM | BODY MASS INDEX: 47.32 KG/M2 | WEIGHT: 284 LBS | OXYGEN SATURATION: 99 %

## 2021-03-07 VITALS — TEMPERATURE: 98.1 F

## 2021-03-07 NOTE — REVIEW OF SYSTEMS
[Fatigue] : fatigue [Dyspnea on Exertion] : dyspnea on exertion [Nocturia] : nocturia [Joint Stiffness] : joint stiffness [Back Pain] : back pain [Insomnia] : insomnia [Anxiety] : anxiety [Depression] : depression [Negative] : Heme/Lymph [Fever] : no fever [Chills] : no chills [Hot Flashes] : no hot flashes [Night Sweats] : no night sweats [Recent Change In Weight] : ~T no recent weight change [Discharge] : no discharge [Pain] : no pain [Redness] : no redness [Dryness] : no dryness  [Vision Problems] : no vision problems [Itching] : no itching [Earache] : no earache [Hearing Loss] : no hearing loss [Nosebleed] : no nosebleeds [Hoarseness] : no hoarseness [Nasal Discharge] : no nasal discharge [Sore Throat] : no sore throat [Postnasal Drip] : no postnasal drip [Chest Pain] : no chest pain [Palpitations] : no palpitations [Leg Claudication] : no leg claudication [Lower Ext Edema] : lower extremity edema [Orthopnea] : no orthopnea [Paroxysmal Nocturnal Dyspnea] : no paroxysmal nocturnal dyspnea [Shortness Of Breath] : no shortness of breath [Wheezing] : no wheezing [Cough] : no cough [Abdominal Pain] : no abdominal pain [Nausea] : no nausea [Constipation] : no constipation [Diarrhea] : diarrhea [Vomiting] : no vomiting [Heartburn] : no heartburn [Melena] : no melena [Dysuria] : no dysuria [Incontinence] : no incontinence [Poor Libido] : libido not poor [Hematuria] : no hematuria [Frequency] : no frequency [Vaginal Discharge] : no vaginal discharge [Dysmenorrhea] : no dysmenorrhea [Joint Pain] : joint pain [Joint Swelling] : no joint swelling [Muscle Weakness] : no muscle weakness [Muscle Pain] : no muscle pain [Itching] : no itching [Mole Changes] : no mole changes [Nail Changes] : no nail changes [Hair Changes] : no hair changes [Skin Rash] : no skin rash [Headache] : no headache [Dizziness] : no dizziness [Fainting] : no fainting [Confusion] : no confusion [Memory Loss] : no memory loss [Unsteady Walking] : no ataxia [Suicidal] : not suicidal [Easy Bleeding] : no easy bleeding [Easy Bruising] : no easy bruising [Swollen Glands] : no swollen glands [FreeTextEntry9] : knees

## 2021-03-07 NOTE — HEALTH RISK ASSESSMENT
[No] : No [No falls in past year] : Patient reported no falls in the past year [0] : 2) Feeling down, depressed, or hopeless: Not at all (0) [] : No [[facility]] : 11 [[id number]] : 0

## 2021-03-07 NOTE — COUNSELING
[Potential consequences of obesity discussed] : Potential consequences of obesity discussed [Benefits of weight loss discussed] : Benefits of weight loss discussed [Structured Weight Management Program suggested:] : Structured weight management program suggested [Encouraged to maintain food diary] : Encouraged to maintain food diary [Encouraged to increase physical activity] : Encouraged to increase physical activity [Encouraged to use exercise tracking device] : Encouraged to use exercise tracking device [Weigh Self Weekly] : weigh self weekly [Decrease Portions] : decrease portions [____ min/wk Activity] : [unfilled] min/wk activity [Keep Food Diary] : keep food diary [Good understanding] : Patient has a good understanding of disease, goals and obesity follow-up plan [FreeTextEntry1] : weight watchers [FreeTextEntry4] : 18

## 2021-03-07 NOTE — PHYSICAL EXAM
[Well Nourished] : well nourished [Well Developed] : well developed [Well-Appearing] : well-appearing [Normal Voice/Communication] : normal voice/communication [Normal Sclera/Conjunctiva] : normal sclera/conjunctiva [PERRL] : pupils equal round and reactive to light [EOMI] : extraocular movements intact [Normal Outer Ear/Nose] : the outer ears and nose were normal in appearance [Normal Oropharynx] : the oropharynx was normal [Normal TMs] : both tympanic membranes were normal [Normal Nasal Mucosa] : the nasal mucosa was normal [No JVD] : no jugular venous distention [No Lymphadenopathy] : no lymphadenopathy [Supple] : supple [Thyroid Normal, No Nodules] : the thyroid was normal and there were no nodules present [No Respiratory Distress] : no respiratory distress  [No Accessory Muscle Use] : no accessory muscle use [Clear to Auscultation] : lungs were clear to auscultation bilaterally [Normal Percussion] : the chest was normal to percussion [Normal Rate] : normal rate  [Regular Rhythm] : with a regular rhythm [Normal S1, S2] : normal S1 and S2 [No Murmur] : no murmur heard [No Carotid Bruits] : no carotid bruits [No Abdominal Bruit] : a ~M bruit was not heard ~T in the abdomen [No Varicosities] : no varicosities [Pedal Pulses Present] : the pedal pulses are present [No Edema] : there was no peripheral edema [No Palpable Aorta] : no palpable aorta [No Extremity Clubbing/Cyanosis] : no extremity clubbing/cyanosis [Declined Breast Exam] : declined breast exam  [Soft] : abdomen soft [Non Tender] : non-tender [Non-distended] : non-distended [No Masses] : no abdominal mass palpated [No HSM] : no HSM [Normal Bowel Sounds] : normal bowel sounds [No Hernias] : no hernias [Declined Rectal Exam] : declined rectal exam [Normal Supraclavicular Nodes] : no supraclavicular lymphadenopathy [Normal Axillary Nodes] : no axillary lymphadenopathy [Normal Posterior Cervical Nodes] : no posterior cervical lymphadenopathy [Normal Anterior Cervical Nodes] : no anterior cervical lymphadenopathy [Normal Inguinal Nodes] : no inguinal lymphadenopathy [Normal Femoral Nodes] : no femoral lymphadenopathy [No CVA Tenderness] : no CVA  tenderness [No Spinal Tenderness] : no spinal tenderness [No Joint Swelling] : no joint swelling [Grossly Normal Strength/Tone] : grossly normal strength/tone [No Rash] : no rash [Coordination Grossly Intact] : coordination grossly intact [No Focal Deficits] : no focal deficits [Normal Gait] : normal gait [Deep Tendon Reflexes (DTR)] : deep tendon reflexes were 2+ and symmetric [Speech Grossly Normal] : speech grossly normal [Memory Grossly Normal] : memory grossly normal [Normal Affect] : the affect was normal [Alert and Oriented x3] : oriented to person, place, and time [Normal Mood] : the mood was normal [Normal Insight/Judgement] : insight and judgment were intact [Kyphosis] : no kyphosis [Scoliosis] : no scoliosis [Acne] : no acne

## 2021-03-07 NOTE — HISTORY OF PRESENT ILLNESS
[FreeTextEntry1] : 61-year-old woman comes to the office for follow-up to review her medications and discuss her overall health major issues dealing with her weight [de-identified] : ComesTo the office for follow-up with a history of morbid obesity obstructive sleep apnea hypothyroidism hypertension prediabetes pulmonary embolus type 2 diabetes as well as osteoarthritis denies temperature chills sweats or myalgias she has had no headache sinus congestion sore throat cough wheezing pleurisy chest pain shortness of breath at rest she does get short of breath with exertion she denies lightheadedness palpitations dizziness vertigo or syncope.  Had no abdominal pain nausea vomiting diarrhea constipation bright red blood per rectum or black stools her appetite has been good her weight has been stable she has occasional pain in her knees and lower back she does get up at night to urinate but denies dysuria or gross hematuria she has occasional leg edema denies skin rashes usually sleeps erratically she is troubled by anxiety and depression

## 2021-03-12 ENCOUNTER — RX RENEWAL (OUTPATIENT)
Age: 61
End: 2021-03-12

## 2021-03-15 ENCOUNTER — RX RENEWAL (OUTPATIENT)
Age: 61
End: 2021-03-15

## 2021-06-06 ENCOUNTER — RX RENEWAL (OUTPATIENT)
Age: 61
End: 2021-06-06

## 2021-06-24 ENCOUNTER — RX RENEWAL (OUTPATIENT)
Age: 61
End: 2021-06-24

## 2021-07-06 ENCOUNTER — EMERGENCY (EMERGENCY)
Facility: HOSPITAL | Age: 61
LOS: 1 days | Discharge: HOME | End: 2021-07-06
Attending: EMERGENCY MEDICINE | Admitting: EMERGENCY MEDICINE
Payer: COMMERCIAL

## 2021-07-06 VITALS
DIASTOLIC BLOOD PRESSURE: 108 MMHG | RESPIRATION RATE: 16 BRPM | OXYGEN SATURATION: 96 % | SYSTOLIC BLOOD PRESSURE: 156 MMHG | HEART RATE: 99 BPM | TEMPERATURE: 98 F | WEIGHT: 293 LBS | HEIGHT: 65 IN

## 2021-07-06 VITALS
TEMPERATURE: 98 F | OXYGEN SATURATION: 97 % | RESPIRATION RATE: 14 BRPM | SYSTOLIC BLOOD PRESSURE: 132 MMHG | HEART RATE: 85 BPM | DIASTOLIC BLOOD PRESSURE: 82 MMHG

## 2021-07-06 LAB
ALBUMIN SERPL ELPH-MCNC: 3.7 G/DL — SIGNIFICANT CHANGE UP (ref 3.3–5)
ALP SERPL-CCNC: 119 U/L — SIGNIFICANT CHANGE UP (ref 40–120)
ALT FLD-CCNC: 45 U/L — SIGNIFICANT CHANGE UP (ref 10–45)
ANION GAP SERPL CALC-SCNC: 8 MMOL/L — SIGNIFICANT CHANGE UP (ref 5–17)
APTT BLD: 46.7 SEC — HIGH (ref 27.5–35.5)
AST SERPL-CCNC: 27 U/L — SIGNIFICANT CHANGE UP (ref 10–40)
BASOPHILS # BLD AUTO: 0.1 K/UL — SIGNIFICANT CHANGE UP (ref 0–0.2)
BASOPHILS NFR BLD AUTO: 0.8 % — SIGNIFICANT CHANGE UP (ref 0–2)
BILIRUB SERPL-MCNC: 0.8 MG/DL — SIGNIFICANT CHANGE UP (ref 0.2–1.2)
BUN SERPL-MCNC: 21 MG/DL — SIGNIFICANT CHANGE UP (ref 7–23)
CALCIUM SERPL-MCNC: 10.2 MG/DL — SIGNIFICANT CHANGE UP (ref 8.4–10.5)
CHLORIDE SERPL-SCNC: 101 MMOL/L — SIGNIFICANT CHANGE UP (ref 96–108)
CO2 SERPL-SCNC: 29 MMOL/L — SIGNIFICANT CHANGE UP (ref 22–31)
CREAT SERPL-MCNC: 0.82 MG/DL — SIGNIFICANT CHANGE UP (ref 0.5–1.3)
EOSINOPHIL # BLD AUTO: 0.06 K/UL — SIGNIFICANT CHANGE UP (ref 0–0.5)
EOSINOPHIL NFR BLD AUTO: 0.5 % — SIGNIFICANT CHANGE UP (ref 0–6)
GLUCOSE SERPL-MCNC: 145 MG/DL — HIGH (ref 70–99)
HCT VFR BLD CALC: 41.3 % — SIGNIFICANT CHANGE UP (ref 34.5–45)
HGB BLD-MCNC: 13.6 G/DL — SIGNIFICANT CHANGE UP (ref 11.5–15.5)
IMM GRANULOCYTES NFR BLD AUTO: 0.2 % — SIGNIFICANT CHANGE UP (ref 0–1.5)
INR BLD: 2.58 RATIO — HIGH (ref 0.88–1.16)
LYMPHOCYTES # BLD AUTO: 2.63 K/UL — SIGNIFICANT CHANGE UP (ref 1–3.3)
LYMPHOCYTES # BLD AUTO: 21.2 % — SIGNIFICANT CHANGE UP (ref 13–44)
MCHC RBC-ENTMCNC: 29.7 PG — SIGNIFICANT CHANGE UP (ref 27–34)
MCHC RBC-ENTMCNC: 32.9 GM/DL — SIGNIFICANT CHANGE UP (ref 32–36)
MCV RBC AUTO: 90.2 FL — SIGNIFICANT CHANGE UP (ref 80–100)
MONOCYTES # BLD AUTO: 0.8 K/UL — SIGNIFICANT CHANGE UP (ref 0–0.9)
MONOCYTES NFR BLD AUTO: 6.4 % — SIGNIFICANT CHANGE UP (ref 2–14)
NEUTROPHILS # BLD AUTO: 8.8 K/UL — HIGH (ref 1.8–7.4)
NEUTROPHILS NFR BLD AUTO: 70.9 % — SIGNIFICANT CHANGE UP (ref 43–77)
NRBC # BLD: 0 /100 WBCS — SIGNIFICANT CHANGE UP (ref 0–0)
PLATELET # BLD AUTO: 510 K/UL — HIGH (ref 150–400)
POTASSIUM SERPL-MCNC: 3.6 MMOL/L — SIGNIFICANT CHANGE UP (ref 3.5–5.3)
POTASSIUM SERPL-SCNC: 3.6 MMOL/L — SIGNIFICANT CHANGE UP (ref 3.5–5.3)
PROT SERPL-MCNC: 7.6 G/DL — SIGNIFICANT CHANGE UP (ref 6–8.3)
PROTHROM AB SERPL-ACNC: 29.8 SEC — HIGH (ref 10.6–13.6)
RBC # BLD: 4.58 M/UL — SIGNIFICANT CHANGE UP (ref 3.8–5.2)
RBC # FLD: 15.4 % — HIGH (ref 10.3–14.5)
SODIUM SERPL-SCNC: 138 MMOL/L — SIGNIFICANT CHANGE UP (ref 135–145)
WBC # BLD: 12.42 K/UL — HIGH (ref 3.8–10.5)
WBC # FLD AUTO: 12.42 K/UL — HIGH (ref 3.8–10.5)

## 2021-07-06 PROCEDURE — 70450 CT HEAD/BRAIN W/O DYE: CPT | Mod: 26,MA,59

## 2021-07-06 PROCEDURE — 80053 COMPREHEN METABOLIC PANEL: CPT

## 2021-07-06 PROCEDURE — 99285 EMERGENCY DEPT VISIT HI MDM: CPT

## 2021-07-06 PROCEDURE — 72192 CT PELVIS W/O DYE: CPT | Mod: 26,MA

## 2021-07-06 PROCEDURE — 85730 THROMBOPLASTIN TIME PARTIAL: CPT

## 2021-07-06 PROCEDURE — 96374 THER/PROPH/DIAG INJ IV PUSH: CPT

## 2021-07-06 PROCEDURE — 85610 PROTHROMBIN TIME: CPT

## 2021-07-06 PROCEDURE — 96375 TX/PRO/DX INJ NEW DRUG ADDON: CPT

## 2021-07-06 PROCEDURE — 36415 COLL VENOUS BLD VENIPUNCTURE: CPT

## 2021-07-06 PROCEDURE — 70450 CT HEAD/BRAIN W/O DYE: CPT | Mod: MA

## 2021-07-06 PROCEDURE — 99284 EMERGENCY DEPT VISIT MOD MDM: CPT | Mod: 25

## 2021-07-06 PROCEDURE — 85025 COMPLETE CBC W/AUTO DIFF WBC: CPT

## 2021-07-06 PROCEDURE — 72192 CT PELVIS W/O DYE: CPT | Mod: MA

## 2021-07-06 RX ORDER — ACETAMINOPHEN 500 MG
650 TABLET ORAL ONCE
Refills: 0 | Status: COMPLETED | OUTPATIENT
Start: 2021-07-06 | End: 2021-07-06

## 2021-07-06 RX ORDER — VANCOMYCIN HCL 1 G
1000 VIAL (EA) INTRAVENOUS ONCE
Refills: 0 | Status: COMPLETED | OUTPATIENT
Start: 2021-07-06 | End: 2021-07-06

## 2021-07-06 RX ORDER — AMPICILLIN SODIUM AND SULBACTAM SODIUM 250; 125 MG/ML; MG/ML
3 INJECTION, POWDER, FOR SUSPENSION INTRAMUSCULAR; INTRAVENOUS ONCE
Refills: 0 | Status: COMPLETED | OUTPATIENT
Start: 2021-07-06 | End: 2021-07-06

## 2021-07-06 RX ADMIN — AMPICILLIN SODIUM AND SULBACTAM SODIUM 200 GRAM(S): 250; 125 INJECTION, POWDER, FOR SUSPENSION INTRAMUSCULAR; INTRAVENOUS at 20:50

## 2021-07-06 RX ADMIN — Medication 250 MILLIGRAM(S): at 19:45

## 2021-07-06 RX ADMIN — Medication 650 MILLIGRAM(S): at 20:12

## 2021-07-06 RX ADMIN — Medication 650 MILLIGRAM(S): at 19:42

## 2021-07-06 NOTE — ED PROVIDER NOTE - ATTENDING CONTRIBUTION TO CARE
Halie with MILA Ruth. 61 year old history of HTN, sleep apnea, hypothyroidism, bariatric surgery c/o right hip pain and inability to walk. 2 weeks ago pt tripped and fell down 2 steps and hurt her hip, head and right shoulder. pain resolved in head and shoulder but today pt tripped again and heard a snap in her hip and cannot walk.  is on coumadin and doesn't know her INR. pt also has erythema right lower abd wo pain from scratching area .  denies headache, fever, chills, n/n/d, abd pain, numbness, tingling.  Ext:   right hip: FROM, strength 5/5, sensation intact, soft compartments, 2+ distal pulses, cap refill <3, tenderness right pelvis.  ct scans, labs to check INR, reassess.  Patient signed out to incoming physician.  All decisions regarding the progression of care will be made at their discretion.    I performed a face to face bedside interview with patient regarding history of present illness, review of symptoms and past medical history. I completed an independent physical exam.  I have discussed the patient's plan of care with Physician Assistant (PA). I agree with note as stated above, having amended the EMR as needed to reflect my findings.   This includes History of Present Illness, HIV, Past Medical/Surgical/Family/Social History, Allergies and Home Medications, Review of Systems, Physical Exam, and any Progress Notes during the time I functioned as the attending physician for this patient.

## 2021-07-06 NOTE — ED PROVIDER NOTE - PATIENT PORTAL LINK FT
You can access the FollowMyHealth Patient Portal offered by [facility] by registering at the following website: [email or web address]. By joining [facility]’s FollowMyHealth portal, you will also be able to view your health information using other applications (apps) compatible with our system.

## 2021-07-06 NOTE — ED ADULT NURSE NOTE - OBJECTIVE STATEMENT
Pt presents to ER with chief complaint of right posterior thight pain that began 2 weeks ago . Pt states she had a mechanical fall 2 weeks ago and stumbled down 2 steps and as a result has had persistent pain to her right posterior thigh. No s/s of distress noted, will continue to monitor, patient safety maintained. Bed placed in lowest position, non slip socks applied, call bell and bedside table within reach.

## 2021-07-06 NOTE — ED PROVIDER NOTE - PROGRESS NOTE DETAILS
MILA Bean: pt s/o to me to f/u hip CT-- no fx, but cellulitis of abd noted. will treat with vanco and Unasyn in the ED and dc with doxy. case d/w Dr. Wilson.   Pt ambulatory in the ED

## 2021-07-06 NOTE — ED PROVIDER NOTE - CARE PLAN
Principal Discharge DX:	Closed head injury, initial encounter  Secondary Diagnosis:	Hip injury, right, initial encounter  Secondary Diagnosis:	Cellulitis of abdominal wall

## 2021-07-06 NOTE — ED ADULT NURSE NOTE - NSIMPLEMENTINTERV_GEN_ALL_ED
Implemented All Universal Safety Interventions:  [place] to call system. Call bell, personal items and telephone within reach. Instruct patient to call for assistance. Room bathroom lighting operational. Non-slip footwear when patient is off stretcher. Physically safe environment: no spills, clutter or unnecessary equipment. Stretcher in lowest position, wheels locked, appropriate side rails in place.

## 2021-07-06 NOTE — ED PROVIDER NOTE - OBJECTIVE STATEMENT
61 year old history of HTN, sleep apnea, hypothyroidism, bariatric surgery c/o right hip pain and inability to walk 61 year old history of HTN, sleep apnea, hypothyroidism, bariatric surgery c/o right hip pain and inability to walk. 2 weeks ago pt tripped and fell down 2 steps and hurt her hip, head and right shoulder. pain resolved in head and shoulder but today pt tripped again and heard a snap in her hip and cannot walk  is on coumadin and doesn't know her INR. pt also has erythema right lower abd wo pain from scratching area   denies headache, fever, chills, n/n/d, abd pain, numbness, tingling 61 year old history of HTN, sleep apnea, hypothyroidism, bariatric surgery c/o right hip pain and inability to walk. 2 weeks ago pt tripped and fell down 2 steps and hurt her hip, head and right shoulder. pain resolved in head and shoulder but today pt tripped again and heard a snap in her hip and cannot walk.  is on coumadin and doesn't know her INR. pt also has erythema right lower abd w/o pain from scratching area.   denies headache, fever, chills, n/n/d, abd pain, numbness, tingling

## 2021-07-06 NOTE — ED ADULT TRIAGE NOTE - CHIEF COMPLAINT QUOTE
I fell 2 weeks ago and was doing ok but today while walking down the steps I stepped with the right leg and heard a snap. I cant walk on it now and the pain is so much worse.

## 2021-07-06 NOTE — ED PROVIDER NOTE - PHYSICAL EXAMINATION
General:     NAD, well-nourished, well-appearing  Eyes: PERRL  Head:     NC/AT, EOMI, oral mucosa moist  Neck:     trachea midline  Lungs:     CTA b/l  CVS:     RRR  Abd:     +BS, s/nt/nd, mild erythema right lower abd with scab, no crepitus or fluctuance   Ext:   right hip: FROM, strength 5/5, sensation intact, soft compartments, 2+ distal pulses, cap refill <3, tenderness right pelvis    Neuro: AAOx3 General:     NAD, well-nourished, well-appearing  Eyes: PERRL  Head:     NC/AT, EOMI, oral mucosa moist  Neck:     trachea midline  Lungs:     CTA b/l  CVS:     RRR  Abd:     +BS, s/nt/nd, erythema left lower abd with scab, no crepitus or fluctuance   Ext:   right hip: FROM, strength 5/5, sensation intact, soft compartments, 2+ distal pulses, cap refill <3, tenderness right pelvis    Neuro: AAOx3

## 2021-07-06 NOTE — ED PROVIDER NOTE - CLINICAL SUMMARY MEDICAL DECISION MAKING FREE TEXT BOX
61 year old history of HTN, sleep apnea, hypothyroidism, bariatric surgery c/o right hip pain and inability to walk. 2 weeks ago pt tripped and fell down 2 steps and hurt her hip, head and right shoulder. pain resolved in head and shoulder but today pt tripped again and heard a snap in her hip and cannot walk  is on coumadin and doesn't know her INR. pt also has erythema right lower abd wo pain from scratching area   denies headache, fever, chills, n/n/d, abd pain, numbness, tingling  Ext:   right hip: FROM, strength 5/5, sensation intact, soft compartments, 2+ distal pulses, cap refill <3, tenderness right pelvis   ct scans, labs to check INR, reassess

## 2021-07-06 NOTE — ED PROVIDER NOTE - NSFOLLOWUPINSTRUCTIONS_ED_ALL_ED_FT
Take Tylenol 650mg every 4-6 hours as needed for pain    Please fill the prescription for the antibiotics and take as directed for your cellulitis.  Please finish the entire course of medication as prescribed.  If you have any belly pain after the antibiotics, yogurt has been shown to help with this.  Do not use any alcohol or grapefruit juice with any antibiotics.    Follow up with your doctor in 1-2 days    Expect some nausea, vomiting, headache, dizziness and blurry vision    Return to the ED for persistent vomiting, weakness or numbness on one side of the body, loss of consciousness or any concerns.  ************************************  Closed Head Injury    A closed head injury is an injury to your head that may or may not involve a traumatic brain injury (TBI). Symptoms of TBI can be short or long lasting and include headache, dizziness, interference with memory or speech, fatigue, confusion, changes in sleep, mood changes, nausea, depression/anxiety, and dulling of senses. Make sure to obtain proper rest which includes getting plenty of sleep, avoiding excessive visual stimulation, and avoiding activities that may cause physical or mental stress. Avoid any situation where there is potential for another head injury, including sports.    SEEK IMMEDIATE MEDICAL CARE IF YOU HAVE ANY OF THE FOLLOWING SYMPTOMS: unusual drowsiness, vomiting, severe dizziness, seizures, lightheadedness, muscular weakness, different pupil sizes, visual changes, or clear or bloody discharge from your ears or nose.  ************************  Cellulitis    Cellulitis is a skin infection caused by bacteria. This condition occurs most often in the arms and lower legs but can occur anywhere over the body. Symptoms include redness, swelling, warm skin, tenderness, and chills/fever. If you were prescribed an antibiotic medicine, take it as told by your health care provider. Do not stop taking the antibiotic even if you start to feel better.    SEEK IMMEDIATE MEDICAL CARE IF YOU HAVE ANY OF THE FOLLOWING SYMPTOMS: worsening fever, red streaks coming from affected area, vomiting or diarrhea, or dizziness/lightheadedness.

## 2021-07-21 ENCOUNTER — RX RENEWAL (OUTPATIENT)
Age: 61
End: 2021-07-21

## 2021-08-04 ENCOUNTER — APPOINTMENT (OUTPATIENT)
Dept: RADIOLOGY | Facility: HOSPITAL | Age: 61
End: 2021-08-04
Payer: COMMERCIAL

## 2021-08-04 ENCOUNTER — APPOINTMENT (OUTPATIENT)
Dept: INTERNAL MEDICINE | Facility: CLINIC | Age: 61
End: 2021-08-04
Payer: COMMERCIAL

## 2021-08-04 ENCOUNTER — RESULT REVIEW (OUTPATIENT)
Age: 61
End: 2021-08-04

## 2021-08-04 ENCOUNTER — OUTPATIENT (OUTPATIENT)
Dept: OUTPATIENT SERVICES | Facility: HOSPITAL | Age: 61
LOS: 1 days | End: 2021-08-04
Payer: COMMERCIAL

## 2021-08-04 VITALS
DIASTOLIC BLOOD PRESSURE: 82 MMHG | WEIGHT: 293 LBS | TEMPERATURE: 97.5 F | OXYGEN SATURATION: 99 % | SYSTOLIC BLOOD PRESSURE: 132 MMHG | HEIGHT: 65 IN | HEART RATE: 89 BPM | RESPIRATION RATE: 16 BRPM | BODY MASS INDEX: 48.82 KG/M2

## 2021-08-04 VITALS
DIASTOLIC BLOOD PRESSURE: 82 MMHG | RESPIRATION RATE: 16 BRPM | HEIGHT: 65 IN | BODY MASS INDEX: 48.82 KG/M2 | WEIGHT: 293 LBS | HEART RATE: 89 BPM | TEMPERATURE: 97.5 F | OXYGEN SATURATION: 99 % | SYSTOLIC BLOOD PRESSURE: 132 MMHG

## 2021-08-04 DIAGNOSIS — E66.01 MORBID (SEVERE) OBESITY DUE TO EXCESS CALORIES: ICD-10-CM
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
## 2021-08-04 DIAGNOSIS — M79.621 PAIN IN RIGHT UPPER ARM: ICD-10-CM
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
## 2021-08-04 DIAGNOSIS — Z00.8 ENCOUNTER FOR OTHER GENERAL EXAMINATION: ICD-10-CM

## 2021-08-04 DIAGNOSIS — M25.511 PAIN IN RIGHT SHOULDER: ICD-10-CM

## 2021-08-04 PROCEDURE — 73030 X-RAY EXAM OF SHOULDER: CPT

## 2021-08-04 PROCEDURE — 73060 X-RAY EXAM OF HUMERUS: CPT

## 2021-08-04 PROCEDURE — 99215 OFFICE O/P EST HI 40 MIN: CPT

## 2021-08-04 PROCEDURE — 73060 X-RAY EXAM OF HUMERUS: CPT | Mod: 26,RT

## 2021-08-04 PROCEDURE — 73030 X-RAY EXAM OF SHOULDER: CPT | Mod: 26,RT

## 2021-08-07 ENCOUNTER — LABORATORY RESULT (OUTPATIENT)
Age: 61
End: 2021-08-07

## 2021-08-07 NOTE — HISTORY OF PRESENT ILLNESS
[FreeTextEntry1] : 61-year-old woman comes to the office for follow-up to review her medications and discuss her overall health recent issues with insomnia and inability to lose weight [de-identified] :  comes to the office for follow-up with a history of morbid obesity hypertension hypothyroidism pulmonary embolus obstructive sleep apnea depression chronic dyspnea on exertion.  She denies temperature chills sweats or myalgias she is chronically fatigued she has no headache sinus congestion sore throat cough wheezing pleurisy chest pain shortness of breath at rest she does have mild exertional dyspnea which is chronic she denies lightheadedness palpitations dizziness vertigo or syncope she has had no abdominal pain nausea vomiting diarrhea constipation bright red blood per rectum or black stools her appetite has been good her weight has been stable she denies significant musculoskeletal complaints she has chronic leg edema she has had no skin rashes he is troubled by chronic anxiety and depression and does sleep erratically

## 2021-08-07 NOTE — ASSESSMENT
[FreeTextEntry1] : Physical examination shows an obese female in no acute distress blood pressure 132/82 height 5 feet 5 inches weight 300 pounds BMI 49.92 temperature 97.5 °F heart rate of 89 respirations at 16 oxygen saturation on room air 99% HEENT was unremarkable chest was clear cardiovascular exam was regular abdomen was soft extremities show trace bilateral edema and neurologic exam was nonfocal in view of patient's obesity and pulmonary embolus she is maintained on warfarin she was reminded of the necessity to do PT/INRs on a monthly basis or consider switching to a oral agent such as Eliquis or Xarelto she was given a slip for complete blood test including CBC full chemistries lipid profile thyroid profile urinalysis CRP vitamins D3 and B12 COVID-19 nuclear capsid antibodies an order was placed for her PT/INR of every 2 weeks for 6 months she is up-to-date with her ophthalmologist she will consider dermatologic cancer screening patient's last mammography and breast sonography were in November 2019 she is aware of the need that this be scheduled because of some pain in her right shoulder x-rays were performed of the right shoulder and humerus she will call her gastroenterologist to find out when she is due for screening colonoscopy patient is aware of the need to lose weight bariatric surgery was presented as what looks like the only choice she has in view of her lack of success with diet and herself or with weight watchers she is open to the possibility of going to a seminar concerning the gastric sleeve or bypass procedure blood pressure is well controlled at the present visit she is using her CPAP machine for her sleep apnea she feels that her breathing has not worsened and she realizes that her weight loss would improve that situation as well as safeguard her joints feels that her depression is adequately controlled at the present time on her sertraline

## 2021-08-07 NOTE — REVIEW OF SYSTEMS
[Fatigue] : fatigue [Lower Ext Edema] : lower extremity edema [Dyspnea on Exertion] : dyspnea on exertion [Nocturia] : nocturia [Joint Pain] : joint pain [Joint Stiffness] : joint stiffness [Back Pain] : back pain [Insomnia] : insomnia [Anxiety] : anxiety [Depression] : depression [Negative] : Heme/Lymph [Fever] : no fever [Chills] : no chills [Hot Flashes] : no hot flashes [Night Sweats] : no night sweats [Recent Change In Weight] : ~T no recent weight change [Discharge] : no discharge [Pain] : no pain [Redness] : no redness [Dryness] : no dryness  [Vision Problems] : no vision problems [Itching] : no itching [Earache] : no earache [Hearing Loss] : no hearing loss [Nosebleed] : no nosebleeds [Hoarseness] : no hoarseness [Nasal Discharge] : no nasal discharge [Sore Throat] : no sore throat [Postnasal Drip] : no postnasal drip [Chest Pain] : no chest pain [Palpitations] : no palpitations [Leg Claudication] : no leg claudication [Orthopnea] : no orthopnea [Paroxysmal Nocturnal Dyspnea] : no paroxysmal nocturnal dyspnea [Shortness Of Breath] : no shortness of breath [Wheezing] : no wheezing [Cough] : no cough [Abdominal Pain] : no abdominal pain [Nausea] : no nausea [Constipation] : no constipation [Diarrhea] : diarrhea [Vomiting] : no vomiting [Heartburn] : no heartburn [Melena] : no melena [Dysuria] : no dysuria [Incontinence] : no incontinence [Poor Libido] : libido not poor [Hematuria] : no hematuria [Frequency] : no frequency [Vaginal Discharge] : no vaginal discharge [Dysmenorrhea] : no dysmenorrhea [Joint Swelling] : no joint swelling [Muscle Weakness] : no muscle weakness [Muscle Pain] : no muscle pain [Itching] : no itching [Mole Changes] : no mole changes [Nail Changes] : no nail changes [Hair Changes] : no hair changes [Skin Rash] : no skin rash [Headache] : no headache [Dizziness] : no dizziness [Fainting] : no fainting [Confusion] : no confusion [Memory Loss] : no memory loss [Unsteady Walking] : no ataxia [Suicidal] : not suicidal [Easy Bleeding] : no easy bleeding [Easy Bruising] : no easy bruising [Swollen Glands] : no swollen glands [FreeTextEntry9] : knees

## 2021-08-27 LAB
25(OH)D3 SERPL-MCNC: 24.7 NG/ML
ALBUMIN SERPL ELPH-MCNC: 4.2 G/DL
ALP BLD-CCNC: 126 U/L
ALT SERPL-CCNC: 47 U/L
ANION GAP SERPL CALC-SCNC: 14 MMOL/L
APPEARANCE: CLEAR
AST SERPL-CCNC: 38 U/L
BASOPHILS # BLD AUTO: 0.08 K/UL
BASOPHILS NFR BLD AUTO: 1 %
BILIRUB SERPL-MCNC: 0.7 MG/DL
BILIRUBIN URINE: NEGATIVE
BLOOD URINE: NEGATIVE
BUN SERPL-MCNC: 12 MG/DL
CALCIUM SERPL-MCNC: 9.8 MG/DL
CHLORIDE SERPL-SCNC: 100 MMOL/L
CHOLEST SERPL-MCNC: 182 MG/DL
CO2 SERPL-SCNC: 26 MMOL/L
COLOR: YELLOW
COVID-19 NUCLEOCAPSID  GAM ANTIBODY INTERPRETATION: NEGATIVE
CREAT SERPL-MCNC: 0.54 MG/DL
CRP SERPL HS-MCNC: 4.56 MG/L
EOSINOPHIL # BLD AUTO: 0.16 K/UL
EOSINOPHIL NFR BLD AUTO: 2 %
ESTIMATED AVERAGE GLUCOSE: 163 MG/DL
GLUCOSE BS SERPL-MCNC: 148 MG/DL
GLUCOSE QUALITATIVE U: NEGATIVE
GLUCOSE SERPL-MCNC: 155 MG/DL
HBA1C MFR BLD HPLC: 7.3 %
HCT VFR BLD CALC: 40.7 %
HDLC SERPL-MCNC: 40 MG/DL
HGB BLD-MCNC: 13 G/DL
IMM GRANULOCYTES NFR BLD AUTO: 0.2 %
INR PPP: 2.36 RATIO
KETONES URINE: NEGATIVE
LDLC SERPL CALC-MCNC: 101 MG/DL
LEUKOCYTE ESTERASE URINE: NEGATIVE
LYMPHOCYTES # BLD AUTO: 1.82 K/UL
LYMPHOCYTES NFR BLD AUTO: 22.2 %
MAN DIFF?: NORMAL
MCHC RBC-ENTMCNC: 29 PG
MCHC RBC-ENTMCNC: 31.9 GM/DL
MCV RBC AUTO: 90.6 FL
MONOCYTES # BLD AUTO: 0.45 K/UL
MONOCYTES NFR BLD AUTO: 5.5 %
NEUTROPHILS # BLD AUTO: 5.65 K/UL
NEUTROPHILS NFR BLD AUTO: 69.1 %
NITRITE URINE: NEGATIVE
NONHDLC SERPL-MCNC: 143 MG/DL
PH URINE: 5.5
PLATELET # BLD AUTO: 417 K/UL
POTASSIUM SERPL-SCNC: 4 MMOL/L
PROT SERPL-MCNC: 6.2 G/DL
PROTEIN URINE: ABNORMAL
PT BLD: 27 SEC
RBC # BLD: 4.49 M/UL
RBC # FLD: 15.1 %
SARS-COV-2 AB SERPL QL IA: 0.07 INDEX
SODIUM SERPL-SCNC: 140 MMOL/L
SPECIFIC GRAVITY URINE: 1.03
T4 FREE SERPL-MCNC: 1.1 NG/DL
TRIGL SERPL-MCNC: 206 MG/DL
TSH SERPL-ACNC: 2.14 UIU/ML
UROBILINOGEN URINE: NORMAL
VIT B12 SERPL-MCNC: 1265 PG/ML
WBC # FLD AUTO: 8.18 K/UL

## 2021-09-04 ENCOUNTER — RX RENEWAL (OUTPATIENT)
Age: 61
End: 2021-09-04

## 2021-09-21 ENCOUNTER — RX RENEWAL (OUTPATIENT)
Age: 61
End: 2021-09-21

## 2021-09-22 ENCOUNTER — RX RENEWAL (OUTPATIENT)
Age: 61
End: 2021-09-22

## 2021-09-22 RX ORDER — FLASH GLUCOSE SCANNING READER
EACH MISCELLANEOUS
Qty: 1 | Refills: 3 | Status: ACTIVE | COMMUNITY
Start: 2021-09-22 | End: 1900-01-01

## 2021-10-17 ENCOUNTER — RX RENEWAL (OUTPATIENT)
Age: 61
End: 2021-10-17

## 2021-11-29 ENCOUNTER — RX RENEWAL (OUTPATIENT)
Age: 61
End: 2021-11-29

## 2021-12-09 ENCOUNTER — EMERGENCY (EMERGENCY)
Facility: HOSPITAL | Age: 61
LOS: 1 days | Discharge: HOME | End: 2021-12-09
Attending: EMERGENCY MEDICINE | Admitting: EMERGENCY MEDICINE
Payer: COMMERCIAL

## 2021-12-09 ENCOUNTER — APPOINTMENT (OUTPATIENT)
Dept: INTERNAL MEDICINE | Facility: CLINIC | Age: 61
End: 2021-12-09

## 2021-12-09 ENCOUNTER — APPOINTMENT (OUTPATIENT)
Dept: INTERNAL MEDICINE | Facility: CLINIC | Age: 61
End: 2021-12-09
Payer: COMMERCIAL

## 2021-12-09 VITALS
SYSTOLIC BLOOD PRESSURE: 138 MMHG | HEIGHT: 65 IN | WEIGHT: 293 LBS | RESPIRATION RATE: 18 BRPM | OXYGEN SATURATION: 98 % | DIASTOLIC BLOOD PRESSURE: 80 MMHG | BODY MASS INDEX: 48.82 KG/M2 | HEART RATE: 88 BPM | TEMPERATURE: 97.7 F

## 2021-12-09 VITALS
SYSTOLIC BLOOD PRESSURE: 148 MMHG | DIASTOLIC BLOOD PRESSURE: 90 MMHG | TEMPERATURE: 99 F | OXYGEN SATURATION: 98 % | HEART RATE: 75 BPM | RESPIRATION RATE: 16 BRPM

## 2021-12-09 VITALS
RESPIRATION RATE: 17 BRPM | OXYGEN SATURATION: 97 % | WEIGHT: 293 LBS | HEART RATE: 95 BPM | SYSTOLIC BLOOD PRESSURE: 166 MMHG | HEIGHT: 65 IN | DIASTOLIC BLOOD PRESSURE: 86 MMHG | TEMPERATURE: 98 F

## 2021-12-09 VITALS
RESPIRATION RATE: 18 BRPM | BODY MASS INDEX: 48.82 KG/M2 | OXYGEN SATURATION: 98 % | WEIGHT: 293 LBS | DIASTOLIC BLOOD PRESSURE: 80 MMHG | SYSTOLIC BLOOD PRESSURE: 138 MMHG | HEIGHT: 65 IN | HEART RATE: 88 BPM | TEMPERATURE: 97.7 F

## 2021-12-09 LAB
ALBUMIN SERPL ELPH-MCNC: 3.3 G/DL — SIGNIFICANT CHANGE UP (ref 3.3–5)
ALP SERPL-CCNC: 144 U/L — HIGH (ref 40–120)
ALT FLD-CCNC: 82 U/L — HIGH (ref 10–45)
ANION GAP SERPL CALC-SCNC: 8 MMOL/L — SIGNIFICANT CHANGE UP (ref 5–17)
APPEARANCE UR: CLEAR — SIGNIFICANT CHANGE UP
APTT BLD: 42.1 SEC — HIGH (ref 27.5–35.5)
AST SERPL-CCNC: 51 U/L — HIGH (ref 10–40)
BASOPHILS # BLD AUTO: 0.1 K/UL — SIGNIFICANT CHANGE UP (ref 0–0.2)
BASOPHILS NFR BLD AUTO: 1.1 % — SIGNIFICANT CHANGE UP (ref 0–2)
BILIRUB SERPL-MCNC: 0.4 MG/DL — SIGNIFICANT CHANGE UP (ref 0.2–1.2)
BILIRUB UR-MCNC: NEGATIVE — SIGNIFICANT CHANGE UP
BUN SERPL-MCNC: 11 MG/DL — SIGNIFICANT CHANGE UP (ref 7–23)
CALCIUM SERPL-MCNC: 8.9 MG/DL — SIGNIFICANT CHANGE UP (ref 8.4–10.5)
CHLORIDE SERPL-SCNC: 101 MMOL/L — SIGNIFICANT CHANGE UP (ref 96–108)
CO2 SERPL-SCNC: 28 MMOL/L — SIGNIFICANT CHANGE UP (ref 22–31)
COLOR SPEC: YELLOW — SIGNIFICANT CHANGE UP
CREAT SERPL-MCNC: 0.78 MG/DL — SIGNIFICANT CHANGE UP (ref 0.5–1.3)
DIFF PNL FLD: NEGATIVE — SIGNIFICANT CHANGE UP
EOSINOPHIL # BLD AUTO: 0.13 K/UL — SIGNIFICANT CHANGE UP (ref 0–0.5)
EOSINOPHIL NFR BLD AUTO: 1.4 % — SIGNIFICANT CHANGE UP (ref 0–6)
GLUCOSE SERPL-MCNC: 244 MG/DL — HIGH (ref 70–99)
GLUCOSE UR QL: NEGATIVE — SIGNIFICANT CHANGE UP
HCT VFR BLD CALC: 39.2 % — SIGNIFICANT CHANGE UP (ref 34.5–45)
HGB BLD-MCNC: 12.6 G/DL — SIGNIFICANT CHANGE UP (ref 11.5–15.5)
IMM GRANULOCYTES NFR BLD AUTO: 0.8 % — SIGNIFICANT CHANGE UP (ref 0–1.5)
INR BLD: 2.2 RATIO — HIGH (ref 0.88–1.16)
KETONES UR-MCNC: NEGATIVE — SIGNIFICANT CHANGE UP
LEUKOCYTE ESTERASE UR-ACNC: NEGATIVE — SIGNIFICANT CHANGE UP
LIDOCAIN IGE QN: 176 U/L — SIGNIFICANT CHANGE UP (ref 73–393)
LYMPHOCYTES # BLD AUTO: 1.94 K/UL — SIGNIFICANT CHANGE UP (ref 1–3.3)
LYMPHOCYTES # BLD AUTO: 21 % — SIGNIFICANT CHANGE UP (ref 13–44)
MCHC RBC-ENTMCNC: 29.3 PG — SIGNIFICANT CHANGE UP (ref 27–34)
MCHC RBC-ENTMCNC: 32.1 GM/DL — SIGNIFICANT CHANGE UP (ref 32–36)
MCV RBC AUTO: 91.2 FL — SIGNIFICANT CHANGE UP (ref 80–100)
MONOCYTES # BLD AUTO: 0.62 K/UL — SIGNIFICANT CHANGE UP (ref 0–0.9)
MONOCYTES NFR BLD AUTO: 6.7 % — SIGNIFICANT CHANGE UP (ref 2–14)
NEUTROPHILS # BLD AUTO: 6.36 K/UL — SIGNIFICANT CHANGE UP (ref 1.8–7.4)
NEUTROPHILS NFR BLD AUTO: 69 % — SIGNIFICANT CHANGE UP (ref 43–77)
NITRITE UR-MCNC: NEGATIVE — SIGNIFICANT CHANGE UP
NRBC # BLD: 0 /100 WBCS — SIGNIFICANT CHANGE UP (ref 0–0)
PH UR: 7 — SIGNIFICANT CHANGE UP (ref 5–8)
PLATELET # BLD AUTO: 399 K/UL — SIGNIFICANT CHANGE UP (ref 150–400)
POTASSIUM SERPL-MCNC: 4.7 MMOL/L — SIGNIFICANT CHANGE UP (ref 3.5–5.3)
POTASSIUM SERPL-SCNC: 4.7 MMOL/L — SIGNIFICANT CHANGE UP (ref 3.5–5.3)
PROT SERPL-MCNC: 6.7 G/DL — SIGNIFICANT CHANGE UP (ref 6–8.3)
PROT UR-MCNC: NEGATIVE — SIGNIFICANT CHANGE UP
PROTHROM AB SERPL-ACNC: 25.6 SEC — HIGH (ref 10.6–13.6)
RBC # BLD: 4.3 M/UL — SIGNIFICANT CHANGE UP (ref 3.8–5.2)
RBC # FLD: 15.9 % — HIGH (ref 10.3–14.5)
SODIUM SERPL-SCNC: 137 MMOL/L — SIGNIFICANT CHANGE UP (ref 135–145)
SP GR SPEC: 1.01 — SIGNIFICANT CHANGE UP (ref 1.01–1.02)
UROBILINOGEN FLD QL: NEGATIVE — SIGNIFICANT CHANGE UP
WBC # BLD: 9.22 K/UL — SIGNIFICANT CHANGE UP (ref 3.8–10.5)
WBC # FLD AUTO: 9.22 K/UL — SIGNIFICANT CHANGE UP (ref 3.8–10.5)

## 2021-12-09 PROCEDURE — 85025 COMPLETE CBC W/AUTO DIFF WBC: CPT

## 2021-12-09 PROCEDURE — 74176 CT ABD & PELVIS W/O CONTRAST: CPT | Mod: MA

## 2021-12-09 PROCEDURE — 36415 COLL VENOUS BLD VENIPUNCTURE: CPT

## 2021-12-09 PROCEDURE — 83690 ASSAY OF LIPASE: CPT

## 2021-12-09 PROCEDURE — 74176 CT ABD & PELVIS W/O CONTRAST: CPT | Mod: 26,MA

## 2021-12-09 PROCEDURE — 85610 PROTHROMBIN TIME: CPT

## 2021-12-09 PROCEDURE — 99285 EMERGENCY DEPT VISIT HI MDM: CPT

## 2021-12-09 PROCEDURE — 99215 OFFICE O/P EST HI 40 MIN: CPT

## 2021-12-09 PROCEDURE — 96374 THER/PROPH/DIAG INJ IV PUSH: CPT

## 2021-12-09 PROCEDURE — 85730 THROMBOPLASTIN TIME PARTIAL: CPT

## 2021-12-09 PROCEDURE — 80053 COMPREHEN METABOLIC PANEL: CPT

## 2021-12-09 PROCEDURE — 99284 EMERGENCY DEPT VISIT MOD MDM: CPT | Mod: 25

## 2021-12-09 RX ORDER — SODIUM CHLORIDE 9 MG/ML
1000 INJECTION INTRAMUSCULAR; INTRAVENOUS; SUBCUTANEOUS ONCE
Refills: 0 | Status: COMPLETED | OUTPATIENT
Start: 2021-12-09 | End: 2021-12-09

## 2021-12-09 RX ORDER — ONDANSETRON 8 MG/1
4 TABLET, FILM COATED ORAL ONCE
Refills: 0 | Status: COMPLETED | OUTPATIENT
Start: 2021-12-09 | End: 2021-12-09

## 2021-12-09 RX ADMIN — ONDANSETRON 4 MILLIGRAM(S): 8 TABLET, FILM COATED ORAL at 11:13

## 2021-12-09 RX ADMIN — SODIUM CHLORIDE 1000 MILLILITER(S): 9 INJECTION INTRAMUSCULAR; INTRAVENOUS; SUBCUTANEOUS at 11:12

## 2021-12-09 NOTE — ED ADULT TRIAGE NOTE - ADDITIONAL SAFETY/BANDS...
Hpi Title: Evaluation of Skin Lesions How Severe Are Your Spot(S)?: mild Have Your Spot(S) Been Treated In The Past?: has not been treated Additional Safety/Bands:

## 2021-12-09 NOTE — ED PROVIDER NOTE - NSICDXPASTMEDICALHX_GEN_ALL_CORE_FT
PAST MEDICAL HISTORY:  Diabetes denies a past medical problem    History of abdominal pain     Hypertension     Hypothyroid     Sleep apnea

## 2021-12-09 NOTE — ED ADULT NURSE NOTE - NSFALLRSKPASTHIST_ED_ALL_ED
Documentation entered by Larry Tsai SCRIBE, acting as scribe for Paula Sanchez MD.








Paula Sanchez MD:  This documentation has been prepared by the Eulalio oreilly Nirvannie, SCRIBE, under my direction and personally reviewed by me in its 

entirety.  I confirm that the documentation accurately reflects all work, 

treatment, procedures, and medical decision making performed by me.  





Attending Attestation





- Resident


Resident Name: Mario Virgen





- ED Attending Attestation


I have performed the following: I have examined & evaluated the patient, The 

case was reviewed & discussed with the resident, I agree w/resident's findings 

& plan, Exceptions are as noted





- HPI


HPI: 





06/19/19 17:45


The patient is a 55 year old female, with a significant past medical history of 

HLD, who presents to the emergency department with, 2 days of intermittent, 

sharp, 7/10, pleuritic sternal chest pain. Pain began while pt was standing at 

work yesterday. Pain comes and goes and is not present currently. Denies 

associated N/V/D, diapheresis, dizziness, weakness/numbness, SOB, focal weakness

/numbness. Denies exogenous hormone use. Denies recent travel/immobility. 

Denies calf tenderness/LE edema. Denies personal or family hx blood clots. 

Reports history of cardiac disease in her grandmother in her 80s, no other 

family hx.  





She denies recent fevers, chills, cough, headache. She denies constipation. She 

denies recent  dysuria, frequency, urgency or hematuria. 





Allergies: NKDA 


Past surgical history: None reported.


Social history: Social alcohol. Nonsmoker. Denies recreational drug use. 


Primary Care Physician: Dr. Miller











- Physicial Exam


PE: 





06/19/19 17:45


GENERAL: Awake, alert, and fully oriented, in no acute distress


HEAD: No signs of trauma


EYES: PERRLA, EOMI, sclera anicteric, conjunctiva clear


ENT: Oropharynx clear without exudates. Moist mucosa


NECK: Normal ROM, supple, no lymphadenopathy, JVD, or masses


LUNGS: Breath sounds equal, clear to auscultation bilaterally. No wheezes, and 

no crackles


HEART: Regular rate and rhythm, normal S1 and S2, no murmurs, rubs or gallops


ABDOMEN: Soft, nontender, normoactive bowel sounds. No guarding, no rebound. No 

masses


EXTREMITIES: Normal range of motion, no edema. No clubbing or cyanosis. No cords

, erythema, or tenderness. WWP. 2+ peripheral pulses. 


BACK: No midline spinal tenderness in cervical/thoracic/lumbar region.


NEUROLOGICAL: Normal speech, cranial nerves intact, equal strength and 

sensation b/l


SKIN: Warm, Dry, normal turgor, no rashes or lesions noted.











- Medical Decision Making





06/19/19 18:45


54yo F presents to the ED sternal pleuritic CP, now resolved


Vitals on arrival with elevated BP, on my recheck 134/88


Exam wnl


EKG non ischemic


Pt is very well appearing


DDx includes but not limited to: ACS vs PE vs MSK pain vs PNA


HS is 2 and thus will do trop x2, CXR to eval for ACS


Pt is low risk by jorge, will send dimer to eval for PE given pleuritic nature 

of pain.





06/19/19 19:01


Trop neg


dimer +


CTA ordered


Pt continues to be chest pain free


Will sign out to overnight attending for f/u on further diagnostics, management

, and dispo





**Heart Score/ECG Review





- History


History: Slightly suspicious





- Electrocardiogram


EKG: Normal





- Age


Age: 45-65





- Risk Factors


Based on the list above the patient has:: 1-2 risk factors





- Troponin


Troponin: </= normal limit





- Score


Heart Score - Total: 2


  ** #1





06/19/19 18:46


Twelve-lead EKG was performed and reviewed by me. Normal sinus rhythm, rate 63. 

Normal axis and intervals. No ST elevations or T-wave inversions. no

## 2021-12-09 NOTE — ED PROVIDER NOTE - PATIENT PORTAL LINK FT
You can access the FollowMyHealth Patient Portal offered by [facility] by registering at the following website: [email or web address]. By joining "[employer]’s FollowMyHealth portal, you will also be able to view your health information using other applications (apps) compatible with our system.

## 2021-12-09 NOTE — ED PROVIDER NOTE - ATTENDING CONTRIBUTION TO CARE
Halie with [facility] Garland. 60 yo female,  hx gastric bypass,  htn, diabetes, hypothyroidism, comes to the ED co abdominal pain.   States has had the  lower abd pain for the past 2-3 weeks becoming more frequent and intense over the past few days.    Saw dr Fuller today who sent her to the ED for CT.    Admits to feeling some nausea.  Denies any fevers, chills, vomiting, diarrhea, constipation, urinary complaints, blood in the stool,  or any other symptoms.  Abd soft, + tender lower abd,  will check labs, urine, give ivf, zofran, get CT and re-eval    Labs and CT / Urine all WNL. Copy of results given.  Discussed glucose 244, and Alk Phos/AST/ALT w/ slight elevation. Pt states it has been a while since last colonoscopy  Advised to f/u with PCP and to ensure all screening tests are up to date. Worsening, continued or ANY new concerning symptoms return to the emergency department.    I performed a face to face bedside interview with patient regarding history of present illness, review of symptoms and past medical history. I completed an independent physical exam.  I have discussed the patient's plan of care with Physician Assistant (PA). I agree with note as stated above, having amended the EMR as needed to reflect my findings.   This includes History of Present Illness, HIV, Past Medical/Surgical/Family/Social History, Allergies and Home Medications, Review of Systems, Physical Exam, and any Progress Notes during the time I functioned as the attending physician for this patient.

## 2021-12-09 NOTE — ED ADULT NURSE NOTE - NSIMPLEMENTINTERV_GEN_ALL_ED
Implemented All Universal Safety Interventions:  [place] to call system. Call bell, personal items and telephone within reach. Instruct patient to call for assistance. Room bathroom lighting operational. Non-slip footwear when patient is off stretcher. Physically safe environment: no spills, clutter or unnecessary equipment. Stretcher in lowest position, wheels locked, appropriate side rails in place.

## 2021-12-09 NOTE — ED PROVIDER NOTE - OBJECTIVE STATEMENT
60 yo female,  hx gastric bypass,  htn, diabetes, hypothyroidism, comes to the ED co abdominal pain.   States has had the  lower abd pain for the past 2-3 weeks becoming more frequent and intense over the past few days.    Saw dr Fuller today who sent her to the ED for CT.    Admits to feeling some nausea.  Denies any fevers, chills, vomiting, diarrhea, constipation, urinary complaints, blood in the stool,  or any other symptoms.

## 2021-12-09 NOTE — ED PROVIDER NOTE - CLINICAL SUMMARY MEDICAL DECISION MAKING FREE TEXT BOX
62 yo female,  hx gastric bypass,  htn, diabetes, hypothyroidism, comes to the ED co abdominal pain.   States has had the  lower abd pain for the past 2-3 weeks becoming more frequent and intense over the past few days.    Saw dr Fuller today who sent her to the ED for CT.    Admits to feeling some nausea.  Denies any fevers, chills, vomiting, diarrhea, constipation, urinary complaints, blood in the stool,  or any other symptoms.  Abd soft, + tender lower abd,  will check labs, urine, give ivf, zofran, get CT and re-eval 62 yo female,  hx gastric bypass,  htn, diabetes, hypothyroidism, comes to the ED co abdominal pain.   States has had the  lower abd pain for the past 2-3 weeks becoming more frequent and intense over the past few days.    Saw dr Fuller today who sent her to the ED for CT.    Admits to feeling some nausea.  Denies any fevers, chills, vomiting, diarrhea, constipation, urinary complaints, blood in the stool,  or any other symptoms.  Abd soft, + tender lower abd,  will check labs, urine, give ivf, zofran, get CT and re-eval    Labs and CT / Urine all WNL. Copy of results given.  Discussed glucose 244, and Alk Phos/AST/ALT w/ slight elevation. Pt states it has been a while since last colonoscopy  Advised to f/u with PCP and to ensure all screening tests are up to date. Worsening, continued or ANY new concerning symptoms return to the emergency department.

## 2021-12-09 NOTE — ED PROVIDER NOTE - CARE PROVIDER_API CALL
Simone León  GASTROENTEROLOGY  [address] [place] [address], [address]  [place], [place] [id number]  Phone: [phone]  Fax: [phone]  Follow Up Time:

## 2021-12-14 ENCOUNTER — NON-APPOINTMENT (OUTPATIENT)
Age: 61
End: 2021-12-14

## 2021-12-14 PROBLEM — Z87.898 PERSONAL HISTORY OF OTHER SPECIFIED CONDITIONS: Chronic | Status: ACTIVE | Noted: 2021-12-09

## 2021-12-14 NOTE — REVIEW OF SYSTEMS
[Fever] : no fever [Chills] : no chills [Fatigue] : fatigue [Hot Flashes] : no hot flashes [Night Sweats] : no night sweats [Recent Change In Weight] : ~T no recent weight change [Discharge] : no discharge [Pain] : no pain [Redness] : no redness [Dryness] : no dryness  [Vision Problems] : no vision problems [Itching] : no itching [Earache] : no earache [Hearing Loss] : no hearing loss [Nosebleed] : no nosebleeds [Hoarseness] : no hoarseness [Nasal Discharge] : no nasal discharge [Sore Throat] : no sore throat [Postnasal Drip] : no postnasal drip [Chest Pain] : no chest pain [Palpitations] : no palpitations [Leg Claudication] : no leg claudication [Lower Ext Edema] : lower extremity edema [Orthopnea] : no orthopnea [Paroxysmal Nocturnal Dyspnea] : no paroxysmal nocturnal dyspnea [Shortness Of Breath] : no shortness of breath [Wheezing] : no wheezing [Cough] : no cough [Dyspnea on Exertion] : dyspnea on exertion [Abdominal Pain] : no abdominal pain [Nausea] : no nausea [Constipation] : no constipation [Diarrhea] : diarrhea [Vomiting] : no vomiting [Heartburn] : no heartburn [Melena] : no melena [Dysuria] : no dysuria [Incontinence] : no incontinence [Nocturia] : nocturia [Poor Libido] : libido not poor [Hematuria] : no hematuria [Frequency] : no frequency [Vaginal Discharge] : no vaginal discharge [Dysmenorrhea] : no dysmenorrhea [Joint Pain] : joint pain [Joint Stiffness] : joint stiffness [Joint Swelling] : no joint swelling [Muscle Weakness] : no muscle weakness [Muscle Pain] : no muscle pain [Back Pain] : back pain [Itching] : no itching [Mole Changes] : no mole changes [Nail Changes] : no nail changes [Hair Changes] : no hair changes [Skin Rash] : no skin rash [Headache] : no headache [Dizziness] : no dizziness [Fainting] : no fainting [Confusion] : no confusion [Memory Loss] : no memory loss [Unsteady Walking] : no ataxia [Suicidal] : not suicidal [Insomnia] : insomnia [Anxiety] : anxiety [Depression] : depression [Easy Bleeding] : no easy bleeding [Easy Bruising] : no easy bruising [Swollen Glands] : no swollen glands [Negative] : Heme/Lymph [FreeTextEntry9] : knees

## 2021-12-14 NOTE — HEALTH RISK ASSESSMENT
[Never] : Never [No] : No [No falls in past year] : Patient reported no falls in the past year [0] : 1) Little interest or pleasure doing things: Not at all (0) [1] : 2) Feeling down, depressed, or hopeless for several days (1) [PHQ-2 Negative - No further assessment needed] : PHQ-2 Negative - No further assessment needed [[id number]] : 1

## 2021-12-14 NOTE — PHYSICAL EXAM
[No Acute Distress] : no acute distress [Well Nourished] : well nourished [Well Developed] : well developed [Well-Appearing] : well-appearing [Normal Voice/Communication] : normal voice/communication [Normal Sclera/Conjunctiva] : normal sclera/conjunctiva [PERRL] : pupils equal round and reactive to light [EOMI] : extraocular movements intact [Normal Outer Ear/Nose] : the outer ears and nose were normal in appearance [Normal Oropharynx] : the oropharynx was normal [Normal TMs] : both tympanic membranes were normal [Normal Nasal Mucosa] : the nasal mucosa was normal [No JVD] : no jugular venous distention [No Lymphadenopathy] : no lymphadenopathy [Supple] : supple [Thyroid Normal, No Nodules] : the thyroid was normal and there were no nodules present [No Respiratory Distress] : no respiratory distress  [No Accessory Muscle Use] : no accessory muscle use [Clear to Auscultation] : lungs were clear to auscultation bilaterally [Normal Percussion] : the chest was normal to percussion [Normal Rate] : normal rate  [Regular Rhythm] : with a regular rhythm [Normal S1, S2] : normal S1 and S2 [No Murmur] : no murmur heard [No Carotid Bruits] : no carotid bruits [No Abdominal Bruit] : a ~M bruit was not heard ~T in the abdomen [No Varicosities] : no varicosities [Pedal Pulses Present] : the pedal pulses are present [No Edema] : there was no peripheral edema [No Palpable Aorta] : no palpable aorta [No Extremity Clubbing/Cyanosis] : no extremity clubbing/cyanosis [Declined Breast Exam] : declined breast exam  [Soft] : abdomen soft [Non Tender] : non-tender [Non-distended] : non-distended [No Masses] : no abdominal mass palpated [No HSM] : no HSM [Normal Bowel Sounds] : normal bowel sounds [No Hernias] : no hernias [Declined Rectal Exam] : declined rectal exam [Normal Supraclavicular Nodes] : no supraclavicular lymphadenopathy [Normal Axillary Nodes] : no axillary lymphadenopathy [Normal Posterior Cervical Nodes] : no posterior cervical lymphadenopathy [Normal Anterior Cervical Nodes] : no anterior cervical lymphadenopathy [Normal Inguinal Nodes] : no inguinal lymphadenopathy [Normal Femoral Nodes] : no femoral lymphadenopathy [No CVA Tenderness] : no CVA  tenderness [No Spinal Tenderness] : no spinal tenderness [Kyphosis] : no kyphosis [Scoliosis] : no scoliosis [No Joint Swelling] : no joint swelling [Grossly Normal Strength/Tone] : grossly normal strength/tone [No Rash] : no rash [Acne] : no acne [Coordination Grossly Intact] : coordination grossly intact [No Focal Deficits] : no focal deficits [Normal Gait] : normal gait [Deep Tendon Reflexes (DTR)] : deep tendon reflexes were 2+ and symmetric [Speech Grossly Normal] : speech grossly normal [Memory Grossly Normal] : memory grossly normal [Normal Affect] : the affect was normal [Alert and Oriented x3] : oriented to person, place, and time [Normal Mood] : the mood was normal [Normal Insight/Judgement] : insight and judgment were intact

## 2021-12-14 NOTE — HISTORY OF PRESENT ILLNESS
[FreeTextEntry1] : 61-year-old woman comes to the office for follow-up to review her medication and discuss her overall health and issues are her obesity and shortness of breath on exertion [de-identified] : Comes to the office for follow-up with a history of morbid obesity hypertension hypothyroidism previous pulmonary embolism obstructive sleep apnea type 2 diabetes depression exertional dyspnea patient has chronic fatigue denies temperature chills sweats or myalgias has had no headache sinus congestion sore throat cough wheezing pleurisy chest pain shortness of breath at rest she does experience exertional dyspnea with mild to moderate exertion this is chronic she denies lightheadedness palpitations dizziness vertigo or syncope she denies abdominal pain nausea vomiting diarrhea constipation bright red blood per rectum or black stools her appetite has been good her weight has been stable she does get up at night to urinate and urinates frequently but denies dysuria or gross hematuria she has chronic leg edema has pains in her back and knees she denies skin rashes she has episodes of anxiety and depression and has been sleeping erratically

## 2021-12-14 NOTE — ASSESSMENT
[FreeTextEntry1] : Physical exam shows a well-developed woman in no acute distress blood pressure 138/80 height 5 feet 5 inches weight 307 pounds BMI 51.09 temperature 97.7 °F heart rate of 88 respirations 16 HEENT was unremarkable chest was clear cardiovascular exam was regular with no extra heart sounds or murmurs abdomen was soft and nontender extremities showed no clubbing cyanosis or edema neurologic exam was nonfocal patient had complete blood test in August 2021 patient was reminded of the need for a mammogram bone density and breast ultrasound she will make an appointment for complete physical and we will make arrangements for these she has received the primary Covid vaccines and will be considering a booster this was strongly encouraged she stays up-to-date with her influenza vaccine Prevnar 13 Pneumovax 23 and has received the Tdap vaccine in 2012 patient was reminded to consider the Shingrix vaccine Long discussion with the patient concerning her obesity portion management carbohydrate restriction exercising proper selections and minimizing snacking was strongly advised patient is weight has produced a worsening of her discomfort in her knees and back patient's blood pressure was adequately controlled at the present visit patient's last hemoglobin A1c in August 2021 was 7.3% in her emotional state is stable

## 2021-12-15 ENCOUNTER — RX RENEWAL (OUTPATIENT)
Age: 61
End: 2021-12-15

## 2021-12-20 ENCOUNTER — NON-APPOINTMENT (OUTPATIENT)
Age: 61
End: 2021-12-20

## 2021-12-20 ENCOUNTER — APPOINTMENT (OUTPATIENT)
Dept: OBGYN | Facility: CLINIC | Age: 61
End: 2021-12-20
Payer: COMMERCIAL

## 2021-12-20 VITALS
SYSTOLIC BLOOD PRESSURE: 124 MMHG | OXYGEN SATURATION: 98 % | BODY MASS INDEX: 48.82 KG/M2 | TEMPERATURE: 97.3 F | DIASTOLIC BLOOD PRESSURE: 80 MMHG | HEIGHT: 65 IN | WEIGHT: 293 LBS | HEART RATE: 95 BPM

## 2021-12-20 DIAGNOSIS — B35.4 TINEA CORPORIS: ICD-10-CM

## 2021-12-20 PROCEDURE — 99396 PREV VISIT EST AGE 40-64: CPT

## 2021-12-20 NOTE — HISTORY OF PRESENT ILLNESS
[N] : Patient is not sexually active [FreeTextEntry1] : Check up  S/P fall Referred by Dr Marie to  ED on 11/9/21 C/O persistent RLQ and suprapubic pain  S/P myomectomy and LSO years ago Denies vaginal Sx  + Hx for chronic urinary incontinence [PGHxTotal] : 0

## 2021-12-26 LAB — HPV HIGH+LOW RISK DNA PNL CVX: NOT DETECTED

## 2022-01-03 ENCOUNTER — RX RENEWAL (OUTPATIENT)
Age: 62
End: 2022-01-03

## 2022-01-04 ENCOUNTER — OUTPATIENT (OUTPATIENT)
Dept: OUTPATIENT SERVICES | Facility: HOSPITAL | Age: 62
LOS: 1 days | End: 2022-01-04
Payer: COMMERCIAL

## 2022-01-04 DIAGNOSIS — Z20.828 CONTACT WITH AND (SUSPECTED) EXPOSURE TO OTHER VIRAL COMMUNICABLE DISEASES: ICD-10-CM

## 2022-01-04 PROCEDURE — U0003: CPT

## 2022-01-04 PROCEDURE — U0005: CPT

## 2022-01-05 DIAGNOSIS — B37.3 CANDIDIASIS OF VULVA AND VAGINA: ICD-10-CM

## 2022-01-05 LAB — CYTOLOGY CVX/VAG DOC THIN PREP: ABNORMAL

## 2022-01-28 ENCOUNTER — RX RENEWAL (OUTPATIENT)
Age: 62
End: 2022-01-28

## 2022-02-07 ENCOUNTER — RX RENEWAL (OUTPATIENT)
Age: 62
End: 2022-02-07

## 2022-02-22 LAB
INR PPP: 2.43 RATIO
PT BLD: 27.7 SEC

## 2022-02-28 ENCOUNTER — RX RENEWAL (OUTPATIENT)
Age: 62
End: 2022-02-28

## 2022-03-05 ENCOUNTER — RX RENEWAL (OUTPATIENT)
Age: 62
End: 2022-03-05

## 2022-03-08 NOTE — ED ADULT NURSE NOTE - PAIN RATING/NUMBER SCALE (0-10): ACTIVITY
5 Doxepin Counseling:  Patient advised that the medication is sedating and not to drive a car after taking this medication. Patient informed of potential adverse effects including but not limited to dry mouth, urinary retention, and blurry vision.  The patient verbalized understanding of the proper use and possible adverse effects of doxepin.  All of the patient's questions and concerns were addressed.

## 2022-03-12 ENCOUNTER — RX RENEWAL (OUTPATIENT)
Age: 62
End: 2022-03-12

## 2022-03-28 LAB
INR PPP: 2.41 RATIO
PT BLD: 28.7 SEC

## 2022-03-31 ENCOUNTER — RX RENEWAL (OUTPATIENT)
Age: 62
End: 2022-03-31

## 2022-04-08 ENCOUNTER — APPOINTMENT (OUTPATIENT)
Dept: INTERNAL MEDICINE | Facility: CLINIC | Age: 62
End: 2022-04-08
Payer: COMMERCIAL

## 2022-04-08 VITALS
DIASTOLIC BLOOD PRESSURE: 92 MMHG | HEART RATE: 95 BPM | HEIGHT: 65 IN | RESPIRATION RATE: 18 BRPM | BODY MASS INDEX: 48.82 KG/M2 | OXYGEN SATURATION: 97 % | TEMPERATURE: 97.5 F | SYSTOLIC BLOOD PRESSURE: 144 MMHG | WEIGHT: 293 LBS

## 2022-04-08 DIAGNOSIS — G47.30 SLEEP APNEA, UNSPECIFIED: ICD-10-CM

## 2022-04-08 PROCEDURE — 99215 OFFICE O/P EST HI 40 MIN: CPT

## 2022-04-09 ENCOUNTER — LABORATORY RESULT (OUTPATIENT)
Age: 62
End: 2022-04-09

## 2022-04-15 PROBLEM — G47.30 SLEEP APNEA, UNSPECIFIED TYPE: Status: ACTIVE | Noted: 2017-03-28

## 2022-04-15 NOTE — ASSESSMENT
[FreeTextEntry1] : Physical examination shows a well-developed obese woman in no acute distress blood pressure 144/92 pulse of 95 respirations 16 height 5 foot 5 inches weight 301 pounds BMI 50.09 HEENT was unremarkable chest was clear cardiovascular exam was regular with no extra heart sounds or murmurs abdomen was soft and nontender extremities show edema neurologic exam was nonfocal on discussion with the patient concerning her obesity and the medical ramifications weight watchers dietitians was strongly advised she realizes that the amount of weight she needs to use may be difficult without a program of reinforcement a slip was given for complete blood test including CBC full chemistries lipid profile thyroid profile urine analysis CRP hemoglobin A1c vitamins B12 and D3 COVID-19 nuclear capsid and spike domain antibodies she was advised to receive both boosters for the COVID-19 virus she is up-to-date with her Prevnar 13 and Pneumovax 23 and was strongly advised to receive the Shingrix vaccine patient recently saw her gynecologist is scheduled over the next months to have a mammogram patient was advised to have a bone density slip had been given to her in December 2021 at which time a slip was also given for mammography and breast ultrasound patient's blood pressure was adequately controlled at the present visit she has had minimal abdominal discomforts or nausea she feels she is in a good place emotionally which makes her more motivated to consider dieting her bowels have been moving well recently with the help of dietary fiber

## 2022-04-15 NOTE — HISTORY OF PRESENT ILLNESS
[FreeTextEntry1] : 62-year-old woman comes to the office for follow-up to review her medications and discuss her overall health chief complaint recently has been fatigue [de-identified] : Comes to the office for follow-up with a history of morbid obesity chronic constipation intermittent abdominal pain nausea  sleep apnea type 2 diabetes depression hypertension patient denies temperature chills sweats or myalgias has had no headache sinus congestion sore throat cough wheezing pleurisy chest pain shortness of breath at rest does have mild exertional dyspnea which is chronic denies lightheadedness palpitations dizziness vertigo or syncope has chronic constipation denies vomiting has intermittent nausea has had no diarrhea bright red blood per rectum or black stools appetite has been good weight has been elevated does have chronic leg edema denies skin rashes has chronic pain in her knees and back has bouts of anxiety and depression and her sleeping has been erratic

## 2022-04-15 NOTE — HEALTH RISK ASSESSMENT
[Never] : Never [No] : No [No falls in past year] : Patient reported no falls in the past year [0] : 2) Feeling down, depressed, or hopeless: Not at all (0) [PHQ-2 Negative - No further assessment needed] : PHQ-2 Negative - No further assessment needed [[id number]] : 0

## 2022-04-15 NOTE — REVIEW OF SYSTEMS
[Fatigue] : fatigue [Lower Ext Edema] : lower extremity edema [Dyspnea on Exertion] : dyspnea on exertion [Constipation] : constipation [Nocturia] : nocturia [Joint Pain] : joint pain [Joint Stiffness] : joint stiffness [Back Pain] : back pain [Insomnia] : insomnia [Anxiety] : anxiety [Depression] : depression [Negative] : Heme/Lymph [Fever] : no fever [Chills] : no chills [Hot Flashes] : no hot flashes [Night Sweats] : no night sweats [Recent Change In Weight] : ~T no recent weight change [Discharge] : no discharge [Pain] : no pain [Redness] : no redness [Dryness] : no dryness  [Vision Problems] : no vision problems [Itching] : no itching [Earache] : no earache [Hearing Loss] : no hearing loss [Nosebleed] : no nosebleeds [Hoarseness] : no hoarseness [Nasal Discharge] : no nasal discharge [Sore Throat] : no sore throat [Postnasal Drip] : no postnasal drip [Chest Pain] : no chest pain [Palpitations] : no palpitations [Leg Claudication] : no leg claudication [Orthopnea] : no orthopnea [Paroxysmal Nocturnal Dyspnea] : no paroxysmal nocturnal dyspnea [Shortness Of Breath] : no shortness of breath [Wheezing] : no wheezing [Cough] : no cough [Abdominal Pain] : no abdominal pain [Nausea] : nausea [Diarrhea] : diarrhea [Vomiting] : no vomiting [Heartburn] : no heartburn [Melena] : no melena [Dysuria] : no dysuria [Incontinence] : no incontinence [Poor Libido] : libido not poor [Hematuria] : no hematuria [Frequency] : no frequency [Vaginal Discharge] : no vaginal discharge [Dysmenorrhea] : no dysmenorrhea [Joint Swelling] : no joint swelling [Muscle Weakness] : no muscle weakness [Muscle Pain] : no muscle pain [Mole Changes] : no mole changes [Nail Changes] : no nail changes [Hair Changes] : no hair changes [Skin Rash] : no skin rash [Headache] : no headache [Dizziness] : no dizziness [Fainting] : no fainting [Confusion] : no confusion [Memory Loss] : no memory loss [Unsteady Walking] : no ataxia [Suicidal] : not suicidal [Easy Bleeding] : no easy bleeding [Easy Bruising] : no easy bruising [Swollen Glands] : no swollen glands [FreeTextEntry6] : mild [FreeTextEntry9] : knees

## 2022-05-01 ENCOUNTER — RX RENEWAL (OUTPATIENT)
Age: 62
End: 2022-05-01

## 2022-06-07 ENCOUNTER — RX RENEWAL (OUTPATIENT)
Age: 62
End: 2022-06-07

## 2022-06-15 LAB
25(OH)D3 SERPL-MCNC: 25.8 NG/ML
ALBUMIN SERPL ELPH-MCNC: 4.2 G/DL
ALP BLD-CCNC: 155 U/L
ALT SERPL-CCNC: 61 U/L
ANION GAP SERPL CALC-SCNC: 14 MMOL/L
APPEARANCE: CLEAR
AST SERPL-CCNC: 45 U/L
BASOPHILS # BLD AUTO: 0.09 K/UL
BASOPHILS NFR BLD AUTO: 0.9 %
BILIRUB SERPL-MCNC: 0.8 MG/DL
BILIRUBIN URINE: NEGATIVE
BLOOD URINE: NEGATIVE
BUN SERPL-MCNC: 12 MG/DL
CALCIUM SERPL-MCNC: 10.2 MG/DL
CHLORIDE SERPL-SCNC: 97 MMOL/L
CHOLEST SERPL-MCNC: 185 MG/DL
CO2 SERPL-SCNC: 26 MMOL/L
COLOR: YELLOW
CREAT SERPL-MCNC: 0.61 MG/DL
CRP SERPL HS-MCNC: 3.69 MG/L
EGFR: 102 ML/MIN/1.73M2
EOSINOPHIL # BLD AUTO: 0.01 K/UL
EOSINOPHIL NFR BLD AUTO: 0.1 %
ESTIMATED AVERAGE GLUCOSE: 197 MG/DL
FERRITIN SERPL-MCNC: 54 NG/ML
GLUCOSE BS SERPL-MCNC: 186 MG/DL
GLUCOSE QUALITATIVE U: NEGATIVE
GLUCOSE SERPL-MCNC: 186 MG/DL
HBA1C MFR BLD HPLC: 8.5 %
HCT VFR BLD CALC: 43.2 %
HDLC SERPL-MCNC: 38 MG/DL
HGB BLD-MCNC: 13.4 G/DL
IMM GRANULOCYTES NFR BLD AUTO: 0.3 %
INR PPP: 1.66 RATIO
INR PPP: 2.06 RATIO
INR PPP: 2.22 RATIO
IRON SATN MFR SERPL: 30 %
IRON SERPL-MCNC: 100 UG/DL
KETONES URINE: NEGATIVE
LDLC SERPL CALC-MCNC: 107 MG/DL
LEUKOCYTE ESTERASE URINE: NEGATIVE
LYMPHOCYTES # BLD AUTO: 2.88 K/UL
LYMPHOCYTES NFR BLD AUTO: 28.9 %
MAN DIFF?: NORMAL
MCHC RBC-ENTMCNC: 27.8 PG
MCHC RBC-ENTMCNC: 31 GM/DL
MCV RBC AUTO: 89.6 FL
MONOCYTES # BLD AUTO: 0.67 K/UL
MONOCYTES NFR BLD AUTO: 6.7 %
NEUTROPHILS # BLD AUTO: 6.27 K/UL
NEUTROPHILS NFR BLD AUTO: 63.1 %
NITRITE URINE: NEGATIVE
NONHDLC SERPL-MCNC: 147 MG/DL
PH URINE: 8
PLATELET # BLD AUTO: 460 K/UL
POTASSIUM SERPL-SCNC: 4.4 MMOL/L
PROT SERPL-MCNC: 6.3 G/DL
PROTEIN URINE: ABNORMAL
PT BLD: 19.7 SEC
PT BLD: 24.5 SEC
PT BLD: 26.2 SEC
RBC # BLD: 4.82 M/UL
RBC # FLD: 15.7 %
SODIUM SERPL-SCNC: 137 MMOL/L
SPECIFIC GRAVITY URINE: 1.03
T4 FREE SERPL-MCNC: 1.4 NG/DL
TIBC SERPL-MCNC: 336 UG/DL
TRIGL SERPL-MCNC: 202 MG/DL
TSH SERPL-ACNC: 1.87 UIU/ML
TTG IGA SER IA-ACNC: <1.2 U/ML
TTG IGA SER-ACNC: NEGATIVE
TTG IGG SER IA-ACNC: <1.2 U/ML
TTG IGG SER IA-ACNC: NEGATIVE
UIBC SERPL-MCNC: 237 UG/DL
UROBILINOGEN URINE: ABNORMAL
VIT B12 SERPL-MCNC: >2000 PG/ML
WBC # FLD AUTO: 9.95 K/UL

## 2022-06-16 ENCOUNTER — RX RENEWAL (OUTPATIENT)
Age: 62
End: 2022-06-16

## 2022-06-19 ENCOUNTER — RX RENEWAL (OUTPATIENT)
Age: 62
End: 2022-06-19

## 2022-06-29 ENCOUNTER — LABORATORY RESULT (OUTPATIENT)
Age: 62
End: 2022-06-29

## 2022-07-06 ENCOUNTER — RX RENEWAL (OUTPATIENT)
Age: 62
End: 2022-07-06

## 2022-07-23 ENCOUNTER — LABORATORY RESULT (OUTPATIENT)
Age: 62
End: 2022-07-23

## 2022-07-31 ENCOUNTER — RX RENEWAL (OUTPATIENT)
Age: 62
End: 2022-07-31

## 2022-08-10 ENCOUNTER — LABORATORY RESULT (OUTPATIENT)
Age: 62
End: 2022-08-10

## 2022-08-12 NOTE — PROGRESS NOTE ADULT - SUBJECTIVE AND OBJECTIVE BOX
The skin of the right groin and RIGHT RADIAL was clipped, prepped and draped in the usual sterile manner. (If not otherwise specified, skin prep was bilateral.)  Patient is a 59y old  Female who presents with a chief complaint of shortness of breath with ambulation (17 May 2019 18:55)      INTERVAL HPI/OVERNIGHT EVENTS: comfortable at rest      REVIEW OF SYSTEMS:  CONSTITUTIONAL: No fever, weight loss, or fatigue  EYES: No eye pain, visual disturbances, or discharge  ENMT:  No difficulty hearing, tinnitus, vertigo; No sinus or throat pain  NECK: No pain or stiffness  BREASTS: No pain, masses, or nipple discharge  RESPIRATORY: No cough, wheezing, chills or hemoptysis; No shortness of breath  CARDIOVASCULAR: No chest pain, palpitations, dizziness, or leg swelling  GASTROINTESTINAL: No abdominal or epigastric pain. No nausea, vomiting, or hematemesis; No diarrhea or constipation. No melena or hematochezia.  GENITOURINARY: No dysuria, frequency, hematuria, or incontinence  NEUROLOGICAL: No headaches, memory loss, loss of strength, numbness, or tremors  SKIN: No itching, burning, rashes, or lesions   LYMPH NODES: No enlarged glands  ENDOCRINE: No heat or cold intolerance; No hair loss  MUSCULOSKELETAL: No joint pain or swelling; No muscle, back, or extremity pain  PSYCHIATRIC: No depression, anxiety, mood swings, or difficulty sleeping  HEME/LYMPH: No easy bruising, or bleeding gums  ALLERY AND IMMUNOLOGIC: No hives or eczema  FAMILY HISTORY:    T(C): 37 (05-18-19 @ 07:39), Max: 37 (05-18-19 @ 06:00)  HR: 80 (05-18-19 @ 07:39) (79 - 100)  BP: 144/87 (05-18-19 @ 07:39) (118/64 - 156/80)  RR: 15 (05-18-19 @ 07:39) (15 - 18)  SpO2: 97% (05-18-19 @ 07:39) (96% - 99%)  Wt(kg): --Vital Signs Last 24 Hrs  T(C): 37 (18 May 2019 07:39), Max: 37 (18 May 2019 06:00)  T(F): 98.6 (18 May 2019 07:39), Max: 98.6 (18 May 2019 06:00)  HR: 80 (18 May 2019 07:39) (79 - 100)  BP: 144/87 (18 May 2019 07:39) (118/64 - 156/80)  BP(mean): --  RR: 15 (18 May 2019 07:39) (15 - 18)  SpO2: 97% (18 May 2019 07:39) (96% - 99%)    T(F): 98.6 (05-18-19 @ 07:39), Max: 98.6 (05-18-19 @ 06:00)  HR: 80 (05-18-19 @ 07:39) (79 - 100)  BP: 144/87 (05-18-19 @ 07:39) (118/64 - 156/80)  RR: 15 (05-18-19 @ 07:39) (15 - 18)  SpO2: 97% (05-18-19 @ 07:39) (96% - 99%)    PHYSICAL EXAM:  GENERAL: NAD, well-groomed, well-developed  HEAD:  Atraumatic, Normocephalic  EYES: EOMI, PERRLA, conjunctiva and sclera clear  ENMT: No tonsillar erythema, exudates, or enlargement; Moist mucous membranes, Good dentition, No lesions  NECK: Supple, No JVD, Normal thyroid  NERVOUS SYSTEM:  Alert & Oriented X3, Good concentration; Motor Strength 5/5 B/L upper and lower extremities; DTRs 2+ intact and symmetric  CHEST/LUNG: Clear to percussion bilaterally; No rales, rhonchi, wheezing, or rubs  HEART: Regular rate and rhythm; No murmurs, rubs, or gallops  ABDOMEN: Soft, Nontender, Nondistended; Bowel sounds present  EXTREMITIES:  2+ Peripheral Pulses, No clubbing, cyanosis, or edema  LYMPH: No lymphadenopathy noted  SKIN: No rashes or lesions    Consultant(s) Notes Reviewed:  [x ] YES  [ ] NO  Care Discussed with Consultants/Other Providers [ x] YES  [ ] NO    LABS:  05-18    140  |  102  |  11  ----------------------------<  183<H>  3.4<L>   |  28  |  0.63    Ca    9.3      18 May 2019 07:35    TPro  6.6  /  Alb  3.1<L>  /  TBili  0.7  /  DBili  x   /  AST  55<H>  /  ALT  66<H>  /  AlkPhos  120  05-17                          11.6   9.70  )-----------( 475      ( 18 May 2019 07:49 )             36.3         PT/INR - ( 17 May 2019 13:25 )   PT: 10.1 sec;   INR: 0.90 ratio         PTT - ( 17 May 2019 13:25 )  PTT:25.1 sec  LIVER FUNCTIONS - ( 17 May 2019 13:25 )  Alb: 3.1 g/dL / Pro: 6.6 g/dL / ALK PHOS: 120 U/L / ALT: 66 U/L DA / AST: 55 U/L / GGT: x           CARDIAC MARKERS ( 17 May 2019 13:25 )  .019 ng/mL / x     / x     / x     / x                       11.6   9.70  )-----------( 475      ( 05-18 @ 07:49 )             36.3                11.0   8.42  )-----------( 441      ( 05-17 @ 13:25 )             35.1               RADIOLOGY & ADDITIONAL TESTS:    Imaging Personally Reviewed:  [ ] YES  [ ] NO  acetaminophen   Tablet .. 650 milliGRAM(s) Oral every 6 hours PRN  dextrose 40% Gel 15 Gram(s) Oral once PRN  dextrose 5%. 1000 milliLiter(s) IV Continuous <Continuous>  dextrose 50% Injectable 12.5 Gram(s) IV Push once  dextrose 50% Injectable 25 Gram(s) IV Push once  dextrose 50% Injectable 25 Gram(s) IV Push once  enoxaparin Injectable 40 milliGRAM(s) SubCutaneous every 12 hours  glucagon  Injectable 1 milliGRAM(s) IntraMuscular once PRN  hydrochlorothiazide 25 milliGRAM(s) Oral daily  insulin lispro (HumaLOG) corrective regimen sliding scale   SubCutaneous three times a day before meals  insulin lispro (HumaLOG) corrective regimen sliding scale   SubCutaneous at bedtime  levothyroxine 75 MICROGram(s) Oral daily  lisinopril 40 milliGRAM(s) Oral daily      HEALTH ISSUES - PROBLEM Dx:  Diabetes: Diabetes  Sleep apnea: Sleep apnea  Hypothyroid: Hypothyroid  Essential hypertension: Essential hypertension  Dyspnea on exertion: Dyspnea on exertion

## 2022-08-15 ENCOUNTER — APPOINTMENT (OUTPATIENT)
Dept: INTERNAL MEDICINE | Facility: CLINIC | Age: 62
End: 2022-08-15

## 2022-08-15 VITALS
BODY MASS INDEX: 48.82 KG/M2 | HEART RATE: 89 BPM | OXYGEN SATURATION: 96 % | HEIGHT: 65 IN | WEIGHT: 293 LBS | RESPIRATION RATE: 18 BRPM | SYSTOLIC BLOOD PRESSURE: 136 MMHG | DIASTOLIC BLOOD PRESSURE: 82 MMHG | TEMPERATURE: 97.8 F

## 2022-08-15 DIAGNOSIS — R11.0 NAUSEA: ICD-10-CM

## 2022-08-15 PROCEDURE — 99215 OFFICE O/P EST HI 40 MIN: CPT

## 2022-08-15 RX ORDER — ESOMEPRAZOLE MAGNESIUM 40 MG/1
40 CAPSULE, DELAYED RELEASE ORAL
Refills: 0 | Status: DISCONTINUED | COMMUNITY
Start: 2020-11-03 | End: 2022-08-15

## 2022-08-15 RX ORDER — OMEPRAZOLE 40 MG/1
40 CAPSULE, DELAYED RELEASE ORAL
Qty: 90 | Refills: 1 | Status: ACTIVE | COMMUNITY
Start: 2022-08-15

## 2022-08-15 RX ORDER — FAMOTIDINE 40 MG/1
40 TABLET, FILM COATED ORAL
Refills: 0 | Status: ACTIVE | COMMUNITY
Start: 2022-08-15

## 2022-08-23 NOTE — REVIEW OF SYSTEMS
[Fatigue] : fatigue [Lower Ext Edema] : lower extremity edema [Dyspnea on Exertion] : dyspnea on exertion [Nausea] : nausea [Constipation] : constipation [Nocturia] : nocturia [Joint Pain] : joint pain [Joint Stiffness] : joint stiffness [Back Pain] : back pain [Insomnia] : insomnia [Anxiety] : anxiety [Depression] : depression [Negative] : Heme/Lymph [Heartburn] : heartburn [Fever] : no fever [Chills] : no chills [Hot Flashes] : no hot flashes [Night Sweats] : no night sweats [Recent Change In Weight] : ~T no recent weight change [Discharge] : no discharge [Pain] : no pain [Redness] : no redness [Dryness] : no dryness  [Vision Problems] : no vision problems [Itching] : no itching [Earache] : no earache [Hearing Loss] : no hearing loss [Nosebleed] : no nosebleeds [Hoarseness] : no hoarseness [Nasal Discharge] : no nasal discharge [Sore Throat] : no sore throat [Postnasal Drip] : no postnasal drip [Chest Pain] : no chest pain [Palpitations] : no palpitations [Leg Claudication] : no leg claudication [Orthopnea] : no orthopnea [Paroxysmal Nocturnal Dyspnea] : no paroxysmal nocturnal dyspnea [Shortness Of Breath] : no shortness of breath [Wheezing] : no wheezing [Cough] : no cough [Abdominal Pain] : no abdominal pain [Diarrhea] : diarrhea [Vomiting] : no vomiting [Melena] : no melena [Dysuria] : no dysuria [Incontinence] : no incontinence [Poor Libido] : libido not poor [Hematuria] : no hematuria [Frequency] : no frequency [Vaginal Discharge] : no vaginal discharge [Dysmenorrhea] : no dysmenorrhea [Joint Swelling] : no joint swelling [Muscle Weakness] : no muscle weakness [Muscle Pain] : no muscle pain [Itching] : no itching [Mole Changes] : no mole changes [Nail Changes] : no nail changes [Hair Changes] : no hair changes [Skin Rash] : no skin rash [Headache] : no headache [Dizziness] : no dizziness [Fainting] : no fainting [Confusion] : no confusion [Memory Loss] : no memory loss [Unsteady Walking] : no ataxia [Suicidal] : not suicidal [Easy Bleeding] : no easy bleeding [Easy Bruising] : no easy bruising [Swollen Glands] : no swollen glands [FreeTextEntry6] : mild [FreeTextEntry9] : knees

## 2022-08-23 NOTE — HEALTH RISK ASSESSMENT
[Never] : Never [No] : No [No falls in past year] : Patient reported no falls in the past year [0] : 2) Feeling down, depressed, or hopeless: Not at all (0) [PHQ-2 Negative - No further assessment needed] : PHQ-2 Negative - No further assessment needed [[id number]] : 0

## 2022-08-23 NOTE — ASSESSMENT
[FreeTextEntry1] : Physical examination reveals a well-developed obese woman in no acute distress at rest blood pressure 136/82 heart rate of 89 respirations 16 oxygen saturation on room air was 96% height 5 foot 5 inches weight 303 pounds BMI 50.42 HEENT was unremarkable chest was clear heart sounds were distant abdomen was soft extremities showed trace bilateral edema neurologic exam was nonfocal Long discussion with the patient concerning her obesity and the medical ramifications she is status post in the past gastric bypass dietitian weight watchers were recommended she will attempt to reduce portions and carbohydrates avoid snacking and late night eating and walk is much as her knees and her back will allow she is followed by her gynecologist Dr. Kiran Clayton last complete blood test were in April 2022 she has recently been on warfarin secondary to a pulmonary embolus and because of her obesity and high likelihood of this occurring again warfarin will be continued her blood pressure was adequately controlled at the present visit her bowels have been moving with the help of mobility dietary fiber and increasing liquids because of some recent midepigastric discomfort sucralfate was added to her omeprazole

## 2022-08-23 NOTE — HISTORY OF PRESENT ILLNESS
[FreeTextEntry1] : 62-year-old woman comes to the office for follow-up to review her medications and discuss her overall health issues with obesity and arthritic pains [de-identified] : Comes to the office for follow-up with a history of morbid obesity chronic constipation and occasional nausea obstructive sleep apnea type 2 diabetes dyspnea on exertion hypertension hypothyroidism and depression patient also has occasional heartburn review of systems is significant for fatigue lower extremity edema dyspnea on exertion occasional nausea constipation and heartburn urinates at night has chronic pain in her back and knees has bouts of anxiety and depression has been sleeping erratically

## 2022-08-26 ENCOUNTER — RX RENEWAL (OUTPATIENT)
Age: 62
End: 2022-08-26

## 2022-08-27 ENCOUNTER — LABORATORY RESULT (OUTPATIENT)
Age: 62
End: 2022-08-27

## 2022-09-02 ENCOUNTER — APPOINTMENT (OUTPATIENT)
Dept: OPHTHALMOLOGY | Facility: CLINIC | Age: 62
End: 2022-09-02

## 2022-09-02 ENCOUNTER — NON-APPOINTMENT (OUTPATIENT)
Age: 62
End: 2022-09-02

## 2022-09-02 PROCEDURE — 92004 COMPRE OPH EXAM NEW PT 1/>: CPT

## 2022-09-02 PROCEDURE — 92014 COMPRE OPH EXAM EST PT 1/>: CPT | Mod: 1L

## 2022-09-09 ENCOUNTER — APPOINTMENT (OUTPATIENT)
Dept: NEUROLOGY | Facility: CLINIC | Age: 62
End: 2022-09-09

## 2022-09-09 VITALS
HEART RATE: 92 BPM | HEIGHT: 65 IN | SYSTOLIC BLOOD PRESSURE: 142 MMHG | DIASTOLIC BLOOD PRESSURE: 82 MMHG | BODY MASS INDEX: 48.82 KG/M2 | WEIGHT: 293 LBS

## 2022-09-09 PROCEDURE — 99204 OFFICE O/P NEW MOD 45 MIN: CPT

## 2022-09-09 NOTE — ASSESSMENT
[FreeTextEntry1] : Impression: This 62-year-old female with history of morbid obesity diabetes hypertension hypothyroidism depression sleep apnea pulmonary emboli on warfarin GERD presents with a history of dizziness described as lightheadedness for the last several months and an episode 1 week ago on standing in the bathroom of binocular loss of vision followed by flashing lights affecting both eyes vertigo and oscillopsia.  This was a brief episode and there was a second even briefer episode of vertigo.  She denies loss of consciousness or headache.  She denies other focal neurological symptomatology.  The etiology is unclear.  TIA affecting posterior circulation should be ruled out.  Other possibilities include complicated migraine near syncope which could be due to dysautonomia in the setting of her diabetes and other medical comorbidities.  Cardiac arrhythmia would probably be less likely without any alteration in consciousness.  Benign positional vertigo should not be associated with the binocular visual complaints.  Today's neurological exam is unremarkable in this regard.\par \par Recommendations: MRI of the brain and MRA studies of the brain and neck with attention to the posterior circulation.  Begin atorvastatin 20 mg once at bedtime.  Vascular risk factor reduction.  Consider addition of aspirin 81 mg to be deferred at this juncture.  Consider cardiac consult.\par

## 2022-09-09 NOTE — HISTORY OF PRESENT ILLNESS
[FreeTextEntry1] : This patient is seen for an office consultation.  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  She does not notice a definite association with change of position.  She has symptoms while sitting and lying down.  In this setting 1 week ago while in the bathroom she stood up and noted binocular visual loss seeing only black of short duration only seconds followed by flashing lights in both eyes and true vertigo with oscillopsia.  There was no nausea and vomiting or headache.  Several days later she had a very brief episode of vertigo without visual loss.  She denies headache or migraine.  She denies history of stroke or TIA.  She is not having tinnitus or hearing loss.  She had an unremarkable ophthalmological exam on 9/2/2022 in the setting.\par \par This patient has comorbidities including obesity sleep apnea hypertension diabetes hypothyroidism GERD and depression and she is on chronic warfarin therapy for pulmonary emboli.  Her multiple medications have been reviewed.  She does not take aspirin.

## 2022-09-09 NOTE — CONSULT LETTER
[Dear  ___] : Dear  [unfilled], [Consult Letter:] : I had the pleasure of evaluating your patient, [unfilled]. [Please see my note below.] : Please see my note below. [Consult Closing:] : Thank you very much for allowing me to participate in the care of this patient.  If you have any questions, please do not hesitate to contact me. [Sincerely,] : Sincerely, [FreeTextEntry3] : Ken Nowak MD\par

## 2022-09-09 NOTE — PHYSICAL EXAM
[FreeTextEntry1] : Head:  Normocephalic Neck: Supple nontender no carotid bruits.  Patient is obese\par \par Mental Status:  Alert Oriented X3 Speech normal and no aphasia or dysarthria.\par \par Cranial Nerves:  PERRL, Fundi not visualized.  Visual Fields full  EOMI no diplopia no ptosis no nystagmus, V through XII intact.\par \par Motor:  No drift, normal strength tone and coordination and no focal atrophy. No abnormal movements. No dysmetria.  Normal rapid alternating movements. \par \par DTRs: Symmetric 1+..  Plantars flexor.  No Clonus.\par \par Sensory:  Normal testing with pin light touch and position sense and vibration is mildly decreased in the toes of both feet.\par \par Gait: Affected by her obesity otherwise unremarkable\par

## 2022-09-19 ENCOUNTER — APPOINTMENT (OUTPATIENT)
Dept: MRI IMAGING | Facility: HOSPITAL | Age: 62
End: 2022-09-19

## 2022-09-19 ENCOUNTER — NON-APPOINTMENT (OUTPATIENT)
Age: 62
End: 2022-09-19

## 2022-09-19 ENCOUNTER — LABORATORY RESULT (OUTPATIENT)
Age: 62
End: 2022-09-19

## 2022-09-19 ENCOUNTER — OUTPATIENT (OUTPATIENT)
Dept: OUTPATIENT SERVICES | Facility: HOSPITAL | Age: 62
LOS: 1 days | End: 2022-09-19
Payer: COMMERCIAL

## 2022-09-19 DIAGNOSIS — H53.30 UNSPECIFIED DISORDER OF BINOCULAR VISION: ICD-10-CM

## 2022-09-19 DIAGNOSIS — G45.9 TRANSIENT CEREBRAL ISCHEMIC ATTACK, UNSPECIFIED: ICD-10-CM

## 2022-09-19 PROCEDURE — 70551 MRI BRAIN STEM W/O DYE: CPT

## 2022-09-19 PROCEDURE — 70547 MR ANGIOGRAPHY NECK W/O DYE: CPT

## 2022-09-19 PROCEDURE — 70544 MR ANGIOGRAPHY HEAD W/O DYE: CPT

## 2022-09-19 PROCEDURE — 70547 MR ANGIOGRAPHY NECK W/O DYE: CPT | Mod: 26

## 2022-09-19 PROCEDURE — 70544 MR ANGIOGRAPHY HEAD W/O DYE: CPT | Mod: 26,59

## 2022-09-19 PROCEDURE — 70551 MRI BRAIN STEM W/O DYE: CPT | Mod: 26

## 2022-09-22 ENCOUNTER — RX RENEWAL (OUTPATIENT)
Age: 62
End: 2022-09-22

## 2022-10-05 ENCOUNTER — RX RENEWAL (OUTPATIENT)
Age: 62
End: 2022-10-05

## 2022-10-09 ENCOUNTER — RX RENEWAL (OUTPATIENT)
Age: 62
End: 2022-10-09

## 2022-10-18 ENCOUNTER — APPOINTMENT (OUTPATIENT)
Dept: NEUROLOGY | Facility: CLINIC | Age: 62
End: 2022-10-18

## 2022-10-18 VITALS
DIASTOLIC BLOOD PRESSURE: 82 MMHG | WEIGHT: 293 LBS | HEIGHT: 65 IN | HEART RATE: 88 BPM | SYSTOLIC BLOOD PRESSURE: 138 MMHG | BODY MASS INDEX: 48.82 KG/M2

## 2022-10-18 DIAGNOSIS — I67.89 OTHER CEREBROVASCULAR DISEASE: ICD-10-CM

## 2022-10-18 DIAGNOSIS — R42 DIZZINESS AND GIDDINESS: ICD-10-CM

## 2022-10-18 DIAGNOSIS — H53.30 UNSPECIFIED DISORDER OF BINOCULAR VISION: ICD-10-CM

## 2022-10-18 PROCEDURE — 99214 OFFICE O/P EST MOD 30 MIN: CPT

## 2022-10-18 NOTE — ASSESSMENT
[FreeTextEntry1] : Impression: This 62-year-old female patient with history of morbid obesity diabetes hypertension hypothyroidism depression sleep apnea pulmonary emboli on warfarin GERD recently started on atorvastatin presents with a history of dizziness including lightheadedness and she is also describing positional vertigo.  She has had binocular visual symptomatology as detailed above.  Brain MRI reveals a significant degree of micro vasculopathy the within the periventricular and subcortical white matter consistent with small vessel disease and she does have significant risk factors for vascular disease.  There is no evidence of vascular stenosis on MRA studies.  Suspect that the patient's is not diagnostic of TIA or CVA and could be due to ocular migraine and dysautonomia in the setting of diabetes and also a peripheral vestibulopathy since the vertigo that she describes is positional.  Neurological exam is unremarkable in this regard.\par \par Recommendations: Vascular risk factor reduction.  Diabetic and blood pressure control.  Continue atorvastatin 20 mg at bedtime.  Defer addition of aspirin at this juncture since she is receiving warfarin and there is a risk.  Consider cardiac consult.  Office follow-up in 4 months or else as needed.\par

## 2022-10-18 NOTE — HISTORY OF PRESENT ILLNESS
[FreeTextEntry1] : This patient is seen for an office visit.  She denies further episodes of binocular visual loss or flashing lights.  She is not having significant headache.  She does describe positional vertigo with head turning and this can occur while seated or even lying down and her symptoms are not necessarily postural.\par \par MRI of the brain on 9/19/2022 did reveal periventricular and subcortical foci of increased intensity suggesting severe microvascular change.  MRA studies on 9/19/2022 did not reveal significant vascular stenosis or obstruction in the brain or neck.\par \par She has been taking atorvastatin 20 mg once daily.  There is no other change in her medication.\par \par

## 2022-10-18 NOTE — REASON FOR VISIT
[Follow-Up: _____] : a [unfilled] follow-up visit [FreeTextEntry1] : Vertigo and binocular visual symptomatology

## 2022-10-18 NOTE — PHYSICAL EXAM
[FreeTextEntry1] : Head:  Normocephalic Neck: Supple nontender no carotid bruits. \par \par Mental Status:  Alert Oriented X3 Speech normal and no aphasia or dysarthria.\par \par Cranial Nerves:  PERRL, Fundi not visualized.  Visual Fields full  EOMI no diplopia no ptosis no nystagmus, V through XII intact.\par \par Motor:  No drift, normal strength tone and coordination and no focal atrophy. No abnormal movements. No dysmetria.  Normal rapid alternating movements. \par \par DTRs: Symmetric and 1+.  Plantars flexor.  No Clonus.\par \par Sensory:.  Decreased vibration sense noted distally in the lowers.\par \par Gait: Affected by her obesity otherwise unremarkable.\par

## 2022-10-22 ENCOUNTER — RX RENEWAL (OUTPATIENT)
Age: 62
End: 2022-10-22

## 2022-10-29 ENCOUNTER — LABORATORY RESULT (OUTPATIENT)
Age: 62
End: 2022-10-29

## 2022-11-19 ENCOUNTER — LABORATORY RESULT (OUTPATIENT)
Age: 62
End: 2022-11-19

## 2022-11-23 NOTE — ED PROVIDER NOTE - NS ED MD DISPO SPECIAL CONSIDERATION1
Called pt, he had refill on file that he picked up, he doesn't need mobic refilled today/ rx denied   None

## 2022-11-25 ENCOUNTER — RX RENEWAL (OUTPATIENT)
Age: 62
End: 2022-11-25

## 2022-12-02 ENCOUNTER — RX RENEWAL (OUTPATIENT)
Age: 62
End: 2022-12-02

## 2022-12-05 RX ORDER — CIPROFLOXACIN HYDROCHLORIDE 500 MG/1
500 TABLET, FILM COATED ORAL
Qty: 14 | Refills: 0 | Status: DISCONTINUED | COMMUNITY
Start: 2022-08-25 | End: 2022-12-05

## 2022-12-15 NOTE — ED ADULT NURSE NOTE - PLAN OF CARE
Gaby Bills RN Case Manager  Inpatient Care Coordination  [facility]   [phone]    New Referral HC RN Wound Vac   Explanation of exam/test/Position of comfort/Call bell

## 2022-12-17 ENCOUNTER — LABORATORY RESULT (OUTPATIENT)
Age: 62
End: 2022-12-17

## 2022-12-18 ENCOUNTER — RX RENEWAL (OUTPATIENT)
Age: 62
End: 2022-12-18

## 2023-01-02 ENCOUNTER — RX RENEWAL (OUTPATIENT)
Age: 63
End: 2023-01-02

## 2023-01-02 RX ORDER — GLIPIZIDE 5 MG/1
5 TABLET ORAL
Qty: 180 | Refills: 1 | Status: ACTIVE | COMMUNITY
Start: 2021-08-04 | End: 1900-01-01

## 2023-01-05 ENCOUNTER — APPOINTMENT (OUTPATIENT)
Dept: INTERNAL MEDICINE | Facility: CLINIC | Age: 63
End: 2023-01-05

## 2023-01-05 NOTE — ED ADULT NURSE NOTE - SUICIDE SCREENING DEPRESSION
Patient's first and last name, , procedure, and correct site confirmed prior to the start of procedure. Negative Patient's first and last name, , procedure, and correct site confirmed prior to the start of procedure.

## 2023-01-22 ENCOUNTER — RX RENEWAL (OUTPATIENT)
Age: 63
End: 2023-01-22

## 2023-01-25 ENCOUNTER — APPOINTMENT (OUTPATIENT)
Dept: INTERNAL MEDICINE | Facility: CLINIC | Age: 63
End: 2023-01-25
Payer: COMMERCIAL

## 2023-01-25 VITALS
SYSTOLIC BLOOD PRESSURE: 118 MMHG | HEIGHT: 66 IN | HEART RATE: 79 BPM | DIASTOLIC BLOOD PRESSURE: 68 MMHG | OXYGEN SATURATION: 97 % | WEIGHT: 293 LBS | RESPIRATION RATE: 16 BRPM | BODY MASS INDEX: 47.09 KG/M2 | TEMPERATURE: 97.9 F

## 2023-01-25 DIAGNOSIS — Z00.00 ENCOUNTER FOR GENERAL ADULT MEDICAL EXAMINATION W/OUT ABNORMAL FINDINGS: ICD-10-CM

## 2023-01-25 PROCEDURE — G0447 BEHAVIOR COUNSEL OBESITY 15M: CPT

## 2023-01-25 PROCEDURE — 99396 PREV VISIT EST AGE 40-64: CPT

## 2023-01-25 RX ORDER — SUCRALFATE 1 G/10ML
1 SUSPENSION ORAL
Qty: 1200 | Refills: 1 | Status: DISCONTINUED | COMMUNITY
Start: 2022-08-15 | End: 2023-01-25

## 2023-02-01 ENCOUNTER — OUTPATIENT (OUTPATIENT)
Dept: OUTPATIENT SERVICES | Facility: HOSPITAL | Age: 63
LOS: 1 days | End: 2023-02-01
Payer: COMMERCIAL

## 2023-02-01 ENCOUNTER — APPOINTMENT (OUTPATIENT)
Dept: RADIOLOGY | Facility: HOSPITAL | Age: 63
End: 2023-02-01
Payer: COMMERCIAL

## 2023-02-01 DIAGNOSIS — Z00.8 ENCOUNTER FOR OTHER GENERAL EXAMINATION: ICD-10-CM

## 2023-02-01 LAB
25(OH)D3 SERPL-MCNC: 22.5 NG/ML
ALBUMIN SERPL ELPH-MCNC: 4.1 G/DL
ALP BLD-CCNC: 158 U/L
ALT SERPL-CCNC: 37 U/L
ANION GAP SERPL CALC-SCNC: 12 MMOL/L
AST SERPL-CCNC: 26 U/L
BASOPHILS # BLD AUTO: 0.08 K/UL
BASOPHILS NFR BLD AUTO: 0.8 %
BILIRUB SERPL-MCNC: 0.7 MG/DL
BUN SERPL-MCNC: 13 MG/DL
CALCIUM SERPL-MCNC: 10 MG/DL
CHLORIDE SERPL-SCNC: 101 MMOL/L
CHOLEST SERPL-MCNC: 136 MG/DL
CO2 SERPL-SCNC: 26 MMOL/L
COVID-19 NUCLEOCAPSID  GAM ANTIBODY INTERPRETATION: POSITIVE
COVID-19 SPIKE DOMAIN ANTIBODY INTERPRETATION: POSITIVE
CREAT SERPL-MCNC: 0.5 MG/DL
CRP SERPL HS-MCNC: 6.3 MG/L
EGFR: 107 ML/MIN/1.73M2
EOSINOPHIL # BLD AUTO: 0.08 K/UL
EOSINOPHIL NFR BLD AUTO: 0.8 %
ESTIMATED AVERAGE GLUCOSE: 186 MG/DL
GLUCOSE BS SERPL-MCNC: 191 MG/DL
GLUCOSE SERPL-MCNC: 190 MG/DL
HBA1C MFR BLD HPLC: 8.1 %
HCT VFR BLD CALC: 40.3 %
HDLC SERPL-MCNC: 38 MG/DL
HGB BLD-MCNC: 12.8 G/DL
IMM GRANULOCYTES NFR BLD AUTO: 0.5 %
INR PPP: 2.13 RATIO
LDLC SERPL CALC-MCNC: 67 MG/DL
LYMPHOCYTES # BLD AUTO: 1.8 K/UL
LYMPHOCYTES NFR BLD AUTO: 18.3 %
MAN DIFF?: NORMAL
MCHC RBC-ENTMCNC: 29.3 PG
MCHC RBC-ENTMCNC: 31.8 GM/DL
MCV RBC AUTO: 92.2 FL
MONOCYTES # BLD AUTO: 0.46 K/UL
MONOCYTES NFR BLD AUTO: 4.7 %
NEUTROPHILS # BLD AUTO: 7.34 K/UL
NEUTROPHILS NFR BLD AUTO: 74.9 %
NONHDLC SERPL-MCNC: 98 MG/DL
PLATELET # BLD AUTO: 423 K/UL
POTASSIUM SERPL-SCNC: 4.3 MMOL/L
PROT SERPL-MCNC: 6.4 G/DL
PT BLD: 24.9 SEC
RBC # BLD: 4.37 M/UL
RBC # FLD: 14.7 %
SARS-COV-2 AB SERPL IA-ACNC: >250 U/ML
SARS-COV-2 AB SERPL QL IA: 117 INDEX
SODIUM SERPL-SCNC: 140 MMOL/L
T4 FREE SERPL-MCNC: 1.2 NG/DL
TRIGL SERPL-MCNC: 156 MG/DL
TSH SERPL-ACNC: 2.57 UIU/ML
VIT B12 SERPL-MCNC: 944 PG/ML
WBC # FLD AUTO: 9.81 K/UL

## 2023-02-01 PROCEDURE — 71046 X-RAY EXAM CHEST 2 VIEWS: CPT

## 2023-02-01 PROCEDURE — 71046 X-RAY EXAM CHEST 2 VIEWS: CPT | Mod: 26

## 2023-02-08 ENCOUNTER — RX RENEWAL (OUTPATIENT)
Age: 63
End: 2023-02-08

## 2023-02-20 ENCOUNTER — RX RENEWAL (OUTPATIENT)
Age: 63
End: 2023-02-20

## 2023-02-20 NOTE — REVIEW OF SYSTEMS
[Fatigue] : fatigue [Lower Ext Edema] : lower extremity edema [Dyspnea on Exertion] : dyspnea on exertion [Nausea] : nausea [Constipation] : constipation [Heartburn] : heartburn [Nocturia] : nocturia [Joint Pain] : joint pain [Joint Stiffness] : joint stiffness [Back Pain] : back pain [Insomnia] : insomnia [Anxiety] : anxiety [Depression] : depression [Negative] : Heme/Lymph [Fever] : no fever [Chills] : no chills [Hot Flashes] : no hot flashes [Night Sweats] : no night sweats [Recent Change In Weight] : ~T no recent weight change [Discharge] : no discharge [Pain] : no pain [Redness] : no redness [Dryness] : no dryness  [Vision Problems] : no vision problems [Itching] : no itching [Earache] : no earache [Hearing Loss] : no hearing loss [Nosebleed] : no nosebleeds [Hoarseness] : no hoarseness [Nasal Discharge] : no nasal discharge [Sore Throat] : no sore throat [Postnasal Drip] : no postnasal drip [Chest Pain] : no chest pain [Palpitations] : no palpitations [Leg Claudication] : no leg claudication [Orthopnea] : no orthopnea [Paroxysmal Nocturnal Dyspnea] : no paroxysmal nocturnal dyspnea [Shortness Of Breath] : no shortness of breath [Wheezing] : no wheezing [Cough] : no cough [Abdominal Pain] : no abdominal pain [Diarrhea] : diarrhea [Vomiting] : no vomiting [Melena] : no melena [Dysuria] : no dysuria [Incontinence] : no incontinence [Poor Libido] : libido not poor [Hematuria] : no hematuria [Frequency] : no frequency [Vaginal Discharge] : no vaginal discharge [Dysmenorrhea] : no dysmenorrhea [Joint Swelling] : no joint swelling [Muscle Weakness] : no muscle weakness [Muscle Pain] : no muscle pain [Itching] : no itching [Mole Changes] : no mole changes [Nail Changes] : no nail changes [Hair Changes] : no hair changes [Skin Rash] : no skin rash [Headache] : no headache [Dizziness] : no dizziness [Fainting] : no fainting [Confusion] : no confusion [Memory Loss] : no memory loss [Unsteady Walking] : no ataxia [Suicidal] : not suicidal [Easy Bleeding] : no easy bleeding [Easy Bruising] : no easy bruising [Swollen Glands] : no swollen glands [FreeTextEntry6] : mild [FreeTextEntry9] : knees

## 2023-02-20 NOTE — HISTORY OF PRESENT ILLNESS
[FreeTextEntry1] : 62-year-old woman comes to the office for a comprehensive physical examination to review her medications and discuss her overall health she has complaints dealing with obesity and constipation [de-identified] : Comes to the office for a comprehensive physical examination with a history of chronic constipation morbid obesity depression obstructive sleep apnea type 2 diabetes mild dyspnea on exertion which is chronic hypothyroidism hypertension previous TIA prediabetes previous pulmonary embolus review of systems is significant for fatigue lower extremity edema mild dyspnea on exertion occasional nausea heartburn without dysphagia and constipation nocturia knees and back pain joint stiffness anxiety depression and insomnia remaining review of systems is noncontributory

## 2023-02-20 NOTE — ASSESSMENT
[FreeTextEntry1] : Physical examination shows a morbidly obese woman in no acute distress blood pressure 118/68 height 5 feet 6 inches weight 299 pounds BMI 48.26 temperature 97.9 °F heart rate of 79 respirations 16 oxygen saturation on room air 97% HEENT was unremarkable chest was clear cardiovascular exam was regular with no extra heart sounds or murmurs abdomen was soft extremities showed trace bilateral edema neurologic exam was nonfocal slip was given for a chest x-ray PA and lateral patient has 2 primary COVID vaccines recommended the influenza vaccine and the Prevnar 20 and the Shingrix vaccines patient had complete blood test significant for an INR of 3.07 medication dosage was adjusted alkaline phosphatase 158 glucose 190 cholesterol 136 HDL 38 LDL 67 triglycerides 156 hemoglobin A1c 8.1% vitamin D 22.5 supplementation was advised patient was given the name of a local endocrinologist patient on metformin and glipizide have added Ozempic once a week injections further adjustments to be made by her endocrinologist she remains anticoagulated in view of her pulmonary embolus with warfarin she uses Zofran occasionally for occasional nausea patient has been given multiple slips for mammogram and breast ultrasounds and today was given a slip for an axial bone density patient's last colonoscopy was in 2018 she has an appointment February 7, 2023 with her gastroenterologist to schedule an interval follow-up her blood pressure is well controlled at the present visit patient was given the name of a bariatric surgeon to make an appointment with them here that option she has tried other options including weight watchers with minimal success bowels have been moving adequately with the help of dietary fiber she has been doing well with her depression with the use of sertraline 200 mg a day

## 2023-02-20 NOTE — COUNSELING
[Potential consequences of obesity discussed] : Potential consequences of obesity discussed [Benefits of weight loss discussed] : Benefits of weight loss discussed [Structured Weight Management Program suggested:] : Structured weight management program suggested [Encouraged to maintain food diary] : Encouraged to maintain food diary [Encouraged to increase physical activity] : Encouraged to increase physical activity [Encouraged to use exercise tracking device] : Encouraged to use exercise tracking device [Weigh Self Weekly] : weigh self weekly [Decrease Portions] : decrease portions [____ min/wk Activity] : [unfilled] min/wk activity [Keep Food Diary] : keep food diary [Good understanding] : Patient has a good understanding of disease, goals and obesity follow-up plan [FreeTextEntry1] : Weight watchers [FreeTextEntry4] : 15

## 2023-02-20 NOTE — HEALTH RISK ASSESSMENT
[Never] : Never [No] : No [No falls in past year] : Patient reported no falls in the past year [0] : 2) Feeling down, depressed, or hopeless: Not at all (0) [PHQ-2 Negative - No further assessment needed] : PHQ-2 Negative - No further assessment needed [HNA6Hldib] : 0 [HIV test declined] : HIV test declined [Hepatitis C test declined] : Hepatitis C test declined [ColonoscopyDate] : 2018

## 2023-02-21 ENCOUNTER — APPOINTMENT (OUTPATIENT)
Dept: NEUROLOGY | Facility: CLINIC | Age: 63
End: 2023-02-21

## 2023-02-21 LAB
INR PPP: 3.07 RATIO
PT BLD: 36.3 SEC

## 2023-02-24 ENCOUNTER — LABORATORY RESULT (OUTPATIENT)
Age: 63
End: 2023-02-24

## 2023-03-16 ENCOUNTER — APPOINTMENT (OUTPATIENT)
Dept: INTERNAL MEDICINE | Facility: CLINIC | Age: 63
End: 2023-03-16
Payer: COMMERCIAL

## 2023-03-16 ENCOUNTER — LABORATORY RESULT (OUTPATIENT)
Age: 63
End: 2023-03-16

## 2023-03-16 VITALS
HEART RATE: 100 BPM | HEIGHT: 66 IN | BODY MASS INDEX: 46.93 KG/M2 | TEMPERATURE: 97.5 F | OXYGEN SATURATION: 99 % | DIASTOLIC BLOOD PRESSURE: 74 MMHG | SYSTOLIC BLOOD PRESSURE: 118 MMHG | WEIGHT: 292 LBS

## 2023-03-16 DIAGNOSIS — K59.09 OTHER CONSTIPATION: ICD-10-CM

## 2023-03-16 DIAGNOSIS — G45.9 TRANSIENT CEREBRAL ISCHEMIC ATTACK, UNSPECIFIED: ICD-10-CM

## 2023-03-16 DIAGNOSIS — E66.01 MORBID (SEVERE) OBESITY DUE TO EXCESS CALORIES: ICD-10-CM

## 2023-03-16 DIAGNOSIS — R06.09 OTHER FORMS OF DYSPNEA: ICD-10-CM

## 2023-03-16 DIAGNOSIS — R73.03 PREDIABETES.: ICD-10-CM

## 2023-03-16 DIAGNOSIS — Z11.1 ENCOUNTER FOR SCREENING FOR RESPIRATORY TUBERCULOSIS: ICD-10-CM

## 2023-03-16 PROCEDURE — 99215 OFFICE O/P EST HI 40 MIN: CPT

## 2023-03-19 ENCOUNTER — RX RENEWAL (OUTPATIENT)
Age: 63
End: 2023-03-19

## 2023-03-20 LAB
M TB IFN-G BLD-IMP: NEGATIVE
QUANTIFERON TB PLUS MITOGEN MINUS NIL: >10 IU/ML
QUANTIFERON TB PLUS NIL: 0.03 IU/ML
QUANTIFERON TB PLUS TB1 MINUS NIL: -0.01 IU/ML
QUANTIFERON TB PLUS TB2 MINUS NIL: -0.01 IU/ML

## 2023-04-07 PROBLEM — G45.9 TIA (TRANSIENT ISCHEMIC ATTACK): Status: ACTIVE | Noted: 2022-09-09

## 2023-04-07 PROBLEM — R73.03 PREDIABETES: Status: ACTIVE | Noted: 2019-05-17

## 2023-04-07 PROBLEM — K59.09 CHRONIC CONSTIPATION: Status: ACTIVE | Noted: 2022-04-08

## 2023-04-07 PROBLEM — R06.09 DYSPNEA ON EXERTION: Status: ACTIVE | Noted: 2019-05-17

## 2023-04-07 NOTE — HISTORY OF PRESENT ILLNESS
[FreeTextEntry1] : 63-year-old woman comes to the office for follow-up to review her medications and discuss her overall health recent issues with her weight and arthritic complaints [de-identified] : Comes to the office for follow-up history of chronic constipation morbid obesity depression obstructive sleep apnea type 2 diabetes dyspnea on exertion hypertension hypothyroidism previous pulmonary embolus prediabetes and TIA review of systems is significant for fatigue lower extremity edema mild dyspnea on exertion heartburn without dysphagia occasional nausea constipation nocturia lower back pain knee discomfort and joint stiffness insomnia anxiety and depression the remaining review of systems is noncontributory

## 2023-04-07 NOTE — HEALTH RISK ASSESSMENT
[No] : No [No falls in past year] : Patient reported no falls in the past year [0] : 2) Feeling down, depressed, or hopeless: Not at all (0) [PHQ-2 Negative - No further assessment needed] : PHQ-2 Negative - No further assessment needed [[id number]] : 0 [Never] : Never

## 2023-04-07 NOTE — REVIEW OF SYSTEMS
[Fever] : no fever [Chills] : no chills [Fatigue] : fatigue [Hot Flashes] : no hot flashes [Night Sweats] : no night sweats [Recent Change In Weight] : ~T no recent weight change [Discharge] : no discharge [Pain] : no pain [Redness] : no redness [Dryness] : no dryness  [Vision Problems] : no vision problems [Itching] : no itching [Earache] : no earache [Hearing Loss] : no hearing loss [Nosebleed] : no nosebleeds [Hoarseness] : no hoarseness [Nasal Discharge] : no nasal discharge [Sore Throat] : no sore throat [Postnasal Drip] : no postnasal drip [Chest Pain] : no chest pain [Palpitations] : no palpitations [Leg Claudication] : no leg claudication [Lower Ext Edema] : lower extremity edema [Orthopnea] : no orthopnea [Paroxysmal Nocturnal Dyspnea] : no paroxysmal nocturnal dyspnea [Shortness Of Breath] : no shortness of breath [Wheezing] : no wheezing [Cough] : no cough [Dyspnea on Exertion] : dyspnea on exertion [Abdominal Pain] : no abdominal pain [Nausea] : nausea [Constipation] : constipation [Diarrhea] : diarrhea [Vomiting] : no vomiting [Heartburn] : heartburn [Melena] : no melena [Dysuria] : no dysuria [Incontinence] : no incontinence [Nocturia] : nocturia [Hematuria] : no hematuria [Poor Libido] : libido not poor [Frequency] : no frequency [Vaginal Discharge] : no vaginal discharge [Dysmenorrhea] : no dysmenorrhea [Joint Pain] : joint pain [Joint Stiffness] : joint stiffness [Joint Swelling] : no joint swelling [Muscle Weakness] : no muscle weakness [Muscle Pain] : no muscle pain [Back Pain] : back pain [Itching] : no itching [Mole Changes] : no mole changes [Nail Changes] : no nail changes [Hair Changes] : no hair changes [Skin Rash] : no skin rash [Headache] : no headache [Dizziness] : no dizziness [Fainting] : no fainting [Confusion] : no confusion [Memory Loss] : no memory loss [Unsteady Walking] : no ataxia [Suicidal] : not suicidal [Insomnia] : insomnia [Anxiety] : anxiety [Depression] : depression [Easy Bleeding] : no easy bleeding [Easy Bruising] : no easy bruising [Swollen Glands] : no swollen glands [Negative] : Heme/Lymph [FreeTextEntry6] : mild [FreeTextEntry9] : knees

## 2023-04-07 NOTE — ASSESSMENT
[FreeTextEntry1] : Physical examination reveals a well-developed obese woman in no acute distress blood pressure 118/74 height 5 foot 6 inches weight 292 pounds BMI 47.13 temperature 97.5 °F heart rate 100 respiratory rate of 16 oxygen saturation on room air 99% HEENT was unremarkable chest was clear cardiovascular exam was regular with no extra heart sounds or murmurs abdomen was soft extremities showed just trace lower extremity edema neurologic exam was nonfocal patient follows with neurology recently Dr. Nowak concerning her vertigo and binocular vision loss she has received the primary COVID vaccines the influenza vaccine the Prevnar 13 she was recommended the Shingrix vaccine and the Prevnar 20 patient will be making an appointment to her gynecologist Dr. Kiran Vasquez patient has regular INRs as she is on warfarin her last complete blood test were in January 2023 patient has been given slip for mammography breast ultrasound and axial bone densities she will consider these she had a chest x-ray in February 2023 which showed no evidence of active chest disease patient's last colonoscopy was in February 2023 patient's blood pressure has been doing well on her present medications patient's obesity is a major issue she is status post bariatric surgery patient's last hemoglobin A1c was 8.1% in February 2023 Ozempic was added the patient's emotional state has been stable while on sertraline 200 mg a day

## 2023-04-07 NOTE — PHYSICAL EXAM
[No Acute Distress] : no acute distress [Well Nourished] : well nourished [Well Developed] : well developed [Well-Appearing] : well-appearing [Normal Voice/Communication] : normal voice/communication [Normal Sclera/Conjunctiva] : normal sclera/conjunctiva [PERRL] : pupils equal round and reactive to light [EOMI] : extraocular movements intact [Normal Outer Ear/Nose] : the outer ears and nose were normal in appearance [Normal Oropharynx] : the oropharynx was normal [Normal TMs] : both tympanic membranes were normal [Normal Nasal Mucosa] : the nasal mucosa was normal [No JVD] : no jugular venous distention [No Lymphadenopathy] : no lymphadenopathy [Supple] : supple [Thyroid Normal, No Nodules] : the thyroid was normal and there were no nodules present [No Respiratory Distress] : no respiratory distress  [No Accessory Muscle Use] : no accessory muscle use [Clear to Auscultation] : lungs were clear to auscultation bilaterally [Normal Percussion] : the chest was normal to percussion [Normal Rate] : normal rate  [Regular Rhythm] : with a regular rhythm [Normal S1, S2] : normal S1 and S2 [No Murmur] : no murmur heard [No Carotid Bruits] : no carotid bruits [No Abdominal Bruit] : a ~M bruit was not heard ~T in the abdomen [No Varicosities] : no varicosities [Pedal Pulses Present] : the pedal pulses are present [No Edema] : there was no peripheral edema [No Palpable Aorta] : no palpable aorta [No Extremity Clubbing/Cyanosis] : no extremity clubbing/cyanosis [Declined Breast Exam] : declined breast exam  [Soft] : abdomen soft [Non Tender] : non-tender [Non-distended] : non-distended [No Masses] : no abdominal mass palpated [No HSM] : no HSM [Normal Bowel Sounds] : normal bowel sounds [No Hernias] : no hernias [Declined Rectal Exam] : declined rectal exam [No CVA Tenderness] : no CVA  tenderness [No Spinal Tenderness] : no spinal tenderness [Kyphosis] : no kyphosis [Scoliosis] : no scoliosis [No Joint Swelling] : no joint swelling [Grossly Normal Strength/Tone] : grossly normal strength/tone [No Rash] : no rash [Acne] : no acne [Coordination Grossly Intact] : coordination grossly intact [No Focal Deficits] : no focal deficits [Normal Gait] : normal gait [Deep Tendon Reflexes (DTR)] : deep tendon reflexes were 2+ and symmetric [Speech Grossly Normal] : speech grossly normal [Memory Grossly Normal] : memory grossly normal [Normal Affect] : the affect was normal [Alert and Oriented x3] : oriented to person, place, and time [Normal Mood] : the mood was normal [Normal Insight/Judgement] : insight and judgment were intact

## 2023-04-13 ENCOUNTER — LABORATORY RESULT (OUTPATIENT)
Age: 63
End: 2023-04-13

## 2023-04-17 ENCOUNTER — RX RENEWAL (OUTPATIENT)
Age: 63
End: 2023-04-17

## 2023-04-17 RX ORDER — METFORMIN ER 500 MG 500 MG/1
500 TABLET ORAL DAILY
Qty: 90 | Refills: 1 | Status: ACTIVE | COMMUNITY
Start: 2019-08-26 | End: 1900-01-01

## 2023-09-27 ENCOUNTER — APPOINTMENT (OUTPATIENT)
Dept: FAMILY MEDICINE | Facility: CLINIC | Age: 63
End: 2023-09-27

## 2023-10-05 ENCOUNTER — RX RENEWAL (OUTPATIENT)
Age: 63
End: 2023-10-05

## 2023-10-17 ENCOUNTER — APPOINTMENT (OUTPATIENT)
Dept: FAMILY MEDICINE | Facility: CLINIC | Age: 63
End: 2023-10-17
Payer: COMMERCIAL

## 2023-10-17 VITALS
RESPIRATION RATE: 16 BRPM | BODY MASS INDEX: 45.8 KG/M2 | WEIGHT: 285 LBS | TEMPERATURE: 97.1 F | OXYGEN SATURATION: 98 % | DIASTOLIC BLOOD PRESSURE: 82 MMHG | HEART RATE: 86 BPM | HEIGHT: 66 IN | SYSTOLIC BLOOD PRESSURE: 114 MMHG

## 2023-10-17 DIAGNOSIS — E61.1 IRON DEFICIENCY: ICD-10-CM

## 2023-10-17 DIAGNOSIS — Z12.39 ENCOUNTER FOR OTHER SCREENING FOR MALIGNANT NEOPLASM OF BREAST: ICD-10-CM

## 2023-10-17 DIAGNOSIS — R92.30 INCONCLUSIVE MAMMOGRAM: ICD-10-CM

## 2023-10-17 DIAGNOSIS — Z82.49 FAMILY HISTORY OF ISCHEMIC HEART DISEASE AND OTHER DISEASES OF THE CIRCULATORY SYSTEM: ICD-10-CM

## 2023-10-17 DIAGNOSIS — F32.A DEPRESSION, UNSPECIFIED: ICD-10-CM

## 2023-10-17 DIAGNOSIS — G47.33 OBSTRUCTIVE SLEEP APNEA (ADULT) (PEDIATRIC): ICD-10-CM

## 2023-10-17 DIAGNOSIS — E55.9 VITAMIN D DEFICIENCY, UNSPECIFIED: ICD-10-CM

## 2023-10-17 DIAGNOSIS — R92.2 INCONCLUSIVE MAMMOGRAM: ICD-10-CM

## 2023-10-17 DIAGNOSIS — R49.9 UNSPECIFIED VOICE AND RESONANCE DISORDER: ICD-10-CM

## 2023-10-17 PROCEDURE — 99214 OFFICE O/P EST MOD 30 MIN: CPT

## 2023-10-17 RX ORDER — AMLODIPINE BESYLATE 5 MG/1
5 TABLET ORAL DAILY
Qty: 90 | Refills: 1 | Status: DISCONTINUED | COMMUNITY
Start: 2021-12-09 | End: 2023-10-17

## 2023-10-17 RX ORDER — ASPIRIN ENTERIC COATED TABLETS 81 MG 81 MG/1
81 TABLET, DELAYED RELEASE ORAL
Qty: 90 | Refills: 3 | Status: COMPLETED | COMMUNITY
Start: 2021-12-09 | End: 2023-10-17

## 2023-10-24 DIAGNOSIS — R74.8 ABNORMAL LEVELS OF OTHER SERUM ENZYMES: ICD-10-CM

## 2023-10-24 LAB
25(OH)D3 SERPL-MCNC: 16.7 NG/ML
ALBUMIN SERPL ELPH-MCNC: 4 G/DL
ALP BLD-CCNC: 143 U/L
ALT SERPL-CCNC: 22 U/L
ANION GAP SERPL CALC-SCNC: 13 MMOL/L
AST SERPL-CCNC: 19 U/L
BASOPHILS # BLD AUTO: 0.07 K/UL
BASOPHILS NFR BLD AUTO: 0.9 %
BILIRUB SERPL-MCNC: 0.6 MG/DL
BUN SERPL-MCNC: 15 MG/DL
CALCIUM SERPL-MCNC: 9.7 MG/DL
CHLORIDE SERPL-SCNC: 103 MMOL/L
CHOLEST SERPL-MCNC: 136 MG/DL
CO2 SERPL-SCNC: 25 MMOL/L
CREAT SERPL-MCNC: 0.54 MG/DL
EGFR: 104 ML/MIN/1.73M2
EOSINOPHIL # BLD AUTO: 0.15 K/UL
EOSINOPHIL NFR BLD AUTO: 1.8 %
ESTIMATED AVERAGE GLUCOSE: 143 MG/DL
FERRITIN SERPL-MCNC: 34 NG/ML
FOLATE SERPL-MCNC: 15.2 NG/ML
GLUCOSE SERPL-MCNC: 149 MG/DL
HBA1C MFR BLD HPLC: 6.6 %
HCT VFR BLD CALC: 38.8 %
HDLC SERPL-MCNC: 36 MG/DL
HGB BLD-MCNC: 12.5 G/DL
IMM GRANULOCYTES NFR BLD AUTO: 0.2 %
INR PPP: 2.36 RATIO
IRON SATN MFR SERPL: 19 %
IRON SERPL-MCNC: 61 UG/DL
LDLC SERPL CALC-MCNC: 64 MG/DL
LYMPHOCYTES # BLD AUTO: 1.62 K/UL
LYMPHOCYTES NFR BLD AUTO: 19.8 %
MAN DIFF?: NORMAL
MCHC RBC-ENTMCNC: 29.1 PG
MCHC RBC-ENTMCNC: 32.2 GM/DL
MCV RBC AUTO: 90.4 FL
MONOCYTES # BLD AUTO: 0.59 K/UL
MONOCYTES NFR BLD AUTO: 7.2 %
NEUTROPHILS # BLD AUTO: 5.75 K/UL
NEUTROPHILS NFR BLD AUTO: 70.1 %
NONHDLC SERPL-MCNC: 100 MG/DL
PLATELET # BLD AUTO: 358 K/UL
POTASSIUM SERPL-SCNC: 4.6 MMOL/L
PROT SERPL-MCNC: 6.2 G/DL
PT BLD: 26.2 SEC
RBC # BLD: 4.29 M/UL
RBC # BLD: 4.29 M/UL
RBC # FLD: 14.9 %
RETICS # AUTO: 1.9 %
RETICS AGGREG/RBC NFR: 83.2 K/UL
SODIUM SERPL-SCNC: 141 MMOL/L
T3 SERPL-MCNC: 85 NG/DL
T4 FREE SERPL-MCNC: 1.2 NG/DL
TIBC SERPL-MCNC: 314 UG/DL
TRIGL SERPL-MCNC: 223 MG/DL
TSH SERPL-ACNC: 2.72 UIU/ML
UIBC SERPL-MCNC: 253 UG/DL
VIT B12 SERPL-MCNC: 476 PG/ML
WBC # FLD AUTO: 8.2 K/UL

## 2023-11-20 RX ORDER — FLASH GLUCOSE SENSOR
KIT MISCELLANEOUS
Qty: 6 | Refills: 3 | Status: ACTIVE | COMMUNITY
Start: 2021-09-22 | End: 1900-01-01

## 2023-11-25 RX ORDER — FLASH GLUCOSE SENSOR
KIT MISCELLANEOUS
Qty: 90 | Refills: 3 | Status: ACTIVE | COMMUNITY
Start: 2022-12-18 | End: 1900-01-01

## 2024-01-09 ENCOUNTER — INPATIENT (INPATIENT)
Facility: HOSPITAL | Age: 64
LOS: 2 days | Discharge: HOME | DRG: 392 | End: 2024-01-12
Attending: INTERNAL MEDICINE | Admitting: STUDENT IN AN ORGANIZED HEALTH CARE EDUCATION/TRAINING PROGRAM
Payer: COMMERCIAL

## 2024-01-09 VITALS
TEMPERATURE: 98 F | HEART RATE: 100 BPM | DIASTOLIC BLOOD PRESSURE: 73 MMHG | WEIGHT: 289.91 LBS | RESPIRATION RATE: 18 BRPM | OXYGEN SATURATION: 98 % | SYSTOLIC BLOOD PRESSURE: 108 MMHG | HEIGHT: 65 IN

## 2024-01-09 DIAGNOSIS — R07.9 CHEST PAIN, UNSPECIFIED: ICD-10-CM

## 2024-01-09 LAB
ALBUMIN SERPL ELPH-MCNC: 3.4 G/DL — SIGNIFICANT CHANGE UP (ref 3.3–5)
ALBUMIN SERPL ELPH-MCNC: 3.4 G/DL — SIGNIFICANT CHANGE UP (ref 3.3–5)
ALP SERPL-CCNC: 138 U/L — HIGH (ref 40–120)
ALP SERPL-CCNC: 138 U/L — HIGH (ref 40–120)
ALT FLD-CCNC: 32 U/L — SIGNIFICANT CHANGE UP (ref 10–45)
ALT FLD-CCNC: 32 U/L — SIGNIFICANT CHANGE UP (ref 10–45)
ANION GAP SERPL CALC-SCNC: 10 MMOL/L — SIGNIFICANT CHANGE UP (ref 5–17)
ANION GAP SERPL CALC-SCNC: 10 MMOL/L — SIGNIFICANT CHANGE UP (ref 5–17)
APPEARANCE UR: CLEAR — SIGNIFICANT CHANGE UP
APPEARANCE UR: CLEAR — SIGNIFICANT CHANGE UP
APTT BLD: 33.6 SEC — SIGNIFICANT CHANGE UP (ref 24.5–35.6)
APTT BLD: 33.6 SEC — SIGNIFICANT CHANGE UP (ref 24.5–35.6)
AST SERPL-CCNC: 22 U/L — SIGNIFICANT CHANGE UP (ref 10–40)
AST SERPL-CCNC: 22 U/L — SIGNIFICANT CHANGE UP (ref 10–40)
BACTERIA # UR AUTO: ABNORMAL /HPF
BACTERIA # UR AUTO: ABNORMAL /HPF
BASOPHILS # BLD AUTO: 0.09 K/UL — SIGNIFICANT CHANGE UP (ref 0–0.2)
BASOPHILS # BLD AUTO: 0.09 K/UL — SIGNIFICANT CHANGE UP (ref 0–0.2)
BASOPHILS NFR BLD AUTO: 0.6 % — SIGNIFICANT CHANGE UP (ref 0–2)
BASOPHILS NFR BLD AUTO: 0.6 % — SIGNIFICANT CHANGE UP (ref 0–2)
BILIRUB SERPL-MCNC: 0.6 MG/DL — SIGNIFICANT CHANGE UP (ref 0.2–1.2)
BILIRUB SERPL-MCNC: 0.6 MG/DL — SIGNIFICANT CHANGE UP (ref 0.2–1.2)
BILIRUB UR-MCNC: NEGATIVE — SIGNIFICANT CHANGE UP
BILIRUB UR-MCNC: NEGATIVE — SIGNIFICANT CHANGE UP
BUN SERPL-MCNC: 22 MG/DL — SIGNIFICANT CHANGE UP (ref 7–23)
BUN SERPL-MCNC: 22 MG/DL — SIGNIFICANT CHANGE UP (ref 7–23)
CALCIUM SERPL-MCNC: 9.6 MG/DL — SIGNIFICANT CHANGE UP (ref 8.4–10.5)
CALCIUM SERPL-MCNC: 9.6 MG/DL — SIGNIFICANT CHANGE UP (ref 8.4–10.5)
CHLORIDE SERPL-SCNC: 102 MMOL/L — SIGNIFICANT CHANGE UP (ref 96–108)
CHLORIDE SERPL-SCNC: 102 MMOL/L — SIGNIFICANT CHANGE UP (ref 96–108)
CO2 SERPL-SCNC: 25 MMOL/L — SIGNIFICANT CHANGE UP (ref 22–31)
CO2 SERPL-SCNC: 25 MMOL/L — SIGNIFICANT CHANGE UP (ref 22–31)
COD CRY URNS QL: PRESENT
COD CRY URNS QL: PRESENT
COLOR SPEC: YELLOW — SIGNIFICANT CHANGE UP
COLOR SPEC: YELLOW — SIGNIFICANT CHANGE UP
CREAT SERPL-MCNC: 0.76 MG/DL — SIGNIFICANT CHANGE UP (ref 0.5–1.3)
CREAT SERPL-MCNC: 0.76 MG/DL — SIGNIFICANT CHANGE UP (ref 0.5–1.3)
DIFF PNL FLD: NEGATIVE — SIGNIFICANT CHANGE UP
DIFF PNL FLD: NEGATIVE — SIGNIFICANT CHANGE UP
EGFR: 88 ML/MIN/1.73M2 — SIGNIFICANT CHANGE UP
EGFR: 88 ML/MIN/1.73M2 — SIGNIFICANT CHANGE UP
EOSINOPHIL # BLD AUTO: 0.02 K/UL — SIGNIFICANT CHANGE UP (ref 0–0.5)
EOSINOPHIL # BLD AUTO: 0.02 K/UL — SIGNIFICANT CHANGE UP (ref 0–0.5)
EOSINOPHIL NFR BLD AUTO: 0.1 % — SIGNIFICANT CHANGE UP (ref 0–6)
EOSINOPHIL NFR BLD AUTO: 0.1 % — SIGNIFICANT CHANGE UP (ref 0–6)
EPI CELLS # UR: 2 — SIGNIFICANT CHANGE UP
EPI CELLS # UR: 2 — SIGNIFICANT CHANGE UP
GLUCOSE SERPL-MCNC: 130 MG/DL — HIGH (ref 70–99)
GLUCOSE SERPL-MCNC: 130 MG/DL — HIGH (ref 70–99)
GLUCOSE UR QL: NEGATIVE MG/DL — SIGNIFICANT CHANGE UP
GLUCOSE UR QL: NEGATIVE MG/DL — SIGNIFICANT CHANGE UP
HCT VFR BLD CALC: 42.8 % — SIGNIFICANT CHANGE UP (ref 34.5–45)
HCT VFR BLD CALC: 42.8 % — SIGNIFICANT CHANGE UP (ref 34.5–45)
HGB BLD-MCNC: 14 G/DL — SIGNIFICANT CHANGE UP (ref 11.5–15.5)
HGB BLD-MCNC: 14 G/DL — SIGNIFICANT CHANGE UP (ref 11.5–15.5)
IMM GRANULOCYTES NFR BLD AUTO: 0.6 % — SIGNIFICANT CHANGE UP (ref 0–0.9)
IMM GRANULOCYTES NFR BLD AUTO: 0.6 % — SIGNIFICANT CHANGE UP (ref 0–0.9)
INR BLD: 1.39 RATIO — HIGH (ref 0.85–1.18)
INR BLD: 1.39 RATIO — HIGH (ref 0.85–1.18)
KETONES UR-MCNC: NEGATIVE MG/DL — SIGNIFICANT CHANGE UP
KETONES UR-MCNC: NEGATIVE MG/DL — SIGNIFICANT CHANGE UP
LEUKOCYTE ESTERASE UR-ACNC: ABNORMAL
LEUKOCYTE ESTERASE UR-ACNC: ABNORMAL
LYMPHOCYTES # BLD AUTO: 0.56 K/UL — LOW (ref 1–3.3)
LYMPHOCYTES # BLD AUTO: 0.56 K/UL — LOW (ref 1–3.3)
LYMPHOCYTES # BLD AUTO: 3.5 % — LOW (ref 13–44)
LYMPHOCYTES # BLD AUTO: 3.5 % — LOW (ref 13–44)
MCHC RBC-ENTMCNC: 28.5 PG — SIGNIFICANT CHANGE UP (ref 27–34)
MCHC RBC-ENTMCNC: 28.5 PG — SIGNIFICANT CHANGE UP (ref 27–34)
MCHC RBC-ENTMCNC: 32.7 GM/DL — SIGNIFICANT CHANGE UP (ref 32–36)
MCHC RBC-ENTMCNC: 32.7 GM/DL — SIGNIFICANT CHANGE UP (ref 32–36)
MCV RBC AUTO: 87.2 FL — SIGNIFICANT CHANGE UP (ref 80–100)
MCV RBC AUTO: 87.2 FL — SIGNIFICANT CHANGE UP (ref 80–100)
MONOCYTES # BLD AUTO: 0.64 K/UL — SIGNIFICANT CHANGE UP (ref 0–0.9)
MONOCYTES # BLD AUTO: 0.64 K/UL — SIGNIFICANT CHANGE UP (ref 0–0.9)
MONOCYTES NFR BLD AUTO: 4 % — SIGNIFICANT CHANGE UP (ref 2–14)
MONOCYTES NFR BLD AUTO: 4 % — SIGNIFICANT CHANGE UP (ref 2–14)
NEUTROPHILS # BLD AUTO: 14.65 K/UL — HIGH (ref 1.8–7.4)
NEUTROPHILS # BLD AUTO: 14.65 K/UL — HIGH (ref 1.8–7.4)
NEUTROPHILS NFR BLD AUTO: 91.2 % — HIGH (ref 43–77)
NEUTROPHILS NFR BLD AUTO: 91.2 % — HIGH (ref 43–77)
NITRITE UR-MCNC: NEGATIVE — SIGNIFICANT CHANGE UP
NITRITE UR-MCNC: NEGATIVE — SIGNIFICANT CHANGE UP
NRBC # BLD: 0 /100 WBCS — SIGNIFICANT CHANGE UP (ref 0–0)
NRBC # BLD: 0 /100 WBCS — SIGNIFICANT CHANGE UP (ref 0–0)
PH UR: 5 — SIGNIFICANT CHANGE UP (ref 5–8)
PH UR: 5 — SIGNIFICANT CHANGE UP (ref 5–8)
PLATELET # BLD AUTO: 431 K/UL — HIGH (ref 150–400)
PLATELET # BLD AUTO: 431 K/UL — HIGH (ref 150–400)
POTASSIUM SERPL-MCNC: 3.9 MMOL/L — SIGNIFICANT CHANGE UP (ref 3.5–5.3)
POTASSIUM SERPL-MCNC: 3.9 MMOL/L — SIGNIFICANT CHANGE UP (ref 3.5–5.3)
POTASSIUM SERPL-SCNC: 3.9 MMOL/L — SIGNIFICANT CHANGE UP (ref 3.5–5.3)
POTASSIUM SERPL-SCNC: 3.9 MMOL/L — SIGNIFICANT CHANGE UP (ref 3.5–5.3)
PROT SERPL-MCNC: 7 G/DL — SIGNIFICANT CHANGE UP (ref 6–8.3)
PROT SERPL-MCNC: 7 G/DL — SIGNIFICANT CHANGE UP (ref 6–8.3)
PROT UR-MCNC: NEGATIVE MG/DL — SIGNIFICANT CHANGE UP
PROT UR-MCNC: NEGATIVE MG/DL — SIGNIFICANT CHANGE UP
PROTHROM AB SERPL-ACNC: 16.1 SEC — HIGH (ref 9.5–13)
PROTHROM AB SERPL-ACNC: 16.1 SEC — HIGH (ref 9.5–13)
RAPID RVP RESULT: SIGNIFICANT CHANGE UP
RAPID RVP RESULT: SIGNIFICANT CHANGE UP
RBC # BLD: 4.91 M/UL — SIGNIFICANT CHANGE UP (ref 3.8–5.2)
RBC # BLD: 4.91 M/UL — SIGNIFICANT CHANGE UP (ref 3.8–5.2)
RBC # FLD: 15.1 % — HIGH (ref 10.3–14.5)
RBC # FLD: 15.1 % — HIGH (ref 10.3–14.5)
RBC CASTS # UR COMP ASSIST: 0 /HPF — SIGNIFICANT CHANGE UP (ref 0–4)
RBC CASTS # UR COMP ASSIST: 0 /HPF — SIGNIFICANT CHANGE UP (ref 0–4)
SARS-COV-2 RNA SPEC QL NAA+PROBE: SIGNIFICANT CHANGE UP
SARS-COV-2 RNA SPEC QL NAA+PROBE: SIGNIFICANT CHANGE UP
SODIUM SERPL-SCNC: 137 MMOL/L — SIGNIFICANT CHANGE UP (ref 135–145)
SODIUM SERPL-SCNC: 137 MMOL/L — SIGNIFICANT CHANGE UP (ref 135–145)
SP GR SPEC: 1.02 — SIGNIFICANT CHANGE UP (ref 1–1.03)
SP GR SPEC: 1.02 — SIGNIFICANT CHANGE UP (ref 1–1.03)
TROPONIN I, HIGH SENSITIVITY RESULT: 4.6 NG/L — SIGNIFICANT CHANGE UP
TROPONIN I, HIGH SENSITIVITY RESULT: 4.6 NG/L — SIGNIFICANT CHANGE UP
TROPONIN I, HIGH SENSITIVITY RESULT: 5.3 NG/L — SIGNIFICANT CHANGE UP
TROPONIN I, HIGH SENSITIVITY RESULT: 5.3 NG/L — SIGNIFICANT CHANGE UP
UROBILINOGEN FLD QL: 0.2 MG/DL — SIGNIFICANT CHANGE UP (ref 0.2–1)
UROBILINOGEN FLD QL: 0.2 MG/DL — SIGNIFICANT CHANGE UP (ref 0.2–1)
WBC # BLD: 16.06 K/UL — HIGH (ref 3.8–10.5)
WBC # BLD: 16.06 K/UL — HIGH (ref 3.8–10.5)
WBC # FLD AUTO: 16.06 K/UL — HIGH (ref 3.8–10.5)
WBC # FLD AUTO: 16.06 K/UL — HIGH (ref 3.8–10.5)
WBC UR QL: 3 /HPF — SIGNIFICANT CHANGE UP (ref 0–5)
WBC UR QL: 3 /HPF — SIGNIFICANT CHANGE UP (ref 0–5)

## 2024-01-09 PROCEDURE — 99285 EMERGENCY DEPT VISIT HI MDM: CPT

## 2024-01-09 PROCEDURE — 93010 ELECTROCARDIOGRAM REPORT: CPT | Mod: 76

## 2024-01-09 PROCEDURE — 71045 X-RAY EXAM CHEST 1 VIEW: CPT | Mod: 26

## 2024-01-09 PROCEDURE — 74177 CT ABD & PELVIS W/CONTRAST: CPT | Mod: 26,MA

## 2024-01-09 PROCEDURE — 71275 CT ANGIOGRAPHY CHEST: CPT | Mod: 26,MA

## 2024-01-09 PROCEDURE — 99223 1ST HOSP IP/OBS HIGH 75: CPT | Mod: GC

## 2024-01-09 RX ORDER — LANOLIN ALCOHOL/MO/W.PET/CERES
3 CREAM (GRAM) TOPICAL AT BEDTIME
Refills: 0 | Status: DISCONTINUED | OUTPATIENT
Start: 2024-01-09 | End: 2024-01-12

## 2024-01-09 RX ORDER — PANTOPRAZOLE SODIUM 20 MG/1
40 TABLET, DELAYED RELEASE ORAL
Refills: 0 | Status: DISCONTINUED | OUTPATIENT
Start: 2024-01-09 | End: 2024-01-12

## 2024-01-09 RX ORDER — LISINOPRIL 2.5 MG/1
40 TABLET ORAL DAILY
Refills: 0 | Status: DISCONTINUED | OUTPATIENT
Start: 2024-01-09 | End: 2024-01-09

## 2024-01-09 RX ORDER — ONDANSETRON 8 MG/1
4 TABLET, FILM COATED ORAL ONCE
Refills: 0 | Status: COMPLETED | OUTPATIENT
Start: 2024-01-09 | End: 2024-01-09

## 2024-01-09 RX ORDER — DIPHENHYDRAMINE HCL 50 MG
50 CAPSULE ORAL ONCE
Refills: 0 | Status: DISCONTINUED | OUTPATIENT
Start: 2024-01-09 | End: 2024-01-09

## 2024-01-09 RX ORDER — AMLODIPINE BESYLATE 2.5 MG/1
1 TABLET ORAL
Qty: 0 | Refills: 0 | DISCHARGE

## 2024-01-09 RX ORDER — SODIUM CHLORIDE 9 MG/ML
1000 INJECTION INTRAMUSCULAR; INTRAVENOUS; SUBCUTANEOUS ONCE
Refills: 0 | Status: COMPLETED | OUTPATIENT
Start: 2024-01-09 | End: 2024-01-09

## 2024-01-09 RX ORDER — LEVOTHYROXINE SODIUM 125 MCG
75 TABLET ORAL DAILY
Refills: 0 | Status: DISCONTINUED | OUTPATIENT
Start: 2024-01-09 | End: 2024-01-12

## 2024-01-09 RX ORDER — ACETAMINOPHEN 500 MG
650 TABLET ORAL EVERY 6 HOURS
Refills: 0 | Status: DISCONTINUED | OUTPATIENT
Start: 2024-01-09 | End: 2024-01-12

## 2024-01-09 RX ORDER — QUINAPRIL HYDROCHLORIDE 40 MG/1
1 TABLET, FILM COATED ORAL
Qty: 0 | Refills: 0 | DISCHARGE

## 2024-01-09 RX ORDER — WARFARIN SODIUM 2.5 MG/1
1 TABLET ORAL
Refills: 0 | DISCHARGE

## 2024-01-09 RX ORDER — LISINOPRIL 2.5 MG/1
1 TABLET ORAL
Refills: 0 | DISCHARGE

## 2024-01-09 RX ORDER — ASPIRIN/CALCIUM CARB/MAGNESIUM 324 MG
81 TABLET ORAL DAILY
Refills: 0 | Status: DISCONTINUED | OUTPATIENT
Start: 2024-01-09 | End: 2024-01-12

## 2024-01-09 RX ORDER — ONDANSETRON 8 MG/1
4 TABLET, FILM COATED ORAL EVERY 8 HOURS
Refills: 0 | Status: DISCONTINUED | OUTPATIENT
Start: 2024-01-09 | End: 2024-01-12

## 2024-01-09 RX ORDER — ASPIRIN/CALCIUM CARB/MAGNESIUM 324 MG
0 TABLET ORAL
Qty: 0 | Refills: 0 | DISCHARGE

## 2024-01-09 RX ADMIN — SODIUM CHLORIDE 1000 MILLILITER(S): 9 INJECTION INTRAMUSCULAR; INTRAVENOUS; SUBCUTANEOUS at 16:55

## 2024-01-09 RX ADMIN — SODIUM CHLORIDE 1000 MILLILITER(S): 9 INJECTION INTRAMUSCULAR; INTRAVENOUS; SUBCUTANEOUS at 15:55

## 2024-01-09 RX ADMIN — ONDANSETRON 4 MILLIGRAM(S): 8 TABLET, FILM COATED ORAL at 18:56

## 2024-01-09 NOTE — PATIENT PROFILE ADULT - NSTOBACCONEVERSMOKERY/N_GEN_A
Prior Authorization Retail Medication Request    Medication/Dose: Amlodipine  ICD code (if different than what is on RX):  Benign essential hypertension [I10]  Previously Tried and Failed:  unknown  Rationale:  unknown    Insurance Name:  Plan Telephone   Insurance ID:  [id number]      Pharmacy Information (if different than what is on RX)  Name:  [facility]  Phone:  [phone]     No

## 2024-01-09 NOTE — ED PROVIDER NOTE - CLINICAL SUMMARY MEDICAL DECISION MAKING FREE TEXT BOX
63F PMHX of obesity, Finesse-en-Y gastric bypass, HTN, HLD, PE on warfarin, DM type 2, hypothyroidism presenting with generalized fatigue, chest pain, and abdominal pain x few days. Describes mild, nonradiating, chest pain left sided with no exacerbating or relieving factors a/w, dyspnea on exertion ongoing for weeks. Also, reporting generalized abdominal pain, without any nausea. Denies any   nausea/vomiting, headaches, fevers, chills, diarrhea  weakness, syncope,  dysuria, urinary symptoms, subjective neurological deficits, falls, trauma, rashes, visual complaints. Compliant with home medications.     History obtained from independent historian: na  External note reviewed: na  DDx: rule out ACS, check INR for therapeutic range for PE, rule out internal hernia since nausea a/w abdominal pain.   Decision making, ED course, independent interpretation of imaging studies, and consults:   -   Consideration hospitalization vs de-escalation of care:     Disposition: 63F PMHX of obesity, Finesse-en-Y gastric bypass, HTN, HLD, PE on warfarin, DM type 2, hypothyroidism presenting with generalized fatigue, chest pain, and abdominal pain x few days. Describes mild, nonradiating, chest pain left sided with no exacerbating or relieving factors a/w, dyspnea on exertion ongoing for weeks. Also, reporting generalized abdominal pain, without any nausea. Denies any   nausea/vomiting, headaches, fevers, chills, diarrhea  weakness, syncope,  dysuria, urinary symptoms, subjective neurological deficits, falls, trauma, rashes, visual complaints. Compliant with home medications.     History obtained from independent historian: na  External note reviewed: na  DDx: rule out ACS, check INR for therapeutic range for PE, rule out internal hernia since nausea a/w abdominal pain.   Decision making, ED course, independent interpretation of imaging studies, and consults:   -   Consideration hospitalization vs de-escalation of care:     Disposition: admit for acs w/u. cta chest/ a/p unremarkable. no pe.

## 2024-01-09 NOTE — ED ADULT NURSE NOTE - NSFALLUNIVINTERV_ED_ALL_ED
Bed/Stretcher in lowest position, wheels locked, appropriate side rails in place/Call bell, personal items and telephone in reach/Instruct patient to call for assistance before getting out of bed/chair/stretcher/Non-slip footwear applied when patient is off stretcher/[place] to call system/Physically safe environment - no spills, clutter or unnecessary equipment/Purposeful proactive rounding/Room/bathroom lighting operational, light cord in reach Bed/Stretcher in lowest position, wheels locked, appropriate side rails in place/Call bell, personal items and telephone in reach/Instruct patient to call for assistance before getting out of bed/chair/stretcher/Non-slip footwear applied when patient is off stretcher/[place] to call system/Physically safe environment - no spills, clutter or unnecessary equipment/Purposeful proactive rounding/Room/bathroom lighting operational, light cord in reach

## 2024-01-09 NOTE — H&P ADULT - ASSESSMENT
#Chest pain, r/o ACS  -Admit to telemetry  - trop 5.3>4.6  - EKG personally reviewed: NO ST or T wave hcanges  - Last echo 10/2019 w/ EF 63% Pulm HTN, Grade 1 diastolic dysfunction  - Last stress test 5/2019 nondiagnostic   - Cardiology consulted  - Aspirin 81mg  - f/u AM A1c, lipid profile, TSH  - f/u AM echo    #Elevated white count w/ diarrhea, possible gastroenteritis   - No fevers currently, hold off on antibiotics  - CT AP WNL  - c/w home omeprazole- protonix 40mg  - Maalox 30mg q4hr PRN   - Zofran PRN for nausea  - low threshold for antibiotics and blood cultures if become febrile  - f/u  stool culture/PCR     #ABBIE and  pulm HTN  - No CPAP at home  - will order CPAP 5 overnight   - If worsening pulmonary HTN on AM echo, get pulmonary consult  - Patient also would like Auto CPAP for home on discharge    #Hypothyroidism   - c/w levothyroxine 75mcg     #HTN  - amlodipine 2.5mg but does not take it given normal BP, will hold  - will monitor BP for now  - HCTZ 25mg BID PRN at home - will continue given LE edema   - Lisinopril 40mg - hold given low BP     #DM  - hold home glipizide 5mg BID and ozempic  - c/w AISS and finger sticks TID   - consistent carb diet    #history of PE on warfarin  - CTA w/ no PE  - c/w warfarin 7.5mg every day except Saturday On Saturday patient takes 10mg while in the hospital  - Monitor INR, currently non therapeutic   -follow up PE outpatient    #DVT PPx   -c/w coumadin  -follow INRs    #Full Code   Patient is a 63F w/ PMH of DM, HTN, obesity, gastric bypass, PE on warfarin, hypothyroidism, ABBIE not on CPAP who presented to the ED centralized chest pain x 2 days, admitted for :    #Chest pain, r/o ACS  -Admit to telemetry  - trop 5.3>4.6  - EKG personally reviewed: NO ST or T wave changes  - Last echo 10/2019 w/ EF 63% Pulm HTN, Grade 1 diastolic dysfunction  - Last stress test 5/2019 nondiagnostic   - Cardiology consulted  - Aspirin 81mg  -c/w home statin  - f/u AM A1c, lipid profile, TSH  - f/u AM echo    #Acute on chronic diastolic congestive heart failure  -hypervolemic on exam  - Last echo 10/2019 w/ EF 63% Pulm HTN, Grade 1 diastolic dysfunction  - Cardiology consulted  - c/w home thiazide  - reintroduce lasix when pressures improved  - strict Is&Os, daily weight, salt/ fluid restriction  - f/u AM echo    #Elevated white count w/ diarrhea, possible gastroenteritis   - No fevers currently, hold off on antibiotics  - CT AP unremarkable  - c/w home omeprazole- protonix 40mg  - Maalox 30mg q4hr PRN   - Zofran PRN for nausea  - low threshold for antibiotics and blood cultures if become febrile  - f/u  stool culture/PCR     #ABBIE and  pulm HTN  - No CPAP at home  - will order CPAP 5 overnight   - If worsening pulmonary HTN on AM echo, get pulmonary consult  - Patient also would like Auto CPAP for home on discharge    #Hypothyroidism   - c/w levothyroxine 75mcg   -follow up AM TSH    #HTN  - will monitor BP for now  - HCTZ 25mg BID PRN at home - will continue given LE edema   - hold home Lisinopril 40mg given low BP   - hold home amlodipine 2.5mg (does not usually take it because of leg swelling)   - hold home lasix due to lower blood pressures, may restart when pressures improve    #DM  - hold home glipizide 5mg BID and ozempic  - Sliding scale and accuchecks  - consistent carb diet  - f/u AM A1c     #history of PE on warfarin  - CTA w/ no PE  - c/w warfarin 7.5mg every day except Saturday On Saturday patient takes 10mg while in the hospital  - Monitor INR, currently non therapeutic   -follow up PE outpatient    #depression  -c/w home zoloft    #DVT PPx   -c/w coumadin  -follow INRs    #Full Code    d/w Dr. Carlin

## 2024-01-09 NOTE — ED ADULT NURSE NOTE - OBJECTIVE STATEMENT
pt came in from home for c/o med sternal chest pain since last night.  Pt. states she was diaphoretic at times.  Pt. states the chest pain was radiating to her neck but denies any chest pain at this time.  Pt. states she feels tired and weak at this time.  Pt. also states she had 2 episodes of diarrhea as well earlier today  Pt states she is on coumadin for h/o of PE. pt also with PMH of DM2, on ozempic, pt presents with CBG monitor to left upper arm. pt also reports she does have SOB on exertion at baseline.

## 2024-01-09 NOTE — ED ADULT NURSE REASSESSMENT NOTE - NS ED NURSE REASSESS COMMENT FT1
called tele and 2 surg each several times to give report, no one answering phones since 2200. contacted nursing supervisor brandy to make her aware at this time.

## 2024-01-09 NOTE — H&P ADULT - HISTORY OF PRESENT ILLNESS
63F w/ PMH of DM, HTN, obesity, gastric bypass, PE on warfarin, hypothyroidism, ABBIE noncompliant w/ CPAP presents to the ED w/ left sided CP and abd pain. Patient states yesterday morning at rest she began to develop left sided/midsternal CP for 5-10 mins that radiated to the jaw. States she took maalox w/out relief. This morning endorses 1 episode of brief chest pain while in the nursing home and fatigue. States she has noticed increased SOB on ambulation, now only able to walk 1/2 block. Sleeps with 3 pillows at night and has LE edema. Endorses abd pain in lower quadrant w/ 2 episodes of non bloody diarrhea this morning. Denies any cough, vomiting, fevers.    Patient is a 63F w/ PMH of DM, HTN, obesity, gastric bypass, PE on warfarin, hypothyroidism, ABBIE not on CPAP who presented to the ED centralized chest pain x 2 days. Patient states taht 2 days ago in themorning at rest she began to develop midsternal chest pain for a few minutes which later began to radiate to the right neck and jaw over the next day. t States she took maalox w/out relief. She has also noticed increasing shortness of breath over the past year, to [place] point where she can no longer walk a full block without getting SOB. Sleeps with 3 pillows at night and has LE edema., for which she takes as needed lasix.  Also endorses abd pain in lower quadrant with nausea and 2 episodes of nonbloody diarrhea.  Denies any cough, vomiting, fevers, palpitations.     In the ED, VSS, afebrile satting well on room air. EKG sinus rhythm with ST or T wave abnormalities.   CT AP unremarkable

## 2024-01-09 NOTE — H&P ADULT - NSHPREVIEWOFSYSTEMS_GEN_ALL_CORE
Gen: No fever, normal appetite  Eyes: No eye irritation or discharge  ENT: No ear pain, congestion, sore throat  Resp: No cough or trouble breathing  Cardiovascular: No chest pain or palpitation  Gastroenteric: No nausea/vomiting, diarrhea, constipation  :  No change in urine output; no dysuria  MS: No joint or muscle pain  Skin: No rashes  Neuro: No headache; no abnormal movements  Remainder negative, except as per the HPI Gen: No fever, decr appetite  Eyes: No eye irritation or discharge  ENT: No ear pain, congestion, sore throat  Resp: No cough + trouble breathing  Cardiovascular: + chest pain NO palpitation +leg swelling  Gastroenteric: + nausea (-) vomiting, + diarrhea, No constipation  :  No change in urine output; no dysuria  MS: No joint or muscle pain  Skin: No rashes  Neuro: No headache; no abnormal movements  Remainder negative, except as per the HPI

## 2024-01-09 NOTE — PATIENT PROFILE ADULT - FALL HARM RISK - HARM RISK INTERVENTIONS
Assistance OOB with selected safe patient handling equipment/Communicate Risk of Fall with Harm to all staff/Discuss with provider need for PT consult/Monitor gait and stability/Provide patient with walking aids - walker, cane, crutches/Reinforce activity limits and safety measures with patient and family/Tailored Fall Risk Interventions/Visual Cue: Yellow wristband and red socks/Bed in lowest position, wheels locked, appropriate side rails in place/Call bell, personal items and telephone in reach/Instruct patient to call for assistance before getting out of bed or chair/Non-slip footwear when patient is out of bed/[place] to call system/Physically safe environment - no spills, clutter or unnecessary equipment/Purposeful Proactive Rounding/Room/bathroom lighting operational, light cord in reach Assistance OOB with selected safe patient handling equipment/Communicate Risk of Fall with Harm to all staff/Discuss with provider need for PT consult/Monitor gait and stability/Provide patient with walking aids - walker, cane, crutches/Reinforce activity limits and safety measures with patient and family/Tailored Fall Risk Interventions/Visual Cue: Yellow wristband and red socks/Bed in lowest position, wheels locked, appropriate side rails in place/Call bell, personal items and telephone in reach/Instruct patient to call for assistance before getting out of bed or chair/Non-slip footwear when patient is out of bed/[place] to call system/Physically safe environment - no spills, clutter or unnecessary equipment/Purposeful Proactive Rounding/Room/bathroom lighting operational, light cord in reach

## 2024-01-09 NOTE — ED ADULT TRIAGE NOTE - CHIEF COMPLAINT QUOTE
Pt. to ED complaining of med sternal chest pain since last night.  Pt. states she was diaphoretic at times.  Pt. states the chest pain was radiating to her neck but denies any chest pain at this time.  Pt. states she feels tired and weak at this time.  Pt. also states she had an episode of diarrhea as well earlier.  Pt. states she is on coumadin for PE.

## 2024-01-09 NOTE — ED PROVIDER NOTE - PHYSICAL EXAMINATION
VITAL SIGNS: I have reviewed nursing notes and confirm.   GEN: Well-developed; well-nourished; in no acute distress. Speaking full sentences. (+) tired appearing.  SKIN: Warm, pink, no rash, no diaphoresis, no cyanosis, well perfused.   HEAD: Normocephalic; atraumatic. No scalp lacerations, no abrasions.  NECK: Supple; non tender.   EYES: Pupils 3mm equal, round, reactive to light and accomodation, conjunctiva and sclera clear.    ENT: No nasal discharge; airway clear. Trachea is midline. Normal dentition.  CV: RRR. S1, S2 normal; no murmurs, gallops, or rubs. Capillary refill < 2 seconds throughout. Distal pulses intact 2+ throughout.  RESP: CTA bilaterally. No wheezes, rales, or rhonchi.   ABD: Normal bowel sounds, soft, non-distended, (+) diffusely tender mild; no rebound, no guarding, no rigidity, (+) large body habitus  MSK: Normal range of motion and movement of all 4 extremities.    BACK: No thoracolumbar midline or paravertebral tenderness.   NEURO: Alert & oriented x 3,  Gait: Fluid. Normal speech and coordination.

## 2024-01-09 NOTE — H&P ADULT - ATTENDING COMMENTS
63F w/ PMH of DM, HTN, obesity, gastric bypass, PE on warfarin, hypothyroidism, ABBIE noncompliant w/ CPAP presents to the ED w/ left sided CP and abd pain. Patient states yesterday morning at rest she began to develop left sided/midsternal CP for 5-10 mins that radiated to the jaw. States she took maalox w/out relief. This morning endorses 1 episode of brief chest pain while in the nursing home and fatigue. States she has noticed increased SOB on ambulation, now only able to walk 1/2 block. Sleeps with 3 pillows at night and has LE edema. Endorses abd pain in lower quadrant w/ 2 episodes of non bloody diarrhea this morning. Denies any cough, vomiting, fevers.     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, denies smoking, drinking, illicit drug use  Allergies - latex     ED course - , /73, afebrile, 98% on RA   Given 4mg zofran   EKG - Sinus tach 103    PE - A&Ox3, Normal heart and lung sounds, abd soft non tender, LE w/ 1+ pitting edema   Critical labs - Hgb 14, WBC 16, Trop 5.3, INR 1.39, RVP -, UA -   Imaging - CTA w/ no PE, CT abd w/ no acute pathology     A/P  Chest pain ACS r/o  - trop 5.3, will repeat   - EKG non ischemic   - Last echo 10/2019 w/ EF 63% Pulm HTN, Grade 1 diastolic dysfunction  - Cardiology consult for inpatient vs outpatient workup   - Last stress test 5/2019 nondiagnostic   - Telemetry monitoring  - A1c, lipid profile, TSH  - Aspirin 81mg    Elevated white count w/ diarrhea, possible gastroenteritis   - obtain stool culture/PCR   - hold off antibiotics given afebrile, non toxic appearing   - c/w home omeprazole 20mg BID  - Maalox 30mg q4hr PRN   - Zofran PRN for nausea    ABBIE w/ pulm HTN  - Non compliant w/ CPAP  - will order CPAP 5 overnight   - Pulm evaluation given worsening sob in setting of pulm HTN once echo reading is complete, patient also would like Auto CPAP on discharge    Hypothyroidism   - c/w levothyroxine 75mcg     HTN  - amlodipine 2.5mg but does not take it given normal BP, will hold  - will monitor BP for now  - HCTZ 25mg BID PRN at home - will continue given LE edema   - Lisinopril 40mg - hold given low BP     DM  - glipizide 5mg BID, will hold for now  - c/w AISS and finger sticks TID     PE on warfarin  - c/w warfarin 7.5mg every day except saturday. On saturday patient takes 10mg.   - Monitor INR currently non therapeutic   - CTA w/ no PE, patient will discuss w/ outside hematology regarding continuation of warfarin.     Diet - Diabetic   DVT - lovenox   Full Code

## 2024-01-09 NOTE — ED PROVIDER NOTE - OBJECTIVE STATEMENT
63F PMHX of obesity, Finesse-en-Y gastric bypass, HTN, HLD, PE on warfarin, DM type 2, hypothyroidism presenting with generalized fatigue, chest pain, and abdominal pain x few days. Describes mild, nonradiating, chest pain left sided with no exacerbating or relieving factors a/w, dyspnea on exertion ongoing for weeks. Also, reporting generalized abdominal pain, without any nausea. Denies any   nausea/vomiting, headaches, fevers, chills, diarrhea  weakness, syncope,  dysuria, urinary symptoms, subjective neurological deficits, falls, trauma, rashes, visual complaints. Compliant with home medications.

## 2024-01-09 NOTE — H&P ADULT - NSHPPHYSICALEXAM_GEN_ALL_CORE
Vital Signs Last 24 Hrs  T(C): 36.9 (09 Jan 2024 22:44), Max: 36.9 (09 Jan 2024 22:44)  T(F): 98.5 (09 Jan 2024 22:44), Max: 98.5 (09 Jan 2024 22:44)  HR: 102 (09 Jan 2024 23:52) (98 - 108)  BP: 100/63 (09 Jan 2024 22:44) (100/63 - 125/85)  BP(mean): 90 (09 Jan 2024 21:30) (83 - 98)  ABP: --  ABP(mean): --  RR: 17 (09 Jan 2024 22:44) (16 - 18)  SpO2: 97% (09 Jan 2024 23:52) (95% - 98%)    O2 Parameters below as of 09 Jan 2024 21:30  Patient On (Oxygen Delivery Method): room air      PHYSICAL EXAM:  GENERAL: NAD, lying in bed comfortably  HEAD:  Atraumatic, Normocephalic  EYES: EOMI, PERRLA, conjunctiva and sclera clear  ENT: Moist mucous membranes  CHEST/LUNG: Clear to auscultation bilaterally, good air entry bilaterally; No wheezing, rales, or rhonchi. Unlabored respirations  HEART: Regular rate and rhythm. S1 and S2. No murmurs, rubs, or gallops  ABDOMEN: TTP diffusely, especially lower abdomen b/l.  Soft, Nondistended. Bowel sounds present  EXTREMITIES:  2+ Peripheral Pulses. Capillary refill <2 seconds. No clubbing, cyanosis, or edema  NERVOUS SYSTEM:  Alert & Oriented X3, speech clear. No deficits   MSK: FROM all 4 extremities, full and equal strength  SKIN: No rashes, bruises, or other lesions

## 2024-01-10 DIAGNOSIS — Z78.9 OTHER SPECIFIED HEALTH STATUS: ICD-10-CM

## 2024-01-10 DIAGNOSIS — E03.9 HYPOTHYROIDISM, UNSPECIFIED: ICD-10-CM

## 2024-01-10 DIAGNOSIS — G47.33 OBSTRUCTIVE SLEEP APNEA (ADULT) (PEDIATRIC): ICD-10-CM

## 2024-01-10 DIAGNOSIS — Z86.711 PERSONAL HISTORY OF PULMONARY EMBOLISM: ICD-10-CM

## 2024-01-10 DIAGNOSIS — I50.32 CHRONIC DIASTOLIC (CONGESTIVE) HEART FAILURE: ICD-10-CM

## 2024-01-10 DIAGNOSIS — Z86.79 PERSONAL HISTORY OF OTHER DISEASES OF THE CIRCULATORY SYSTEM: ICD-10-CM

## 2024-01-10 DIAGNOSIS — I10 ESSENTIAL (PRIMARY) HYPERTENSION: ICD-10-CM

## 2024-01-10 DIAGNOSIS — E78.5 HYPERLIPIDEMIA, UNSPECIFIED: ICD-10-CM

## 2024-01-10 DIAGNOSIS — Z29.9 ENCOUNTER FOR PROPHYLACTIC MEASURES, UNSPECIFIED: ICD-10-CM

## 2024-01-10 DIAGNOSIS — E11.9 TYPE 2 DIABETES MELLITUS WITHOUT COMPLICATIONS: ICD-10-CM

## 2024-01-10 DIAGNOSIS — I50.33 ACUTE ON CHRONIC DIASTOLIC (CONGESTIVE) HEART FAILURE: ICD-10-CM

## 2024-01-10 DIAGNOSIS — R19.7 DIARRHEA, UNSPECIFIED: ICD-10-CM

## 2024-01-10 LAB
A1C WITH ESTIMATED AVERAGE GLUCOSE RESULT: 6.8 % — HIGH (ref 4–5.6)
A1C WITH ESTIMATED AVERAGE GLUCOSE RESULT: 6.8 % — HIGH (ref 4–5.6)
ALBUMIN SERPL ELPH-MCNC: 2.8 G/DL — LOW (ref 3.3–5)
ALBUMIN SERPL ELPH-MCNC: 2.8 G/DL — LOW (ref 3.3–5)
ALP SERPL-CCNC: 105 U/L — SIGNIFICANT CHANGE UP (ref 40–120)
ALP SERPL-CCNC: 105 U/L — SIGNIFICANT CHANGE UP (ref 40–120)
ALT FLD-CCNC: 27 U/L — SIGNIFICANT CHANGE UP (ref 10–45)
ALT FLD-CCNC: 27 U/L — SIGNIFICANT CHANGE UP (ref 10–45)
ANION GAP SERPL CALC-SCNC: 11 MMOL/L — SIGNIFICANT CHANGE UP (ref 5–17)
ANION GAP SERPL CALC-SCNC: 11 MMOL/L — SIGNIFICANT CHANGE UP (ref 5–17)
AST SERPL-CCNC: 18 U/L — SIGNIFICANT CHANGE UP (ref 10–40)
AST SERPL-CCNC: 18 U/L — SIGNIFICANT CHANGE UP (ref 10–40)
BILIRUB SERPL-MCNC: 0.8 MG/DL — SIGNIFICANT CHANGE UP (ref 0.2–1.2)
BILIRUB SERPL-MCNC: 0.8 MG/DL — SIGNIFICANT CHANGE UP (ref 0.2–1.2)
BUN SERPL-MCNC: 26 MG/DL — HIGH (ref 7–23)
BUN SERPL-MCNC: 26 MG/DL — HIGH (ref 7–23)
C DIFF BY PCR RESULT: SIGNIFICANT CHANGE UP
C DIFF BY PCR RESULT: SIGNIFICANT CHANGE UP
CALCIUM SERPL-MCNC: 8.8 MG/DL — SIGNIFICANT CHANGE UP (ref 8.4–10.5)
CALCIUM SERPL-MCNC: 8.8 MG/DL — SIGNIFICANT CHANGE UP (ref 8.4–10.5)
CHLORIDE SERPL-SCNC: 101 MMOL/L — SIGNIFICANT CHANGE UP (ref 96–108)
CHLORIDE SERPL-SCNC: 101 MMOL/L — SIGNIFICANT CHANGE UP (ref 96–108)
CHOLEST SERPL-MCNC: 112 MG/DL — SIGNIFICANT CHANGE UP
CHOLEST SERPL-MCNC: 112 MG/DL — SIGNIFICANT CHANGE UP
CO2 SERPL-SCNC: 23 MMOL/L — SIGNIFICANT CHANGE UP (ref 22–31)
CO2 SERPL-SCNC: 23 MMOL/L — SIGNIFICANT CHANGE UP (ref 22–31)
CREAT SERPL-MCNC: 0.65 MG/DL — SIGNIFICANT CHANGE UP (ref 0.5–1.3)
CREAT SERPL-MCNC: 0.65 MG/DL — SIGNIFICANT CHANGE UP (ref 0.5–1.3)
EGFR: 99 ML/MIN/1.73M2 — SIGNIFICANT CHANGE UP
EGFR: 99 ML/MIN/1.73M2 — SIGNIFICANT CHANGE UP
ESTIMATED AVERAGE GLUCOSE: 148 MG/DL — HIGH (ref 68–114)
ESTIMATED AVERAGE GLUCOSE: 148 MG/DL — HIGH (ref 68–114)
GLUCOSE BLDC GLUCOMTR-MCNC: 111 MG/DL — HIGH (ref 70–99)
GLUCOSE BLDC GLUCOMTR-MCNC: 111 MG/DL — HIGH (ref 70–99)
GLUCOSE BLDC GLUCOMTR-MCNC: 117 MG/DL — HIGH (ref 70–99)
GLUCOSE BLDC GLUCOMTR-MCNC: 117 MG/DL — HIGH (ref 70–99)
GLUCOSE BLDC GLUCOMTR-MCNC: 154 MG/DL — HIGH (ref 70–99)
GLUCOSE BLDC GLUCOMTR-MCNC: 154 MG/DL — HIGH (ref 70–99)
GLUCOSE BLDC GLUCOMTR-MCNC: 177 MG/DL — HIGH (ref 70–99)
GLUCOSE BLDC GLUCOMTR-MCNC: 177 MG/DL — HIGH (ref 70–99)
GLUCOSE SERPL-MCNC: 168 MG/DL — HIGH (ref 70–99)
GLUCOSE SERPL-MCNC: 168 MG/DL — HIGH (ref 70–99)
HCT VFR BLD CALC: 40.3 % — SIGNIFICANT CHANGE UP (ref 34.5–45)
HCT VFR BLD CALC: 40.3 % — SIGNIFICANT CHANGE UP (ref 34.5–45)
HDLC SERPL-MCNC: 31 MG/DL — LOW
HDLC SERPL-MCNC: 31 MG/DL — LOW
HGB BLD-MCNC: 13 G/DL — SIGNIFICANT CHANGE UP (ref 11.5–15.5)
HGB BLD-MCNC: 13 G/DL — SIGNIFICANT CHANGE UP (ref 11.5–15.5)
INR BLD: 1.9 RATIO — HIGH (ref 0.85–1.18)
INR BLD: 1.9 RATIO — HIGH (ref 0.85–1.18)
LIPID PNL WITH DIRECT LDL SERPL: 57 MG/DL — SIGNIFICANT CHANGE UP
LIPID PNL WITH DIRECT LDL SERPL: 57 MG/DL — SIGNIFICANT CHANGE UP
MAGNESIUM SERPL-MCNC: 1.8 MG/DL — SIGNIFICANT CHANGE UP (ref 1.6–2.6)
MAGNESIUM SERPL-MCNC: 1.8 MG/DL — SIGNIFICANT CHANGE UP (ref 1.6–2.6)
MCHC RBC-ENTMCNC: 28.9 PG — SIGNIFICANT CHANGE UP (ref 27–34)
MCHC RBC-ENTMCNC: 28.9 PG — SIGNIFICANT CHANGE UP (ref 27–34)
MCHC RBC-ENTMCNC: 32.3 GM/DL — SIGNIFICANT CHANGE UP (ref 32–36)
MCHC RBC-ENTMCNC: 32.3 GM/DL — SIGNIFICANT CHANGE UP (ref 32–36)
MCV RBC AUTO: 89.6 FL — SIGNIFICANT CHANGE UP (ref 80–100)
MCV RBC AUTO: 89.6 FL — SIGNIFICANT CHANGE UP (ref 80–100)
NON HDL CHOLESTEROL: 81 MG/DL — SIGNIFICANT CHANGE UP
NON HDL CHOLESTEROL: 81 MG/DL — SIGNIFICANT CHANGE UP
NRBC # BLD: 0 /100 WBCS — SIGNIFICANT CHANGE UP (ref 0–0)
NRBC # BLD: 0 /100 WBCS — SIGNIFICANT CHANGE UP (ref 0–0)
NT-PROBNP SERPL-SCNC: 96 PG/ML — SIGNIFICANT CHANGE UP (ref 0–300)
NT-PROBNP SERPL-SCNC: 96 PG/ML — SIGNIFICANT CHANGE UP (ref 0–300)
PLATELET # BLD AUTO: 276 K/UL — SIGNIFICANT CHANGE UP (ref 150–400)
PLATELET # BLD AUTO: 276 K/UL — SIGNIFICANT CHANGE UP (ref 150–400)
POTASSIUM SERPL-MCNC: 3.1 MMOL/L — LOW (ref 3.5–5.3)
POTASSIUM SERPL-MCNC: 3.1 MMOL/L — LOW (ref 3.5–5.3)
POTASSIUM SERPL-SCNC: 3.1 MMOL/L — LOW (ref 3.5–5.3)
POTASSIUM SERPL-SCNC: 3.1 MMOL/L — LOW (ref 3.5–5.3)
PROT SERPL-MCNC: 6.2 G/DL — SIGNIFICANT CHANGE UP (ref 6–8.3)
PROT SERPL-MCNC: 6.2 G/DL — SIGNIFICANT CHANGE UP (ref 6–8.3)
PROTHROM AB SERPL-ACNC: 21.3 SEC — HIGH (ref 9.5–13)
PROTHROM AB SERPL-ACNC: 21.3 SEC — HIGH (ref 9.5–13)
RBC # BLD: 4.5 M/UL — SIGNIFICANT CHANGE UP (ref 3.8–5.2)
RBC # BLD: 4.5 M/UL — SIGNIFICANT CHANGE UP (ref 3.8–5.2)
RBC # FLD: 15.6 % — HIGH (ref 10.3–14.5)
RBC # FLD: 15.6 % — HIGH (ref 10.3–14.5)
SODIUM SERPL-SCNC: 135 MMOL/L — SIGNIFICANT CHANGE UP (ref 135–145)
SODIUM SERPL-SCNC: 135 MMOL/L — SIGNIFICANT CHANGE UP (ref 135–145)
TRIGL SERPL-MCNC: 132 MG/DL — SIGNIFICANT CHANGE UP
TRIGL SERPL-MCNC: 132 MG/DL — SIGNIFICANT CHANGE UP
TSH SERPL-MCNC: 0.92 UIU/ML — SIGNIFICANT CHANGE UP (ref 0.36–3.74)
TSH SERPL-MCNC: 0.92 UIU/ML — SIGNIFICANT CHANGE UP (ref 0.36–3.74)
WBC # BLD: 11.59 K/UL — HIGH (ref 3.8–10.5)
WBC # BLD: 11.59 K/UL — HIGH (ref 3.8–10.5)
WBC # FLD AUTO: 11.59 K/UL — HIGH (ref 3.8–10.5)
WBC # FLD AUTO: 11.59 K/UL — HIGH (ref 3.8–10.5)

## 2024-01-10 PROCEDURE — 99233 SBSQ HOSP IP/OBS HIGH 50: CPT

## 2024-01-10 PROCEDURE — 93306 TTE W/DOPPLER COMPLETE: CPT | Mod: 26

## 2024-01-10 PROCEDURE — 99222 1ST HOSP IP/OBS MODERATE 55: CPT

## 2024-01-10 PROCEDURE — 93970 EXTREMITY STUDY: CPT | Mod: 26

## 2024-01-10 RX ORDER — ATORVASTATIN CALCIUM 80 MG/1
1 TABLET, FILM COATED ORAL
Refills: 0 | DISCHARGE

## 2024-01-10 RX ORDER — WARFARIN SODIUM 2.5 MG/1
7.5 TABLET ORAL ONCE
Refills: 0 | Status: COMPLETED | OUTPATIENT
Start: 2024-01-10 | End: 2024-01-10

## 2024-01-10 RX ORDER — SODIUM CHLORIDE 9 MG/ML
1000 INJECTION, SOLUTION INTRAVENOUS
Refills: 0 | Status: DISCONTINUED | OUTPATIENT
Start: 2024-01-10 | End: 2024-01-12

## 2024-01-10 RX ORDER — POTASSIUM CHLORIDE 20 MEQ
40 PACKET (EA) ORAL EVERY 4 HOURS
Refills: 0 | Status: COMPLETED | OUTPATIENT
Start: 2024-01-10 | End: 2024-01-10

## 2024-01-10 RX ORDER — SERTRALINE 25 MG/1
1 TABLET, FILM COATED ORAL
Refills: 0 | DISCHARGE

## 2024-01-10 RX ORDER — SODIUM CHLORIDE 9 MG/ML
1000 INJECTION, SOLUTION INTRAVENOUS
Refills: 0 | Status: COMPLETED | OUTPATIENT
Start: 2024-01-10 | End: 2024-01-10

## 2024-01-10 RX ORDER — DEXTROSE 50 % IN WATER 50 %
25 SYRINGE (ML) INTRAVENOUS ONCE
Refills: 0 | Status: DISCONTINUED | OUTPATIENT
Start: 2024-01-10 | End: 2024-01-12

## 2024-01-10 RX ORDER — SERTRALINE 25 MG/1
200 TABLET, FILM COATED ORAL DAILY
Refills: 0 | Status: DISCONTINUED | OUTPATIENT
Start: 2024-01-10 | End: 2024-01-12

## 2024-01-10 RX ORDER — FUROSEMIDE 40 MG
1 TABLET ORAL
Refills: 0 | DISCHARGE

## 2024-01-10 RX ORDER — DEXTROSE 50 % IN WATER 50 %
12.5 SYRINGE (ML) INTRAVENOUS ONCE
Refills: 0 | Status: DISCONTINUED | OUTPATIENT
Start: 2024-01-10 | End: 2024-01-12

## 2024-01-10 RX ORDER — GLUCAGON INJECTION, SOLUTION 0.5 MG/.1ML
1 INJECTION, SOLUTION SUBCUTANEOUS ONCE
Refills: 0 | Status: DISCONTINUED | OUTPATIENT
Start: 2024-01-10 | End: 2024-01-12

## 2024-01-10 RX ORDER — ATORVASTATIN CALCIUM 80 MG/1
40 TABLET, FILM COATED ORAL AT BEDTIME
Refills: 0 | Status: DISCONTINUED | OUTPATIENT
Start: 2024-01-10 | End: 2024-01-12

## 2024-01-10 RX ORDER — SEMAGLUTIDE 0.68 MG/ML
0 INJECTION, SOLUTION SUBCUTANEOUS
Refills: 0 | DISCHARGE

## 2024-01-10 RX ORDER — FUROSEMIDE 40 MG
40 TABLET ORAL DAILY
Refills: 0 | Status: DISCONTINUED | OUTPATIENT
Start: 2024-01-10 | End: 2024-01-11

## 2024-01-10 RX ORDER — INSULIN LISPRO 100/ML
VIAL (ML) SUBCUTANEOUS
Refills: 0 | Status: DISCONTINUED | OUTPATIENT
Start: 2024-01-10 | End: 2024-01-12

## 2024-01-10 RX ORDER — SERTRALINE 25 MG/1
150 TABLET, FILM COATED ORAL
Qty: 0 | Refills: 0 | DISCHARGE

## 2024-01-10 RX ORDER — DEXTROSE 50 % IN WATER 50 %
15 SYRINGE (ML) INTRAVENOUS ONCE
Refills: 0 | Status: DISCONTINUED | OUTPATIENT
Start: 2024-01-10 | End: 2024-01-12

## 2024-01-10 RX ORDER — REGADENOSON 0.08 MG/ML
0.4 INJECTION, SOLUTION INTRAVENOUS ONCE
Refills: 0 | Status: DISCONTINUED | OUTPATIENT
Start: 2024-01-10 | End: 2024-01-12

## 2024-01-10 RX ADMIN — Medication 40 MILLIEQUIVALENT(S): at 13:17

## 2024-01-10 RX ADMIN — Medication 2: at 17:36

## 2024-01-10 RX ADMIN — Medication 2: at 08:41

## 2024-01-10 RX ADMIN — SODIUM CHLORIDE 50 MILLILITER(S): 9 INJECTION, SOLUTION INTRAVENOUS at 17:37

## 2024-01-10 RX ADMIN — PANTOPRAZOLE SODIUM 40 MILLIGRAM(S): 20 TABLET, DELAYED RELEASE ORAL at 05:52

## 2024-01-10 RX ADMIN — Medication 81 MILLIGRAM(S): at 13:08

## 2024-01-10 RX ADMIN — Medication 650 MILLIGRAM(S): at 13:16

## 2024-01-10 RX ADMIN — Medication 75 MICROGRAM(S): at 05:52

## 2024-01-10 RX ADMIN — Medication 40 MILLIEQUIVALENT(S): at 17:36

## 2024-01-10 RX ADMIN — Medication 650 MILLIGRAM(S): at 14:15

## 2024-01-10 RX ADMIN — Medication 81 MILLIGRAM(S): at 05:53

## 2024-01-10 RX ADMIN — WARFARIN SODIUM 7.5 MILLIGRAM(S): 2.5 TABLET ORAL at 22:24

## 2024-01-10 RX ADMIN — Medication 40 MILLIEQUIVALENT(S): at 23:35

## 2024-01-10 RX ADMIN — ATORVASTATIN CALCIUM 40 MILLIGRAM(S): 80 TABLET, FILM COATED ORAL at 22:24

## 2024-01-10 RX ADMIN — SERTRALINE 200 MILLIGRAM(S): 25 TABLET, FILM COATED ORAL at 13:08

## 2024-01-10 NOTE — CONSULT NOTE ADULT - SUBJECTIVE AND OBJECTIVE BOX
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    The patient reports that for the past few days experiencing substernal chest discomfort at rest which radiates to her jaw. She also reports longstanding history of exertional dyspnea. Reports leg swelling and takes Lasix intermittently.       ALLERGIES:  No Known Drug Allergies  latex (Rash)      CURRENT MEDICATIONS:  acetaminophen     Tablet .. 650 milliGRAM(s) Oral every 6 hours PRN  aluminum hydroxide/magnesium hydroxide/simethicone Suspension 30 milliLiter(s) Oral every 4 hours PRN  aspirin  chewable 81 milliGRAM(s) Oral daily  atorvastatin 40 milliGRAM(s) Oral at bedtime  dextrose 5%. 1000 milliLiter(s) IV Continuous <Continuous>  dextrose 5%. 1000 milliLiter(s) IV Continuous <Continuous>  dextrose 50% Injectable 25 Gram(s) IV Push once  dextrose 50% Injectable 12.5 Gram(s) IV Push once  dextrose 50% Injectable 25 Gram(s) IV Push once  dextrose Oral Gel 15 Gram(s) Oral once PRN  glucagon  Injectable 1 milliGRAM(s) IntraMuscular once  hydrochlorothiazide 25 milliGRAM(s) Oral two times a day  insulin lispro (ADMELOG) corrective regimen sliding scale   SubCutaneous three times a day before meals  levothyroxine 75 MICROGram(s) Oral daily  melatonin 3 milliGRAM(s) Oral at bedtime PRN  ondansetron Injectable 4 milliGRAM(s) IV Push every 8 hours PRN  pantoprazole    Tablet 40 milliGRAM(s) Oral before breakfast  potassium chloride    Tablet ER 40 milliEquivalent(s) Oral every 4 hours  sertraline 200 milliGRAM(s) Oral daily      ROS:  All 10 systems reviewed and positives noted in HPI    OBJECTIVE:    VITAL SIGNS:  Vital Signs Last 24 Hrs  T(C): 37.5 (10 Barrera 2024 05:47), Max: 37.5 (10 Barrera 2024 05:47)  T(F): 99.5 (10 Barrera 2024 05:47), Max: 99.5 (10 Barrera 2024 05:47)  HR: 95 (10 Barrera 2024 05:47) (95 - 108)  BP: 120/63 (10 Barrera 2024 05:47) (100/63 - 125/85)  BP(mean): 90 (09 Jan 2024 21:30) (83 - 98)  RR: 19 (10 Barrera 2024 05:47) (16 - 19)  SpO2: 99% (10 Barrera 2024 05:47) (95% - 99%)    Parameters below as of 09 Jan 2024 21:30  Patient On (Oxygen Delivery Method): room air        PHYSICAL EXAM:  General: morbidly obese  HEENT: sclera anicteric  Neck: supple  CVS: JVP ~ 7 cm H20, RRR, s1, s2, no murmurs/rubs  Chest: unlabored respirations, clear to auscultation b/l  Abdomen: obese  Extremities: minimal lower extremity edema b/l  Neuro: awake, alert & oriented   Psych: normal affect    LABS:                        13.0   11.59 )-----------( 276      ( 10 Barrera 2024 05:36 )             40.3     01-10    135  |  101  |  26<H>  ----------------------------<  168<H>  3.1<L>   |  23  |  0.65    Ca    8.8      10 Barrera 2024 05:36  Mg     1.8     01-10    TPro  6.2  /  Alb  2.8<L>  /  TBili  0.8  /  DBili  x   /  AST  18  /  ALT  27  /  AlkPhos  105  01-10        PT/INR - ( 10 Barrera 2024 05:36 )   PT: 21.3 sec;   INR: 1.90 ratio         PTT - ( 09 Jan 2024 15:50 )  PTT:33.6 sec      Nuclear stress test (2019):  Impression:  Normal cardiac perfusion rest and vasodilator study     TTE (2019):  LVEF 63%  Mild pulmonary hypertension     ECG (1/9/24): sinus tachycardia, left axis deviation, nonspecific ST abnormalities     CTPA (1/9/24):  No evidence of pulmonary emboli. Indeterminate evaluation of subsegmental pulmonary arteries.  No acute abnormality within the abdomen and pelvis.  Additional findings as above.   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    The patient reports that for the past few days experiencing substernal chest discomfort at rest which radiates to her jaw. She also reports longstanding history of exertional dyspnea. Reports leg swelling and takes Lasix intermittently.       ALLERGIES:  No Known Drug Allergies  latex (Rash)      CURRENT MEDICATIONS:  acetaminophen     Tablet .. 650 milliGRAM(s) Oral every 6 hours PRN  aluminum hydroxide/magnesium hydroxide/simethicone Suspension 30 milliLiter(s) Oral every 4 hours PRN  aspirin  chewable 81 milliGRAM(s) Oral daily  atorvastatin 40 milliGRAM(s) Oral at bedtime  dextrose 5%. 1000 milliLiter(s) IV Continuous <Continuous>  dextrose 5%. 1000 milliLiter(s) IV Continuous <Continuous>  dextrose 50% Injectable 25 Gram(s) IV Push once  dextrose 50% Injectable 12.5 Gram(s) IV Push once  dextrose 50% Injectable 25 Gram(s) IV Push once  dextrose Oral Gel 15 Gram(s) Oral once PRN  glucagon  Injectable 1 milliGRAM(s) IntraMuscular once  hydrochlorothiazide 25 milliGRAM(s) Oral two times a day  insulin lispro (ADMELOG) corrective regimen sliding scale   SubCutaneous three times a day before meals  levothyroxine 75 MICROGram(s) Oral daily  melatonin 3 milliGRAM(s) Oral at bedtime PRN  ondansetron Injectable 4 milliGRAM(s) IV Push every 8 hours PRN  pantoprazole    Tablet 40 milliGRAM(s) Oral before breakfast  potassium chloride    Tablet ER 40 milliEquivalent(s) Oral every 4 hours  sertraline 200 milliGRAM(s) Oral daily      ROS:  All 10 systems reviewed and positives noted in HPI    OBJECTIVE:    VITAL SIGNS:  Vital Signs Last 24 Hrs  T(C): 37.5 (10 Barrera 2024 05:47), Max: 37.5 (10 Barrera 2024 05:47)  T(F): 99.5 (10 Barrera 2024 05:47), Max: 99.5 (10 Barrera 2024 05:47)  HR: 95 (10 Barrera 2024 05:47) (95 - 108)  BP: 120/63 (10 Barrera 2024 05:47) (100/63 - 125/85)  BP(mean): 90 (09 Jan 2024 21:30) (83 - 98)  RR: 19 (10 Barrera 2024 05:47) (16 - 19)  SpO2: 99% (10 Barrera 2024 05:47) (95% - 99%)    Parameters below as of 09 Jan 2024 21:30  Patient On (Oxygen Delivery Method): room air        PHYSICAL EXAM:  General: morbidly obese  HEENT: sclera anicteric  Neck: supple  CVS: JVP ~ 7 cm H20, RRR, s1, s2, no murmurs/rubs  Chest: unlabored respirations, clear to auscultation b/l  Abdomen: obese  Extremities: minimal lower extremity edema b/l  Neuro: awake, alert & oriented   Psych: normal affect    LABS:                        13.0   11.59 )-----------( 276      ( 10 Barrera 2024 05:36 )             40.3     01-10    135  |  101  |  26<H>  ----------------------------<  168<H>  3.1<L>   |  23  |  0.65    Ca    8.8      10 Barrera 2024 05:36  Mg     1.8     01-10    TPro  6.2  /  Alb  2.8<L>  /  TBili  0.8  /  DBili  x   /  AST  18  /  ALT  27  /  AlkPhos  105  01-10        PT/INR - ( 10 Barrera 2024 05:36 )   PT: 21.3 sec;   INR: 1.90 ratio         PTT - ( 09 Jan 2024 15:50 )  PTT:33.6 sec      Nuclear stress test (2019):  Impression:  Normal cardiac perfusion rest and vasodilator study     TTE (2019):  LVEF 63%  Mild pulmonary hypertension     ECG (1/9/24): sinus tachycardia, left axis deviation, nonspecific ST abnormalities     CTPA (1/9/24):  No evidence of pulmonary emboli. Indeterminate evaluation of subsegmental pulmonary arteries.  No acute abnormality within the abdomen and pelvis.  Additional findings as above.

## 2024-01-10 NOTE — CONSULT NOTE ADULT - SUBJECTIVE AND OBJECTIVE BOX
PULMONARY CONSULT  Location of Patient : [facility] W1 ( TELE)  Attending requesting Consult:Zhanna Lopez  Chief Complaint :     Reason For consult :      Initial HPI on admission:  HPI:  63 year old obese female with h/o DM type 2 , HTN,  h/o  gastric bypass, PE on warfarin, hypothyroidism, ABBIE not on CPAP (s/p PSG 10/3/2019 with AHI 7.1) who presented to the ED centralized chest pain x 2 days. Patient states that 2 days ago in the morning at rest she began to develop midsternal chest pain for a few minutes which later began to radiate to the right neck and jaw over the next day. t States she took maalox w/out relief. She has also noticed increasing shortness of breath over the past year, to lobito point where she can no longer walk a full block without getting SOB. Sleeps with 3 pillows at night and has LE edema., for which she takes as needed lasix.  Also endorses abd pain in lower quadrant with nausea and 2 episodes of nonbloody diarrhea.  Denies any cough, vomiting, fevers, palpitations.     In the ED, VSS, afebrile satting well on room air. EKG sinus rhythm with ST or T wave abnormalities.   CT AP unremarkable      (09 Jan 2024 22:25)    pulmonary consult called for evaluation of ABBIE   I know this patient from previous admission and supplied her with APAP , she did not like it and returned it  she is currently intreated in using it  states cpap provided her is comfortable and would want to use at night    PAST MEDICAL & SURGICAL HISTORY:  Hypertension  Hypothyroid  Diabetes  denies a past medical problem  Sleep apnea  History of abdominal pain  Pulmonary embolism  No significant past surgical history        Allergies    No Known Drug Allergies  latex (Rash)    Intolerances      FAMILY HISTORY: Denies    Social history:      Smoking: Denies     Drinking: denies     Drug use: denies    Review of Systems: as stated above          Medications:  MEDICATIONS  (STANDING):  aspirin  chewable 81 milliGRAM(s) Oral daily  atorvastatin 40 milliGRAM(s) Oral at bedtime  dextrose 5%. 1000 milliLiter(s) (100 mL/Hr) IV Continuous <Continuous>  dextrose 5%. 1000 milliLiter(s) (50 mL/Hr) IV Continuous <Continuous>  dextrose 50% Injectable 25 Gram(s) IV Push once  dextrose 50% Injectable 12.5 Gram(s) IV Push once  dextrose 50% Injectable 25 Gram(s) IV Push once  glucagon  Injectable 1 milliGRAM(s) IntraMuscular once  hydrochlorothiazide 25 milliGRAM(s) Oral two times a day  insulin lispro (ADMELOG) corrective regimen sliding scale   SubCutaneous three times a day before meals  levothyroxine 75 MICROGram(s) Oral daily  pantoprazole    Tablet 40 milliGRAM(s) Oral before breakfast  potassium chloride    Tablet ER 40 milliEquivalent(s) Oral every 4 hours  regadenoson Injectable 0.4 milliGRAM(s) IV Push once  sertraline 200 milliGRAM(s) Oral daily  warfarin 7.5 milliGRAM(s) Oral once    MEDICATIONS  (PRN):  acetaminophen     Tablet .. 650 milliGRAM(s) Oral every 6 hours PRN Temp greater or equal to 38C (100.4F), Mild Pain (1 - 3)  aluminum hydroxide/magnesium hydroxide/simethicone Suspension 30 milliLiter(s) Oral every 4 hours PRN Dyspepsia  dextrose Oral Gel 15 Gram(s) Oral once PRN Blood Glucose LESS THAN 70 milliGRAM(s)/deciliter  furosemide    Tablet 40 milliGRAM(s) Oral daily PRN leg swelling  melatonin 3 milliGRAM(s) Oral at bedtime PRN Insomnia  ondansetron Injectable 4 milliGRAM(s) IV Push every 8 hours PRN Nausea and/or Vomiting      Antibiotics History      Heme Medications   aspirin  chewable 81 milliGRAM(s) Oral daily, 01-09-24 @ 22:46  warfarin 7.5 milliGRAM(s) Oral once, 01-10-24 @ 15:08      GI Medications  aluminum hydroxide/magnesium hydroxide/simethicone Suspension 30 milliLiter(s) Oral every 4 hours, 01-09-24 @ 20:46, Routine PRN  pantoprazole    Tablet 40 milliGRAM(s) Oral before breakfast, 01-09-24 @ 23:03, Routine        Home Medications:  Last Order Reconciliation Date: 01-10-24 @ 02:41 (Admission Reconciliation)  atorvastatin 40 mg oral tablet: 1 tab(s) orally once a day (at bedtime) (01-10-24 @ 00:04)  Coumadin 10 mg oral tablet: 1 tab(s) orally once a day ON saturday (01-09-24 @ 22:28)  Coumadin 7.5 mg oral tablet: 1 tab(s) orally once a day mon - fri (01-09-24 @ 22:28)  glipiZIDE:  (01-09-24 @ 23:05)  glipiZIDE 10 mg oral tablet: 1 tab(s) orally (01-10-24 @ 00:04)  hydroCHLOROthiazide 25 mg oral tablet: 1 tab(s) orally 2 times a day (01-09-24 @ 23:05)  Lasix 20 mg oral tablet: 1 tab(s) orally 2 times a day PRN for edema (01-10-24 @ 00:02)  lisinopril 40 mg oral tablet: 1 tab(s) orally once a day (01-09-24 @ 22:27)  Ozempic 2 mg/1.5 mL (1 mg dose) subcutaneous solution: subcutaneous (01-10-24 @ 00:04)  sertraline 200 mg oral capsule: 1 cap(s) orally once a day (01-10-24 @ 00:04)  Synthroid 75 mcg (0.075 mg) oral tablet: 1 tab(s) orally once a day (01-09-24 @ 23:05)      LABS:                        13.0   11.59 )-----------( 276      ( 10 Barrera 2024 05:36 )             40.3     01-10    135  |  101  |  26<H>  ----------------------------<  168<H>  3.1<L>   |  23  |  0.65    Ca    8.8      10 Barrera 2024 05:36  Mg     1.8     01-10    TPro  6.2  /  Alb  2.8<L>  /  TBili  0.8  /  DBili  x   /  AST  18  /  ALT  27  /  AlkPhos  105  01-10    Trend Cardiac Enzymes  01-09-24 @ 22:00  DUN-FCQCC-SCJQS-CPKMM/Trop I - -- - --  - --  -  --  /  4.6  01-09-24 @ 15:50  ACE-QUIWI-ELZDO-CPKMM/Trop I - -- - --  - --  -  --  /  5.3    Trend BNP  01-10-24 @ 05:36   -  96       RADIOLOGY  CXR:      CT:  < from: CT Angio Chest PE Protocol w/ IV Cont (01.09.24 @ 18:01) >    FINDINGS:  CHEST:  LUNGS AND LARGE AIRWAYS: Patent central airways. 5 mm sized nonspecific subpleural nodular opacity right lower lobe (302:54).  PLEURA: No pleural effusion.  VESSELS: Less than optimal contrast opacification of the pulmonary arteries. No central, lobar or segmental pulmonary emboli. Coronary artery calcification.   HEART: Heart size is normal. No pericardial effusion.  MEDIASTINUM AND SARA: No lymphadenopathy.  CHEST WALL AND LOWER NECK: Within normal limits.    ABDOMEN AND PELVIS:  LIVER: Within normal limits.  BILE DUCTS: Normal caliber.  GALLBLADDER: Cholecystectomy.  SPLEEN: Within normal limits.  PANCREAS: Within normal limits.  ADRENALS: 2 left and one right adrenal nodule are indeterminate but stable.  KIDNEYS/URETERS:  Redemonstration of cortical scarring upper pole of the left kidney. No hydronephrosis.    BLADDER: Minimally distended.  REPRODUCTIVE ORGANS: Uterus and adnexa within normal limits.    BOWEL: No bowel obstruction. Appendix is not visualized. No evidence of inflammation in the pericecal region. Multilevel uncomplicated diverticula. Prior gastric bypass area did focal nonspecific small bowel distention measuring up to 7 cm at the level of the JJ anastomosis. This is similar to the prior study  PERITONEUM: No ascites.  VESSELS: Enlarged main portal vein measuring 2.2 cm, previously 1.9 cm.  RETROPERITONEUM/LYMPH NODES: No lymphadenopathy.  ABDOMINAL WALL: Right-sided fat-containing spigelian hernia.  BONES: Degenerative changes.    IMPRESSION:  No evidence of pulmonary emboli. Indeterminate evaluation of subsegmental pulmonary arteries.  No acute abnormality within the abdomen and pelvis.  Additional findings as above.      < end of copied text >    ECHO:  < from: TTE Echo Complete w/o Contrast w/ Doppler (01.10.24 @ 08:23) >    Summary:   1. Technically difficult study with poor endocardial visualization.   2. Normal global left ventricular systolic function.   3. Left ventricular ejection fraction, by visual estimation, is 60 to 65%.   4. Moderately increased LV wall thickness.   5. Normal left ventricular internal cavity size.   6. The right ventricle is not well visualized, appears to have normal systolic function.   7. The left atrium is not well visualized, appears generally normal in size.   8. The right atrium is not well visualized, appears generally normal in size.   9. Mitral valve regurgitant jet not well visualized.  10. Mild aortic valve leaflet calcification. No aortic valve stenosis.  11. Pericardium not well visualized, appears to have prominent pericardial fat pad.      < end of copied text >      VITALS:  T(C): 37.5 (01-10-24 @ 05:47), Max: 37.5 (01-10-24 @ 05:47)  T(F): 99.5 (01-10-24 @ 05:47), Max: 99.5 (01-10-24 @ 05:47)  HR: 95 (01-10-24 @ 05:47) (95 - 107)  BP: 120/63 (01-10-24 @ 05:47) (100/63 - 125/85)  BP(mean): 90 (01-09-24 @ 21:30) (90 - 98)  ABP: --  ABP(mean): --  RR: 19 (01-10-24 @ 05:47) (16 - 19)  SpO2: 99% (01-10-24 @ 05:47) (95% - 99%)  CVP(mm Hg): --  CVP(cm H2O): --    Ins and Outs       Height (cm): 167.6 (01-09-24 @ 22:44)  Weight (kg): 132.5 (01-09-24 @ 22:44)  BMI (kg/m2): 47.2 (01-09-24 @ 22:44)        I&O's Detail      Physical Examination:  GENERAL:               Alert, Oriented, No acute distress.    HEENT:                   . No JVD, Moist MM  PULM:                     Bilateral air entry, Clear to auscultation bilaterally, no significant sputum production, No Rales, No Rhonchi, No Wheezing  CVS:                         S1, S2,  No Murmur  ABD:                        Soft, nondistended, nontender, normoactive bowel sounds,   EXT:                         +edema, nontender, No Cyanosis or Clubbing   Vascular:                Warm Extremities, Normal Capillary refill, Normal Distal Pulses  SKIN:                       Warm and well perfused, no rashes noted.   NEURO:                  Alert, oriented, interactive, nonfocal, follows commands  PSYC:                      Calm, + Insight.     PULMONARY CONSULT  Location of Patient : [facility] W[facility] ( TELE)  Attending requesting Consult:Zhanna Lopez  Chief Complaint :     Reason For consult :      Initial HPI on admission:  HPI:  63 year old obese female with h/o DM type 2 , HTN,  h/o  gastric bypass, PE on warfarin, hypothyroidism, ABBIE not on CPAP (s/p PSG 10/3/2019 with AHI 7.1) who presented to the ED centralized chest pain x 2 days. Patient states that 2 days ago in the morning at rest she began to develop midsternal chest pain for a few minutes which later began to radiate to the right neck and jaw over the next day. t States she took maalox w/out relief. She has also noticed increasing shortness of breath over the past year, to lobito point where she can no longer walk a full block without getting SOB. Sleeps with 3 pillows at night and has LE edema., for which she takes as needed lasix.  Also endorses abd pain in lower quadrant with nausea and 2 episodes of nonbloody diarrhea.  Denies any cough, vomiting, fevers, palpitations.     In the ED, VSS, afebrile satting well on room air. EKG sinus rhythm with ST or T wave abnormalities.   CT AP unremarkable      (09 Jan 2024 22:25)    pulmonary consult called for evaluation of ABBIE   I know this patient from previous admission and supplied her with APAP , she did not like it and returned it  she is currently intreated in using it  states cpap provided her is comfortable and would want to use at night    PAST MEDICAL & SURGICAL HISTORY:  Hypertension  Hypothyroid  Diabetes  denies a past medical problem  Sleep apnea  History of abdominal pain  Pulmonary embolism  No significant past surgical history        Allergies    No Known Drug Allergies  latex (Rash)    Intolerances      FAMILY HISTORY: Denies    Social history:      Smoking: Denies     Drinking: denies     Drug use: denies    Review of Systems: as stated above          Medications:  MEDICATIONS  (STANDING):  aspirin  chewable 81 milliGRAM(s) Oral daily  atorvastatin 40 milliGRAM(s) Oral at bedtime  dextrose 5%. 1000 milliLiter(s) (100 mL/Hr) IV Continuous <Continuous>  dextrose 5%. 1000 milliLiter(s) (50 mL/Hr) IV Continuous <Continuous>  dextrose 50% Injectable 25 Gram(s) IV Push once  dextrose 50% Injectable 12.5 Gram(s) IV Push once  dextrose 50% Injectable 25 Gram(s) IV Push once  glucagon  Injectable 1 milliGRAM(s) IntraMuscular once  hydrochlorothiazide 25 milliGRAM(s) Oral two times a day  insulin lispro (ADMELOG) corrective regimen sliding scale   SubCutaneous three times a day before meals  levothyroxine 75 MICROGram(s) Oral daily  pantoprazole    Tablet 40 milliGRAM(s) Oral before breakfast  potassium chloride    Tablet ER 40 milliEquivalent(s) Oral every 4 hours  regadenoson Injectable 0.4 milliGRAM(s) IV Push once  sertraline 200 milliGRAM(s) Oral daily  warfarin 7.5 milliGRAM(s) Oral once    MEDICATIONS  (PRN):  acetaminophen     Tablet .. 650 milliGRAM(s) Oral every 6 hours PRN Temp greater or equal to 38C (100.4F), Mild Pain (1 - 3)  aluminum hydroxide/magnesium hydroxide/simethicone Suspension 30 milliLiter(s) Oral every 4 hours PRN Dyspepsia  dextrose Oral Gel 15 Gram(s) Oral once PRN Blood Glucose LESS THAN 70 milliGRAM(s)/deciliter  furosemide    Tablet 40 milliGRAM(s) Oral daily PRN leg swelling  melatonin 3 milliGRAM(s) Oral at bedtime PRN Insomnia  ondansetron Injectable 4 milliGRAM(s) IV Push every 8 hours PRN Nausea and/or Vomiting      Antibiotics History      Heme Medications   aspirin  chewable 81 milliGRAM(s) Oral daily, 01-09-24 @ 22:46  warfarin 7.5 milliGRAM(s) Oral once, 01-10-24 @ 15:08      GI Medications  aluminum hydroxide/magnesium hydroxide/simethicone Suspension 30 milliLiter(s) Oral every 4 hours, 01-09-24 @ 20:46, Routine PRN  pantoprazole    Tablet 40 milliGRAM(s) Oral before breakfast, 01-09-24 @ 23:03, Routine        Home Medications:  Last Order Reconciliation Date: 01-10-24 @ 02:41 (Admission Reconciliation)  atorvastatin 40 mg oral tablet: 1 tab(s) orally once a day (at bedtime) (01-10-24 @ 00:04)  Coumadin 10 mg oral tablet: 1 tab(s) orally once a day ON saturday (01-09-24 @ 22:28)  Coumadin 7.5 mg oral tablet: 1 tab(s) orally once a day mon - fri (01-09-24 @ 22:28)  glipiZIDE:  (01-09-24 @ 23:05)  glipiZIDE 10 mg oral tablet: 1 tab(s) orally (01-10-24 @ 00:04)  hydroCHLOROthiazide 25 mg oral tablet: 1 tab(s) orally 2 times a day (01-09-24 @ 23:05)  Lasix 20 mg oral tablet: 1 tab(s) orally 2 times a day PRN for edema (01-10-24 @ 00:02)  lisinopril 40 mg oral tablet: 1 tab(s) orally once a day (01-09-24 @ 22:27)  Ozempic 2 mg/1.5 mL (1 mg dose) subcutaneous solution: subcutaneous (01-10-24 @ 00:04)  sertraline 200 mg oral capsule: 1 cap(s) orally once a day (01-10-24 @ 00:04)  Synthroid 75 mcg (0.075 mg) oral tablet: 1 tab(s) orally once a day (01-09-24 @ 23:05)      LABS:                        13.0   11.59 )-----------( 276      ( 10 Barrera 2024 05:36 )             40.3     01-10    135  |  101  |  26<H>  ----------------------------<  168<H>  3.1<L>   |  23  |  0.65    Ca    8.8      10 Barrera 2024 05:36  Mg     1.8     01-10    TPro  6.2  /  Alb  2.8<L>  /  TBili  0.8  /  DBili  x   /  AST  18  /  ALT  27  /  AlkPhos  105  01-10    Trend Cardiac Enzymes  01-09-24 @ 22:00  VHU-PILVF-VSSPN-CPKMM/Trop I - -- - --  - --  -  --  /  4.6  01-09-24 @ 15:50  ZJZ-HYWKG-SRGSV-CPKMM/Trop I - -- - --  - --  -  --  /  5.3    Trend BNP  01-10-24 @ 05:36   -  96       RADIOLOGY  CXR:      CT:  < from: CT Angio Chest PE Protocol w/ IV Cont (01.09.24 @ 18:01) >    FINDINGS:  CHEST:  LUNGS AND LARGE AIRWAYS: Patent central airways. 5 mm sized nonspecific subpleural nodular opacity right lower lobe (302:54).  PLEURA: No pleural effusion.  VESSELS: Less than optimal contrast opacification of the pulmonary arteries. No central, lobar or segmental pulmonary emboli. Coronary artery calcification.   HEART: Heart size is normal. No pericardial effusion.  MEDIASTINUM AND SARA: No lymphadenopathy.  CHEST WALL AND LOWER NECK: Within normal limits.    ABDOMEN AND PELVIS:  LIVER: Within normal limits.  BILE DUCTS: Normal caliber.  GALLBLADDER: Cholecystectomy.  SPLEEN: Within normal limits.  PANCREAS: Within normal limits.  ADRENALS: 2 left and one right adrenal nodule are indeterminate but stable.  KIDNEYS/URETERS:  Redemonstration of cortical scarring upper pole of the left kidney. No hydronephrosis.    BLADDER: Minimally distended.  REPRODUCTIVE ORGANS: Uterus and adnexa within normal limits.    BOWEL: No bowel obstruction. Appendix is not visualized. No evidence of inflammation in the pericecal region. Multilevel uncomplicated diverticula. Prior gastric bypass area did focal nonspecific small bowel distention measuring up to 7 cm at the level of the JJ anastomosis. This is similar to the prior study  PERITONEUM: No ascites.  VESSELS: Enlarged main portal vein measuring 2.2 cm, previously 1.9 cm.  RETROPERITONEUM/LYMPH NODES: No lymphadenopathy.  ABDOMINAL WALL: Right-sided fat-containing spigelian hernia.  BONES: Degenerative changes.    IMPRESSION:  No evidence of pulmonary emboli. Indeterminate evaluation of subsegmental pulmonary arteries.  No acute abnormality within the abdomen and pelvis.  Additional findings as above.      < end of copied text >    ECHO:  < from: TTE Echo Complete w/o Contrast w/ Doppler (01.10.24 @ 08:23) >    Summary:   1. Technically difficult study with poor endocardial visualization.   2. Normal global left ventricular systolic function.   3. Left ventricular ejection fraction, by visual estimation, is 60 to 65%.   4. Moderately increased LV wall thickness.   5. Normal left ventricular internal cavity size.   6. The right ventricle is not well visualized, appears to have normal systolic function.   7. The left atrium is not well visualized, appears generally normal in size.   8. The right atrium is not well visualized, appears generally normal in size.   9. Mitral valve regurgitant jet not well visualized.  10. Mild aortic valve leaflet calcification. No aortic valve stenosis.  11. Pericardium not well visualized, appears to have prominent pericardial fat pad.      < end of copied text >      VITALS:  T(C): 37.5 (01-10-24 @ 05:47), Max: 37.5 (01-10-24 @ 05:47)  T(F): 99.5 (01-10-24 @ 05:47), Max: 99.5 (01-10-24 @ 05:47)  HR: 95 (01-10-24 @ 05:47) (95 - 107)  BP: 120/63 (01-10-24 @ 05:47) (100/63 - 125/85)  BP(mean): 90 (01-09-24 @ 21:30) (90 - 98)  ABP: --  ABP(mean): --  RR: 19 (01-10-24 @ 05:47) (16 - 19)  SpO2: 99% (01-10-24 @ 05:47) (95% - 99%)  CVP(mm Hg): --  CVP(cm H2O): --    Ins and Outs       Height (cm): 167.6 (01-09-24 @ 22:44)  Weight (kg): 132.5 (01-09-24 @ 22:44)  BMI (kg/m2): 47.2 (01-09-24 @ 22:44)        I&O's Detail      Physical Examination:  GENERAL:               Alert, Oriented, No acute distress.    HEENT:                   . No JVD, Moist MM  PULM:                     Bilateral air entry, Clear to auscultation bilaterally, no significant sputum production, No Rales, No Rhonchi, No Wheezing  CVS:                         S1, S2,  No Murmur  ABD:                        Soft, nondistended, nontender, normoactive bowel sounds,   EXT:                         +edema, nontender, No Cyanosis or Clubbing   Vascular:                Warm Extremities, Normal Capillary refill, Normal Distal Pulses  SKIN:                       Warm and well perfused, no rashes noted.   NEURO:                  Alert, oriented, interactive, nonfocal, follows commands  PSYC:                      Calm, + Insight.

## 2024-01-10 NOTE — PROGRESS NOTE ADULT - SUBJECTIVE AND OBJECTIVE BOX
Medicine Progress Note    Patient is a 63y old  Female who presents with a chief complaint of Rule out ACS (10 Barrera 2024 10:36)      SUBJECTIVE / OVERNIGHT EVENTS:  seen and examined  Chart reviewed  No overnight events  no CP today. chronic NELSON and chronic b/l LE edema   BM*2 today, BM*3 yesterday. loose paste today. was loose watery yesterday. no other symptoms.   for stress test tomorrow and day after.     ADDITIONAL REVIEW OF SYSTEMS:  denied fever/chills/CP/SOB/cough/palpitation/dizziness/abdominal pian/nausea/vomiting/constipation/dysuria/leg or calf pain/headaches.all other ROS neg    MEDICATIONS  (STANDING):  aspirin  chewable 81 milliGRAM(s) Oral daily  atorvastatin 40 milliGRAM(s) Oral at bedtime  dextrose 5%. 1000 milliLiter(s) (100 mL/Hr) IV Continuous <Continuous>  dextrose 5%. 1000 milliLiter(s) (50 mL/Hr) IV Continuous <Continuous>  dextrose 50% Injectable 25 Gram(s) IV Push once  dextrose 50% Injectable 12.5 Gram(s) IV Push once  dextrose 50% Injectable 25 Gram(s) IV Push once  glucagon  Injectable 1 milliGRAM(s) IntraMuscular once  hydrochlorothiazide 25 milliGRAM(s) Oral two times a day  insulin lispro (ADMELOG) corrective regimen sliding scale   SubCutaneous three times a day before meals  levothyroxine 75 MICROGram(s) Oral daily  pantoprazole    Tablet 40 milliGRAM(s) Oral before breakfast  potassium chloride    Tablet ER 40 milliEquivalent(s) Oral every 4 hours  regadenoson Injectable 0.4 milliGRAM(s) IV Push once  sertraline 200 milliGRAM(s) Oral daily    MEDICATIONS  (PRN):  acetaminophen     Tablet .. 650 milliGRAM(s) Oral every 6 hours PRN Temp greater or equal to 38C (100.4F), Mild Pain (1 - 3)  aluminum hydroxide/magnesium hydroxide/simethicone Suspension 30 milliLiter(s) Oral every 4 hours PRN Dyspepsia  dextrose Oral Gel 15 Gram(s) Oral once PRN Blood Glucose LESS THAN 70 milliGRAM(s)/deciliter  melatonin 3 milliGRAM(s) Oral at bedtime PRN Insomnia  ondansetron Injectable 4 milliGRAM(s) IV Push every 8 hours PRN Nausea and/or Vomiting    CAPILLARY BLOOD GLUCOSE      POCT Blood Glucose.: 111 mg/dL (10 Barrera 2024 13:06)  POCT Blood Glucose.: 177 mg/dL (10 Barrera 2024 08:38)    I&O's Summary      PHYSICAL EXAM:  Vital Signs Last 24 Hrs  T(C): 37.5 (10 Barrera 2024 05:47), Max: 37.5 (10 Barrera 2024 05:47)  T(F): 99.5 (10 Barrera 2024 05:47), Max: 99.5 (10 Barrera 2024 05:47)  HR: 95 (10 Barrera 2024 05:47) (95 - 108)  BP: 120/63 (10 Barrera 2024 05:47) (100/63 - 125/85)  BP(mean): 90 (09 Jan 2024 21:30) (83 - 98)  RR: 19 (10 Barrera 2024 05:47) (16 - 19)  SpO2: 99% (10 Barrera 2024 05:47) (95% - 99%)    Parameters below as of 09 Jan 2024 21:30  Patient On (Oxygen Delivery Method): room air    GENERAL: Not in distress. Alert. morbid obesity    HEENT: AT/NC. clear conjuctiva, MMM.   no pallor or icterus  CARDIOVASCULAR: RRR S1, S2. No murmur/rubs/gallop  LUNGS: BLAE+, no rales, no wheezing, no rhonchi.    ABDOMEN: ND. Soft,  NT, no guarding / rebound / rigidity. BS normoactive. No CVA tenderness.    BACK: No spine tenderness.  EXTREMITIES: trace b/l edema. no leg or calf TP.  SKIN: no rash.  NEUROLOGIC: AAO*3.strength is symmetric, sensation intact, speech fluent.    PSYCHIATRIC: Calm.  No agitation.      LABS:                        13.0   11.59 )-----------( 276      ( 10 Barrera 2024 05:36 )             40.3     01-10    135  |  101  |  26<H>  ----------------------------<  168<H>  3.1<L>   |  23  |  0.65    Ca    8.8      10 Barrera 2024 05:36  Mg     1.8     01-10    TPro  6.2  /  Alb  2.8<L>  /  TBili  0.8  /  DBili  x   /  AST  18  /  ALT  27  /  AlkPhos  105  01-10    PT/INR - ( 10 Barrera 2024 05:36 )   PT: 21.3 sec;   INR: 1.90 ratio         PTT - ( 09 Jan 2024 15:50 )  PTT:33.6 sec      Urinalysis Basic - ( 10 Barrera 2024 05:36 )    Color: x / Appearance: x / SG: x / pH: x  Gluc: 168 mg/dL / Ketone: x  / Bili: x / Urobili: x   Blood: x / Protein: x / Nitrite: x   Leuk Esterase: x / RBC: x / WBC x   Sq Epi: x / Non Sq Epi: x / Bacteria: x        SARS-CoV-2: NotDetec (09 Jan 2024 15:50)      RADIOLOGY & ADDITIONAL TESTS:    < from: CT Angio Chest PE Protocol w/ IV Cont (01.09.24 @ 18:01) >    IMPRESSION:  No evidence of pulmonary emboli. Indeterminate evaluation of subsegmental   pulmonary arteries.  No acute abnormality within the abdomen and pelvis.  Additional findings as above.    < end of copied text >    Imaging from Last 24 Hours:  < from: 12 Lead ECG (01.09.24 @ 15:30) >  Diagnosis Line Sinus tachycardia  When compared with ECG of 27-OCT-2019 11:12,  No significant change was found  Confirmed by Brandyn Houston ([id number]) on 1/10/2024 7:19:36 AM    < end of copied text >    Electrocardiogram/QTc Interval:    COORDINATION OF CARE:  Care Discussed with Consultants/Other Providers:   Medicine Progress Note    Patient is a 63y old  Female who presents with a chief complaint of Rule out ACS (10 Barrera 2024 10:36)      SUBJECTIVE / OVERNIGHT EVENTS:  seen and examined  Chart reviewed  No overnight events  no CP today. chronic NELSON and chronic b/l LE edema   BM*2 today, BM*3 yesterday. loose paste today. was loose watery yesterday. no other symptoms.   for stress test tomorrow and day after.     ADDITIONAL REVIEW OF SYSTEMS:  denied fever/chills/CP/SOB/cough/palpitation/dizziness/abdominal pian/nausea/vomiting/constipation/dysuria/leg or calf pain/headaches.all other ROS neg    MEDICATIONS  (STANDING):  aspirin  chewable 81 milliGRAM(s) Oral daily  atorvastatin 40 milliGRAM(s) Oral at bedtime  dextrose 5%. 1000 milliLiter(s) (100 mL/Hr) IV Continuous <Continuous>  dextrose 5%. 1000 milliLiter(s) (50 mL/Hr) IV Continuous <Continuous>  dextrose 50% Injectable 25 Gram(s) IV Push once  dextrose 50% Injectable 12.5 Gram(s) IV Push once  dextrose 50% Injectable 25 Gram(s) IV Push once  glucagon  Injectable 1 milliGRAM(s) IntraMuscular once  hydrochlorothiazide 25 milliGRAM(s) Oral two times a day  insulin lispro (ADMELOG) corrective regimen sliding scale   SubCutaneous three times a day before meals  levothyroxine 75 MICROGram(s) Oral daily  pantoprazole    Tablet 40 milliGRAM(s) Oral before breakfast  potassium chloride    Tablet ER 40 milliEquivalent(s) Oral every 4 hours  regadenoson Injectable 0.4 milliGRAM(s) IV Push once  sertraline 200 milliGRAM(s) Oral daily    MEDICATIONS  (PRN):  acetaminophen     Tablet .. 650 milliGRAM(s) Oral every 6 hours PRN Temp greater or equal to 38C (100.4F), Mild Pain (1 - 3)  aluminum hydroxide/magnesium hydroxide/simethicone Suspension 30 milliLiter(s) Oral every 4 hours PRN Dyspepsia  dextrose Oral Gel 15 Gram(s) Oral once PRN Blood Glucose LESS THAN 70 milliGRAM(s)/deciliter  melatonin 3 milliGRAM(s) Oral at bedtime PRN Insomnia  ondansetron Injectable 4 milliGRAM(s) IV Push every 8 hours PRN Nausea and/or Vomiting    CAPILLARY BLOOD GLUCOSE      POCT Blood Glucose.: 111 mg/dL (10 Barrera 2024 13:06)  POCT Blood Glucose.: 177 mg/dL (10 Barrera 2024 08:38)    I&O's Summary      PHYSICAL EXAM:  Vital Signs Last 24 Hrs  T(C): 37.5 (10 Barrera 2024 05:47), Max: 37.5 (10 Barrera 2024 05:47)  T(F): 99.5 (10 Barrera 2024 05:47), Max: 99.5 (10 Barrera 2024 05:47)  HR: 95 (10 Barrera 2024 05:47) (95 - 108)  BP: 120/63 (10 Barrera 2024 05:47) (100/63 - 125/85)  BP(mean): 90 (09 Jan 2024 21:30) (83 - 98)  RR: 19 (10 Barrera 2024 05:47) (16 - 19)  SpO2: 99% (10 Barrera 2024 05:47) (95% - 99%)    Parameters below as of 09 Jan 2024 21:30  Patient On (Oxygen Delivery Method): room air    GENERAL: Not in distress. Alert. morbid obesity    HEENT: AT/NC. clear conjuctiva, MMM.   no pallor or icterus  CARDIOVASCULAR: RRR S1, S2. No murmur/rubs/gallop  LUNGS: BLAE+, no rales, no wheezing, no rhonchi.    ABDOMEN: ND. Soft,  NT, no guarding / rebound / rigidity. BS normoactive. No CVA tenderness.    BACK: No spine tenderness.  EXTREMITIES: trace b/l edema. no leg or calf TP.  SKIN: no rash.  NEUROLOGIC: AAO*3.strength is symmetric, sensation intact, speech fluent.    PSYCHIATRIC: Calm.  No agitation.      LABS:                        13.0   11.59 )-----------( 276      ( 10 Barrera 2024 05:36 )             40.3     01-10    135  |  101  |  26<H>  ----------------------------<  168<H>  3.1<L>   |  23  |  0.65    Ca    8.8      10 Barrera 2024 05:36  Mg     1.8     01-10    TPro  6.2  /  Alb  2.8<L>  /  TBili  0.8  /  DBili  x   /  AST  18  /  ALT  27  /  AlkPhos  105  01-10    PT/INR - ( 10 Barrera 2024 05:36 )   PT: 21.3 sec;   INR: 1.90 ratio         PTT - ( 09 Jan 2024 15:50 )  PTT:33.6 sec      Urinalysis Basic - ( 10 Barrera 2024 05:36 )    Color: x / Appearance: x / SG: x / pH: x  Gluc: 168 mg/dL / Ketone: x  / Bili: x / Urobili: x   Blood: x / Protein: x / Nitrite: x   Leuk Esterase: x / RBC: x / WBC x   Sq Epi: x / Non Sq Epi: x / Bacteria: x        SARS-CoV-2: NotDetec (09 Jan 2024 15:50)      RADIOLOGY & ADDITIONAL TESTS:    < from: CT Angio Chest PE Protocol w/ IV Cont (01.09.24 @ 18:01) >    IMPRESSION:  No evidence of pulmonary emboli. Indeterminate evaluation of subsegmental   pulmonary arteries.  No acute abnormality within the abdomen and pelvis.  Additional findings as above.    < end of copied text >    Imaging from Last 24 Hours:  < from: 12 Lead ECG (01.09.24 @ 15:30) >  Diagnosis Line Sinus tachycardia  When compared with ECG of 27-OCT-2019 11:12,  No significant change was found  Confirmed by Brandyn Houston ([id number]) on 1/10/2024 7:19:36 AM    < end of copied text >    Electrocardiogram/QTc Interval:    COORDINATION OF CARE:  Care Discussed with Consultants/Other Providers:

## 2024-01-10 NOTE — CONSULT NOTE ADULT - ASSESSMENT
Assessment  1. Chest pain r/o ACS  2. Obesity s.p bypass with ABBIE   3. no history of COPD/smoking  4. Chronic Lung nodule unchanged un repeat CT - no further workup  5. h.o PE    Plan  No pulmonary contraindication for planned  nuclear stress test  workup of chest pain as per cardio  will place copy of sleep study if able would recommend to obtain APAP 5-10 on d/c for abbie  Continue a/c for h/o PE  suspect may have underlying pulm htn but not seen on echo, can consider right heart cath if left heart cath is indicated but not required   d/w patient  
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    The patient reports that for the past few days experiencing substernal chest discomfort at rest which radiates to her jaw. She also reports longstanding history of exertional dyspnea. ECG consistent with sinus rhythm, left axis deviation and nonspecific ST abnormalities. Troponins negative x 2. CTPA with no obvious pulmonary emboli reported. Overall, due to risk factors and symptoms recommend ischemic evaluation.    Recommendations:  [] Chest discomfort: Will plan for 2 day pharmacologic nuclear stress test to ensure no ischemic heart disease (patient with diarrhea/possible infection and will avoid invasive cardiac procedure unless necessary). Explained to patient that she may ultimately benefit from left and right heart catherization. Continue to monitor on telemetry. Check echo to evaluate LVEF and pulmonary pressures. Continue aspirin and statin. Recommend Pulmonary evaluation.   [] Diarrhea: Infectious workup per primary team     Will sign out this case to cardiologist to follow along tomorrow.    Yoli Houston MD  Cardiology

## 2024-01-10 NOTE — PROGRESS NOTE ADULT - ASSESSMENT
Patient is a 63F w/ PMH of DM, HTN, obesity, gastric bypass, PE on warfarin, hypothyroidism, ABBIE not on CPAP who presented to the ED centralized chest pain x 2 days, admitted for :    #Chest pain, r/o ACS, r/o VTE  # Chronic NELSON  # h/o PE, non-complaints with Coumadin  - CE and EKG neg so far neg  - no events on tele  - CTA: indeterminate but neg for PE or CHF  - EKG personally reviewed: NO ST or T wave changes. sinus tachycardia. repeat EKG today  - Last echo 10/2019 w/ EF 63% Pulm HTN, Grade 1 diastolic dysfunction  - Last stress test 5/2019 nondiagnostic   - Cardiology consulted  - TTE pending  - for 2 steps stress test starting from tomorrow. NPO after MN. ordered today and needs to order tomorrow for stress test on Friday  - cw/ Aspirin 81mg, stain  - A1c: 6.8, LDL 57  - TSH 0.9  - pulm cx requested    # leg swelling, chronic  - no ssx of CHF  - CT chest: no pulm edema,   - BNP low  - c/w home HCTZ  - leg elevation  - duplex to r/o DVT    #Elevated white count w/ diarrhea, possible gastroenteritis   - No fevers currently, hold off on antibiotics  - CT AP unremarkable  - c/w home omeprazole- protonix 40mg  - Maalox 30mg q4hr PRN   - Zofran PRN for nausea  - low threshold for antibiotics and blood cultures if become febrile  - f/u  stool culture/PCR/ C.diff PCR.  - UA and RVP neg  - TSH normal  - currently on contact isolation for r/o c.diff  - LR@ 50 mls/hr, one liter.   - monitor electrolytes, volume status    # Hypokalemia  - likely due to diarrhoea. also on HCTZ  - KCL 40 meq*3.   - repeat BMP and mg in am    #ABBIE and  pulm HTN  - No CPAP at home  - will order CPAP 5 overnight   - f/u TTE  - pulm cx requested    #Hypothyroidism   - c/w levothyroxine 75mcg   - TSH 0.9    #HTN  - will monitor BP for now  - HCTZ 25mg BID PRN at home - will continue given LE edema   - hold home Lisinopril 40mg given low BP   - hold home amlodipine 2.5mg (does not usually take it because of leg swelling)   - resume home lasix 40 mg daily Prn for leg swelling. would prefer not to use HCTZ and lasix together scheduled     #DM  - A1c 6.8  - hold home glipizide 5mg BID and ozempic  - Sliding scale and accuchecks  - consistent carb diet    #history of PE, non-complaints with warfarin  - CTA PE Protocol: No evidence of pulmonary emboli. Indeterminate evaluation of subsegmental pulmonary arteries.  - c/w warfarin 7.5mg every day except Saturday On Saturday patient takes 10mg. as patient is non-complaint with coumadin, DOAC is possibly better for her. did not get any Coumadin last night  - Monitor INR and adjust dose.    - f/u Venous doppler  - pulm cx requested    #depression  -c/w home zoloft    #DVT PPx   -c/w coumadin  -follow INRs  - SCD until INR therapeutic    #Full Code    Dispo: pending clinical course.     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 sinus tachycardia. repeat EKG today  - Last echo 10/2019 w/ EF 63% Pulm HTN, Grade 1 diastolic dysfunction  - Last stress test 5/2019 nondiagnostic   - Cardiology consulted  - TTE : EF 60-63%. no DD or WMA reported. no pulm HTN or RV dysfunction reported  - for 2 steps stress test starting from tomorrow. NPO after MN. ordered today and needs to order tomorrow for stress test on Friday  - cw/ Aspirin 81mg, stain  - A1c: 6.8, LDL 57  - TSH 0.9  - pulm cx requested    # leg swelling, chronic  - no ssx of CHF  - CT chest: no pulm edema,   - BNP low  - c/w home HCTZ  - leg elevation  - duplex to r/o DVT    #Elevated white count w/ diarrhea, possible gastroenteritis   - No fevers currently, hold off on antibiotics  - CT AP unremarkable  - c/w home omeprazole- protonix 40mg  - Maalox 30mg q4hr PRN   - Zofran PRN for nausea  - low threshold for antibiotics and blood cultures if become febrile  - f/u  stool culture/PCR/ C.diff PCR.  - UA and RVP neg  - TSH normal  - currently on contact isolation for r/o c.diff  - LR@ 50 mls/hr, one liter.   - monitor electrolytes, volume status    # Hypokalemia  - likely due to diarrhoea. also on HCTZ  - KCL 40 meq*3.   - repeat BMP and mg in am    #ABBIE and  pulm HTN  - No CPAP at home  - will order CPAP 5 overnight   - f/u TTE  - pulm cx requested    #Hypothyroidism   - c/w levothyroxine 75mcg   - TSH 0.9    #HTN  - will monitor BP for now  - HCTZ 25mg BID PRN at home - will continue given LE edema   - hold home Lisinopril 40mg given low BP   - hold home amlodipine 2.5mg (does not usually take it because of leg swelling)   - resume home lasix 40 mg daily Prn for leg swelling. would prefer not to use HCTZ and lasix together scheduled     #DM  - A1c 6.8  - hold home glipizide 5mg BID and ozempic  - Sliding scale and accuchecks  - consistent carb diet    #history of PE, non-complaints with warfarin  - CTA PE Protocol: No evidence of pulmonary emboli. Indeterminate evaluation of subsegmental pulmonary arteries.  - c/w warfarin 7.5mg every day except Saturday On Saturday patient takes 10mg. as patient is non-complaint with coumadin, DOAC is possibly better for her. did not get any Coumadin last night  - Monitor INR and adjust dose.    - f/u Venous doppler  - pulm cx requested    #depression  -c/w home zoloft    #DVT PPx   -c/w coumadin  -follow INRs  - SCD until INR therapeutic    #Full Code    Dispo: pending clinical course.     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 Indeterminate evaluation of subsegmental pulmonary arteries.  - EKG personally reviewed: NO ST or T wave changes. sinus tachycardia. repeat EKG today  - Last echo 10/2019 w/ EF 63% Pulm HTN, Grade 1 diastolic dysfunction  - Last stress test 5/2019 nondiagnostic   - Cardiology consulted  - TTE : EF 60-63%. no DD or WMA reported. no pulm HTN or RV dysfunction reported  - for 2 steps stress test starting from tomorrow. NPO after MN. ordered today and needs to order tomorrow for stress test on Friday  - cw/ Aspirin 81mg, stain  - A1c: 6.8, LDL 57  - TSH 0.9  - pulm cx requested    # leg swelling, chronic  - no ssx of CHF  - CT chest: no pulm edema,   - BNP low  - c/w home HCTZ  - lasix PRN  - leg elevation  - duplex to r/o DVT    #Elevated white count w/ diarrhea, possible gastroenteritis   - No fevers currently, hold off on antibiotics  - CT AP unremarkable  - c/w home omeprazole- protonix 40mg  - Maalox 30mg q4hr PRN   - Zofran PRN for nausea  - low threshold for antibiotics and blood cultures if become febrile  - f/u  stool culture/PCR/ C.diff PCR.  - UA and RVP neg  - TSH normal  - currently on contact isolation for r/o c.diff  - LR@ 50 mls/hr, one liter.   - monitor electrolytes, volume status    # Hypokalemia  - likely due to diarrhoea. also on HCTZ  - KCL 40 meq*3.   - repeat BMP and mg in am    #ABBIE and  pulm HTN  - No CPAP at home  - will order CPAP 5 overnight   - f/u TTE  - pulm cx requested    #Hypothyroidism   - c/w levothyroxine 75mcg   - TSH 0.9    #HTN  - will monitor BP for now  - HCTZ 25mg BID PRN at home - will continue given LE edema   - hold home Lisinopril 40mg given low BP   - hold home amlodipine 2.5mg (does not usually take it because of leg swelling)   - resume home lasix 40 mg daily Prn for leg swelling. would prefer not to use HCTZ and lasix together scheduled     #DM  - A1c 6.8  - hold home glipizide 5mg BID and ozempic  - Sliding scale and accuchecks  - consistent carb diet    #history of PE, non-complaints with warfarin  - CTA PE Protocol: No evidence of pulmonary emboli. Indeterminate evaluation of subsegmental pulmonary arteries.  - c/w warfarin 7.5mg every day except Saturday On Saturday patient takes 10mg. as patient is non-complaint with coumadin, DOAC is possibly better for her. did not get any Coumadin last night  - Monitor INR and adjust dose.    - f/u Venous doppler  - pulm cx requested    #depression  -c/w home zoloft    #DVT PPx   -c/w coumadin  -follow INRs  - SCD until INR therapeutic    #Full Code    Dispo: pending clinical course.     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 Indeterminate evaluation of subsegmental pulmonary arteries.  - EKG personally reviewed: NO ST or T wave changes. sinus tachycardia. repeat EKG today  - Last echo 10/2019 w/ EF 63% Pulm HTN, Grade 1 diastolic dysfunction  - Last stress test 5/2019 nondiagnostic   - Cardiology consulted  - TTE : EF 60-63%. no DD or WMA reported. no pulm HTN or RV dysfunction reported  - for 2 steps stress test starting from tomorrow. NPO after MN. ordered today and needs to order tomorrow for stress test on Friday  - cw/ Aspirin 81mg, stain  - A1c: 6.8, LDL 57  - TSH 0.9  - pulm cx requested.  will see in am    # leg swelling, chronic  - no ssx of CHF  - CT chest: no pulm edema,   - BNP low  - c/w home HCTZ  - lasix PRN  - leg elevation  - duplex to r/o DVT    #Elevated white count w/ diarrhea, possible gastroenteritis   - No fevers currently, hold off on antibiotics  - CT AP unremarkable  - c/w home omeprazole- protonix 40mg  - Maalox 30mg q4hr PRN   - Zofran PRN for nausea  - low threshold for antibiotics and blood cultures if become febrile  - f/u  stool culture/PCR/ C.diff PCR.  - UA and RVP neg  - TSH normal  - currently on contact isolation for r/o c.diff  - LR@ 50 mls/hr, one liter.   - monitor electrolytes, volume status    # Hypokalemia  - likely due to diarrhoea. also on HCTZ  - KCL 40 meq*3.   - repeat BMP and mg in am    #ABBIE and  pulm HTN  - No CPAP at home  - will order CPAP 5 overnight   - f/u TTE  - pulm cx requested    #Hypothyroidism   - c/w levothyroxine 75mcg   - TSH 0.9    #HTN  - will monitor BP for now  - HCTZ 25mg BID PRN at home - will continue given LE edema   - hold home Lisinopril 40mg given low BP   - hold home amlodipine 2.5mg (does not usually take it because of leg swelling)   - resume home lasix 40 mg daily Prn for leg swelling. would prefer not to use HCTZ and lasix together scheduled     #DM  - A1c 6.8  - hold home glipizide 5mg BID and ozempic  - Sliding scale and accuchecks  - consistent carb diet    #history of PE, non-complaints with warfarin  - CTA PE Protocol: No evidence of pulmonary emboli. Indeterminate evaluation of subsegmental pulmonary arteries.  - Home meds: Coumadin Sun-Friday 7.5 mg daily, on Saturday 10 mg  - c/w warfarin 7.5mg tonight.   - Monitor INR and adjust dose.    - f/u Venous doppler  - INR 1.9 today. per patient, she missed last 2-3 doses.   - Per patient, Her hematologist recommended coumadin over DOAC because of gastric bypass    #depression  -c/w home zoloft    #DVT PPx   -c/w coumadin  -follow INRs  - SCD until INR therapeutic    #Full Code    Dispo: pending clinical course.     patient will update family

## 2024-01-11 ENCOUNTER — TRANSCRIPTION ENCOUNTER (OUTPATIENT)
Age: 64
End: 2024-01-11

## 2024-01-11 LAB
ANION GAP SERPL CALC-SCNC: 6 MMOL/L — SIGNIFICANT CHANGE UP (ref 5–17)
ANION GAP SERPL CALC-SCNC: 6 MMOL/L — SIGNIFICANT CHANGE UP (ref 5–17)
BASOPHILS # BLD AUTO: 0.05 K/UL — SIGNIFICANT CHANGE UP (ref 0–0.2)
BASOPHILS # BLD AUTO: 0.05 K/UL — SIGNIFICANT CHANGE UP (ref 0–0.2)
BASOPHILS NFR BLD AUTO: 0.8 % — SIGNIFICANT CHANGE UP (ref 0–2)
BASOPHILS NFR BLD AUTO: 0.8 % — SIGNIFICANT CHANGE UP (ref 0–2)
BUN SERPL-MCNC: 17 MG/DL — SIGNIFICANT CHANGE UP (ref 7–23)
BUN SERPL-MCNC: 17 MG/DL — SIGNIFICANT CHANGE UP (ref 7–23)
CALCIUM SERPL-MCNC: 8.9 MG/DL — SIGNIFICANT CHANGE UP (ref 8.4–10.5)
CALCIUM SERPL-MCNC: 8.9 MG/DL — SIGNIFICANT CHANGE UP (ref 8.4–10.5)
CHLORIDE SERPL-SCNC: 105 MMOL/L — SIGNIFICANT CHANGE UP (ref 96–108)
CHLORIDE SERPL-SCNC: 105 MMOL/L — SIGNIFICANT CHANGE UP (ref 96–108)
CO2 SERPL-SCNC: 26 MMOL/L — SIGNIFICANT CHANGE UP (ref 22–31)
CO2 SERPL-SCNC: 26 MMOL/L — SIGNIFICANT CHANGE UP (ref 22–31)
CREAT SERPL-MCNC: 0.6 MG/DL — SIGNIFICANT CHANGE UP (ref 0.5–1.3)
CREAT SERPL-MCNC: 0.6 MG/DL — SIGNIFICANT CHANGE UP (ref 0.5–1.3)
EGFR: 101 ML/MIN/1.73M2 — SIGNIFICANT CHANGE UP
EGFR: 101 ML/MIN/1.73M2 — SIGNIFICANT CHANGE UP
EOSINOPHIL # BLD AUTO: 0.04 K/UL — SIGNIFICANT CHANGE UP (ref 0–0.5)
EOSINOPHIL # BLD AUTO: 0.04 K/UL — SIGNIFICANT CHANGE UP (ref 0–0.5)
EOSINOPHIL NFR BLD AUTO: 0.7 % — SIGNIFICANT CHANGE UP (ref 0–6)
EOSINOPHIL NFR BLD AUTO: 0.7 % — SIGNIFICANT CHANGE UP (ref 0–6)
GI PCR PANEL: DETECTED
GI PCR PANEL: DETECTED
GLUCOSE BLDC GLUCOMTR-MCNC: 102 MG/DL — HIGH (ref 70–99)
GLUCOSE BLDC GLUCOMTR-MCNC: 102 MG/DL — HIGH (ref 70–99)
GLUCOSE BLDC GLUCOMTR-MCNC: 120 MG/DL — HIGH (ref 70–99)
GLUCOSE BLDC GLUCOMTR-MCNC: 120 MG/DL — HIGH (ref 70–99)
GLUCOSE BLDC GLUCOMTR-MCNC: 135 MG/DL — HIGH (ref 70–99)
GLUCOSE BLDC GLUCOMTR-MCNC: 135 MG/DL — HIGH (ref 70–99)
GLUCOSE SERPL-MCNC: 130 MG/DL — HIGH (ref 70–99)
GLUCOSE SERPL-MCNC: 130 MG/DL — HIGH (ref 70–99)
HCT VFR BLD CALC: 37.6 % — SIGNIFICANT CHANGE UP (ref 34.5–45)
HCT VFR BLD CALC: 37.6 % — SIGNIFICANT CHANGE UP (ref 34.5–45)
HGB BLD-MCNC: 12.2 G/DL — SIGNIFICANT CHANGE UP (ref 11.5–15.5)
HGB BLD-MCNC: 12.2 G/DL — SIGNIFICANT CHANGE UP (ref 11.5–15.5)
IMM GRANULOCYTES NFR BLD AUTO: 0.7 % — SIGNIFICANT CHANGE UP (ref 0–0.9)
IMM GRANULOCYTES NFR BLD AUTO: 0.7 % — SIGNIFICANT CHANGE UP (ref 0–0.9)
INR BLD: 1.74 RATIO — HIGH (ref 0.85–1.18)
INR BLD: 1.74 RATIO — HIGH (ref 0.85–1.18)
LYMPHOCYTES # BLD AUTO: 1.14 K/UL — SIGNIFICANT CHANGE UP (ref 1–3.3)
LYMPHOCYTES # BLD AUTO: 1.14 K/UL — SIGNIFICANT CHANGE UP (ref 1–3.3)
LYMPHOCYTES # BLD AUTO: 19.2 % — SIGNIFICANT CHANGE UP (ref 13–44)
LYMPHOCYTES # BLD AUTO: 19.2 % — SIGNIFICANT CHANGE UP (ref 13–44)
MAGNESIUM SERPL-MCNC: 1.9 MG/DL — SIGNIFICANT CHANGE UP (ref 1.6–2.6)
MAGNESIUM SERPL-MCNC: 1.9 MG/DL — SIGNIFICANT CHANGE UP (ref 1.6–2.6)
MCHC RBC-ENTMCNC: 28.6 PG — SIGNIFICANT CHANGE UP (ref 27–34)
MCHC RBC-ENTMCNC: 28.6 PG — SIGNIFICANT CHANGE UP (ref 27–34)
MCHC RBC-ENTMCNC: 32.4 GM/DL — SIGNIFICANT CHANGE UP (ref 32–36)
MCHC RBC-ENTMCNC: 32.4 GM/DL — SIGNIFICANT CHANGE UP (ref 32–36)
MCV RBC AUTO: 88.3 FL — SIGNIFICANT CHANGE UP (ref 80–100)
MCV RBC AUTO: 88.3 FL — SIGNIFICANT CHANGE UP (ref 80–100)
MONOCYTES # BLD AUTO: 0.62 K/UL — SIGNIFICANT CHANGE UP (ref 0–0.9)
MONOCYTES # BLD AUTO: 0.62 K/UL — SIGNIFICANT CHANGE UP (ref 0–0.9)
MONOCYTES NFR BLD AUTO: 10.4 % — SIGNIFICANT CHANGE UP (ref 2–14)
MONOCYTES NFR BLD AUTO: 10.4 % — SIGNIFICANT CHANGE UP (ref 2–14)
NEUTROPHILS # BLD AUTO: 4.06 K/UL — SIGNIFICANT CHANGE UP (ref 1.8–7.4)
NEUTROPHILS # BLD AUTO: 4.06 K/UL — SIGNIFICANT CHANGE UP (ref 1.8–7.4)
NEUTROPHILS NFR BLD AUTO: 68.2 % — SIGNIFICANT CHANGE UP (ref 43–77)
NEUTROPHILS NFR BLD AUTO: 68.2 % — SIGNIFICANT CHANGE UP (ref 43–77)
NOROVIRUS GI+II RNA STL QL NAA+NON-PROBE: DETECTED
NOROVIRUS GI+II RNA STL QL NAA+NON-PROBE: DETECTED
NRBC # BLD: 0 /100 WBCS — SIGNIFICANT CHANGE UP (ref 0–0)
NRBC # BLD: 0 /100 WBCS — SIGNIFICANT CHANGE UP (ref 0–0)
NT-PROBNP SERPL-SCNC: 71 PG/ML — SIGNIFICANT CHANGE UP (ref 0–300)
NT-PROBNP SERPL-SCNC: 71 PG/ML — SIGNIFICANT CHANGE UP (ref 0–300)
PLATELET # BLD AUTO: 308 K/UL — SIGNIFICANT CHANGE UP (ref 150–400)
PLATELET # BLD AUTO: 308 K/UL — SIGNIFICANT CHANGE UP (ref 150–400)
POTASSIUM SERPL-MCNC: 4 MMOL/L — SIGNIFICANT CHANGE UP (ref 3.5–5.3)
POTASSIUM SERPL-MCNC: 4 MMOL/L — SIGNIFICANT CHANGE UP (ref 3.5–5.3)
POTASSIUM SERPL-SCNC: 4 MMOL/L — SIGNIFICANT CHANGE UP (ref 3.5–5.3)
POTASSIUM SERPL-SCNC: 4 MMOL/L — SIGNIFICANT CHANGE UP (ref 3.5–5.3)
PROTHROM AB SERPL-ACNC: 20 SEC — HIGH (ref 9.5–13)
PROTHROM AB SERPL-ACNC: 20 SEC — HIGH (ref 9.5–13)
RBC # BLD: 4.26 M/UL — SIGNIFICANT CHANGE UP (ref 3.8–5.2)
RBC # BLD: 4.26 M/UL — SIGNIFICANT CHANGE UP (ref 3.8–5.2)
RBC # FLD: 15.7 % — HIGH (ref 10.3–14.5)
RBC # FLD: 15.7 % — HIGH (ref 10.3–14.5)
SODIUM SERPL-SCNC: 137 MMOL/L — SIGNIFICANT CHANGE UP (ref 135–145)
SODIUM SERPL-SCNC: 137 MMOL/L — SIGNIFICANT CHANGE UP (ref 135–145)
WBC # BLD: 5.95 K/UL — SIGNIFICANT CHANGE UP (ref 3.8–10.5)
WBC # BLD: 5.95 K/UL — SIGNIFICANT CHANGE UP (ref 3.8–10.5)
WBC # FLD AUTO: 5.95 K/UL — SIGNIFICANT CHANGE UP (ref 3.8–10.5)
WBC # FLD AUTO: 5.95 K/UL — SIGNIFICANT CHANGE UP (ref 3.8–10.5)

## 2024-01-11 PROCEDURE — 93018 CV STRESS TEST I&R ONLY: CPT

## 2024-01-11 PROCEDURE — 99233 SBSQ HOSP IP/OBS HIGH 50: CPT

## 2024-01-11 PROCEDURE — 99232 SBSQ HOSP IP/OBS MODERATE 35: CPT | Mod: 25

## 2024-01-11 PROCEDURE — 93016 CV STRESS TEST SUPVJ ONLY: CPT

## 2024-01-11 RX ORDER — WARFARIN SODIUM 2.5 MG/1
7.5 TABLET ORAL ONCE
Refills: 0 | Status: COMPLETED | OUTPATIENT
Start: 2024-01-11 | End: 2024-01-11

## 2024-01-11 RX ORDER — LEVOTHYROXINE SODIUM 125 MCG
1 TABLET ORAL
Qty: 0 | Refills: 0 | DISCHARGE

## 2024-01-11 RX ADMIN — Medication 81 MILLIGRAM(S): at 12:37

## 2024-01-11 RX ADMIN — PANTOPRAZOLE SODIUM 40 MILLIGRAM(S): 20 TABLET, DELAYED RELEASE ORAL at 06:27

## 2024-01-11 RX ADMIN — Medication 75 MICROGRAM(S): at 06:27

## 2024-01-11 RX ADMIN — ATORVASTATIN CALCIUM 40 MILLIGRAM(S): 80 TABLET, FILM COATED ORAL at 22:20

## 2024-01-11 RX ADMIN — SERTRALINE 200 MILLIGRAM(S): 25 TABLET, FILM COATED ORAL at 12:36

## 2024-01-11 RX ADMIN — WARFARIN SODIUM 7.5 MILLIGRAM(S): 2.5 TABLET ORAL at 22:20

## 2024-01-11 NOTE — DISCHARGE NOTE PROVIDER - PROVIDER TOKENS
PROVIDER:[TOKEN:[[id number]]],PROVIDER:[TOKEN:[[id number]]] PROVIDER:[TOKEN:[[id number]]],PROVIDER:[TOKEN:[[id number]]]

## 2024-01-11 NOTE — PROGRESS NOTE ADULT - SUBJECTIVE AND OBJECTIVE BOX
Patient is a 63y old  Female who presents with a chief complaint of Rule out ACS (11 Jan 2024 10:09)    No events overnight    Patient seen and examined at bedside.    ALLERGIES:  No Known Drug Allergies  latex (Rash)    MEDICATIONS  (STANDING):  aspirin  chewable 81 milliGRAM(s) Oral daily  atorvastatin 40 milliGRAM(s) Oral at bedtime  dextrose 5%. 1000 milliLiter(s) (100 mL/Hr) IV Continuous <Continuous>  dextrose 5%. 1000 milliLiter(s) (50 mL/Hr) IV Continuous <Continuous>  dextrose 50% Injectable 25 Gram(s) IV Push once  dextrose 50% Injectable 12.5 Gram(s) IV Push once  dextrose 50% Injectable 25 Gram(s) IV Push once  glucagon  Injectable 1 milliGRAM(s) IntraMuscular once  hydrochlorothiazide 25 milliGRAM(s) Oral two times a day  insulin lispro (ADMELOG) corrective regimen sliding scale   SubCutaneous three times a day before meals  levothyroxine 75 MICROGram(s) Oral daily  pantoprazole    Tablet 40 milliGRAM(s) Oral before breakfast  regadenoson Injectable 0.4 milliGRAM(s) IV Push once  sertraline 200 milliGRAM(s) Oral daily    MEDICATIONS  (PRN):  acetaminophen     Tablet .. 650 milliGRAM(s) Oral every 6 hours PRN Temp greater or equal to 38C (100.4F), Mild Pain (1 - 3)  aluminum hydroxide/magnesium hydroxide/simethicone Suspension 30 milliLiter(s) Oral every 4 hours PRN Dyspepsia  dextrose Oral Gel 15 Gram(s) Oral once PRN Blood Glucose LESS THAN 70 milliGRAM(s)/deciliter  furosemide    Tablet 40 milliGRAM(s) Oral daily PRN leg swelling  melatonin 3 milliGRAM(s) Oral at bedtime PRN Insomnia  ondansetron Injectable 4 milliGRAM(s) IV Push every 8 hours PRN Nausea and/or Vomiting    Vital Signs Last 24 Hrs  T(F): 98.2 (11 Jan 2024 06:05), Max: 98.9 (10 Barrera 2024 19:17)  HR: 82 (11 Jan 2024 06:05) (81 - 92)  BP: 104/71 (11 Jan 2024 06:05) (104/71 - 105/67)  RR: 16 (11 Jan 2024 06:05) (16 - 18)  SpO2: 97% (11 Jan 2024 06:05) (95% - 98%)  I&O's Summary    10 Barrera 2024 07:01  -  11 Jan 2024 07:00  --------------------------------------------------------  IN: 400 mL / OUT: 0 mL / NET: 400 mL      BMI (kg/m2): 39.1 (01-10-24 @ 19:17)  PHYSICAL EXAM:  General: NAD, A/O x 3  ENT: MMM, no scleral icterus  Neck: Supple, No JVD, no thyroidomegaly  Lungs: Clear to auscultation bilaterally, no wheezes, no rales, no rhonchi, good inspiratory effort  Cardio: RRR, S1/S2, No murmurs  Abdomen: Soft, Nontender, Nondistended; Bowel sounds present  Extremities: No calf tenderness, No pitting edema, no skin changes    LABS:                        12.2   5.95  )-----------( 308      ( 11 Jan 2024 06:28 )             37.6       01-11    137  |  105  |  17  ----------------------------<  130  4.0   |  26  |  0.60    Ca    8.9      11 Jan 2024 06:28  Mg     1.9     01-11    TPro  6.2  /  Alb  2.8  /  TBili  0.8  /  DBili  x   /  AST  18  /  ALT  27  /  AlkPhos  105  01-10       PT/INR - ( 11 Jan 2024 06:28 )   PT: 20.0 sec;   INR: 1.74 ratio    PTT - ( 09 Jan 2024 15:50 )  PTT:33.6 sec     CARDIAC MARKERS ( 09 Jan 2024 22:00 )  x     / 4.6 ng/L / x     / x     / x      CARDIAC MARKERS ( 09 Jan 2024 15:50 )  x     / 5.3 ng/L / x     / x     / x          01-10 Chol 112 mg/dL LDL -- HDL 31 mg/dL Trig 132 mg/dL  TSH 0.924   TSH with FT4 reflex --  Total T3 --    POCT Blood Glucose.: 117 mg/dL (10 Barrera 2024 22:23)  POCT Blood Glucose.: 154 mg/dL (10 Barrera 2024 17:34)  POCT Blood Glucose.: 111 mg/dL (10 Barrera 2024 13:06)      Urinalysis Basic - ( 11 Jan 2024 06:28 )    Color: x / Appearance: x / SG: x / pH: x  Gluc: 130 mg/dL / Ketone: x  / Bili: x / Urobili: x   Blood: x / Protein: x / Nitrite: x   Leuk Esterase: x / RBC: x / WBC x   Sq Epi: x / Non Sq Epi: x / Bacteria: x

## 2024-01-11 NOTE — PROGRESS NOTE ADULT - SUBJECTIVE AND OBJECTIVE BOX
SUBJ:  Patient is a 63y old  Female who presents with a chief complaint of Rule out ACS (10 Barrera 2024 20:16)  Patient is seen and examined.  The chart is reviewed.  Case was discussed with Dr. Child.  Patient seen in the stress lab.  She appears comfortable.  No current symptoms of chest pain or shortness of breath.        PAST MEDICAL & SURGICAL HISTORY:  Hypertension      Hypothyroid      Diabetes  denies a past medical problem      Sleep apnea      History of abdominal pain      Pulmonary embolism      No significant past surgical history          MEDICATIONS  (STANDING):  aspirin  chewable 81 milliGRAM(s) Oral daily  atorvastatin 40 milliGRAM(s) Oral at bedtime  dextrose 5%. 1000 milliLiter(s) (100 mL/Hr) IV Continuous <Continuous>  dextrose 5%. 1000 milliLiter(s) (50 mL/Hr) IV Continuous <Continuous>  dextrose 50% Injectable 25 Gram(s) IV Push once  dextrose 50% Injectable 12.5 Gram(s) IV Push once  dextrose 50% Injectable 25 Gram(s) IV Push once  glucagon  Injectable 1 milliGRAM(s) IntraMuscular once  hydrochlorothiazide 25 milliGRAM(s) Oral two times a day  insulin lispro (ADMELOG) corrective regimen sliding scale   SubCutaneous three times a day before meals  levothyroxine 75 MICROGram(s) Oral daily  pantoprazole    Tablet 40 milliGRAM(s) Oral before breakfast  regadenoson Injectable 0.4 milliGRAM(s) IV Push once  sertraline 200 milliGRAM(s) Oral daily    MEDICATIONS  (PRN):  acetaminophen     Tablet .. 650 milliGRAM(s) Oral every 6 hours PRN Temp greater or equal to 38C (100.4F), Mild Pain (1 - 3)  aluminum hydroxide/magnesium hydroxide/simethicone Suspension 30 milliLiter(s) Oral every 4 hours PRN Dyspepsia  dextrose Oral Gel 15 Gram(s) Oral once PRN Blood Glucose LESS THAN 70 milliGRAM(s)/deciliter  furosemide    Tablet 40 milliGRAM(s) Oral daily PRN leg swelling  melatonin 3 milliGRAM(s) Oral at bedtime PRN Insomnia  ondansetron Injectable 4 milliGRAM(s) IV Push every 8 hours PRN Nausea and/or Vomiting          Vital Signs Last 24 Hrs  T(C): 36.8 (11 Jan 2024 06:05), Max: 37.2 (10 Barrera 2024 19:17)  T(F): 98.2 (11 Jan 2024 06:05), Max: 98.9 (10 Barrera 2024 19:17)  HR: 82 (11 Jan 2024 06:05) (81 - 92)  BP: 104/71 (11 Jan 2024 06:05) (104/71 - 105/67)  BP(mean): --  RR: 16 (11 Jan 2024 06:05) (16 - 18)  SpO2: 97% (11 Jan 2024 06:05) (95% - 98%)    Parameters below as of 11 Jan 2024 06:05  Patient On (Oxygen Delivery Method): BiPAP/CPAP        REVIEW OF SYSTEMS:  CONSTITUTIONAL: No fever, weight loss, or fatigue  RESPIRATORY: No cough, wheezing, chills or hemoptysis; No shortness of breath  CARDIOVASCULAR: No chest pain or chest pressure.  No shortness of breath or dyspnea on exertion.  No palpitations, dizziness, light headedness, syncope or near syncope.  No edema, no orthopnea.   NEUROLOGICAL: No headaches, memory loss, loss of strength, numbness, or tremors      PHYSICAL EXAM  Constitutional: Morbid obesity.  No acute distress.  HEENT: normocephalic, atraumatic.  PERRLA. EOMI  Neck : No JVD. no carotid bruits  Lungs:Decreased breath sounds at both bases.  Heart:  S1 and S2. No S3, S4. I/VI systolic murmur.  Abdomen:  soft, non tender.  Extremities: No clubbing, cyanoisis or edema  Nuerologic:  A+O x 3. No focal deficits  Skin:  no rashes        LABS:                        12.2   5.95  )-----------( 308      ( 11 Jan 2024 06:28 )             37.6     01-11    137  |  105  |  17  ----------------------------<  130<H>  4.0   |  26  |  0.60    Ca    8.9      11 Jan 2024 06:28  Mg     1.9     01-11    TPro  6.2  /  Alb  2.8<L>  /  TBili  0.8  /  DBili  x   /  AST  18  /  ALT  27  /  AlkPhos  105  01-1      I&O's Summary    10 Barrera 2024 07:01  -  11 Jan 2024 07:00  --------------------------------------------------------  IN: 400 mL / OUT: 0 mL / NET: 400 mL    < from: 12 Lead ECG (01.09.24 @ 22:39) >  Diagnosis Line Sinus tachycardia  Nonspecific ST abnormality  When compared with ECG of 09-JAN-2024 15:30,  No significant change was found    < end of copied text >  < from: TTE Echo Complete w/o Contrast w/ Doppler (01.10.24 @ 08:23) >   1. Technically difficult study with poor endocardial visualization.   2. Normal global left ventricular systolic function.   3. Left ventricular ejection fraction, by visual estimation, is 60 to   65%.   4. Moderately increased LV wall thickness.   5. Normal left ventricular internal cavity size.   6. The right ventricle is not well visualized, appears to have normal   systolic function.   7. The left atrium is not well visualized, appears generally normal in   size.   8. The right atrium is not well visualized, appears generally normal in   size.   9. Mitral valve regurgitant jet not well visualized.  10. Mild aortic valve leaflet calcification. No aortic valve stenosis.  11. Pericardium not well visualized, appears to have prominent   pericardial fat pad.    < end of copied text >  < from: CT Angio Chest PE Protocol w/ IV Cont (01.09.24 @ 18:01) >  No evidence of pulmonary emboli. Indeterminate evaluation of subsegmental   pulmonary arteries.  No acute abnormality within the abdomen and pelvis.  Additional findings as above.    < end of copied text >

## 2024-01-11 NOTE — PROGRESS NOTE ADULT - ASSESSMENT
Physical Examination:  GENERAL:               Alert, Oriented, No acute distress.    HEENT:                   . No JVD, Moist MM  PULM:                     Bilateral air entry, Clear to auscultation bilaterally, no significant sputum production, No Rales, No Rhonchi, No Wheezing  CVS:                         S1, S2,  No Murmur  ABD:                        Soft, nondistended, nontender, normoactive bowel sounds,   EXT:                         +edema, nontender, No Cyanosis or Clubbing   NEURO:                  Alert, oriented, interactive, nonfocal, follows commands  PSYC:                      Calm, + Insight.      Assessment  1. Chest pain r/o ACS  2. Obesity s.p bypass with ABBIE   3. no history of COPD/smoking  4. Chronic Lung nodule unchanged un repeat CT - no further workup  5. h.o PE  6. Norovirus     Plan  nuclear stress test today  workup of chest pain as per cardio  placed a copy of sleep study if able would recommend to obtain APAP 5-10 on d/c for abbie  but not able to obtain as study too old, will need new home PSG on discharge  Continue a/c for h/o PE  suspect may have underlying pulm htn but not seen on echo, can consider right heart cath if left heart cath is indicated but not required   d/w patient and bedside team

## 2024-01-11 NOTE — PROGRESS NOTE ADULT - SUBJECTIVE AND OBJECTIVE BOX
Follow-up Pulmonary Progress Note  Chief Complaint : Chest pain        no respiratory complaints  no complaints to me  discussed case with CM to see if able to obtain APAP on discharge  patient will need new sleep study as old sleep study is old        Allergies :No Known Drug Allergies  latex (Rash)      PAST MEDICAL & SURGICAL HISTORY:  Hypertension    Hypothyroid    Diabetes  denies a past medical problem    Sleep apnea    History of abdominal pain    Pulmonary embolism    No significant past surgical history        Medications:  MEDICATIONS  (STANDING):  aspirin  chewable 81 milliGRAM(s) Oral daily  atorvastatin 40 milliGRAM(s) Oral at bedtime  dextrose 5%. 1000 milliLiter(s) (50 mL/Hr) IV Continuous <Continuous>  dextrose 5%. 1000 milliLiter(s) (100 mL/Hr) IV Continuous <Continuous>  dextrose 50% Injectable 25 Gram(s) IV Push once  dextrose 50% Injectable 12.5 Gram(s) IV Push once  dextrose 50% Injectable 25 Gram(s) IV Push once  glucagon  Injectable 1 milliGRAM(s) IntraMuscular once  hydrochlorothiazide 25 milliGRAM(s) Oral two times a day  insulin lispro (ADMELOG) corrective regimen sliding scale   SubCutaneous three times a day before meals  levothyroxine 75 MICROGram(s) Oral daily  pantoprazole    Tablet 40 milliGRAM(s) Oral before breakfast  regadenoson Injectable 0.4 milliGRAM(s) IV Push once  sertraline 200 milliGRAM(s) Oral daily  warfarin 7.5 milliGRAM(s) Oral once    MEDICATIONS  (PRN):  acetaminophen     Tablet .. 650 milliGRAM(s) Oral every 6 hours PRN Temp greater or equal to 38C (100.4F), Mild Pain (1 - 3)  aluminum hydroxide/magnesium hydroxide/simethicone Suspension 30 milliLiter(s) Oral every 4 hours PRN Dyspepsia  dextrose Oral Gel 15 Gram(s) Oral once PRN Blood Glucose LESS THAN 70 milliGRAM(s)/deciliter  melatonin 3 milliGRAM(s) Oral at bedtime PRN Insomnia  ondansetron Injectable 4 milliGRAM(s) IV Push every 8 hours PRN Nausea and/or Vomiting      Antibiotics History      Heme Medications   aspirin  chewable 81 milliGRAM(s) Oral daily, 01-09-24 @ 22:46  warfarin 7.5 milliGRAM(s) Oral once, 01-11-24 @ 12:04      GI Medications  aluminum hydroxide/magnesium hydroxide/simethicone Suspension 30 milliLiter(s) Oral every 4 hours, 01-09-24 @ 20:46, Routine PRN  pantoprazole    Tablet 40 milliGRAM(s) Oral before breakfast, 01-09-24 @ 23:03, Routine        LABS:                        12.2   5.95  )-----------( 308      ( 11 Jan 2024 06:28 )             37.6     01-11    137  |  105  |  17  ----------------------------<  130<H>  4.0   |  26  |  0.60    Ca    8.9      11 Jan 2024 06:28  Mg     1.9     01-11    TPro  6.2  /  Alb  2.8<L>  /  TBili  0.8  /  DBili  x   /  AST  18  /  ALT  27  /  AlkPhos  105  01-10            PT/INR - ( 11 Jan 2024 06:28 )   PT: 20.0 sec;   INR: 1.74 ratio         PTT - ( 09 Jan 2024 15:50 )  PTT:33.6 sec  Urinalysis Basic - ( 11 Jan 2024 06:28 )    Color: x / Appearance: x / SG: x / pH: x  Gluc: 130 mg/dL / Ketone: x  / Bili: x / Urobili: x   Blood: x / Protein: x / Nitrite: x   Leuk Esterase: x / RBC: x / WBC x   Sq Epi: x / Non Sq Epi: x / Bacteria: x           VITALS:  T(C): 36.8 (01-11-24 @ 06:05), Max: 37.2 (01-10-24 @ 19:17)  T(F): 98.2 (01-11-24 @ 06:05), Max: 98.9 (01-10-24 @ 19:17)  HR: 82 (01-11-24 @ 06:05) (81 - 92)  BP: 104/71 (01-11-24 @ 06:05) (104/71 - 105/67)  BP(mean): --  ABP: --  ABP(mean): --  RR: 16 (01-11-24 @ 06:05) (16 - 18)  SpO2: 97% (01-11-24 @ 06:05) (95% - 98%)  CVP(mm Hg): --  CVP(cm H2O): --    Ins and Outs     01-10-24 @ 07:01  -  01-11-24 @ 07:00  --------------------------------------------------------  IN: 400 mL / OUT: 0 mL / NET: 400 mL        Height (cm): 165.1 (01-11-24 @ 11:17)  Weight (kg): 106.6 (01-11-24 @ 11:17)  BMI (kg/m2): 39.1 (01-11-24 @ 11:17)        I&O's Detail    10 Barrera 2024 07:01  -  11 Jan 2024 07:00  --------------------------------------------------------  IN:    Lactated Ringers: 400 mL  Total IN: 400 mL    OUT:  Total OUT: 0 mL    Total NET: 400 mL

## 2024-01-11 NOTE — PROGRESS NOTE ADULT - ASSESSMENT
63-year-old woman admitted with complaints of chest pain, that is now resolved.  She has multiple medical issues include morbid obesity, status post prior bariatric surgery, hypertension, pulmonary embolism not compliant with warfarin, pulmonary hypertension, diabetes and obstructive sleep apnea.  No prior cardiac history.  Troponins have been negative, no acute EKG changes noted.    Recommendations  -Part 1 of nuclear stress test done today.  Await review of images.  -Continue current medications of aspirin, statin and hydrochlorothiazide.  -Defer to primary team regarding anticoagulation for prior history of pulmonary embolism.  -Discussed with patient and will discuss with primary team

## 2024-01-11 NOTE — DISCHARGE NOTE PROVIDER - HOSPITAL COURSE
Hospital Course  HPI:  Patient is a 63F w/ PMH of DM, HTN, obesity, gastric bypass, PE on warfarin, hypothyroidism, ABBIE not on CPAP who presented to the ED centralized chest pain x 2 days. Patient states taht 2 days ago in themorning at rest she began to develop midsternal chest pain for a few minutes which later began to radiate to the right neck and jaw over the next day. t States she took maalox w/out relief. She has also noticed increasing shortness of breath over the past year, to lobito point where she can no longer walk a full block without getting SOB. Sleeps with 3 pillows at night and has LE edema., for which she takes as needed lasix.  Also endorses abd pain in lower quadrant with nausea and 2 episodes of nonbloody diarrhea.  Denies any cough, vomiting, fevers, palpitations.     In the ED, VSS, afebrile satting well on room air. EKG sinus rhythm with ST or T wave abnormalities.   CT AP unremarkable      (09 Jan 2024 22:25)      Admitted for diarrhea and chest pain  Cardiology consulted, stress test done 1/11  Stool culture/GI PCR + for norovirus  Tolerating diet  Stable for discharge home   Needs outpatient sleep study

## 2024-01-11 NOTE — DISCHARGE NOTE PROVIDER - CARE PROVIDERS DIRECT ADDRESSES
,DirectAddress_Unknown,[email or web address] ,[email or web address]

## 2024-01-11 NOTE — PROGRESS NOTE ADULT - ASSESSMENT
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 Indeterminate evaluation of subsegmental pulmonary arteries.  - EKG personally reviewed: NO ST or T wave changes. sinus tachycardia. repeat EKG today  - Last echo 10/2019 w/ EF 63% Pulm HTN, Grade 1 diastolic dysfunction  - Last stress test 5/2019 nondiagnostic   - Cardiology consult appreciated  - TTE : EF 60-63%. no DD or WMA reported. no pulm HTN or RV dysfunction reported  - for 2 steps stress test; NPO after MN  - cw/ Aspirin 81mg, stain  - A1c: 6.8, LDL 57  - TSH 0.9  - Appreciate pulmonary recommendations; outpatient sleep study; had on in 2019    # leg swelling, chronic  - no ssx of CHF  - CT chest: no pulm edema,   - BNP low  - c/w home HCTZ  - lasix PRN  - leg elevation  - duplex to r/o DVT    #Norovirus gastroenteritis  #leukocytosis improved due to above  - No fevers currently, hold off on antibiotics  - CT AP unremarkable  - c/w home omeprazole- protonix 40mg  - Maalox 30mg q4hr PRN   - Zofran PRN for nausea  - UA and RVP neg  - TSH normal  - currently on contact isolation for r/o c.diff  - LR@ 50 mls/hr, one liter.   - monitor electrolytes, volume status    # Hypokalemia  - likely due to diarrhoea. also on HCTZ  - KCL 40 meq*3.     #ABBIE and  pulm HTN  - No CPAP at home  - will order CPAP 5 overnight   - o/p sleep study when discharged    #Hypothyroidism   - c/w levothyroxine 75mcg   - TSH 0.9    #HTN  - will monitor BP for now  - HCTZ 25mg BID PRN at home - will continue given LE edema   - hold home Lisinopril 40mg given low BP   - hold home amlodipine 2.5mg (does not usually take it because of leg swelling)     #DM  - A1c 6.8  - hold home glipizide 5mg BID and ozempic  - Sliding scale and accuchecks  - consistent carb diet    #history of PE, non-complaints with warfarin  - CTA PE Protocol: No evidence of pulmonary emboli. Indeterminate evaluation of subsegmental pulmonary arteries.  - Home meds: Coumadin Sun-Friday 7.5 mg daily, on Saturday 10 mg  - c/w warfarin 7.5mg tonight.   - INR 1.74 today. per patient, she missed last 2-3 doses.   - Per patient, Her hematologist recommended coumadin over DOAC because of gastric bypass    #depression  -c/w home zoloft    #DVT PPx c/w coumadin  Full Code    Dispo: pending clinical course.     patient will update family

## 2024-01-11 NOTE — DISCHARGE NOTE PROVIDER - NSDCMRMEDTOKEN_GEN_ALL_CORE_FT
atorvastatin 40 mg oral tablet: 1 tab(s) orally once a day (at bedtime)  Coumadin 10 mg oral tablet: 1 tab(s) orally once a day ON saturday  Coumadin 7.5 mg oral tablet: 1 tab(s) orally once a day mon - fri  glipiZIDE:   glipiZIDE 10 mg oral tablet: 1 tab(s) orally  hydroCHLOROthiazide 25 mg oral tablet: 1 tab(s) orally 2 times a day  Lasix 20 mg oral tablet: 1 tab(s) orally 2 times a day PRN for edema  lisinopril 40 mg oral tablet: 1 tab(s) orally once a day  Ozempic 2 mg/1.5 mL (1 mg dose) subcutaneous solution: subcutaneous  sertraline 200 mg oral capsule: 1 cap(s) orally once a day  Synthroid 75 mcg (0.075 mg) oral tablet: 1 tab(s) orally once a day

## 2024-01-11 NOTE — DISCHARGE NOTE PROVIDER - CARE PROVIDER_API CALL
Carlos Flores  Critical Care Medicine  [address] [facility] [facility] [address]  [place] [address]  Phone: [phone]  Fax: [phone]  Follow Up Time:     Odell Tracy  Internal Medicine  [address] [place] [id number]  Phone: [phone]  Fax: [phone]  Follow Up Time:    Carlos Flores  Critical Care Medicine  [address] [facility] [facility] [address]  [place] [address]  Phone: [phone]  Fax: [phone]  Follow Up Time:     Odell Tracy  Internal Medicine  [address] [place] [id number]  Phone: [phone]  Fax: [phone]  Follow Up Time:

## 2024-01-12 ENCOUNTER — TRANSCRIPTION ENCOUNTER (OUTPATIENT)
Age: 64
End: 2024-01-12

## 2024-01-12 VITALS — HEIGHT: 65 IN | WEIGHT: 235.01 LBS

## 2024-01-12 PROBLEM — I26.99 OTHER PULMONARY EMBOLISM WITHOUT ACUTE COR PULMONALE: Chronic | Status: ACTIVE | Noted: 2024-01-10

## 2024-01-12 LAB
ANION GAP SERPL CALC-SCNC: 9 MMOL/L — SIGNIFICANT CHANGE UP (ref 5–17)
ANION GAP SERPL CALC-SCNC: 9 MMOL/L — SIGNIFICANT CHANGE UP (ref 5–17)
APTT BLD: 36.3 SEC — HIGH (ref 24.5–35.6)
APTT BLD: 36.3 SEC — HIGH (ref 24.5–35.6)
BUN SERPL-MCNC: 14 MG/DL — SIGNIFICANT CHANGE UP (ref 7–23)
BUN SERPL-MCNC: 14 MG/DL — SIGNIFICANT CHANGE UP (ref 7–23)
CALCIUM SERPL-MCNC: 9.5 MG/DL — SIGNIFICANT CHANGE UP (ref 8.4–10.5)
CALCIUM SERPL-MCNC: 9.5 MG/DL — SIGNIFICANT CHANGE UP (ref 8.4–10.5)
CHLORIDE SERPL-SCNC: 101 MMOL/L — SIGNIFICANT CHANGE UP (ref 96–108)
CHLORIDE SERPL-SCNC: 101 MMOL/L — SIGNIFICANT CHANGE UP (ref 96–108)
CO2 SERPL-SCNC: 29 MMOL/L — SIGNIFICANT CHANGE UP (ref 22–31)
CO2 SERPL-SCNC: 29 MMOL/L — SIGNIFICANT CHANGE UP (ref 22–31)
CREAT SERPL-MCNC: 0.72 MG/DL — SIGNIFICANT CHANGE UP (ref 0.5–1.3)
CREAT SERPL-MCNC: 0.72 MG/DL — SIGNIFICANT CHANGE UP (ref 0.5–1.3)
EGFR: 95 ML/MIN/1.73M2 — SIGNIFICANT CHANGE UP
EGFR: 95 ML/MIN/1.73M2 — SIGNIFICANT CHANGE UP
GLUCOSE BLDC GLUCOMTR-MCNC: 132 MG/DL — HIGH (ref 70–99)
GLUCOSE BLDC GLUCOMTR-MCNC: 132 MG/DL — HIGH (ref 70–99)
GLUCOSE SERPL-MCNC: 137 MG/DL — HIGH (ref 70–99)
GLUCOSE SERPL-MCNC: 137 MG/DL — HIGH (ref 70–99)
INR BLD: 1.88 RATIO — HIGH (ref 0.85–1.18)
INR BLD: 1.88 RATIO — HIGH (ref 0.85–1.18)
MAGNESIUM SERPL-MCNC: 1.9 MG/DL — SIGNIFICANT CHANGE UP (ref 1.6–2.6)
MAGNESIUM SERPL-MCNC: 1.9 MG/DL — SIGNIFICANT CHANGE UP (ref 1.6–2.6)
POTASSIUM SERPL-MCNC: 3.7 MMOL/L — SIGNIFICANT CHANGE UP (ref 3.5–5.3)
POTASSIUM SERPL-MCNC: 3.7 MMOL/L — SIGNIFICANT CHANGE UP (ref 3.5–5.3)
POTASSIUM SERPL-SCNC: 3.7 MMOL/L — SIGNIFICANT CHANGE UP (ref 3.5–5.3)
POTASSIUM SERPL-SCNC: 3.7 MMOL/L — SIGNIFICANT CHANGE UP (ref 3.5–5.3)
PROTHROM AB SERPL-ACNC: 21 SEC — HIGH (ref 9.5–13)
PROTHROM AB SERPL-ACNC: 21 SEC — HIGH (ref 9.5–13)
SODIUM SERPL-SCNC: 139 MMOL/L — SIGNIFICANT CHANGE UP (ref 135–145)
SODIUM SERPL-SCNC: 139 MMOL/L — SIGNIFICANT CHANGE UP (ref 135–145)

## 2024-01-12 PROCEDURE — 85025 COMPLETE CBC W/AUTO DIFF WBC: CPT

## 2024-01-12 PROCEDURE — 93017 CV STRESS TEST TRACING ONLY: CPT

## 2024-01-12 PROCEDURE — 99239 HOSP IP/OBS DSCHRG MGMT >30: CPT

## 2024-01-12 PROCEDURE — 85730 THROMBOPLASTIN TIME PARTIAL: CPT

## 2024-01-12 PROCEDURE — 80053 COMPREHEN METABOLIC PANEL: CPT

## 2024-01-12 PROCEDURE — 83880 ASSAY OF NATRIURETIC PEPTIDE: CPT

## 2024-01-12 PROCEDURE — 99232 SBSQ HOSP IP/OBS MODERATE 35: CPT

## 2024-01-12 PROCEDURE — 84484 ASSAY OF TROPONIN QUANT: CPT

## 2024-01-12 PROCEDURE — 71275 CT ANGIOGRAPHY CHEST: CPT | Mod: MA

## 2024-01-12 PROCEDURE — 99285 EMERGENCY DEPT VISIT HI MDM: CPT

## 2024-01-12 PROCEDURE — 85027 COMPLETE CBC AUTOMATED: CPT

## 2024-01-12 PROCEDURE — 94660 CPAP INITIATION&MGMT: CPT

## 2024-01-12 PROCEDURE — 78452 HT MUSCLE IMAGE SPECT MULT: CPT

## 2024-01-12 PROCEDURE — A9500: CPT

## 2024-01-12 PROCEDURE — 83735 ASSAY OF MAGNESIUM: CPT

## 2024-01-12 PROCEDURE — 80048 BASIC METABOLIC PNL TOTAL CA: CPT

## 2024-01-12 PROCEDURE — 93306 TTE W/DOPPLER COMPLETE: CPT

## 2024-01-12 PROCEDURE — 96374 THER/PROPH/DIAG INJ IV PUSH: CPT

## 2024-01-12 PROCEDURE — 78452 HT MUSCLE IMAGE SPECT MULT: CPT | Mod: 26

## 2024-01-12 PROCEDURE — 36415 COLL VENOUS BLD VENIPUNCTURE: CPT

## 2024-01-12 PROCEDURE — 87507 IADNA-DNA/RNA PROBE TQ 12-25: CPT

## 2024-01-12 PROCEDURE — 80061 LIPID PANEL: CPT

## 2024-01-12 PROCEDURE — 87493 C DIFF AMPLIFIED PROBE: CPT

## 2024-01-12 PROCEDURE — 96361 HYDRATE IV INFUSION ADD-ON: CPT

## 2024-01-12 PROCEDURE — 82962 GLUCOSE BLOOD TEST: CPT

## 2024-01-12 PROCEDURE — 84443 ASSAY THYROID STIM HORMONE: CPT

## 2024-01-12 PROCEDURE — 93970 EXTREMITY STUDY: CPT

## 2024-01-12 PROCEDURE — 0225U NFCT DS DNA&RNA 21 SARSCOV2: CPT

## 2024-01-12 PROCEDURE — 74177 CT ABD & PELVIS W/CONTRAST: CPT | Mod: MA

## 2024-01-12 PROCEDURE — 93005 ELECTROCARDIOGRAM TRACING: CPT

## 2024-01-12 PROCEDURE — 81001 URINALYSIS AUTO W/SCOPE: CPT

## 2024-01-12 PROCEDURE — 71045 X-RAY EXAM CHEST 1 VIEW: CPT

## 2024-01-12 PROCEDURE — 83036 HEMOGLOBIN GLYCOSYLATED A1C: CPT

## 2024-01-12 PROCEDURE — 85610 PROTHROMBIN TIME: CPT

## 2024-01-12 RX ADMIN — PANTOPRAZOLE SODIUM 40 MILLIGRAM(S): 20 TABLET, DELAYED RELEASE ORAL at 07:00

## 2024-01-12 RX ADMIN — Medication 75 MICROGRAM(S): at 06:59

## 2024-01-12 NOTE — DISCHARGE NOTE NURSING/CASE MANAGEMENT/SOCIAL WORK - NSDCPEFALRISK_GEN_ALL_CORE
For information on Fall & Injury Prevention, visit: [email or web address] OR  [email or web address] OR  https://www.cdc.gov/steadi/patient.html For information on Fall & Injury Prevention, visit: [email or web address] OR  [email or web address] OR  https://www.cdc.gov/steadi/patient.html

## 2024-01-12 NOTE — PROGRESS NOTE ADULT - ASSESSMENT
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, w/ PMH of DM, HTN, obesity, gastric bypass, PE on warfarin, hypothyroidism, ABBIE not on CPAP who presented to the ED centralized chest pain x 2 days, admitted for :    #Chest pain, ACS ruled out  # Chronic NELSON  # h/o PE, non-complaints with Coumadin  Stable for discharge home  F/U PCP, pulmonary, cardiology  - TSH 0.9  - Appreciate pulmonary recommendations; outpatient sleep study; had on in 2019    # leg swelling, chronic  - no ssx of CHF  - CT chest: no pulm edema,   - BNP low  - c/w home HCTZ  - lasix PRN  - leg elevation  - duplex to r/o DVT    #Norovirus gastroenteritis  #leukocytosis improved due to above  - No fevers currently, hold off on antibiotics  - CT AP unremarkable  - c/w home omeprazole- protonix 40mg  - Maalox 30mg q4hr PRN   - Zofran PRN for nausea  - UA and RVP neg  - TSH normal  - currently on contact isolation for r/o c.diff  - LR@ 50 mls/hr, one liter.   - monitor electrolytes, volume status    # Hypokalemia  - likely due to diarrhoea. also on HCTZ  - KCL 40 meq*3.     #ABBIE and  pulm HTN  - No CPAP at home  - will order CPAP 5 overnight   - o/p sleep study when discharged    #Hypothyroidism   - c/w levothyroxine 75mcg   - TSH 0.9    #HTN  - will monitor BP for now  - HCTZ 25mg BID PRN at home - will continue given LE edema   - hold home Lisinopril 40mg given low BP   - hold home amlodipine 2.5mg (does not usually take it because of leg swelling)     #DM  - A1c 6.8  - hold home glipizide 5mg BID and ozempic  - Sliding scale and accuchecks  - consistent carb diet    #history of PE, non-complaints with warfarin  - CTA PE Protocol: No evidence of pulmonary emboli. Indeterminate evaluation of subsegmental pulmonary arteries.  - Home meds: Coumadin Sun-Friday 7.5 mg daily, on Saturday 10 mg  - c/w warfarin 7.5mg tonight.   - INR 1.74 today. per patient, she missed last 2-3 doses.   - Per patient, Her hematologist recommended coumadin over DOAC because of gastric bypass    #depression  -c/w home zoloft    #DVT PPx c/w coumadin  Full Code    Dispo: pending clinical course.     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, w/ PMH of DM, HTN, obesity, gastric bypass, PE on warfarin, hypothyroidism, ABBIE not on CPAP who presented to the ED centralized chest pain x 2 days    #Chest pain, ACS ruled out  # Chronic NELSON  # h/o PE, non-complaints with Coumadin  Stable for discharge home  F/U PCP, pulmonary, cardiology  - TSH 0.9  - Appreciate pulmonary recommendations; outpatient sleep study; had on in 2019    # leg swelling, chronic  - no ssx of CHF  - CT chest: no pulm edema,   - BNP low  - c/w home HCTZ  - lasix PRN  - leg elevation  - duplex to r/o DVT    #Norovirus gastroenteritis  #leukocytosis improved due to above  - No fevers currently, hold off on antibiotics  - CT AP unremarkable  - c/w home omeprazole- protonix 40mg  - Maalox 30mg q4hr PRN   - Zofran PRN for nausea  - UA and RVP neg  - TSH normal  - currently on contact isolation for r/o c.diff  - LR@ 50 mls/hr, one liter.   - monitor electrolytes, volume status    # Hypokalemia  - likely due to diarrhoea. also on HCTZ  - KCL 40 meq*3.     #ABBIE and  pulm HTN  - No CPAP at home  - will order CPAP 5 overnight   - o/p sleep study when discharged    #Hypothyroidism   - c/w levothyroxine 75mcg   - TSH 0.9    #HTN  - will monitor BP for now  - HCTZ 25mg BID PRN at home - will continue given LE edema   - hold home Lisinopril 40mg given low BP   - hold home amlodipine 2.5mg (does not usually take it because of leg swelling)     #DM  - A1c 6.8  - hold home glipizide 5mg BID and ozempic  - Sliding scale and accuchecks  - consistent carb diet    #history of PE, non-complaints with warfarin  - CTA PE Protocol: No evidence of pulmonary emboli. Indeterminate evaluation of subsegmental pulmonary arteries.  - Home meds: Coumadin Sun-Friday 7.5 mg daily, on Saturday 10 mg  - c/w warfarin 7.5mg tonight.   - INR 1.74 today. per patient, she missed last 2-3 doses.   - Per patient, Her hematologist recommended coumadin over DOAC because of gastric bypass    #depression  -c/w home zoloft    #DVT PPx c/w coumadin  Full Code    Dispo: pending clinical course.     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, w/ PMH of DM, HTN, obesity, gastric bypass, PE on warfarin, hypothyroidism, ABBIE not on CPAP who presented to the ED centralized chest pain x 2 days    #Chest pain, ACS ruled out  # Chronic NELSON  # h/o PE, non-complaints with Coumadin  Stable for discharge home  F/U PCP, pulmonary, cardiology  - TSH 0.9  - Appreciate pulmonary recommendations; outpatient sleep study; had on in 2019    # leg swelling, chronic  - C/w lasix prn  - C/w coumadin   - LE ULT negative for DVT    #Norovirus gastroenteritis  #leukocytosis improved due to above  - No fevers currently, hold off on antibiotics  - CT AP unremarkable  - c/w home omeprazole- protonix 40mg  - Maalox 30mg q4hr PRN   - Zofran PRN for nausea  - UA and RVP neg  - TSH normal    # Hypokalemia  - likely due to diarrhoea. also on HCTZ  - KCL 40 meq*3.     #ABBIE and  pulm HTN  - No CPAP at home  - will order CPAP 5 overnight   - o/p sleep study when discharged    #Hypothyroidism   - c/w levothyroxine 75mcg   - TSH 0.9    #HTN  - will monitor BP for now  - HCTZ 25mg BID PRN at home - will continue given LE edema   - hold home Lisinopril 40mg given low BP   - hold home amlodipine 2.5mg (does not usually take it because of leg swelling)     #DM  - A1c 6.8  - hold home glipizide 5mg BID and ozempic  - Sliding scale and accuchecks  - consistent carb diet    #history of PE, non-complaints with warfarin  - CTA PE Protocol: No evidence of pulmonary emboli. Indeterminate evaluation of subsegmental pulmonary arteries.  - Home meds: Coumadin Sun-Friday 7.5 mg daily, on Saturday 10 mg  - c/w warfarin 7.5mg tonight.   - INR 1.74 today. per patient, she missed last 2-3 doses.   - Per patient, Her hematologist recommended coumadin over DOAC because of gastric bypass    #depression  -c/w home zoloft    D/c 45 min  Stable for discharge  F/U PCP, pulmonary

## 2024-01-12 NOTE — DISCHARGE NOTE NURSING/CASE MANAGEMENT/SOCIAL WORK - PATIENT PORTAL LINK FT
You can access the FollowMyHealth Patient Portal offered by [facility] by registering at the following website: [email or web address]. By joining [employer]’s FollowMyHealth portal, you will also be able to view your health information using other applications (apps) compatible with our system. You can access the FollowMyHealth Patient Portal offered by [facility] by registering at the following website: [email or web address]. By joining [employer]’s FollowMyHealth portal, you will also be able to view your health information using other applications (apps) compatible with our system.

## 2024-01-12 NOTE — PROGRESS NOTE ADULT - ASSESSMENT
63-year-old woman admitted with complaints of chest pain, that is now resolved.  She has multiple medical issues include morbid obesity, status post prior bariatric surgery, hypertension, pulmonary embolism not compliant with warfarin, pulmonary hypertension, diabetes and obstructive sleep apnea.  No prior cardiac history.  Troponins have been negative, no acute EKG changes noted.  2-day study for pharmacologic nuclear stress test is normal.  No evidence of myocardial infarction or vasodilator induced myocardial ischemia.  TID was not noted.    Recommendations  -Favorable results of noninvasive cardiac testing reviewed with the patient.  -Limitations of noninvasive cardiac testing also discussed.  -Continue current medications of aspirin, statin and hydrochlorothiazide.  -Defer to primary team regarding anticoagulation for prior history of pulmonary embolism.  -Discharge planning in progress.  -Discussed with patient and with primary team

## 2024-01-12 NOTE — PROGRESS NOTE ADULT - SUBJECTIVE AND OBJECTIVE BOX
Patient is a 63y old  Female who presents with a chief complaint of Rule out ACS (11 Jan 2024 14:32)    NO events overnight  Patient seen and examined at bedside.    ALLERGIES:  No Known Drug Allergies  latex (Rash)    MEDICATIONS  (STANDING):  aspirin  chewable 81 milliGRAM(s) Oral daily  atorvastatin 40 milliGRAM(s) Oral at bedtime  dextrose 5%. 1000 milliLiter(s) (100 mL/Hr) IV Continuous <Continuous>  dextrose 5%. 1000 milliLiter(s) (50 mL/Hr) IV Continuous <Continuous>  dextrose 50% Injectable 25 Gram(s) IV Push once  dextrose 50% Injectable 12.5 Gram(s) IV Push once  dextrose 50% Injectable 25 Gram(s) IV Push once  glucagon  Injectable 1 milliGRAM(s) IntraMuscular once  hydrochlorothiazide 25 milliGRAM(s) Oral two times a day  insulin lispro (ADMELOG) corrective regimen sliding scale   SubCutaneous three times a day before meals  levothyroxine 75 MICROGram(s) Oral daily  pantoprazole    Tablet 40 milliGRAM(s) Oral before breakfast  regadenoson Injectable 0.4 milliGRAM(s) IV Push once  sertraline 200 milliGRAM(s) Oral daily    MEDICATIONS  (PRN):  acetaminophen     Tablet .. 650 milliGRAM(s) Oral every 6 hours PRN Temp greater or equal to 38C (100.4F), Mild Pain (1 - 3)  aluminum hydroxide/magnesium hydroxide/simethicone Suspension 30 milliLiter(s) Oral every 4 hours PRN Dyspepsia  dextrose Oral Gel 15 Gram(s) Oral once PRN Blood Glucose LESS THAN 70 milliGRAM(s)/deciliter  melatonin 3 milliGRAM(s) Oral at bedtime PRN Insomnia  ondansetron Injectable 4 milliGRAM(s) IV Push every 8 hours PRN Nausea and/or Vomiting    Vital Signs Last 24 Hrs  T(F): 97.8 (12 Jan 2024 04:49), Max: 97.9 (11 Jan 2024 15:31)  HR: 93 (12 Jan 2024 05:11) (70 - 93)  BP: 113/79 (12 Jan 2024 04:49) (109/75 - 120/86)  RR: 16 (12 Jan 2024 04:49) (16 - 18)  SpO2: 96% (12 Jan 2024 05:11) (95% - 97%)  I&O's Summary    11 Jan 2024 07:01  -  12 Jan 2024 07:00  --------------------------------------------------------  IN: 720 mL / OUT: 0 mL / NET: 720 mL      BMI (kg/m2): 39.1 (01-11-24 @ 11:17)  PHYSICAL EXAM:  General: NAD, A/O x 3  ENT: MMM, no scleral icterus  Neck: Supple, No JVD, no thyroidomegaly  Lungs: Clear to auscultation bilaterally, no wheezes, no rales, no rhonchi, good inspiratory effort  Cardio: RRR, S1/S2, No murmurs  Abdomen: Soft, Nontender, Nondistended; Bowel sounds present  Extremities: No calf tenderness, No pitting edema, no skin changes    LABS:                        12.2   5.95  )-----------( 308      ( 11 Jan 2024 06:28 )             37.6       01-12    139  |  101  |  14  ----------------------------<  137  3.7   |  29  |  0.72    Ca    9.5      12 Jan 2024 05:44  Mg     1.9     01-12    TPro  6.2  /  Alb  2.8  /  TBili  0.8  /  DBili  x   /  AST  18  /  ALT  27  /  AlkPhos  105  01-10       PT/INR - ( 12 Jan 2024 05:36 )   PT: 21.0 sec;   INR: 1.88 ratio    PTT - ( 12 Jan 2024 05:36 )  PTT:36.3 sec     CARDIAC MARKERS ( 09 Jan 2024 22:00 )  x     / 4.6 ng/L / x     / x     / x      CARDIAC MARKERS ( 09 Jan 2024 15:50 )  x     / 5.3 ng/L / x     / x     / x          01-10 Chol 112 mg/dL LDL -- HDL 31 mg/dL Trig 132 mg/dL  TSH 0.924   TSH with FT4 reflex --  Total T3 --    POCT Blood Glucose.: 132 mg/dL (12 Jan 2024 07:42)  POCT Blood Glucose.: 120 mg/dL (11 Jan 2024 22:19)  POCT Blood Glucose.: 102 mg/dL (11 Jan 2024 16:49)  POCT Blood Glucose.: 135 mg/dL (11 Jan 2024 12:31)      Urinalysis Basic - ( 12 Jan 2024 05:44 )    Color: x / Appearance: x / SG: x / pH: x  Gluc: 137 mg/dL / Ketone: x  / Bili: x / Urobili: x   Blood: x / Protein: x / Nitrite: x   Leuk Esterase: x / RBC: x / WBC x   Sq Epi: x / Non Sq Epi: x / Bacteria: x

## 2024-01-12 NOTE — DISCHARGE NOTE NURSING/CASE MANAGEMENT/SOCIAL WORK - NSDCFUADDAPPT_GEN_ALL_CORE_FT
Follow up appointment with Dr. Lo [phone] on 1/22/2024 @ 1:20pm Follow up appointment with Dr. Lo [phone] on 1/22/2024 @ 1:20pm

## 2024-01-12 NOTE — PROGRESS NOTE ADULT - SUBJECTIVE AND OBJECTIVE BOX
SUBJ:  Patient is a 63y old  Female who presents with a chief complaint of Rule out ACS (12 Jan 2024 11:08)  The patient is seen and examined.  The chart is reviewed.  She is comfortable, no further complaints of chest pain or shortness of breath.  Diarrhea is improving.      PAST MEDICAL & SURGICAL HISTORY:  Hypertension      Hypothyroid      Diabetes  denies a past medical problem      Sleep apnea      History of abdominal pain      Pulmonary embolism      No significant past surgical history          MEDICATIONS  (STANDING):  aspirin  chewable 81 milliGRAM(s) Oral daily  atorvastatin 40 milliGRAM(s) Oral at bedtime  dextrose 5%. 1000 milliLiter(s) (100 mL/Hr) IV Continuous <Continuous>  dextrose 5%. 1000 milliLiter(s) (50 mL/Hr) IV Continuous <Continuous>  dextrose 50% Injectable 25 Gram(s) IV Push once  dextrose 50% Injectable 12.5 Gram(s) IV Push once  dextrose 50% Injectable 25 Gram(s) IV Push once  glucagon  Injectable 1 milliGRAM(s) IntraMuscular once  hydrochlorothiazide 25 milliGRAM(s) Oral two times a day  insulin lispro (ADMELOG) corrective regimen sliding scale   SubCutaneous three times a day before meals  levothyroxine 75 MICROGram(s) Oral daily  pantoprazole    Tablet 40 milliGRAM(s) Oral before breakfast  regadenoson Injectable 0.4 milliGRAM(s) IV Push once  sertraline 200 milliGRAM(s) Oral daily    MEDICATIONS  (PRN):  acetaminophen     Tablet .. 650 milliGRAM(s) Oral every 6 hours PRN Temp greater or equal to 38C (100.4F), Mild Pain (1 - 3)  aluminum hydroxide/magnesium hydroxide/simethicone Suspension 30 milliLiter(s) Oral every 4 hours PRN Dyspepsia  dextrose Oral Gel 15 Gram(s) Oral once PRN Blood Glucose LESS THAN 70 milliGRAM(s)/deciliter  melatonin 3 milliGRAM(s) Oral at bedtime PRN Insomnia  ondansetron Injectable 4 milliGRAM(s) IV Push every 8 hours PRN Nausea and/or Vomiting          Vital Signs Last 24 Hrs  T(C): 36.6 (12 Jan 2024 04:49), Max: 36.6 (11 Jan 2024 15:31)  T(F): 97.8 (12 Jan 2024 04:49), Max: 97.9 (11 Jan 2024 15:31)  HR: 93 (12 Jan 2024 05:11) (70 - 93)  BP: 113/79 (12 Jan 2024 04:49) (109/75 - 120/86)  BP(mean): --  RR: 16 (12 Jan 2024 04:49) (16 - 18)  SpO2: 96% (12 Jan 2024 05:11) (95% - 97%)    Parameters below as of 12 Jan 2024 04:49  Patient On (Oxygen Delivery Method): room air        REVIEW OF SYSTEMS:  CONSTITUTIONAL: No fever, weight loss, or fatigue  RESPIRATORY: No cough, wheezing, chills or hemoptysis; No shortness of breath  CARDIOVASCULAR: No chest pain or chest pressure.  No shortness of breath or dyspnea on exertion.  No palpitations, dizziness, light headedness, syncope or near syncope.  No edema, no orthopnea.   NEUROLOGICAL: No headaches, memory loss, loss of strength, numbness, or tremors      PHYSICAL EXAM  Constitutional: Morbid obesity.  HEENT: normocephalic, atraumatic.  PERRLA. EOMI  Neck : No JVD. no carotid bruits  Lungs:  Decreased breath sounds at both bases.  Heart:  S1 and S2. No S3, S4. I/VI systolic murmur.  Abdomen:  soft, non tender.  Extremities: No clubbing, cyanoisis or edema  Nuerologic:  A+O x 3. No focal deficits  Skin:  no rashes        LABS:                        12.2   5.95  )-----------( 308      ( 11 Jan 2024 06:28 )             37.6     01-12    139  |  101  |  14  ----------------------------<  137<H>  3.7   |  29  |  0.72    Ca    9.5      12 Jan 2024 05:44  Mg     1.9     01-12    I&O's Summary    11 Jan 2024 07:01  -  12 Jan 2024 07:00  --------------------------------------------------------  IN: 720 mL / OUT: 0 mL / NET: 720 mL    < from: 12 Lead ECG (01.09.24 @ 22:39) >    Diagnosis Line Sinus tachycardia  Nonspecific ST abnormality  When compared with ECG of 09-JAN-2024 15:30,  No significant change was found    < end of copied text >  < from: TTE Echo Complete w/o Contrast w/ Doppler (01.10.24 @ 08:23) >   1. Technically difficult study with poor endocardial visualization.   2. Normal global left ventricular systolic function.   3. Left ventricular ejection fraction, by visual estimation, is 60 to   65%.   4. Moderately increased LV wall thickness.   5. Normal left ventricular internal cavity size.   6. The right ventricle is not well visualized, appears to have normal   systolic function.   7. The left atrium is not well visualized, appears generally normal in   size.   8. The right atrium is not well visualized, appears generally normal in   size.   9. Mitral valve regurgitant jet not well visualized.  10. Mild aortic valve leaflet calcification. No aortic valve stenosis.  11. Pericardium not well visualized, appears to have prominent   pericardial fat pad.    < end of copied text >  < from: NM Multiple Day Procedure (01.12.24 @ 10:57) >  MPRESSION:  Normal ventricular SPECT Myocardial Perfusion Imaging.       No scan evidence of reversible or fixed perfusion defects.       Normal left ventricular wall motion with ejection fraction of 70 %   (normal: 50% or greater).       No regional wall motion abnormalities.      Please refer to cardiac stress test report.    Comparison: No prior study available.    --- End of Report ---            SUDEEP JEFF MD; Attending Cardiologist  This document has been electronically signed. Jan 12 2024 10:54AM    < end of copied text >

## 2024-01-22 ENCOUNTER — APPOINTMENT (OUTPATIENT)
Dept: FAMILY MEDICINE | Facility: CLINIC | Age: 64
End: 2024-01-22

## 2024-01-29 ENCOUNTER — TRANSCRIPTION ENCOUNTER (OUTPATIENT)
Age: 64
End: 2024-01-29

## 2024-02-23 RX ORDER — LISINOPRIL 40 MG/1
40 TABLET ORAL
Qty: 90 | Refills: 0 | Status: ACTIVE | COMMUNITY
Start: 2023-03-08 | End: 1900-01-01

## 2024-02-26 ENCOUNTER — APPOINTMENT (OUTPATIENT)
Dept: OBGYN | Facility: CLINIC | Age: 64
End: 2024-02-26
Payer: COMMERCIAL

## 2024-02-26 VITALS
HEART RATE: 85 BPM | WEIGHT: 285 LBS | BODY MASS INDEX: 45.8 KG/M2 | HEIGHT: 66 IN | DIASTOLIC BLOOD PRESSURE: 70 MMHG | SYSTOLIC BLOOD PRESSURE: 118 MMHG | TEMPERATURE: 97.6 F | OXYGEN SATURATION: 96 %

## 2024-02-26 DIAGNOSIS — Z01.419 ENCOUNTER FOR GYNECOLOGICAL EXAMINATION (GENERAL) (ROUTINE) W/OUT ABNORMAL FINDINGS: ICD-10-CM

## 2024-02-26 DIAGNOSIS — R10.31 RIGHT LOWER QUADRANT PAIN: ICD-10-CM

## 2024-02-26 DIAGNOSIS — L29.2 PRURITUS VULVAE: ICD-10-CM

## 2024-02-26 PROCEDURE — 99396 PREV VISIT EST AGE 40-64: CPT

## 2024-02-26 NOTE — REVIEW OF SYSTEMS
[Negative] : Heme/Lymph [Abdominal Pain] : abdominal pain [Urgency] : urgency [FreeTextEntry7] : See HPI

## 2024-02-26 NOTE — PHYSICAL EXAM
[Chaperone Present] : A chaperone was present in the examining room during all aspects of the physical examination [FreeTextEntry1] : GEETA Collins [Appropriately responsive] : appropriately responsive [Alert] : alert [No Acute Distress] : no acute distress [No Lymphadenopathy] : no lymphadenopathy [No Murmurs] : no murmurs [Non-tender] : non-tender [Non-distended] : non-distended [No HSM] : No HSM [No Mass] : no mass [No Lesions] : no lesions [Oriented x3] : oriented x3 [Examination Of The Breasts] : a normal appearance [No Masses] : no breast masses were palpable [No Discharge] : no discharge [Labia Minora] : normal [Labia Majora] : normal [No Bleeding] : There was no active vaginal bleeding [Soft] : soft [Normal] : normal [Normal Position] : in a normal position [Tenderness] : nontender [Enlarged ___ wks] : not enlarged [Mass ___ cm] : no uterine mass was palpated [Uterine Adnexae] : absent

## 2024-02-26 NOTE — COUNSELING
[Nutrition/ Exercise/ Weight Management] : nutrition, exercise, weight management [Vitamins/Supplements] : vitamins/supplements [Breast Self Exam] : breast self exam [FreeTextEntry2] : Caridad excercises

## 2024-02-26 NOTE — HISTORY OF PRESENT ILLNESS
[FreeTextEntry1] : CHeck up  C/O occasional RLQ pain Some constipation  Last colonoscopy 2 years ago  S/P hosp for Noro virus at [facility] X 2 days [N] : Patient is not sexually active [PGHxTotal] : 0 [LMPDate] : age 53

## 2024-02-29 ENCOUNTER — RX RENEWAL (OUTPATIENT)
Age: 64
End: 2024-02-29

## 2024-02-29 LAB
CYTOLOGY CVX/VAG DOC THIN PREP: NORMAL
HPV HIGH+LOW RISK DNA PNL CVX: NOT DETECTED

## 2024-03-18 ENCOUNTER — RX RENEWAL (OUTPATIENT)
Age: 64
End: 2024-03-18

## 2024-03-19 ENCOUNTER — APPOINTMENT (OUTPATIENT)
Dept: FAMILY MEDICINE | Facility: CLINIC | Age: 64
End: 2024-03-19
Payer: COMMERCIAL

## 2024-03-19 VITALS
HEART RATE: 92 BPM | WEIGHT: 284 LBS | TEMPERATURE: 96.9 F | RESPIRATION RATE: 16 BRPM | HEIGHT: 66 IN | OXYGEN SATURATION: 98 % | BODY MASS INDEX: 45.64 KG/M2 | DIASTOLIC BLOOD PRESSURE: 76 MMHG | SYSTOLIC BLOOD PRESSURE: 116 MMHG

## 2024-03-19 DIAGNOSIS — I10 ESSENTIAL (PRIMARY) HYPERTENSION: ICD-10-CM

## 2024-03-19 DIAGNOSIS — E11.9 TYPE 2 DIABETES MELLITUS W/OUT COMPLICATIONS: ICD-10-CM

## 2024-03-19 DIAGNOSIS — E03.9 HYPOTHYROIDISM, UNSPECIFIED: ICD-10-CM

## 2024-03-19 PROCEDURE — 99214 OFFICE O/P EST MOD 30 MIN: CPT

## 2024-03-19 NOTE — ASSESSMENT
[FreeTextEntry1] : 65 yo F PMH DM2, HTN, Hypothyroidism, depression, PE, ABBIE (not on CPAP), bariatric surgery (gastric bypass), iron deficiency presenting today for follow-up.

## 2024-03-19 NOTE — HISTORY OF PRESENT ILLNESS
[FreeTextEntry1] : follow-up   [de-identified] : 65 yo F PMH DM2, HTN, Hypothyroidism, depression, PE, ABBIE (not on CPAP), bariatric surgery (gastric bypass), iron deficiency presenting today for follow-up.    She follows w/ GYN (Dr. Clayton), Neuro (Dr. Nowak).   Since our last visit she has not established care with outpatient specialists, but she was evaluated in the hospital in January for chest pain/Noro virus.  She saw cardio/Pulm and is to follow-up outpatient.  She has a pharmacological stress test, was recommended that she have a R and L cath. She was recommended to have outpatient pulm, heme, cardio follow-up.

## 2024-03-19 NOTE — PLAN
[FreeTextEntry1] :  #DM2 -f/u A1c, lipid panel   -continue current regimen of Ozempic 1 mg weekly, glipizide 5 mg (states at times she takes 2 - discussed how medication is usually not taken on PRN basis), metformin  mg QD -on statin, continue  -follow-up with endo, referral provided on last visit   #HLD  -f/u CMP and lipid panel  -continue current regimen of atorvastatin 20 mg QD   #HTN -f/u CMP -stable -continue current regimen of HCTZ 25 mg QD, lisinopril 40 mg QD  #PE -f/u INR -discussed keeping up with INR regularly -continue current regimen of warfarin - 7.5 mg daily except 10 mg on Saturdays -f/u with heme/onc  #Depression -current on sertraline 200 mg QD -referral for psychiatry provided on last visit as requested   #SOB on exertion -f/u with cardio -f/u with Pulm  -appreciated hospital discharge note   #HCM  -has script for Mammo/Sono  -colon cancer screening: up to date (had 02/23 - due 2028) -Derm referral for skin cancer screening provided on last visit     -follow-up with office in 3 months or sooner if needed.

## 2024-04-04 LAB
ALBUMIN SERPL ELPH-MCNC: 4.1 G/DL
ALP BLD-CCNC: 134 U/L
ALT SERPL-CCNC: 24 U/L
ANION GAP SERPL CALC-SCNC: 13 MMOL/L
AST SERPL-CCNC: 20 U/L
BILIRUB SERPL-MCNC: 0.7 MG/DL
BUN SERPL-MCNC: 17 MG/DL
CALCIUM SERPL-MCNC: 9.8 MG/DL
CHLORIDE SERPL-SCNC: 102 MMOL/L
CHOLEST SERPL-MCNC: 162 MG/DL
CO2 SERPL-SCNC: 25 MMOL/L
CREAT SERPL-MCNC: 0.65 MG/DL
EGFR: 99 ML/MIN/1.73M2
ESTIMATED AVERAGE GLUCOSE: 163 MG/DL
GGT SERPL-CCNC: 34 U/L
GLUCOSE SERPL-MCNC: 121 MG/DL
HBA1C MFR BLD HPLC: 7.3 %
HDLC SERPL-MCNC: 44 MG/DL
LDLC SERPL CALC-MCNC: 86 MG/DL
NONHDLC SERPL-MCNC: 119 MG/DL
POTASSIUM SERPL-SCNC: 4.9 MMOL/L
PROT SERPL-MCNC: 6.5 G/DL
SODIUM SERPL-SCNC: 140 MMOL/L
T3 SERPL-MCNC: 91 NG/DL
T4 FREE SERPL-MCNC: 1.2 NG/DL
TRIGL SERPL-MCNC: 191 MG/DL
TSH SERPL-ACNC: 2.41 UIU/ML

## 2024-04-04 RX ORDER — SEMAGLUTIDE 2.68 MG/ML
8 INJECTION, SOLUTION SUBCUTANEOUS
Qty: 1 | Refills: 3 | Status: ACTIVE | COMMUNITY
Start: 2023-01-25 | End: 1900-01-01

## 2024-04-08 ENCOUNTER — NON-APPOINTMENT (OUTPATIENT)
Age: 64
End: 2024-04-08

## 2024-04-23 ENCOUNTER — APPOINTMENT (OUTPATIENT)
Dept: FAMILY MEDICINE | Facility: CLINIC | Age: 64
End: 2024-04-23
Payer: COMMERCIAL

## 2024-04-23 ENCOUNTER — APPOINTMENT (OUTPATIENT)
Dept: RADIOLOGY | Facility: HOSPITAL | Age: 64
End: 2024-04-23
Payer: COMMERCIAL

## 2024-04-23 ENCOUNTER — OUTPATIENT (OUTPATIENT)
Dept: OUTPATIENT SERVICES | Facility: HOSPITAL | Age: 64
LOS: 1 days | End: 2024-04-23
Payer: COMMERCIAL

## 2024-04-23 ENCOUNTER — LABORATORY RESULT (OUTPATIENT)
Age: 64
End: 2024-04-23

## 2024-04-23 VITALS
RESPIRATION RATE: 16 BRPM | WEIGHT: 285 LBS | OXYGEN SATURATION: 95 % | TEMPERATURE: 97 F | BODY MASS INDEX: 45.8 KG/M2 | SYSTOLIC BLOOD PRESSURE: 116 MMHG | HEART RATE: 87 BPM | DIASTOLIC BLOOD PRESSURE: 72 MMHG | HEIGHT: 66 IN

## 2024-04-23 DIAGNOSIS — J45.901 UNSPECIFIED ASTHMA WITH (ACUTE) EXACERBATION: ICD-10-CM

## 2024-04-23 DIAGNOSIS — R06.2 WHEEZING: ICD-10-CM

## 2024-04-23 PROCEDURE — G2211 COMPLEX E/M VISIT ADD ON: CPT

## 2024-04-23 PROCEDURE — 99214 OFFICE O/P EST MOD 30 MIN: CPT

## 2024-04-23 PROCEDURE — 71046 X-RAY EXAM CHEST 2 VIEWS: CPT | Mod: 26

## 2024-04-23 PROCEDURE — 71046 X-RAY EXAM CHEST 2 VIEWS: CPT

## 2024-04-23 NOTE — HISTORY OF PRESENT ILLNESS
[FreeTextEntry8] : 65 yo F PMH DM2, HTN, Hypothyroidism, depression, PE, ABBIE (not on CPAP), bariatric surgery (gastric bypass), iron deficiency presenting today for cough.  Her symptoms started on Friday the 19th.  Her symptoms include cough, sweats, SOB, wheezing.  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 She had a headache and elevated BP yesterday evening. She doubled up on her lisinopril and took her brother's beta blocker. Denies associated neurological symptoms with her headache, admits to some nausea at the time.  Currently headache has resolved, and BP is normal.   She did at home covid swab on day 1 of symptoms, was negative.

## 2024-04-23 NOTE — REVIEW OF SYSTEMS
[Fever] : no fever [Chills] : no chills [Earache] : no earache [Chest Pain] : no chest pain [Shortness Of Breath] : shortness of breath [Wheezing] : wheezing [Cough] : cough

## 2024-04-23 NOTE — ASSESSMENT
[FreeTextEntry1] : 65 yo F PMH DM2, HTN, Hypothyroidism, depression, PE, ABBIE (not on CPAP), bariatric surgery (gastric bypass), iron deficiency presenting today for cough.

## 2024-04-23 NOTE — PHYSICAL EXAM
[No Acute Distress] : no acute distress [Normal Oropharynx] : the oropharynx was normal [No Accessory Muscle Use] : no accessory muscle use [Normal] : normal rate, regular rhythm, normal S1 and S2 and no murmur heard [de-identified] : diffuse wheezing - mild/moderate

## 2024-04-23 NOTE — PLAN
[FreeTextEntry1] : -f/u chest Xray  -albuterol PRN given  -discussed ER precautions  -f/u COVID/Flu/RSV swab

## 2024-04-26 ENCOUNTER — NON-APPOINTMENT (OUTPATIENT)
Age: 64
End: 2024-04-26

## 2024-04-26 LAB
ANION GAP SERPL CALC-SCNC: 15 MMOL/L
BUN SERPL-MCNC: 13 MG/DL
CALCIUM SERPL-MCNC: 10.3 MG/DL
CHLORIDE SERPL-SCNC: 98 MMOL/L
CO2 SERPL-SCNC: 24 MMOL/L
CREAT SERPL-MCNC: 0.62 MG/DL
EGFR: 100 ML/MIN/1.73M2
GLUCOSE SERPL-MCNC: 152 MG/DL
INFLUENZA A RESULT: NOT DETECTED
INFLUENZA B RESULT: NOT DETECTED
POTASSIUM SERPL-SCNC: 4.3 MMOL/L
RESP SYN VIRUS RESULT: NOT DETECTED
SARS-COV-2 RESULT: NOT DETECTED
SODIUM SERPL-SCNC: 137 MMOL/L

## 2024-05-06 ENCOUNTER — APPOINTMENT (OUTPATIENT)
Dept: RADIOLOGY | Facility: HOSPITAL | Age: 64
End: 2024-05-06
Payer: COMMERCIAL

## 2024-05-06 ENCOUNTER — OUTPATIENT (OUTPATIENT)
Dept: OUTPATIENT SERVICES | Facility: HOSPITAL | Age: 64
LOS: 1 days | End: 2024-05-06
Payer: COMMERCIAL

## 2024-05-06 ENCOUNTER — APPOINTMENT (OUTPATIENT)
Dept: INTERNAL MEDICINE | Facility: CLINIC | Age: 64
End: 2024-05-06
Payer: COMMERCIAL

## 2024-05-06 VITALS
TEMPERATURE: 96.9 F | HEART RATE: 90 BPM | SYSTOLIC BLOOD PRESSURE: 114 MMHG | HEIGHT: 66 IN | RESPIRATION RATE: 16 BRPM | WEIGHT: 285 LBS | BODY MASS INDEX: 45.8 KG/M2 | DIASTOLIC BLOOD PRESSURE: 70 MMHG | OXYGEN SATURATION: 96 %

## 2024-05-06 DIAGNOSIS — M25.562 PAIN IN LEFT KNEE: ICD-10-CM

## 2024-05-06 PROCEDURE — 73564 X-RAY EXAM KNEE 4 OR MORE: CPT | Mod: 26,LT

## 2024-05-06 PROCEDURE — 73564 X-RAY EXAM KNEE 4 OR MORE: CPT

## 2024-05-06 PROCEDURE — 99214 OFFICE O/P EST MOD 30 MIN: CPT

## 2024-05-06 NOTE — HISTORY OF PRESENT ILLNESS
[FreeTextEntry8] : 65 y/o F presents for an acute care visit.  One week ago patient had sudden onset of severe left knee pain when she awoke. She also had swelling. She has taken otc tylenol, nsaids, and ice. Pain is still there and severe but patient is able to bear weight and ambulate. She believes there may have been hyperextension of the knee given her position in the bed. She doesn't believe there is a specific injury. She denies any buckling of the knee, clicking, or popping sounds.

## 2024-05-06 NOTE — PHYSICAL EXAM
[Alert and Oriented x3] : oriented to person, place, and time [Normal] : affect was normal and insight and judgment were intact [TextEntry] : Left Knee Exam: Visually no specific deformity or swelling of knee joint appreciated. Severe pain upon palpation of patella as well as patellar ligament. Some pain of the medial patellar retinaculum. No medial or lateral condyle tenderness. Knee flexion limited to about 10-20 deg due to pain. No bruising or ecchymoses appreciated.

## 2024-05-06 NOTE — PLAN
[FreeTextEntry1] : #Left Knee Pain Given acute onset of pain with pain still present and with great severity will obtain imaging with xray to r/o fractures and subsequently MRI to r/o soft tissue injury. Will provide percocet for pain control as patient would prefer to avoid nsaids. C/w leg elevation, ice, compression if relieving of pain. 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

## 2024-05-08 ENCOUNTER — OUTPATIENT (OUTPATIENT)
Dept: OUTPATIENT SERVICES | Facility: HOSPITAL | Age: 64
LOS: 1 days | End: 2024-05-08
Payer: COMMERCIAL

## 2024-05-08 ENCOUNTER — APPOINTMENT (OUTPATIENT)
Dept: MRI IMAGING | Facility: HOSPITAL | Age: 64
End: 2024-05-08

## 2024-05-08 DIAGNOSIS — M25.562 PAIN IN LEFT KNEE: ICD-10-CM

## 2024-05-08 PROCEDURE — 73721 MRI JNT OF LWR EXTRE W/O DYE: CPT | Mod: 26,LT

## 2024-05-08 PROCEDURE — 73721 MRI JNT OF LWR EXTRE W/O DYE: CPT

## 2024-05-09 ENCOUNTER — APPOINTMENT (OUTPATIENT)
Dept: INTERNAL MEDICINE | Facility: CLINIC | Age: 64
End: 2024-05-09
Payer: COMMERCIAL

## 2024-05-09 VITALS
BODY MASS INDEX: 45.8 KG/M2 | RESPIRATION RATE: 16 BRPM | WEIGHT: 285 LBS | SYSTOLIC BLOOD PRESSURE: 116 MMHG | HEART RATE: 85 BPM | DIASTOLIC BLOOD PRESSURE: 72 MMHG | TEMPERATURE: 96.6 F | OXYGEN SATURATION: 98 % | HEIGHT: 66 IN

## 2024-05-09 DIAGNOSIS — N39.0 URINARY TRACT INFECTION, SITE NOT SPECIFIED: ICD-10-CM

## 2024-05-09 LAB
DEPRECATED D DIMER PPP IA-ACNC: 150 NG/ML DDU
INR PPP: 3.27 RATIO
PT BLD: 35.7 SEC

## 2024-05-09 PROCEDURE — 99213 OFFICE O/P EST LOW 20 MIN: CPT

## 2024-05-09 RX ORDER — NITROFURANTOIN (MONOHYDRATE/MACROCRYSTALS) 25; 75 MG/1; MG/1
100 CAPSULE ORAL TWICE DAILY
Qty: 10 | Refills: 0 | Status: COMPLETED | COMMUNITY
Start: 2024-05-09 | End: 2024-05-14

## 2024-05-09 NOTE — PLAN
[FreeTextEntry1] : #UTI Clinical diagnosis of cystitis. Do not suspect gc/chlam at this time. Will tx with 5 day course of macrobid as this won't affect INR levels.

## 2024-05-09 NOTE — HISTORY OF PRESENT ILLNESS
[de-identified] : 65 y/o F here for an acute care visit.   Patient complaining about one week of dysuria, suprapubic pain, malodorous urine. Denies increased frequency, abnormal vaginal discharge, flank tenderness, fevers, chills. Denies having frequent UTIs.   Went over x-ray results. Patient's knee feeling better pain-wise. Had MRI done.

## 2024-05-13 RX ORDER — WARFARIN 10 MG/1
10 TABLET ORAL
Qty: 30 | Refills: 0 | Status: ACTIVE | COMMUNITY
Start: 2019-11-05 | End: 1900-01-01

## 2024-05-18 ENCOUNTER — LABORATORY RESULT (OUTPATIENT)
Age: 64
End: 2024-05-18

## 2024-05-19 ENCOUNTER — RX RENEWAL (OUTPATIENT)
Age: 64
End: 2024-05-19

## 2024-05-19 RX ORDER — SERTRALINE HYDROCHLORIDE 100 MG/1
100 TABLET, FILM COATED ORAL
Qty: 180 | Refills: 1 | Status: ACTIVE | COMMUNITY
Start: 2022-07-05 | End: 1900-01-01

## 2024-05-28 NOTE — HEALTH RISK ASSESSMENT
[No] : No [] : No [No falls in past year] : Patient reported no falls in the past year [0] : 1) Little interest or pleasure doing things: Not at all (0) [1] : 2) Feeling down, depressed, or hopeless for several days (1) None [[id number]] : 1 [MammogramDate] : 03/18 [ColonoscopyDate] : 2018 None

## 2024-06-18 ENCOUNTER — APPOINTMENT (OUTPATIENT)
Dept: FAMILY MEDICINE | Facility: CLINIC | Age: 64
End: 2024-06-18
Payer: COMMERCIAL

## 2024-06-18 ENCOUNTER — EMERGENCY (EMERGENCY)
Facility: HOSPITAL | Age: 64
LOS: 1 days | Discharge: HOME | End: 2024-06-18
Attending: INTERNAL MEDICINE | Admitting: INTERNAL MEDICINE
Payer: COMMERCIAL

## 2024-06-18 VITALS
OXYGEN SATURATION: 98 % | BODY MASS INDEX: 45.8 KG/M2 | HEIGHT: 66 IN | SYSTOLIC BLOOD PRESSURE: 118 MMHG | TEMPERATURE: 97 F | WEIGHT: 285 LBS | RESPIRATION RATE: 16 BRPM | HEART RATE: 85 BPM | DIASTOLIC BLOOD PRESSURE: 80 MMHG

## 2024-06-18 VITALS
DIASTOLIC BLOOD PRESSURE: 78 MMHG | HEIGHT: 65 IN | WEIGHT: 285.06 LBS | RESPIRATION RATE: 18 BRPM | TEMPERATURE: 98 F | SYSTOLIC BLOOD PRESSURE: 145 MMHG | OXYGEN SATURATION: 96 % | HEART RATE: 79 BPM

## 2024-06-18 DIAGNOSIS — Z90.49 ACQUIRED ABSENCE OF OTHER SPECIFIED PARTS OF DIGESTIVE TRACT: Chronic | ICD-10-CM

## 2024-06-18 DIAGNOSIS — S83.207A UNSPECIFIED TEAR OF UNSPECIFIED MENISCUS, CURRENT INJURY, LEFT KNEE, INITIAL ENCOUNTER: ICD-10-CM

## 2024-06-18 DIAGNOSIS — I26.99 OTHER PULMONARY EMBOLISM W/OUT ACUTE COR PULMONALE: ICD-10-CM

## 2024-06-18 DIAGNOSIS — Z98.84 BARIATRIC SURGERY STATUS: Chronic | ICD-10-CM

## 2024-06-18 DIAGNOSIS — Z98.890 OTHER SPECIFIED POSTPROCEDURAL STATES: Chronic | ICD-10-CM

## 2024-06-18 DIAGNOSIS — R10.9 UNSPECIFIED ABDOMINAL PAIN: ICD-10-CM

## 2024-06-18 DIAGNOSIS — Z90.721 ACQUIRED ABSENCE OF OVARIES, UNILATERAL: Chronic | ICD-10-CM

## 2024-06-18 LAB
ALBUMIN SERPL ELPH-MCNC: 3 G/DL — LOW (ref 3.3–5)
ALP SERPL-CCNC: 121 U/L — HIGH (ref 40–120)
ALT FLD-CCNC: 17 U/L — SIGNIFICANT CHANGE UP (ref 10–45)
ANION GAP SERPL CALC-SCNC: 7 MMOL/L — SIGNIFICANT CHANGE UP (ref 5–17)
AST SERPL-CCNC: 17 U/L — SIGNIFICANT CHANGE UP (ref 10–40)
BASOPHILS # BLD AUTO: 0.08 K/UL — SIGNIFICANT CHANGE UP (ref 0–0.2)
BASOPHILS NFR BLD AUTO: 0.9 % — SIGNIFICANT CHANGE UP (ref 0–2)
BILIRUB SERPL-MCNC: 0.5 MG/DL — SIGNIFICANT CHANGE UP (ref 0.2–1.2)
BUN SERPL-MCNC: 12 MG/DL — SIGNIFICANT CHANGE UP (ref 7–23)
CALCIUM SERPL-MCNC: 8.8 MG/DL — SIGNIFICANT CHANGE UP (ref 8.4–10.5)
CHLORIDE SERPL-SCNC: 104 MMOL/L — SIGNIFICANT CHANGE UP (ref 96–108)
CO2 SERPL-SCNC: 26 MMOL/L — SIGNIFICANT CHANGE UP (ref 22–31)
CREAT SERPL-MCNC: 0.56 MG/DL — SIGNIFICANT CHANGE UP (ref 0.5–1.3)
EGFR: 103 ML/MIN/1.73M2 — SIGNIFICANT CHANGE UP
EOSINOPHIL # BLD AUTO: 0.45 K/UL — SIGNIFICANT CHANGE UP (ref 0–0.5)
EOSINOPHIL NFR BLD AUTO: 5.2 % — SIGNIFICANT CHANGE UP (ref 0–6)
GLUCOSE SERPL-MCNC: 173 MG/DL — HIGH (ref 70–99)
HCT VFR BLD CALC: 38.6 % — SIGNIFICANT CHANGE UP (ref 34.5–45)
HGB BLD-MCNC: 12.9 G/DL — SIGNIFICANT CHANGE UP (ref 11.5–15.5)
IMM GRANULOCYTES NFR BLD AUTO: 0.2 % — SIGNIFICANT CHANGE UP (ref 0–0.9)
INR BLD: 2.58 RATIO — HIGH (ref 0.85–1.18)
LIDOCAIN IGE QN: 53 U/L — SIGNIFICANT CHANGE UP (ref 16–77)
LYMPHOCYTES # BLD AUTO: 1.96 K/UL — SIGNIFICANT CHANGE UP (ref 1–3.3)
LYMPHOCYTES # BLD AUTO: 22.8 % — SIGNIFICANT CHANGE UP (ref 13–44)
MCHC RBC-ENTMCNC: 29 PG — SIGNIFICANT CHANGE UP (ref 27–34)
MCHC RBC-ENTMCNC: 33.4 GM/DL — SIGNIFICANT CHANGE UP (ref 32–36)
MCV RBC AUTO: 86.7 FL — SIGNIFICANT CHANGE UP (ref 80–100)
MONOCYTES # BLD AUTO: 0.59 K/UL — SIGNIFICANT CHANGE UP (ref 0–0.9)
MONOCYTES NFR BLD AUTO: 6.9 % — SIGNIFICANT CHANGE UP (ref 2–14)
NEUTROPHILS # BLD AUTO: 5.48 K/UL — SIGNIFICANT CHANGE UP (ref 1.8–7.4)
NEUTROPHILS NFR BLD AUTO: 64 % — SIGNIFICANT CHANGE UP (ref 43–77)
NRBC # BLD: 0 /100 WBCS — SIGNIFICANT CHANGE UP (ref 0–0)
PLATELET # BLD AUTO: 433 K/UL — HIGH (ref 150–400)
POTASSIUM SERPL-MCNC: 4.6 MMOL/L — SIGNIFICANT CHANGE UP (ref 3.5–5.3)
POTASSIUM SERPL-SCNC: 4.6 MMOL/L — SIGNIFICANT CHANGE UP (ref 3.5–5.3)
PROT SERPL-MCNC: 6.7 G/DL — SIGNIFICANT CHANGE UP (ref 6–8.3)
PROTHROM AB SERPL-ACNC: 29.4 SEC — HIGH (ref 9.5–13)
RBC # BLD: 4.45 M/UL — SIGNIFICANT CHANGE UP (ref 3.8–5.2)
RBC # FLD: 14.9 % — HIGH (ref 10.3–14.5)
SODIUM SERPL-SCNC: 137 MMOL/L — SIGNIFICANT CHANGE UP (ref 135–145)
WBC # BLD: 8.58 K/UL — SIGNIFICANT CHANGE UP (ref 3.8–10.5)
WBC # FLD AUTO: 8.58 K/UL — SIGNIFICANT CHANGE UP (ref 3.8–10.5)

## 2024-06-18 PROCEDURE — 80053 COMPREHEN METABOLIC PANEL: CPT

## 2024-06-18 PROCEDURE — G2211 COMPLEX E/M VISIT ADD ON: CPT

## 2024-06-18 PROCEDURE — 85025 COMPLETE CBC W/AUTO DIFF WBC: CPT

## 2024-06-18 PROCEDURE — 74177 CT ABD & PELVIS W/CONTRAST: CPT | Mod: MC

## 2024-06-18 PROCEDURE — 96374 THER/PROPH/DIAG INJ IV PUSH: CPT | Mod: XU

## 2024-06-18 PROCEDURE — 96375 TX/PRO/DX INJ NEW DRUG ADDON: CPT

## 2024-06-18 PROCEDURE — 83690 ASSAY OF LIPASE: CPT

## 2024-06-18 PROCEDURE — 74177 CT ABD & PELVIS W/CONTRAST: CPT | Mod: 26,MC

## 2024-06-18 PROCEDURE — 36415 COLL VENOUS BLD VENIPUNCTURE: CPT

## 2024-06-18 PROCEDURE — 99285 EMERGENCY DEPT VISIT HI MDM: CPT

## 2024-06-18 PROCEDURE — 99284 EMERGENCY DEPT VISIT MOD MDM: CPT | Mod: 25

## 2024-06-18 PROCEDURE — 99214 OFFICE O/P EST MOD 30 MIN: CPT

## 2024-06-18 PROCEDURE — 85610 PROTHROMBIN TIME: CPT

## 2024-06-18 RX ORDER — SODIUM CHLORIDE 9 MG/ML
1000 INJECTION INTRAMUSCULAR; INTRAVENOUS; SUBCUTANEOUS ONCE
Refills: 0 | Status: COMPLETED | OUTPATIENT
Start: 2024-06-18 | End: 2024-06-18

## 2024-06-18 RX ORDER — ONDANSETRON 8 MG/1
4 TABLET, FILM COATED ORAL ONCE
Refills: 0 | Status: COMPLETED | OUTPATIENT
Start: 2024-06-18 | End: 2024-06-18

## 2024-06-18 RX ORDER — PREDNISONE 20 MG/1
20 TABLET ORAL
Qty: 6 | Refills: 0 | Status: DISCONTINUED | COMMUNITY
Start: 2024-04-23 | End: 2024-06-18

## 2024-06-18 RX ORDER — OXYCODONE AND ACETAMINOPHEN 5; 325 MG/1; MG/1
1 TABLET ORAL
Qty: 12 | Refills: 0
Start: 2024-06-18 | End: 2024-06-20

## 2024-06-18 RX ORDER — FLUCONAZOLE 200 MG/1
200 TABLET ORAL
Qty: 3 | Refills: 1 | Status: DISCONTINUED | COMMUNITY
Start: 2022-01-05 | End: 2024-06-18

## 2024-06-18 RX ORDER — CLOTRIMAZOLE AND BETAMETHASONE DIPROPIONATE 10; .5 MG/G; MG/G
1-0.05 CREAM TOPICAL TWICE DAILY
Qty: 45 | Refills: 6 | Status: DISCONTINUED | COMMUNITY
Start: 2021-12-20 | End: 2024-06-18

## 2024-06-18 RX ORDER — MORPHINE SULFATE 50 MG/1
4 CAPSULE, EXTENDED RELEASE ORAL ONCE
Refills: 0 | Status: DISCONTINUED | OUTPATIENT
Start: 2024-06-18 | End: 2024-06-18

## 2024-06-18 RX ADMIN — ONDANSETRON 4 MILLIGRAM(S): 8 TABLET, FILM COATED ORAL at 15:18

## 2024-06-18 RX ADMIN — MORPHINE SULFATE 4 MILLIGRAM(S): 50 CAPSULE, EXTENDED RELEASE ORAL at 15:18

## 2024-06-18 RX ADMIN — SODIUM CHLORIDE 1000 MILLILITER(S): 9 INJECTION INTRAMUSCULAR; INTRAVENOUS; SUBCUTANEOUS at 15:18

## 2024-06-18 NOTE — PLAN
[FreeTextEntry1] : # Abdominal pain  -acute  -she will be seen in the ER today  -she has scripts for pelvic US from GYN that have not been completed yet  -reports had history of appendectomy/ cholestectysomy, bariatric surgery, left oophorectomy, uterine myomectomy   #Meniscus tear  -advised importance of following up with ortho, referrals priovided again   #HTN -stable -continue current regimen of HCTZ 25 mg QD, lisinopril 40 mg QD  #PE -f/u INR - has not done over the last month and was subtherapeutic when we last checked -discussed keeping up with INR regularly -f/u with heme/onc - recommended on multiple previous visits   -advised to f/u in office to discuss chronic conditions after discharge from hospital

## 2024-06-18 NOTE — ED ADULT NURSE NOTE - NS ED NURSE RECORD ANOTHER VITAL SIGN
Pt arrived on the unit at 2245. Pt was searched by two female staff and given a tour of the unit. She was calm and cooperative with the admission process. Pt was offered food, which she declined. Pt denies SI and SIB at this time. Pt requested to take a shower.   Yes

## 2024-06-18 NOTE — REVIEW OF SYSTEMS
[Fever] : no fever [Chills] : no chills [Abdominal Pain] : abdominal pain [Nausea] : no nausea [Constipation] : no constipation [Diarrhea] : diarrhea [Vomiting] : no vomiting [Melena] : no melena [Negative] : Genitourinary

## 2024-06-18 NOTE — ED ADULT NURSE NOTE - NSICDXPASTSURGICALHX_GEN_ALL_CORE_FT
PAST SURGICAL HISTORY:  H/O myomectomy     History of appendectomy     History of cholecystectomy     History of gastric bypass     S/P left oophorectomy

## 2024-06-18 NOTE — ED PROVIDER NOTE - PHYSICAL EXAMINATION
General:     NAD, well-nourished, well-appearing  Head:     NC/AT, EOMI, oral mucosa moist  Neck:     trachea midline  Lungs:     CTA b/l, no w/r/r  CVS:     S1S2, RRR, no m/g/r  Abd:     +BS, s/Right lower quadrant tenderness no rebound no guarding no midline expanding masses good femoral pulses equal bilateral/nd, no organomegaly  Abdomen is distended obese multiple scars from prior surgeries on the abdominal wall  Ext:    2+ radial and pedal pulses, no c/c/e  Neuro: AAOx3, no sensory/motor deficits

## 2024-06-18 NOTE — PHYSICAL EXAM
[Normal] : no acute distress, well nourished, well developed and well-appearing [Soft] : abdomen soft [Normal Bowel Sounds] : normal bowel sounds [de-identified] : RLQ tenderness

## 2024-06-18 NOTE — ED ADULT NURSE NOTE - NS ED NURSE DISCH DISPOSITION
Patient, Roberto Agarwal calling for medication refill. Medication(s) set up as pending orders from medication list.    Caller has been advised that their call does not guarantee an immediate refill. This refill will be reviewed within 72 hours by a qualified provider who will determine whether he or she can refill the medication.    Patient has contacted the pharmacy?  No    Patient advised   Additional information:     Patient is completely out of medications    Patient’s preferred pharmacy has been noted and populated.       [id number] - [address] & [address] [address]  Phone: [phone] Fax: [phone]     Discharged

## 2024-06-18 NOTE — ED PROVIDER NOTE - CLINICAL SUMMARY MEDICAL DECISION MAKING FREE TEXT BOX
The patient is a 65 y/o female who presents to the emergency room complaining of RLQ abdominal pain for the last 10 days. She had a routine appointment with her PCP  Dr Wally becerra who suggested she come to the ED. The pain is constant and described as sharp. The pain is worse during a bowel movement and when she drives over bumps in the road. The pain decreases when she is at rest, but is still present. She has been taking naproxen, oxycodone, and Tylenol and states it provides some relief. She rates the pain as a 10/10. She denies fever, chills, chest pain, shortness of breath, nausea, vomiting, diarrhea, constipation, or urinary symptoms. She has a surgical history of an appendectomy, cholecystectomy, gastric bypass, L oophorectomy, and myomectomy.  Significant right lower quadrant tenderness plan to do CBC CMP labs

## 2024-06-18 NOTE — ED PROVIDER NOTE - OBJECTIVE STATEMENT
The patient is a 65 y/o female who presents to the emergency room complaining of RLQ abdominal pain for the last 10 days. She had a routine appointment with her PCP today who suggested she come to the ED. The pain is constant and described as sharp. The pain is worse during a bowel movement and when she drives over bumps in the road. The pain decreases when she is at rest, but is still present. She has been taking naproxen, oxycodone, and Tylenol and states it provides some relief. She rates the pain as a 10/10. She denies fever, chills, chest pain, shortness of breath, nausea, vomiting, diarrhea, constipation, or urinary symptoms. She has a surgical history of an appendectomy, cholecystectomy, gastric bypass, L oophorectomy, and myomectomy. The patient is a 63 y/o female who presents to the emergency room complaining of RLQ abdominal pain for the last 10 days. She had a routine appointment with her PCP  Dr Wally becerra who suggested she come to the ED. The pain is constant and described as sharp. The pain is worse during a bowel movement and when she drives over bumps in the road. The pain decreases when she is at rest, but is still present. She has been taking naproxen, oxycodone, and Tylenol and states it provides some relief. She rates the pain as a 10/10. She denies fever, chills, chest pain, shortness of breath, nausea, vomiting, diarrhea, constipation, or urinary symptoms. She has a surgical history of an appendectomy, cholecystectomy, gastric bypass, L oophorectomy, and myomectomy.

## 2024-06-18 NOTE — ASSESSMENT
[FreeTextEntry1] : 65 yo F PMH DM2, HTN, Hypothyroidism, depression, PE, ABBIE (not on CPAP), bariatric surgery (gastric bypass), iron deficiency presenting today for RLQ pain.

## 2024-06-18 NOTE — ED ADULT TRIAGE NOTE - CHIEF COMPLAINT QUOTE
Patient complaint of RLQ pain for the past 10 days. Denies any nausea, vomit, diarrhea or constipation.

## 2024-06-18 NOTE — ED PROVIDER NOTE - CARE PLAN
Goal:	Right lower quadrant abdominal pain   Principal Discharge DX:	Abdominal pain, acute  Goal:	Right lower quadrant abdominal pain  Secondary Diagnosis:	Adrenal mass   1

## 2024-06-18 NOTE — ED ADULT NURSE NOTE - NSFALLUNIVINTERV_ED_ALL_ED
Bed/Stretcher in lowest position, wheels locked, appropriate side rails in place/Call bell, personal items and telephone in reach/Instruct patient to call for assistance before getting out of bed/chair/stretcher/Non-slip footwear applied when patient is off stretcher/[place] to call system/Physically safe environment - no spills, clutter or unnecessary equipment/Purposeful proactive rounding/Room/bathroom lighting operational, light cord in reach

## 2024-06-18 NOTE — ED PROVIDER NOTE - NSICDXPASTSURGICALHX_GEN_ALL_CORE_FT
[facility] - Outpatient Consultation  Elena Desai 77 y o  male MRN: [id number]  Encounter: [id number]          ASSESSMENT AND PLAN:      1  Ulcerative colitis without complications, unspecified location [facility])   This is a 59-year-old male who presents for office visit to establish care for ulcerative colitis  Patient otherwise states that symptoms are primarily controlled with the mesalamine and occasionally will need Rowasa enema  We will order blood work including C-reactive protein sed rate as well as B12 level and LFTs since these were not included in this will screen for any PSC  We will see patient in 6 months for an office visit and we will see him for colonoscopy in 3 years from 2019 which will be next summer  Patient was seen and examined with Dr Xiomara Stanford      - mesalamine (APRISO) 0 375 g 24 hr capsule; Take 4 capsules (1 5 g total) by mouth daily  Dispense: 120 capsule; Refill: 5  - Sedimentation rate, automated; Future  - C-reactive protein; Future  - Hepatic function panel; Future  - Vitamin B12; Future    ______________________________________________________________________    HPI:    This is a 77year old male who presents today to establish GI doctor  Pt has of ulcerative colitis that was diagnosed back in 2017  He presents today to establish GI care  He has been taking Apriso 0 375 g daily and occasionally has rectal bleeding  Last colonoscopy was performed in 2019 which did show a tubular adenoma in the cecum and he also had some friable mucosa which was found from 15-20 cm proximal to anus  Patient otherwise states that he has no upper GI symptoms  Patient did have a blood work recently which revealed normal kidney function  He has not had any recent LFTs  Hemoglobin on recent blood work was stable as well  He does have hypercholesterolemia and has been taking red yeast rice for that         REVIEW OF SYSTEMS:    CONSTITUTIONAL: Denies any fever, chills, rigors, and weight loss  HEENT: No earache or tinnitus  Denies hearing loss or visual disturbances  CARDIOVASCULAR: No chest pain or palpitations  RESPIRATORY: Denies any cough, hemoptysis, shortness of breath or dyspnea on exertion  GASTROINTESTINAL: As noted in the History of Present Illness  GENITOURINARY: No problems with urination  Denies any hematuria or dysuria  NEUROLOGIC: No dizziness or vertigo, denies headaches  MUSCULOSKELETAL: Denies any muscle or joint pain  SKIN: Denies skin rashes or itching  ENDOCRINE: Denies excessive thirst  Denies intolerance to heat or cold  PSYCHOSOCIAL: Denies depression or anxiety  Denies any recent memory loss  Historical Information   Past Medical History:   Diagnosis Date    Colon polyp     Hypertension      Past Surgical History:   Procedure Laterality Date    ROTATOR CUFF REPAIR Right 1991     Social History   Social History     Substance and Sexual Activity   Alcohol Use Yes    Comment: social     Social History     Substance and Sexual Activity   Drug Use Never     Social History     Tobacco Use   Smoking Status Former Smoker    Quit date: 10/14/1980    Years since quittin 3   Smokeless Tobacco Never Used     History reviewed  No pertinent family history  Meds/Allergies       Current Outpatient Medications:     albuterol (5 mg/mL) 0 5 % nebulizer solution    albuterol (PROVENTIL HFA,VENTOLIN HFA) 90 mcg/act inhaler    amLODIPine (NORVASC) 10 mg tablet    benazepril (LOTENSIN) 20 mg tablet    mesalamine (APRISO) 0 375 g 24 hr capsule    multivitamin (THERAGRAN) TABS    Red Yeast Rice Extract (RED YEAST RICE PO)    fluticasone (FLONASE) 50 mcg/act nasal spray    mesalamine (APRISO) 0 375 g 24 hr capsule    No Known Allergies        Objective     Height 5' 10" (1 778 m), weight 106 kg (233 lb 12 8 oz)  Body mass index is 33 55 kg/m²          PHYSICAL EXAM:      General Appearance:   Alert, cooperative, no distress   HEENT:   Normocephalic, atraumatic, anicteric      Neck:  Supple, symmetrical, trachea midline   Lungs:   Clear to auscultation bilaterally; no rales, rhonchi or wheezing; respirations unlabored    Heart[de-identified]   Regular rate and rhythm; no murmur, rub, or gallop  Abdomen:   Soft, non-tender, non-distended; normal bowel sounds; no masses, no organomegaly    Genitalia:   Deferred    Rectal:   Deferred    Extremities:  No cyanosis, clubbing or edema    Pulses:  2+ and symmetric    Skin:  No jaundice, rashes, or lesions    Lymph nodes:  No palpable cervical lymphadenopathy        Lab Results:   No visits with results within 1 Day(s) from this visit  Latest known visit with results is:   Office Visit on 01/14/2021   Component Date Value     RAPID STREP A 01/14/2021 Negative     Throat Culture 01/14/2021 Negative for beta-hemolytic Streptococcus          Radiology Results:   No results found  PAST SURGICAL HISTORY:  H/O myomectomy     History of appendectomy     History of cholecystectomy     History of gastric bypass     S/P left oophorectomy

## 2024-06-18 NOTE — ED PROVIDER NOTE - PATIENT PORTAL LINK FT
You can access the FollowMyHealth Patient Portal offered by [facility] by registering at the following website: [email or web address]. By joining [employer]’s FollowMyHealth portal, you will also be able to view your health information using other applications (apps) compatible with our system.

## 2024-06-18 NOTE — ED PROVIDER NOTE - NSICDXPASTMEDICALHX_GEN_ALL_CORE_FT
PAST MEDICAL HISTORY:  Diabetes     History of abdominal pain     Hypertension     Hypothyroid     Pulmonary embolism     Sleep apnea

## 2024-06-18 NOTE — ED ADULT NURSE NOTE - OBJECTIVE STATEMENT
Assumed pt care for a 64 yr old female complaining of abdominal pain for several days. Pt denies any further complaints.

## 2024-06-19 ENCOUNTER — TRANSCRIPTION ENCOUNTER (OUTPATIENT)
Age: 64
End: 2024-06-19

## 2024-06-20 PROBLEM — E11.9 TYPE 2 DIABETES MELLITUS WITHOUT COMPLICATIONS: Chronic | Status: ACTIVE | Noted: 2019-05-17

## 2024-06-24 ENCOUNTER — APPOINTMENT (OUTPATIENT)
Dept: GYNECOLOGIC ONCOLOGY | Facility: CLINIC | Age: 64
End: 2024-06-24
Payer: COMMERCIAL

## 2024-06-24 VITALS
OXYGEN SATURATION: 96 % | RESPIRATION RATE: 16 BRPM | BODY MASS INDEX: 46.82 KG/M2 | HEIGHT: 65 IN | SYSTOLIC BLOOD PRESSURE: 143 MMHG | TEMPERATURE: 97.5 F | DIASTOLIC BLOOD PRESSURE: 78 MMHG | WEIGHT: 281 LBS | HEART RATE: 86 BPM

## 2024-06-24 DIAGNOSIS — N83.291 OTHER OVARIAN CYST, RIGHT SIDE: ICD-10-CM

## 2024-06-24 DIAGNOSIS — K43.9 VENTRAL HERNIA W/OUT OBSTRUCTION OR GANGRENE: ICD-10-CM

## 2024-06-24 DIAGNOSIS — R59.1 GENERALIZED ENLARGED LYMPH NODES: ICD-10-CM

## 2024-06-24 PROCEDURE — 99459 PELVIC EXAMINATION: CPT

## 2024-06-24 PROCEDURE — 99205 OFFICE O/P NEW HI 60 MIN: CPT

## 2024-06-24 RX ORDER — ALBUTEROL SULFATE 90 UG/1
108 (90 BASE) INHALANT RESPIRATORY (INHALATION)
Qty: 1 | Refills: 1 | Status: DISCONTINUED | COMMUNITY
Start: 2024-04-23 | End: 2024-06-24

## 2024-06-24 RX ORDER — OXYCODONE AND ACETAMINOPHEN 5; 325 MG/1; MG/1
5-325 TABLET ORAL
Qty: 42 | Refills: 0 | Status: DISCONTINUED | COMMUNITY
Start: 2024-05-06 | End: 2024-06-24

## 2024-06-24 RX ORDER — ONDANSETRON 4 MG/1
4 TABLET, ORALLY DISINTEGRATING ORAL
Qty: 30 | Refills: 1 | Status: DISCONTINUED | COMMUNITY
Start: 2022-05-01 | End: 2024-06-24

## 2024-06-24 RX ORDER — CLOTRIMAZOLE AND BETAMETHASONE DIPROPIONATE 10; .5 MG/G; MG/G
1-0.05 CREAM TOPICAL TWICE DAILY
Qty: 1 | Refills: 3 | Status: DISCONTINUED | COMMUNITY
Start: 2024-02-26 | End: 2024-06-24

## 2024-06-24 RX ORDER — WARFARIN 2.5 MG/1
2.5 TABLET ORAL
Qty: 90 | Refills: 0 | Status: DISCONTINUED | COMMUNITY
Start: 2019-11-07 | End: 2024-06-24

## 2024-06-24 RX ORDER — ATORVASTATIN CALCIUM 20 MG/1
20 TABLET, FILM COATED ORAL
Qty: 30 | Refills: 11 | Status: DISCONTINUED | COMMUNITY
Start: 2022-09-09 | End: 2024-06-24

## 2024-06-24 RX ORDER — WARFARIN 5 MG/1
5 TABLET ORAL
Qty: 30 | Refills: 3 | Status: DISCONTINUED | COMMUNITY
Start: 2019-12-13 | End: 2024-06-24

## 2024-06-24 RX ORDER — BACLOFEN 5 MG/1
5 TABLET ORAL
Refills: 0 | Status: DISCONTINUED | COMMUNITY
Start: 2024-06-18 | End: 2024-06-24

## 2024-06-24 NOTE — POST DISCHARGE NOTE - DETAILS:
Called to follow up on patient regarding recent ER visit and to discuss incidental findings of ventral hernia on CT scan. Patient has a follow up with GYN ONC for Right adnexal mass. Does not wish to see a surgeon at this time for ventral hernia. No referral made. Parents

## 2024-06-26 ENCOUNTER — RX RENEWAL (OUTPATIENT)
Age: 64
End: 2024-06-26

## 2024-06-26 LAB — CANCER AG125 SERPL-ACNC: 11 U/ML

## 2024-06-30 ENCOUNTER — OUTPATIENT (OUTPATIENT)
Dept: OUTPATIENT SERVICES | Facility: HOSPITAL | Age: 64
LOS: 1 days | End: 2024-06-30
Payer: COMMERCIAL

## 2024-06-30 DIAGNOSIS — Z90.721 ACQUIRED ABSENCE OF OVARIES, UNILATERAL: Chronic | ICD-10-CM

## 2024-06-30 DIAGNOSIS — R59.1 GENERALIZED ENLARGED LYMPH NODES: ICD-10-CM

## 2024-06-30 DIAGNOSIS — Z90.49 ACQUIRED ABSENCE OF OTHER SPECIFIED PARTS OF DIGESTIVE TRACT: Chronic | ICD-10-CM

## 2024-06-30 DIAGNOSIS — Z98.890 OTHER SPECIFIED POSTPROCEDURAL STATES: Chronic | ICD-10-CM

## 2024-06-30 DIAGNOSIS — Z98.84 BARIATRIC SURGERY STATUS: Chronic | ICD-10-CM

## 2024-06-30 DIAGNOSIS — N83.291 OTHER OVARIAN CYST, RIGHT SIDE: ICD-10-CM

## 2024-06-30 DIAGNOSIS — Z00.8 ENCOUNTER FOR OTHER GENERAL EXAMINATION: ICD-10-CM

## 2024-06-30 PROCEDURE — A9552: CPT

## 2024-06-30 PROCEDURE — 78815 PET IMAGE W/CT SKULL-THIGH: CPT

## 2024-07-02 ENCOUNTER — APPOINTMENT (OUTPATIENT)
Dept: FAMILY MEDICINE | Facility: CLINIC | Age: 64
End: 2024-07-02

## 2024-07-03 ENCOUNTER — RX RENEWAL (OUTPATIENT)
Age: 64
End: 2024-07-03

## 2024-07-15 ENCOUNTER — APPOINTMENT (OUTPATIENT)
Dept: CARDIOLOGY | Facility: CLINIC | Age: 64
End: 2024-07-15

## 2024-07-16 ENCOUNTER — APPOINTMENT (OUTPATIENT)
Dept: PULMONOLOGY | Facility: CLINIC | Age: 64
End: 2024-07-16
Payer: COMMERCIAL

## 2024-07-16 PROCEDURE — 94729 DIFFUSING CAPACITY: CPT

## 2024-07-16 PROCEDURE — 94726 PLETHYSMOGRAPHY LUNG VOLUMES: CPT

## 2024-07-16 PROCEDURE — 94060 EVALUATION OF WHEEZING: CPT

## 2024-07-17 ENCOUNTER — APPOINTMENT (OUTPATIENT)
Dept: PULMONOLOGY | Facility: CLINIC | Age: 64
End: 2024-07-17
Payer: COMMERCIAL

## 2024-07-17 VITALS
TEMPERATURE: 97 F | HEART RATE: 87 BPM | RESPIRATION RATE: 15 BRPM | HEIGHT: 65 IN | DIASTOLIC BLOOD PRESSURE: 82 MMHG | BODY MASS INDEX: 47.98 KG/M2 | OXYGEN SATURATION: 93 % | WEIGHT: 288 LBS | SYSTOLIC BLOOD PRESSURE: 134 MMHG

## 2024-07-17 DIAGNOSIS — Z01.811 ENCOUNTER FOR PREPROCEDURAL RESPIRATORY EXAMINATION: ICD-10-CM

## 2024-07-17 PROCEDURE — 99203 OFFICE O/P NEW LOW 30 MIN: CPT | Mod: GC

## 2024-07-29 ENCOUNTER — NON-APPOINTMENT (OUTPATIENT)
Age: 64
End: 2024-07-29

## 2024-07-29 ENCOUNTER — LABORATORY RESULT (OUTPATIENT)
Age: 64
End: 2024-07-29

## 2024-07-29 ENCOUNTER — APPOINTMENT (OUTPATIENT)
Dept: CARDIOLOGY | Facility: CLINIC | Age: 64
End: 2024-07-29
Payer: COMMERCIAL

## 2024-07-29 VITALS
SYSTOLIC BLOOD PRESSURE: 123 MMHG | HEIGHT: 60 IN | HEART RATE: 79 BPM | OXYGEN SATURATION: 95 % | BODY MASS INDEX: 55.56 KG/M2 | RESPIRATION RATE: 18 BRPM | WEIGHT: 283 LBS | DIASTOLIC BLOOD PRESSURE: 83 MMHG

## 2024-07-29 DIAGNOSIS — E78.5 HYPERLIPIDEMIA, UNSPECIFIED: ICD-10-CM

## 2024-07-29 DIAGNOSIS — R07.89 OTHER CHEST PAIN: ICD-10-CM

## 2024-07-29 DIAGNOSIS — I10 ESSENTIAL (PRIMARY) HYPERTENSION: ICD-10-CM

## 2024-07-29 PROCEDURE — 93000 ELECTROCARDIOGRAM COMPLETE: CPT

## 2024-07-29 PROCEDURE — 99215 OFFICE O/P EST HI 40 MIN: CPT

## 2024-07-29 NOTE — DISCUSSION/SUMMARY
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  She has hypertension and hyperlipidemia. Medical issues include obesity, sedentary lifestyle, diabetes, history of pulmonary embolism on chronic anticoagulation with warfarin, obstructive sleep apnea.  On physical examination, she is obese, blood pressure stable.  She appears to be euvolemic. 1/6 systolic murmur. EKG is sinus rhythm, within normal limits. Prior noninvasive cardiac testing done January 2023 was mostly unremarkable. Further evaluation to rule out ischemic heart disease is indicated.  Plan 1.  Reasonable at this point to pursue coronary CTA to evaluate coronary anatomy, rule out obstructive coronary artery disease, rule out anomalous origin of coronary arteries. 2.  Current medication list is reviewed, no changes. 3.  Further recommendations regarding cardiac status pending review of coronary CTA. 4.  The above was reviewed with her and all of her questions have been answered to her satisfaction.   [EKG obtained to assist in diagnosis and management of assessed problem(s)] : EKG obtained to assist in diagnosis and management of assessed problem(s)

## 2024-07-29 NOTE — REASON FOR VISIT
[FreeTextEntry1] : Initial cardiology office visit for evaluation management of atypical chest pain, hypertension, hyperlipidemia.

## 2024-07-29 NOTE — CARDIOLOGY SUMMARY
[de-identified] : July 29, 2024.Sinus Rhythm  WITHIN NORMAL LIMITS  [de-identified] : January 12, 2024.  Normal vasodilator myocardial perfusion stress test (2-day study). [de-identified] : January 10, 2024.  [facility].  Technically difficult study. Normal left ventricular systolic function.  Moderate left ventricular hypertrophy.  Mitral regurgitation not well-visualized.

## 2024-07-29 NOTE — REVIEW OF SYSTEMS
[Feeling Fatigued] : feeling fatigued [Dyspnea on exertion] : dyspnea during exertion [Chest Discomfort] : chest discomfort [Lower Ext Edema] : lower extremity edema [Joint Pain] : joint pain [Negative] : Heme/Lymph [de-identified] : headache

## 2024-07-29 NOTE — PHYSICAL EXAM
[Obese] : obese [Normal S1, S2] : normal S1, S2 [No Rub] : no rub [No Gallop] : no gallop [Murmur] : murmur [Normal] : alert and oriented, normal memory [de-identified] : 1/6 systolic murmur  [de-identified] : 1+ edema

## 2024-07-29 NOTE — HISTORY OF PRESENT ILLNESS
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 She reports having episodic complaints of chest pain.  She describes a pressure-like sensation in her chest, that can last for few minutes at a time.  This occurred several times, occurring at rest, usually when she tries to go to sleep. No exertional complaints of chest pain or chest pressure. She does have dyspnea after moderate activities, which she attributes to being overweight and deconditioned.  No resting dyspnea.  No complaints of palpitations.  She has occasional dizziness if she stands for too long.  No syncope.  Intermittent lower extremity edema.  No orthopnea.  No PND. She has been obese most of her life. She has been following with gynecologist regarding intermittent pelvic pain and she was found to have an enlarged ovary.  She is currently undergoing workup, and may be considered for surgical resection in the future.  Past history: No history of CAD, MI, CHF, arrhythmias or heart murmurs. She was admitted to [facility] January 2024 for diarrheal illness and complaints of chest pain. She underwent noninvasive cardiac workup at that time that included echocardiogram and myocardial perfusion stress test which were reported to be unremarkable. She was diagnosed with pulmonary embolism several years back and has been on warfarin ever since then. History of gastric bypass. She has hypertension, hyperlipidemia, diabetes.  + sleep apnea, but she does not use mask nightly  As noted above, she has been obese most of her adult life. Family history: Father had MI. Social history: Single.  No children.  She is a geriatrician, works at "[employer] Allergies: None. Current medications: Hydrochlorothiazide 25 mg daily, lisinopril 40 mg daily, warfarin, levothyroxine, sertraline, Ozempic.

## 2024-08-01 ENCOUNTER — APPOINTMENT (OUTPATIENT)
Dept: FAMILY MEDICINE | Facility: CLINIC | Age: 64
End: 2024-08-01
Payer: SELF-PAY

## 2024-08-01 VITALS
HEART RATE: 85 BPM | HEIGHT: 66 IN | SYSTOLIC BLOOD PRESSURE: 114 MMHG | OXYGEN SATURATION: 95 % | BODY MASS INDEX: 45.48 KG/M2 | DIASTOLIC BLOOD PRESSURE: 68 MMHG | WEIGHT: 283 LBS | RESPIRATION RATE: 16 BRPM | TEMPERATURE: 97.2 F

## 2024-08-01 DIAGNOSIS — R51.9 HEADACHE, UNSPECIFIED: ICD-10-CM

## 2024-08-01 DIAGNOSIS — E23.0 HYPOPITUITARISM: ICD-10-CM

## 2024-08-01 PROCEDURE — 99214 OFFICE O/P EST MOD 30 MIN: CPT

## 2024-08-01 PROCEDURE — G2211 COMPLEX E/M VISIT ADD ON: CPT

## 2024-08-01 NOTE — PLAN
[FreeTextEntry1] : -to ER for acute headache for 3 days with vision changes  -appreciated PET scan results - abnormal sella tursica - MRI head with pituitary focus recommended as well   -ENT follow for activity on PET scan was recommended as well  -discussed labs and f/u in office after discharge from hospital

## 2024-08-01 NOTE — ASSESSMENT
[FreeTextEntry1] : 65 yo F PMH DM2, HTN, Hypothyroidism, depression, PE, ABBIE (not on CPAP), bariatric surgery (gastric bypass), iron deficiency presenting today for headache,

## 2024-08-01 NOTE — HISTORY OF PRESENT ILLNESS
[FreeTextEntry1] : follow-up   [de-identified] : 63 yo F PMH DM2, HTN, Hypothyroidism, depression, PE, ABBIE (not on CPAP), bariatric surgery (gastric bypass), iron deficiency presenting today for headache,  She reports has a history of headaches, for few years, can start temporal region and expand to entire head, wearing tight glasses makes it worse.  This headache is worse, rated a 6-7/10, has been ongoing for 3 days and is associated with R sided vision changes. Reports it started in her R temporal region. Denies N/V/light sensitivity, other neurological symptoms, weakness of arms, jaw pain.   Does admit to fatigue.

## 2024-08-01 NOTE — ASSESSMENT
[FreeTextEntry1] : 63 yo F PMH DM2, HTN, Hypothyroidism, depression, PE, ABBIE (not on CPAP), bariatric surgery (gastric bypass), iron deficiency presenting today for headache,

## 2024-08-01 NOTE — PHYSICAL EXAM
[Normal] : no acute distress, well nourished, well developed and well-appearing [Normal Sclera/Conjunctiva] : normal sclera/conjunctiva [PERRL] : pupils equal round and reactive to light [Coordination Grossly Intact] : coordination grossly intact

## 2024-08-01 NOTE — HISTORY OF PRESENT ILLNESS
[FreeTextEntry1] : follow-up   [de-identified] : 65 yo F PMH DM2, HTN, Hypothyroidism, depression, PE, ABBIE (not on CPAP), bariatric surgery (gastric bypass), iron deficiency presenting today for headache,  She reports has a history of headaches, for few years, can start temporal region and expand to entire head, wearing tight glasses makes it worse.  This headache is worse, rated a 6-7/10, has been ongoing for 3 days and is associated with R sided vision changes. Reports it started in her R temporal region. Denies N/V/light sensitivity, other neurological symptoms, weakness of arms, jaw pain.   Does admit to fatigue.

## 2024-08-09 ENCOUNTER — TRANSCRIPTION ENCOUNTER (OUTPATIENT)
Age: 64
End: 2024-08-09

## 2024-08-16 ENCOUNTER — RX RENEWAL (OUTPATIENT)
Age: 64
End: 2024-08-16

## 2024-08-31 ENCOUNTER — RX RENEWAL (OUTPATIENT)
Age: 64
End: 2024-08-31

## 2024-09-06 ENCOUNTER — NON-APPOINTMENT (OUTPATIENT)
Age: 64
End: 2024-09-06

## 2024-09-07 ENCOUNTER — LABORATORY RESULT (OUTPATIENT)
Age: 64
End: 2024-09-07

## 2024-09-13 DIAGNOSIS — R19.00 INTRA-ABDOMINAL AND PELVIC SWELLING, MASS AND LUMP, UNSPECIFIED SITE: ICD-10-CM

## 2024-09-18 ENCOUNTER — OUTPATIENT (OUTPATIENT)
Dept: OUTPATIENT SERVICES | Facility: HOSPITAL | Age: 64
LOS: 1 days | End: 2024-09-18
Payer: COMMERCIAL

## 2024-09-18 ENCOUNTER — APPOINTMENT (OUTPATIENT)
Dept: SLEEP CENTER | Facility: CLINIC | Age: 64
End: 2024-09-18
Payer: COMMERCIAL

## 2024-09-18 DIAGNOSIS — Z98.890 OTHER SPECIFIED POSTPROCEDURAL STATES: Chronic | ICD-10-CM

## 2024-09-18 DIAGNOSIS — Z90.49 ACQUIRED ABSENCE OF OTHER SPECIFIED PARTS OF DIGESTIVE TRACT: Chronic | ICD-10-CM

## 2024-09-18 DIAGNOSIS — Z90.721 ACQUIRED ABSENCE OF OVARIES, UNILATERAL: Chronic | ICD-10-CM

## 2024-09-18 DIAGNOSIS — Z98.84 BARIATRIC SURGERY STATUS: Chronic | ICD-10-CM

## 2024-09-18 PROCEDURE — 95800 SLP STDY UNATTENDED: CPT

## 2024-09-18 PROCEDURE — 95800 SLP STDY UNATTENDED: CPT | Mod: 26

## 2024-09-26 DIAGNOSIS — G47.33 OBSTRUCTIVE SLEEP APNEA (ADULT) (PEDIATRIC): ICD-10-CM

## 2024-10-01 ENCOUNTER — APPOINTMENT (OUTPATIENT)
Dept: CARDIOLOGY | Facility: CLINIC | Age: 64
End: 2024-10-01
Payer: COMMERCIAL

## 2024-10-01 ENCOUNTER — LABORATORY RESULT (OUTPATIENT)
Age: 64
End: 2024-10-01

## 2024-10-01 ENCOUNTER — NON-APPOINTMENT (OUTPATIENT)
Age: 64
End: 2024-10-01

## 2024-10-01 VITALS
OXYGEN SATURATION: 97 % | WEIGHT: 278 LBS | RESPIRATION RATE: 18 BRPM | HEART RATE: 76 BPM | HEIGHT: 66 IN | SYSTOLIC BLOOD PRESSURE: 112 MMHG | DIASTOLIC BLOOD PRESSURE: 74 MMHG | BODY MASS INDEX: 44.68 KG/M2

## 2024-10-01 DIAGNOSIS — E78.5 HYPERLIPIDEMIA, UNSPECIFIED: ICD-10-CM

## 2024-10-01 DIAGNOSIS — I10 ESSENTIAL (PRIMARY) HYPERTENSION: ICD-10-CM

## 2024-10-01 PROCEDURE — 99213 OFFICE O/P EST LOW 20 MIN: CPT

## 2024-10-01 PROCEDURE — 93000 ELECTROCARDIOGRAM COMPLETE: CPT

## 2024-10-01 PROCEDURE — 93306 TTE W/DOPPLER COMPLETE: CPT

## 2024-10-01 RX ORDER — WARFARIN 5 MG/1
5 TABLET ORAL
Qty: 90 | Refills: 0 | Status: ACTIVE | COMMUNITY
Start: 2024-10-01 | End: 1900-01-01

## 2024-10-01 NOTE — REVIEW OF SYSTEMS
[Feeling Fatigued] : feeling fatigued [Dyspnea on exertion] : dyspnea during exertion [Chest Discomfort] : chest discomfort [Lower Ext Edema] : lower extremity edema [Joint Pain] : joint pain [Negative] : Heme/Lymph [de-identified] : headache

## 2024-10-01 NOTE — REASON FOR VISIT
[FreeTextEntry1] : Cardiology follow-up visit for preoperative cardiac clearance.  Patient with pelvic mass and is being considered for GYN surgery.  She has hypertension and hyperlipidemia.

## 2024-10-01 NOTE — DISCUSSION/SUMMARY
[FreeTextEntry1] : 64-year-old woman with hypertension and hyperlipidemia. She had previous episodes of atypical chest pain that have now resolved.  Possibly related to stress and anxiety. Being considered for GYN surgery for resection of a pelvic mass. Medical issues include obesity, prior history of pulmonary embolism maintained on anticoagulation with warfarin, hypothyroidism, diabetes On physical examination, blood pressure is stable.  She is euvolemic.  No new cardiac murmurs rubs or gallops are noted. EKG sinus rhythm, within normal limits. Vasodilator myocardial perfusion stress test done earlier this year was described as normal without evidence of myocardial infarction or vasodilator induced myocardial ischemia. Echocardiogram done today in the office.  Technically difficult study.  Normal left ventricular systolic function.  Mild left ventricular hypertrophy. There is no evidence of recent myocardial infarction, congestive heart failure or malignant cardiac arrhythmia. Current cardiac status appears to be stable.  Plan 1.  Planned GYN surgery can proceed at an acceptable cardiac risk.  No cardiac contraindications to proceeding with surgery. 2.  Current medication list is reviewed, no changes. 3.  Lifestyle modifications including weight loss, maintaining an active aerobic lifestyle and eating a heart healthy diet was discussed. 4.  Follow-up in the office as needed, she will continue follow-up with primary care provider for overall medical care. 5.  The above was reviewed with her and all of her questions have been answered to her satisfaction.  [EKG obtained to assist in diagnosis and management of assessed problem(s)] : EKG obtained to assist in diagnosis and management of assessed problem(s)

## 2024-10-01 NOTE — PHYSICAL EXAM
[Obese] : obese [Normal S1, S2] : normal S1, S2 [No Rub] : no rub [No Gallop] : no gallop [Murmur] : murmur [Normal] : alert and oriented, normal memory [de-identified] : 1/6 systolic murmur  [de-identified] : 1+ edema

## 2024-10-01 NOTE — CARDIOLOGY SUMMARY
[de-identified] : July 29, 2024.Sinus Rhythm  WITHIN NORMAL LIMITS  [de-identified] : January 12, 2024.  Normal vasodilator myocardial perfusion stress test (2-day study). [de-identified] : Oct 1 2024. Normal LV function. Mild left ventricular hypertrophy.  Mild MR.

## 2024-10-01 NOTE — HISTORY OF PRESENT ILLNESS
[FreeTextEntry1] : Since last visit with me, she reports that prior episodes of chest pain have resolved. In retrospect she thinks that the chest pain was related to arguments that she was having with her brother and may have been anxiety induced. I had previously ordered a coronary CTA for the patient, however, the procedure was denied by insurance company. She has no complaints of shortness of breath or dyspnea on exertion.  Denies palpitations.  No dizziness or lightheadedness.  No syncope.  No edema, orthopnea.  No PND. She is lost 6 pounds from dieting. She hopes to have GYN surgery for pelvic mass in the near future and would like to have cardiac clearance.

## 2024-10-02 NOTE — PHYSICAL EXAM
[1] : T1 [c] : c [0] : N0 [0-10] : 0 -10 ng/mL [Normal] : no acute distress, well nourished, well developed and well-appearing [Biopsy with Fusion] : Patient had a biopsy with fusion on [Normal Sclera/Conjunctiva] : normal sclera/conjunctiva [7(3+4)] : Fusion Biopsy [place] Score: 7(3+4) [PERRL] : pupils equal round and reactive to light [Biopsy results sent to PCP/Referring Physician] : Biopsy results sent to PCP/Referring Physician [Coordination Grossly Intact] : coordination grossly intact [4] : 4 [IIB] : IIB [Radical Prostatectomy] : Radical Prostatectomy [Patient had a radical prostatectomy] : Patient had a radical prostatectomy  [7(4+3)] : Cira Score 7(4+3) [Negative] : Negative margins [2] : T2 [X] : NX [] : Patient had no Bone Scan performed [BiopsyDate] : 1/24/2024 [TotalCores] : 14 [TotalPositiveCores] : 5 [MaxCoreInvolvement] : 60% [FreeTextEntry7] : MRI prostate 12/20/23: 4.4 x 3.1 x 3.9 cm = 28 cc gland. PIRAD 4, PIRAD 3 PSA 12/4/2023 4.1 ng/mL, 16% free [RadicalProstatectomyDate] : 3/26/2024 [TotalNumberofnodesresected] : 0 [PositiveNodes] : 0

## 2024-10-14 ENCOUNTER — APPOINTMENT (OUTPATIENT)
Dept: GYNECOLOGIC ONCOLOGY | Facility: CLINIC | Age: 64
End: 2024-10-14
Payer: COMMERCIAL

## 2024-10-14 DIAGNOSIS — R19.00 INTRA-ABDOMINAL AND PELVIC SWELLING, MASS AND LUMP, UNSPECIFIED SITE: ICD-10-CM

## 2024-10-14 DIAGNOSIS — N83.291 OTHER OVARIAN CYST, RIGHT SIDE: ICD-10-CM

## 2024-10-14 PROCEDURE — 99214 OFFICE O/P EST MOD 30 MIN: CPT

## 2024-10-14 PROCEDURE — 99459 PELVIC EXAMINATION: CPT

## 2024-10-15 LAB — CANCER AG125 SERPL-ACNC: 6 U/ML

## 2024-11-11 ENCOUNTER — APPOINTMENT (OUTPATIENT)
Dept: ULTRASOUND IMAGING | Facility: HOSPITAL | Age: 64
End: 2024-11-11

## 2024-11-13 ENCOUNTER — RX RENEWAL (OUTPATIENT)
Age: 64
End: 2024-11-13

## 2024-11-18 ENCOUNTER — APPOINTMENT (OUTPATIENT)
Dept: PSYCHIATRY | Facility: CLINIC | Age: 64
End: 2024-11-18
Payer: COMMERCIAL

## 2024-11-18 PROBLEM — F33.9 MDD (MAJOR DEPRESSIVE DISORDER), RECURRENT EPISODE: Status: ACTIVE | Noted: 2024-11-18

## 2024-11-18 PROCEDURE — G2211 COMPLEX E/M VISIT ADD ON: CPT

## 2024-11-18 PROCEDURE — 99205 OFFICE O/P NEW HI 60 MIN: CPT

## 2024-11-22 ENCOUNTER — APPOINTMENT (OUTPATIENT)
Dept: PSYCHIATRY | Facility: CLINIC | Age: 64
End: 2024-11-22
Payer: COMMERCIAL

## 2024-11-22 DIAGNOSIS — F33.9 MAJOR DEPRESSIVE DISORDER, RECURRENT, UNSPECIFIED: ICD-10-CM

## 2024-11-22 DIAGNOSIS — F41.9 ANXIETY DISORDER, UNSPECIFIED: ICD-10-CM

## 2024-11-22 PROCEDURE — 90791 PSYCH DIAGNOSTIC EVALUATION: CPT

## 2024-11-25 ENCOUNTER — OUTPATIENT (OUTPATIENT)
Dept: OUTPATIENT SERVICES | Facility: HOSPITAL | Age: 64
LOS: 1 days | End: 2024-11-25
Payer: COMMERCIAL

## 2024-11-25 ENCOUNTER — APPOINTMENT (OUTPATIENT)
Dept: ULTRASOUND IMAGING | Facility: HOSPITAL | Age: 64
End: 2024-11-25
Payer: COMMERCIAL

## 2024-11-25 DIAGNOSIS — Z98.84 BARIATRIC SURGERY STATUS: Chronic | ICD-10-CM

## 2024-11-25 DIAGNOSIS — Z90.49 ACQUIRED ABSENCE OF OTHER SPECIFIED PARTS OF DIGESTIVE TRACT: Chronic | ICD-10-CM

## 2024-11-25 DIAGNOSIS — Z98.890 OTHER SPECIFIED POSTPROCEDURAL STATES: Chronic | ICD-10-CM

## 2024-11-25 DIAGNOSIS — Z90.721 ACQUIRED ABSENCE OF OVARIES, UNILATERAL: Chronic | ICD-10-CM

## 2024-11-25 DIAGNOSIS — R19.00 INTRA-ABDOMINAL AND PELVIC SWELLING, MASS AND LUMP, UNSPECIFIED SITE: ICD-10-CM

## 2024-11-25 PROBLEM — F41.9 ANXIETY: Status: ACTIVE | Noted: 2024-11-25

## 2024-11-25 PROCEDURE — 93970 EXTREMITY STUDY: CPT

## 2024-11-25 PROCEDURE — 93970 EXTREMITY STUDY: CPT | Mod: 26

## 2024-12-02 ENCOUNTER — APPOINTMENT (OUTPATIENT)
Dept: PSYCHIATRY | Facility: CLINIC | Age: 64
End: 2024-12-02
Payer: COMMERCIAL

## 2024-12-02 DIAGNOSIS — F33.9 MAJOR DEPRESSIVE DISORDER, RECURRENT, UNSPECIFIED: ICD-10-CM

## 2024-12-02 DIAGNOSIS — F41.9 ANXIETY DISORDER, UNSPECIFIED: ICD-10-CM

## 2024-12-02 PROCEDURE — 90837 PSYTX W PT 60 MINUTES: CPT

## 2024-12-09 ENCOUNTER — APPOINTMENT (OUTPATIENT)
Dept: PSYCHIATRY | Facility: CLINIC | Age: 64
End: 2024-12-09

## 2024-12-13 ENCOUNTER — APPOINTMENT (OUTPATIENT)
Dept: PSYCHIATRY | Facility: CLINIC | Age: 64
End: 2024-12-13

## 2024-12-17 ENCOUNTER — RX RENEWAL (OUTPATIENT)
Age: 64
End: 2024-12-17

## 2024-12-20 ENCOUNTER — APPOINTMENT (OUTPATIENT)
Dept: PSYCHIATRY | Facility: CLINIC | Age: 64
End: 2024-12-20

## 2025-01-02 ENCOUNTER — RX RENEWAL (OUTPATIENT)
Age: 65
End: 2025-01-02

## 2025-01-09 ENCOUNTER — NON-APPOINTMENT (OUTPATIENT)
Age: 65
End: 2025-01-09

## 2025-01-09 ENCOUNTER — APPOINTMENT (OUTPATIENT)
Dept: OPHTHALMOLOGY | Facility: CLINIC | Age: 65
End: 2025-01-09
Payer: COMMERCIAL

## 2025-01-09 PROCEDURE — 92134 CPTRZ OPH DX IMG PST SGM RTA: CPT

## 2025-01-09 PROCEDURE — 92014 COMPRE OPH EXAM EST PT 1/>: CPT

## 2025-01-17 ENCOUNTER — APPOINTMENT (OUTPATIENT)
Dept: MRI IMAGING | Facility: HOSPITAL | Age: 65
End: 2025-01-17

## 2025-01-30 ENCOUNTER — APPOINTMENT (OUTPATIENT)
Facility: HOSPITAL | Age: 65
End: 2025-01-30

## 2025-01-30 ENCOUNTER — OUTPATIENT (OUTPATIENT)
Dept: OUTPATIENT SERVICES | Facility: HOSPITAL | Age: 65
LOS: 1 days | Discharge: HOME | End: 2025-01-30

## 2025-01-30 DIAGNOSIS — Z90.721 ACQUIRED ABSENCE OF OVARIES, UNILATERAL: Chronic | ICD-10-CM

## 2025-01-30 DIAGNOSIS — G47.30 SLEEP APNEA, UNSPECIFIED: ICD-10-CM

## 2025-01-30 DIAGNOSIS — Z98.890 OTHER SPECIFIED POSTPROCEDURAL STATES: Chronic | ICD-10-CM

## 2025-01-30 DIAGNOSIS — Z90.49 ACQUIRED ABSENCE OF OTHER SPECIFIED PARTS OF DIGESTIVE TRACT: Chronic | ICD-10-CM

## 2025-01-30 DIAGNOSIS — Z98.84 BARIATRIC SURGERY STATUS: Chronic | ICD-10-CM

## 2025-01-30 PROCEDURE — 95810 POLYSOM 6/> YRS 4/> PARAM: CPT | Mod: 26

## 2025-02-03 ENCOUNTER — APPOINTMENT (OUTPATIENT)
Dept: MRI IMAGING | Facility: HOSPITAL | Age: 65
End: 2025-02-03

## 2025-02-13 NOTE — ED ADULT TRIAGE NOTE - INTERNATIONAL TRAVEL
"Subjective   Reason for Visit: Wesley Chilel is an 76 y.o. male here for a Medicare Wellness visit.     Past Medical, Surgical, and Family History reviewed and updated in chart.         HPI    Patient Care Team:  Hany Penaloza DO as PCP - General  Hany Penaloza DO as PCP - [facility] ACO Attributed Provider     Review of Systems   All other systems reviewed and are negative.      Objective   Vitals:  /70 (BP Location: Left arm)   Pulse 72   Ht 1.727 m (5' 8\")   Wt 109 kg (240 lb)   BMI 36.49 kg/m²       Physical Exam  Vitals and nursing note reviewed.   Constitutional:       General: He is not in acute distress.     Appearance: Normal appearance. He is well-developed. He is obese. He is not toxic-appearing.   HENT:      Head: Normocephalic and atraumatic.      Right Ear: Tympanic membrane and external ear normal.      Left Ear: Tympanic membrane and external ear normal.      Nose: Nose normal.      Mouth/Throat:      Mouth: Mucous membranes are moist.      Pharynx: Oropharynx is clear. No oropharyngeal exudate or posterior oropharyngeal erythema.      Tonsils: No tonsillar exudate. 2+ on the right. 2+ on the left.   Eyes:      Extraocular Movements: Extraocular movements intact.      Conjunctiva/sclera: Conjunctivae normal.   Cardiovascular:      Rate and Rhythm: Normal rate and regular rhythm.      Pulses: Normal pulses.      Heart sounds: Normal heart sounds. No murmur heard.  Pulmonary:      Effort: Pulmonary effort is normal.      Breath sounds: Normal breath sounds.   Abdominal:      General: Abdomen is flat. Bowel sounds are normal.      Palpations: Abdomen is soft.   Musculoskeletal:      Cervical back: Neck supple.   Feet:      Right foot:      Skin integrity: Skin integrity normal. No ulcer, blister, skin breakdown, erythema, warmth or callus.      Toenail Condition: Right toenails are normal.      Left foot:      Skin integrity: Skin integrity normal. No ulcer, blister, skin breakdown, " erythema, warmth or callus.      Toenail Condition: Left toenails are normal.   Lymphadenopathy:      Cervical: No cervical adenopathy.   Skin:     General: Skin is warm and dry.      Capillary Refill: Capillary refill takes more than 3 seconds.      Findings: No rash.   Neurological:      Mental Status: He is alert. Mental status is at baseline.      Sensory: Sensation is intact.   Psychiatric:         Mood and Affect: Mood normal.         Behavior: Behavior normal.         Thought Content: Thought content normal.         Judgment: Judgment normal.     Lifestyle Recommendations  I recommend a whole-food plant-based diet, an eating pattern that encourages the consumption of unrefined plant foods (such as fruits, vegetables, tubers, whole grains, legumes, nuts and seeds) and discourages meats, dairy products, eggs and processed foods.     The AHA/ACC recommends that the patient consume a dietary pattern that emphasizes intake of vegetables, fruits, and whole grains; includes low-fat dairy products, poultry, fish, legumes, non-tropical vegetable oils, and nuts; and limits intake of sodium, sweets, sugar-sweetened beverages, and red meats.  Adapt this dietary pattern to appropriate calorie requirements (a 500-750 kcal/day deficit to loose weight), personal and cultural food preferences, and nutrition therapy for other medical conditions (including diabetes).  Achieve this pattern by following plans such as the [place] Mediterranean, DASH dietary pattern, or AHA diet.     Engage in 2 hours and 30 minutes per week of moderate-intensity physical activity, or 1 hour and 15 minutes (75 minutes) per week of vigorous-intensity aerobic physical activity, or an equivalent combination of moderate and vigorous-intensity aerobic physical activity. Aerobic activity should be performed in episodes of at least 10 minutes preferably spread throughout the week.     Adhering to a heart healthy diet, regular exercise habits, avoidance of  tobacco products, and maintenance of a healthy weight are crucial components of their heart disease risk reduction.  Depression and anxiety screening completed   PHQ9 score   GAD7 score   I spent 15 minutes obtaining and discussing depression screening using PHQ 2 questions with results documented in chart.  Screening using PHQ-9 and KEYA-7 scores were used for follow-up with treatment and referral plan discussed.      I spent greater than 15 minutes face-to-face with individual providing recommendations for nutrition choices and exercise plan to help achieve weight reductionI spent 15 minutes face-to-face with this individual discussing the cardiovascular risk and behavioral therapies of nutritional choices exercise and elimination of habits contributing to risk .We agreed on a plan how they may be able to reduce  current cardiovascular risk.  Per patient with calculation greater than 10% aspirin use was discussed and encouraged unless known allergy or increased risk of bleeding contraindicates use patient's 10-year CV risk estimate calculates:I spent greater than 15 minutes discussing advance care planning including the explanation and discussion of advanced directives.  If patient does not have current up-to-date documents examples and information provided on how to create both living will and power of .  toolkit was given to patient and was encouraged to work out completing these documents.    Assessment & Plan  Paroxysmal atrial fibrillation (Multi)  Stable normal sinus rhythm continue rivaroxaban 20 mg daily rate and rhythm controlled at this time status post ablation  Orders:    Follow Up In [facility] - PCP - Established    Follow Up In [facility] - PCP - Established; Future    Comprehensive Metabolic Panel; Future    Lipid Panel; Future    Albumin-Creatinine Ratio, Urine Random; Future    Restless legs syndrome  Improvement with use of pramipexole 0.75 mg nightly  Orders:     Follow Up In [facility] - PCP - Established    Sinus node dysfunction (Multi)  Continues with pacemaker stable at this time  Orders:    Follow Up In [facility] - PCP - Established    History of cerebrovascular disease  Continue aspirin 81 mg daily  Orders:    Follow Up In [facility] - PCP - Established    Follow Up In [facility] - PCP - Established; Future    Comprehensive Metabolic Panel; Future    Lipid Panel; Future    Albumin-Creatinine Ratio, Urine Random; Future    Class 2 severe obesity due to excess calories with serious comorbidity and body mass index (BMI) of 36.0 to 36.9 in adult  15 pound weight loss with careful titration of semaglutide to 2.4 mg subcu weekly continue with dietary changes and lifestyle management improving BMI to 36.  The patient received Current weight: 109 kg (240 lb)  Weight change since last visit (-) denotes wt loss -15 lbs   Weight loss needed to achieve BMI 25: 75.9 Lbs  Weight loss needed to achieve BMI 30: 43.1 Lbs    Provided instructions on dietary changes  Provided instructions on exercise  Advised to Increase physical activity because they have an above normal BMI.   Orders:    Follow Up In [facility] - PCP - Established    Colon cancer screening  Patient given Cologuard screening for colon cancer screening for next visit  Orders:    Cologuard® colon cancer screening; Future    Medicare annual wellness visit, subsequent  Discussed advanced medical directives with wife as medical power of .  Encourage vaccination with influenza and COVID-19 as well as RSV at pharmacy patient defers influenza vaccine at this time reevaluate 6 months       Major depressive disorder with single episode, in full remission (CMS-HCC)  Continues on fluoxetine 40 mg daily stable  Orders:    FLUoxetine (PROzac) 40 mg capsule; Take 1 capsule (40 mg) by mouth once daily.    Chronic insomnia  Refill doxepin 10 mg nightly at  bedtime  Orders:    doxepin (SINEquan) 10 mg capsule; Take 1 capsule (10 mg) by mouth once daily at bedtime.    Routine general medical examination at health care facility    Orders:    1 Year Follow Up In [facility] - PCP - Wellness Exam; Future    Dyslipidemia, goal LDL below 70  LDL cholesterol not at goal at 136.  Will replace atorvastatin 20 with rosuvastatin 40 mg 1 tablet daily and reevaluate for LDL goal less than 70  Orders:    Follow Up In [facility] - PCP - Established; Future    Comprehensive Metabolic Panel; Future    Lipid Panel; Future    Albumin-Creatinine Ratio, Urine Random; Future    rosuvastatin (Crestor) 40 mg tablet; Take 1 tablet (40 mg) by mouth once daily.               No

## 2025-02-14 ENCOUNTER — OUTPATIENT (OUTPATIENT)
Dept: OUTPATIENT SERVICES | Facility: HOSPITAL | Age: 65
LOS: 1 days | End: 2025-02-14
Payer: COMMERCIAL

## 2025-02-14 ENCOUNTER — APPOINTMENT (OUTPATIENT)
Dept: MRI IMAGING | Facility: HOSPITAL | Age: 65
End: 2025-02-14

## 2025-02-14 DIAGNOSIS — Z98.890 OTHER SPECIFIED POSTPROCEDURAL STATES: Chronic | ICD-10-CM

## 2025-02-14 DIAGNOSIS — Z90.49 ACQUIRED ABSENCE OF OTHER SPECIFIED PARTS OF DIGESTIVE TRACT: Chronic | ICD-10-CM

## 2025-02-14 DIAGNOSIS — Z98.84 BARIATRIC SURGERY STATUS: Chronic | ICD-10-CM

## 2025-02-14 DIAGNOSIS — E23.0 HYPOPITUITARISM: ICD-10-CM

## 2025-02-14 DIAGNOSIS — Z90.721 ACQUIRED ABSENCE OF OVARIES, UNILATERAL: Chronic | ICD-10-CM

## 2025-02-14 PROCEDURE — A9579: CPT

## 2025-02-14 PROCEDURE — 70553 MRI BRAIN STEM W/O & W/DYE: CPT | Mod: 26

## 2025-02-14 PROCEDURE — 70553 MRI BRAIN STEM W/O & W/DYE: CPT

## 2025-02-17 ENCOUNTER — RX RENEWAL (OUTPATIENT)
Age: 65
End: 2025-02-17

## 2025-02-18 ENCOUNTER — RX RENEWAL (OUTPATIENT)
Age: 65
End: 2025-02-18

## 2025-02-20 PROBLEM — D35.2 PITUITARY ADENOMA: Status: ACTIVE | Noted: 2025-02-20

## 2025-02-24 ENCOUNTER — APPOINTMENT (OUTPATIENT)
Dept: PSYCHIATRY | Facility: CLINIC | Age: 65
End: 2025-02-24

## 2025-02-26 ENCOUNTER — APPOINTMENT (OUTPATIENT)
Dept: ENDOCRINOLOGY | Facility: CLINIC | Age: 65
End: 2025-02-26
Payer: COMMERCIAL

## 2025-02-26 VITALS
BODY MASS INDEX: 44.68 KG/M2 | OXYGEN SATURATION: 99 % | HEART RATE: 79 BPM | WEIGHT: 278 LBS | SYSTOLIC BLOOD PRESSURE: 114 MMHG | DIASTOLIC BLOOD PRESSURE: 70 MMHG | TEMPERATURE: 97 F | HEIGHT: 66 IN

## 2025-02-26 DIAGNOSIS — E11.9 TYPE 2 DIABETES MELLITUS W/OUT COMPLICATIONS: ICD-10-CM

## 2025-02-26 DIAGNOSIS — E03.9 HYPOTHYROIDISM, UNSPECIFIED: ICD-10-CM

## 2025-02-26 DIAGNOSIS — E78.5 HYPERLIPIDEMIA, UNSPECIFIED: ICD-10-CM

## 2025-02-26 DIAGNOSIS — I10 ESSENTIAL (PRIMARY) HYPERTENSION: ICD-10-CM

## 2025-02-26 DIAGNOSIS — G47.33 OBSTRUCTIVE SLEEP APNEA (ADULT) (PEDIATRIC): ICD-10-CM

## 2025-02-26 DIAGNOSIS — E66.01 MORBID (SEVERE) OBESITY DUE TO EXCESS CALORIES: ICD-10-CM

## 2025-02-26 PROCEDURE — 99205 OFFICE O/P NEW HI 60 MIN: CPT

## 2025-02-26 PROCEDURE — G2211 COMPLEX E/M VISIT ADD ON: CPT

## 2025-02-26 RX ORDER — TIRZEPATIDE 2.5 MG/.5ML
2.5 INJECTION, SOLUTION SUBCUTANEOUS
Qty: 4 | Refills: 0 | Status: ACTIVE | COMMUNITY
Start: 2025-02-26 | End: 1900-01-01

## 2025-02-27 ENCOUNTER — APPOINTMENT (OUTPATIENT)
Dept: NEUROLOGY | Facility: CLINIC | Age: 65
End: 2025-02-27
Payer: COMMERCIAL

## 2025-02-27 ENCOUNTER — NON-APPOINTMENT (OUTPATIENT)
Age: 65
End: 2025-02-27

## 2025-02-27 VITALS
DIASTOLIC BLOOD PRESSURE: 77 MMHG | TEMPERATURE: 97 F | HEIGHT: 66 IN | HEART RATE: 86 BPM | WEIGHT: 278 LBS | BODY MASS INDEX: 44.68 KG/M2 | OXYGEN SATURATION: 98 % | SYSTOLIC BLOOD PRESSURE: 121 MMHG

## 2025-02-27 VITALS
HEIGHT: 66 IN | SYSTOLIC BLOOD PRESSURE: 121 MMHG | TEMPERATURE: 97 F | HEART RATE: 86 BPM | OXYGEN SATURATION: 98 % | WEIGHT: 278 LBS | BODY MASS INDEX: 44.68 KG/M2 | DIASTOLIC BLOOD PRESSURE: 77 MMHG

## 2025-02-27 DIAGNOSIS — G89.29 HEADACHE, UNSPECIFIED: ICD-10-CM

## 2025-02-27 DIAGNOSIS — D35.2 BENIGN NEOPLASM OF PITUITARY GLAND: ICD-10-CM

## 2025-02-27 DIAGNOSIS — R51.9 HEADACHE, UNSPECIFIED: ICD-10-CM

## 2025-02-27 PROCEDURE — G2211 COMPLEX E/M VISIT ADD ON: CPT

## 2025-02-27 PROCEDURE — 99215 OFFICE O/P EST HI 40 MIN: CPT

## 2025-02-27 RX ORDER — BLOOD-GLUCOSE SENSOR
EACH MISCELLANEOUS
Qty: 6 | Refills: 3 | Status: COMPLETED | COMMUNITY
Start: 2025-02-26 | End: 2025-02-27

## 2025-02-27 RX ORDER — BLOOD-GLUCOSE,RECEIVER,CONT
EACH MISCELLANEOUS
Qty: 1 | Refills: 0 | Status: COMPLETED | COMMUNITY
Start: 2025-02-26 | End: 2025-02-27

## 2025-02-27 RX ORDER — APIXABAN 2.5 MG/1
2.5 TABLET, FILM COATED ORAL
Qty: 60 | Refills: 0 | Status: ACTIVE | COMMUNITY
Start: 2024-12-31

## 2025-02-27 RX ORDER — GLIPIZIDE 5 MG/1
5 TABLET, FILM COATED, EXTENDED RELEASE ORAL
Qty: 90 | Refills: 0 | Status: ACTIVE | COMMUNITY
Start: 2024-10-18

## 2025-02-27 RX ORDER — TOPIRAMATE 25 MG/1
25 TABLET, FILM COATED ORAL
Qty: 60 | Refills: 3 | Status: ACTIVE | COMMUNITY
Start: 2025-02-27 | End: 1900-01-01

## 2025-02-27 RX ORDER — METFORMIN HYDROCHLORIDE 500 MG/1
500 TABLET, COATED ORAL
Refills: 0 | Status: ACTIVE | COMMUNITY

## 2025-02-28 RX ORDER — UBROGEPANT 100 MG/1
100 TABLET ORAL
Qty: 30 | Refills: 3 | Status: ACTIVE | COMMUNITY
Start: 2025-02-27 | End: 1900-01-01

## 2025-03-04 LAB
CREAT SPEC-SCNC: 290 MG/DL
HCT VFR BLD CALC: 43.5 %
HGB BLD-MCNC: 13.6 G/DL
MCHC RBC-ENTMCNC: 28.3 PG
MCHC RBC-ENTMCNC: 31.3 G/DL
MCV RBC AUTO: 90.4 FL
MICROALBUMIN 24H UR DL<=1MG/L-MCNC: 1.3 MG/DL
MICROALBUMIN/CREAT 24H UR-RTO: 4 MG/G
PLATELET # BLD AUTO: 377 K/UL
RBC # BLD: 4.81 M/UL
RBC # FLD: 15.6 %
WBC # FLD AUTO: 9.6 K/UL

## 2025-03-05 LAB
ALBUMIN SERPL ELPH-MCNC: 4.1 G/DL
ALP BLD-CCNC: 148 U/L
ALT SERPL-CCNC: 15 U/L
ANION GAP SERPL CALC-SCNC: 11 MMOL/L
AST SERPL-CCNC: 16 U/L
BILIRUB SERPL-MCNC: 1 MG/DL
BUN SERPL-MCNC: 14 MG/DL
CALCIUM SERPL-MCNC: 10 MG/DL
CHLORIDE SERPL-SCNC: 102 MMOL/L
CHOLEST SERPL-MCNC: 146 MG/DL
CO2 SERPL-SCNC: 28 MMOL/L
CORTIS SERPL-MCNC: 11.4 UG/DL
CREAT SERPL-MCNC: 0.68 MG/DL
EGFR: 97 ML/MIN/1.73M2
ESTIMATED AVERAGE GLUCOSE: 137 MG/DL
ESTRADIOL SERPL-MCNC: 20 PG/ML
FSH SERPL-MCNC: 51.7 IU/L
GLUCOSE SERPL-MCNC: 144 MG/DL
HBA1C MFR BLD HPLC: 6.4 %
HDLC SERPL-MCNC: 33 MG/DL
LDLC SERPL CALC-MCNC: 76 MG/DL
LH SERPL-ACNC: 38.8 IU/L
NONHDLC SERPL-MCNC: 114 MG/DL
POTASSIUM SERPL-SCNC: 4.5 MMOL/L
PROLACTIN SERPL-MCNC: 9.3 NG/ML
PROT SERPL-MCNC: 6.6 G/DL
SODIUM SERPL-SCNC: 140 MMOL/L
T4 FREE SERPL-MCNC: 1.2 NG/DL
TRIGL SERPL-MCNC: 231 MG/DL
TSH SERPL-ACNC: 1.23 UIU/ML
VIT B12 SERPL-MCNC: 833 PG/ML

## 2025-03-12 ENCOUNTER — INPATIENT (INPATIENT)
Facility: HOSPITAL | Age: 65
LOS: 1 days | Discharge: HOME | End: 2025-03-14
Attending: HOSPITALIST | Admitting: HOSPITALIST
Payer: COMMERCIAL

## 2025-03-12 VITALS
HEART RATE: 91 BPM | RESPIRATION RATE: 16 BRPM | WEIGHT: 274.92 LBS | OXYGEN SATURATION: 92 % | DIASTOLIC BLOOD PRESSURE: 86 MMHG | TEMPERATURE: 98 F | HEIGHT: 65 IN | SYSTOLIC BLOOD PRESSURE: 119 MMHG

## 2025-03-12 DIAGNOSIS — Z90.49 ACQUIRED ABSENCE OF OTHER SPECIFIED PARTS OF DIGESTIVE TRACT: Chronic | ICD-10-CM

## 2025-03-12 DIAGNOSIS — Z98.890 OTHER SPECIFIED POSTPROCEDURAL STATES: Chronic | ICD-10-CM

## 2025-03-12 DIAGNOSIS — Z90.721 ACQUIRED ABSENCE OF OVARIES, UNILATERAL: Chronic | ICD-10-CM

## 2025-03-12 DIAGNOSIS — Z98.84 BARIATRIC SURGERY STATUS: Chronic | ICD-10-CM

## 2025-03-12 PROCEDURE — 99053 MED SERV 10PM-8AM 24 HR FAC: CPT

## 2025-03-12 PROCEDURE — 71045 X-RAY EXAM CHEST 1 VIEW: CPT | Mod: 26

## 2025-03-12 PROCEDURE — 99291 CRITICAL CARE FIRST HOUR: CPT

## 2025-03-12 NOTE — ED PROVIDER NOTE - CLINICAL SUMMARY MEDICAL DECISION MAKING FREE TEXT BOX
65-year-old female past medical history of hypertension, hyperlipidemia, non-insulin-dependent diabetes, GERD, hx of PE previously on warfarin, switched to Eliquis, discontinued taking medication about 2 weeks ago presenting to emergency department for shortness of breath with exertion.    On arrival to ED patient normotensive, heart rate 90 bpm, nonfebrile, saturating 92% on room air.  Patient placed on 1 L nasal cannula saturating over 95%.    ECG normal sinus rhythm without ST wave abnormalities.    Physical exam patient speaking full sentences, not tachypneic, no accessory muscle use, lungs clear to auscultation bilaterally without wheezing rales or rhonchi, no cardiac murmurs or rubs, abdomen soft nontender nondistended.  No oral secretions or drooling.  Peripheral venous stasis, without pitting edema.  Legs symmetrical.    Differential includes was not limited to PE, ACS, pneumonia, ABBIE, less likely pleural effusion.  Will order CTA PE study, chest x-ray, troponin, BNP, basic labs including coags, supplemental oxygen.

## 2025-03-12 NOTE — ED PROVIDER NOTE - ATTENDING CONTRIBUTION TO CARE
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 She also has a history of ABBIE on nightly noninvasive discharge. patient with prior echo with normal EF and negative pharmacologic nuclear stress test 1/24.  Today patient was walking she became acutely short of breath felt like she was going to pass out.  Patient checked her pulse ox and it was in the 80s.  She states at that time she also experienced some chest pain.  Patient states it does get better with rest.  She denies any recent fever, chills, nausea, vomiting, diarrhea, recent illness.  Patient states that she feels better when she is not moving.  No family history of blood clots recently had a negative hematologic workup however she was still on anticoagulation at that time.  Due to dyspnea on exertion and stopping her anticoagulation will obtain labs and CT angio to rule out pulmonary embolism.  EKG nonischemic.

## 2025-03-12 NOTE — ED ADULT TRIAGE NOTE - BIRTH SEX
[facility]    PROGRESS NOTE    The primary encounter diagnosis was Bipolar affective disorder in remission (CMS/HCC). A diagnosis of Alcohol dependence in remission (CMS/HCC) was also pertinent to this visit.      Patient:  Nayt Luis    :  1971    Date of Service: 2020    Data:  This visit is being performed via phone to discuss Bipolar, Alcohol Problem, and Telephonic Visit    Clinician Location: [facility]    Naty is in [place] and her identity has been established.   She was informed that consent to treat includes permission to submit charges to the applicable insurance on file. Naty was advised regarding the potential risk inherent in video visits, as the assessment may be limited due to what can be seen on the screen which potentially results in an incomplete assessment; as well as either of us may discontinue the video visit if it is felt that the videoconferencing connections are not adequate for his/her situation.   60 minutes were spent in this encounter.      Intervention:  Cognitive Behavioral Strategies and Substance Abuse Recovery Strategies    Patient continues to be involved in service planning:  YES    Describe above interventions:  During the session we discuss some of the strategies that she can use to help her maintain her family.       Patient's response to interventions:  Patient was open to suggestion that would begin to create cohesion in her family.       Continue to support patient's efforts and progress towards established treatment plan goals in the following ways: Patient to continue working with her family to address the family issues.          Off-site:  No  
Female

## 2025-03-12 NOTE — ED ADULT NURSE NOTE - CHIEF COMPLAINT QUOTE
arriving from [facility], c/o SOB and left sided chest tightness since 2120, worsening with ambulation. hx of B/L PE, HTN, HLD, depression, GERD, sleep apnea.

## 2025-03-12 NOTE — ED ADULT NURSE NOTE - OBJECTIVE STATEMENT
Mike RN JMP pt received to RM 14. Brother at bedside. Pt calm pleasant affect axo4. 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 Which is now go.. I have hx of PE 10yrs ago and have been on coumadin for 10years but recently transition off and started Eloquis. few weeks ago but took self of  about a weeks ago. "  Pt placed on cm. sl and labs to follow. EKG in chart . Handoff to Primary RN to follow

## 2025-03-12 NOTE — ED PROVIDER NOTE - PROGRESS NOTE DETAILS
Александр PRICE, PGY-2;  Troponin to 220, will repeat EKG.  BNP within normal limits.  Review of vital signs, normotensive, heart rate 100s, remains on 2 L nasal cannula saturating 96%.  CT chest with signs of bilateral PE, right heart strain.  Cardiology consulted for PERT.  Will obtain with start heparin. Pt remains HDS, repeat labs, echo and US dopplers ordered. pending dispo per cards recs. Александр PRICE, PGY-2;  Troponin uptrending, discussed final plan with cardiology, at this time will keep on heparin drip, admit to medical floor.  Patient remains hemodynamically stable, MAP above 65, heart rate high 90s to low 100s, saturating 98 send 100% on 2 L nasal cannula.  If patient clinically deteriorates, recommend re-consulting PERT team for potential interventional cardiology management

## 2025-03-12 NOTE — ED ADULT TRIAGE NOTE - CHIEF COMPLAINT QUOTE
arriving from [facility], c/o SOB and left sided chest tightness since 2120, worsening with ambulation. hx of B/L PE, HTN, HLD, depression, GERD, sleep apnea.

## 2025-03-12 NOTE — ED PROVIDER NOTE - OBJECTIVE STATEMENT
65-year-old female past medical history of hypertension, hyperlipidemia, non-insulin-dependent diabetes, GERD, 65-year-old female past medical history of hypertension, hyperlipidemia, non-insulin-dependent diabetes, GERD, hx of PE presenting to emergency department for shortness of breath with exertion.  Patient endorses onset of symptoms approximately 5 to 6 PM today.  States chest pain is central, left anterior chest wall.  Nonradiated to his shoulders.  No radiation to back.  No nausea, diaphoresis, vomiting.  Denies shortness of breath at rest.  Of note patient was taking warfarin for PE, was switched to Eliquis approximately 6 weeks ago, stopped taking Eliquis 2 weeks ago. Of note surgical hx of gastric bypass, cholecystectomy, appendectomy, myomectomy.

## 2025-03-12 NOTE — ED ADULT NURSE NOTE - NSFALLHARMRISKINTERV_ED_ALL_ED

## 2025-03-13 ENCOUNTER — APPOINTMENT (OUTPATIENT)
Dept: PULMONOLOGY | Facility: CLINIC | Age: 65
End: 2025-03-13

## 2025-03-13 ENCOUNTER — RESULT REVIEW (OUTPATIENT)
Age: 65
End: 2025-03-13

## 2025-03-13 DIAGNOSIS — D49.7 NEOPLASM OF UNSPECIFIED BEHAVIOR OF ENDOCRINE GLANDS AND OTHER PARTS OF NERVOUS SYSTEM: ICD-10-CM

## 2025-03-13 DIAGNOSIS — I26.99 OTHER PULMONARY EMBOLISM WITHOUT ACUTE COR PULMONALE: ICD-10-CM

## 2025-03-13 DIAGNOSIS — R09.89 OTHER SPECIFIED SYMPTOMS AND SIGNS INVOLVING THE CIRCULATORY AND RESPIRATORY SYSTEMS: ICD-10-CM

## 2025-03-13 DIAGNOSIS — G47.33 OBSTRUCTIVE SLEEP APNEA (ADULT) (PEDIATRIC): ICD-10-CM

## 2025-03-13 DIAGNOSIS — E87.6 HYPOKALEMIA: ICD-10-CM

## 2025-03-13 DIAGNOSIS — E11.9 TYPE 2 DIABETES MELLITUS WITHOUT COMPLICATIONS: ICD-10-CM

## 2025-03-13 DIAGNOSIS — R51.9 HEADACHE, UNSPECIFIED: ICD-10-CM

## 2025-03-13 DIAGNOSIS — E86.0 DEHYDRATION: ICD-10-CM

## 2025-03-13 PROBLEM — G47.30 SLEEP APNEA, UNSPECIFIED: Chronic | Status: INACTIVE | Noted: 2019-10-27 | Resolved: 2025-03-13

## 2025-03-13 PROBLEM — E03.9 HYPOTHYROIDISM, UNSPECIFIED: Chronic | Status: INACTIVE | Noted: 2019-05-17 | Resolved: 2025-03-13

## 2025-03-13 LAB
24R-OH-CALCIDIOL SERPL-MCNC: 27.5 NG/ML — SIGNIFICANT CHANGE UP
A1C WITH ESTIMATED AVERAGE GLUCOSE RESULT: 6.2 % — HIGH (ref 4–5.6)
ADD ON TEST-SPECIMEN IN LAB: SIGNIFICANT CHANGE UP
ALBUMIN SERPL ELPH-MCNC: 3.9 G/DL — SIGNIFICANT CHANGE UP (ref 3.3–5)
ALBUMIN SERPL ELPH-MCNC: 4 G/DL — SIGNIFICANT CHANGE UP (ref 3.3–5)
ALP SERPL-CCNC: 127 U/L — HIGH (ref 40–120)
ALP SERPL-CCNC: 129 U/L — HIGH (ref 40–120)
ALT FLD-CCNC: 17 U/L — SIGNIFICANT CHANGE UP (ref 4–33)
ALT FLD-CCNC: 18 U/L — SIGNIFICANT CHANGE UP (ref 4–33)
ANION GAP SERPL CALC-SCNC: 13 MMOL/L — SIGNIFICANT CHANGE UP (ref 7–14)
ANION GAP SERPL CALC-SCNC: 14 MMOL/L — SIGNIFICANT CHANGE UP (ref 7–14)
APTT BLD: 120.5 SEC — CRITICAL HIGH (ref 24.5–35.6)
APTT BLD: 25 SEC — SIGNIFICANT CHANGE UP (ref 24.5–35.6)
APTT BLD: 69 SEC — HIGH (ref 24.5–35.6)
APTT BLD: 70.7 SEC — HIGH (ref 24.5–35.6)
AST SERPL-CCNC: 15 U/L — SIGNIFICANT CHANGE UP (ref 4–32)
AST SERPL-CCNC: 22 U/L — SIGNIFICANT CHANGE UP (ref 4–32)
BASOPHILS # BLD AUTO: 0.09 K/UL — SIGNIFICANT CHANGE UP (ref 0–0.2)
BASOPHILS # BLD AUTO: 0.11 K/UL — SIGNIFICANT CHANGE UP (ref 0–0.2)
BASOPHILS NFR BLD AUTO: 0.6 % — SIGNIFICANT CHANGE UP (ref 0–2)
BASOPHILS NFR BLD AUTO: 1.1 % — SIGNIFICANT CHANGE UP (ref 0–2)
BILIRUB SERPL-MCNC: 0.9 MG/DL — SIGNIFICANT CHANGE UP (ref 0.2–1.2)
BILIRUB SERPL-MCNC: 1 MG/DL — SIGNIFICANT CHANGE UP (ref 0.2–1.2)
BLD GP AB SCN SERPL QL: NEGATIVE — SIGNIFICANT CHANGE UP
BUN SERPL-MCNC: 13 MG/DL — SIGNIFICANT CHANGE UP (ref 7–23)
BUN SERPL-MCNC: 15 MG/DL — SIGNIFICANT CHANGE UP (ref 7–23)
CALCIUM SERPL-MCNC: 9.5 MG/DL — SIGNIFICANT CHANGE UP (ref 8.4–10.5)
CALCIUM SERPL-MCNC: 9.7 MG/DL — SIGNIFICANT CHANGE UP (ref 8.4–10.5)
CHLORIDE SERPL-SCNC: 101 MMOL/L — SIGNIFICANT CHANGE UP (ref 98–107)
CHLORIDE SERPL-SCNC: 103 MMOL/L — SIGNIFICANT CHANGE UP (ref 98–107)
CHOLEST SERPL-MCNC: 112 MG/DL — SIGNIFICANT CHANGE UP
CO2 SERPL-SCNC: 20 MMOL/L — LOW (ref 22–31)
CO2 SERPL-SCNC: 25 MMOL/L — SIGNIFICANT CHANGE UP (ref 22–31)
CREAT SERPL-MCNC: 0.46 MG/DL — LOW (ref 0.5–1.3)
CREAT SERPL-MCNC: 0.54 MG/DL — SIGNIFICANT CHANGE UP (ref 0.5–1.3)
EGFR: 103 ML/MIN/1.73M2 — SIGNIFICANT CHANGE UP
EGFR: 103 ML/MIN/1.73M2 — SIGNIFICANT CHANGE UP
EGFR: 107 ML/MIN/1.73M2 — SIGNIFICANT CHANGE UP
EGFR: 107 ML/MIN/1.73M2 — SIGNIFICANT CHANGE UP
EOSINOPHIL # BLD AUTO: 0.01 K/UL — SIGNIFICANT CHANGE UP (ref 0–0.5)
EOSINOPHIL # BLD AUTO: 0.47 K/UL — SIGNIFICANT CHANGE UP (ref 0–0.5)
EOSINOPHIL NFR BLD AUTO: 0.1 % — SIGNIFICANT CHANGE UP (ref 0–6)
EOSINOPHIL NFR BLD AUTO: 4.8 % — SIGNIFICANT CHANGE UP (ref 0–6)
ESTIMATED AVERAGE GLUCOSE: 131 — SIGNIFICANT CHANGE UP
FLUAV AG NPH QL: SIGNIFICANT CHANGE UP
FLUBV AG NPH QL: SIGNIFICANT CHANGE UP
GAS PNL BLDV: SIGNIFICANT CHANGE UP
GLUCOSE BLDC GLUCOMTR-MCNC: 115 MG/DL — HIGH (ref 70–99)
GLUCOSE BLDC GLUCOMTR-MCNC: 121 MG/DL — HIGH (ref 70–99)
GLUCOSE BLDC GLUCOMTR-MCNC: 131 MG/DL — HIGH (ref 70–99)
GLUCOSE BLDC GLUCOMTR-MCNC: 170 MG/DL — HIGH (ref 70–99)
GLUCOSE SERPL-MCNC: 117 MG/DL — HIGH (ref 70–99)
GLUCOSE SERPL-MCNC: 127 MG/DL — HIGH (ref 70–99)
HCT VFR BLD CALC: 37 % — SIGNIFICANT CHANGE UP (ref 34.5–45)
HCT VFR BLD CALC: 40.6 % — SIGNIFICANT CHANGE UP (ref 34.5–45)
HCYS SERPL-MCNC: 7.9 UMOL/L — SIGNIFICANT CHANGE UP
HDLC SERPL-MCNC: 31 MG/DL — LOW
HGB BLD-MCNC: 12.2 G/DL — SIGNIFICANT CHANGE UP (ref 11.5–15.5)
HGB BLD-MCNC: 12.9 G/DL — SIGNIFICANT CHANGE UP (ref 11.5–15.5)
IANC: 11.97 K/UL — HIGH (ref 1.8–7.4)
IANC: 6.18 K/UL — SIGNIFICANT CHANGE UP (ref 1.8–7.4)
IMM GRANULOCYTES NFR BLD AUTO: 0.3 % — SIGNIFICANT CHANGE UP (ref 0–0.9)
IMM GRANULOCYTES NFR BLD AUTO: 0.5 % — SIGNIFICANT CHANGE UP (ref 0–0.9)
INR BLD: 0.98 RATIO — SIGNIFICANT CHANGE UP (ref 0.85–1.16)
INR BLD: 1.04 RATIO — SIGNIFICANT CHANGE UP (ref 0.85–1.16)
LACTATE SERPL-SCNC: 1.4 MMOL/L — SIGNIFICANT CHANGE UP (ref 0.5–2)
LACTATE SERPL-SCNC: 1.7 MMOL/L — SIGNIFICANT CHANGE UP (ref 0.5–2)
LIPID PNL WITH DIRECT LDL SERPL: 51 MG/DL — SIGNIFICANT CHANGE UP
LYMPHOCYTES # BLD AUTO: 0.99 K/UL — LOW (ref 1–3.3)
LYMPHOCYTES # BLD AUTO: 2.17 K/UL — SIGNIFICANT CHANGE UP (ref 1–3.3)
LYMPHOCYTES # BLD AUTO: 22.2 % — SIGNIFICANT CHANGE UP (ref 13–44)
LYMPHOCYTES # BLD AUTO: 7.1 % — LOW (ref 13–44)
MAGNESIUM SERPL-MCNC: 2 MG/DL — SIGNIFICANT CHANGE UP (ref 1.6–2.6)
MCHC RBC-ENTMCNC: 28 PG — SIGNIFICANT CHANGE UP (ref 27–34)
MCHC RBC-ENTMCNC: 28.7 PG — SIGNIFICANT CHANGE UP (ref 27–34)
MCHC RBC-ENTMCNC: 31.8 G/DL — LOW (ref 32–36)
MCHC RBC-ENTMCNC: 33 G/DL — SIGNIFICANT CHANGE UP (ref 32–36)
MCV RBC AUTO: 87.1 FL — SIGNIFICANT CHANGE UP (ref 80–100)
MCV RBC AUTO: 88.1 FL — SIGNIFICANT CHANGE UP (ref 80–100)
MONOCYTES # BLD AUTO: 0.74 K/UL — SIGNIFICANT CHANGE UP (ref 0–0.9)
MONOCYTES # BLD AUTO: 0.83 K/UL — SIGNIFICANT CHANGE UP (ref 0–0.9)
MONOCYTES NFR BLD AUTO: 5.3 % — SIGNIFICANT CHANGE UP (ref 2–14)
MONOCYTES NFR BLD AUTO: 8.5 % — SIGNIFICANT CHANGE UP (ref 2–14)
NEUTROPHILS # BLD AUTO: 11.97 K/UL — HIGH (ref 1.8–7.4)
NEUTROPHILS # BLD AUTO: 6.18 K/UL — SIGNIFICANT CHANGE UP (ref 1.8–7.4)
NEUTROPHILS NFR BLD AUTO: 63.1 % — SIGNIFICANT CHANGE UP (ref 43–77)
NEUTROPHILS NFR BLD AUTO: 86.4 % — HIGH (ref 43–77)
NON HDL CHOLESTEROL: 81 MG/DL — SIGNIFICANT CHANGE UP
NRBC # BLD AUTO: 0 K/UL — SIGNIFICANT CHANGE UP (ref 0–0)
NRBC # BLD AUTO: 0 K/UL — SIGNIFICANT CHANGE UP (ref 0–0)
NRBC # FLD: 0 K/UL — SIGNIFICANT CHANGE UP (ref 0–0)
NRBC # FLD: 0 K/UL — SIGNIFICANT CHANGE UP (ref 0–0)
NRBC BLD AUTO-RTO: 0 /100 WBCS — SIGNIFICANT CHANGE UP (ref 0–0)
NRBC BLD AUTO-RTO: 0 /100 WBCS — SIGNIFICANT CHANGE UP (ref 0–0)
NT-PROBNP SERPL-SCNC: 197 PG/ML — SIGNIFICANT CHANGE UP
NT-PROBNP SERPL-SCNC: 38 PG/ML — SIGNIFICANT CHANGE UP
PHOSPHATE SERPL-MCNC: 3.3 MG/DL — SIGNIFICANT CHANGE UP (ref 2.5–4.5)
PLATELET # BLD AUTO: 250 K/UL — SIGNIFICANT CHANGE UP (ref 150–400)
PLATELET # BLD AUTO: 276 K/UL — SIGNIFICANT CHANGE UP (ref 150–400)
POTASSIUM SERPL-MCNC: 3.3 MMOL/L — LOW (ref 3.5–5.3)
POTASSIUM SERPL-MCNC: 3.4 MMOL/L — LOW (ref 3.5–5.3)
POTASSIUM SERPL-SCNC: 3.3 MMOL/L — LOW (ref 3.5–5.3)
POTASSIUM SERPL-SCNC: 3.4 MMOL/L — LOW (ref 3.5–5.3)
PROT SERPL-MCNC: 6.3 G/DL — SIGNIFICANT CHANGE UP (ref 6–8.3)
PROT SERPL-MCNC: 6.5 G/DL — SIGNIFICANT CHANGE UP (ref 6–8.3)
PROTHROM AB SERPL-ACNC: 11.4 SEC — SIGNIFICANT CHANGE UP (ref 9.9–13.4)
PROTHROM AB SERPL-ACNC: 12.4 SEC — SIGNIFICANT CHANGE UP (ref 9.9–13.4)
RBC # BLD: 4.25 M/UL — SIGNIFICANT CHANGE UP (ref 3.8–5.2)
RBC # BLD: 4.61 M/UL — SIGNIFICANT CHANGE UP (ref 3.8–5.2)
RBC # FLD: 15.3 % — HIGH (ref 10.3–14.5)
RBC # FLD: 15.5 % — HIGH (ref 10.3–14.5)
RH IG SCN BLD-IMP: POSITIVE — SIGNIFICANT CHANGE UP
RSV RNA NPH QL NAA+NON-PROBE: SIGNIFICANT CHANGE UP
SARS-COV-2 RNA SPEC QL NAA+PROBE: SIGNIFICANT CHANGE UP
SODIUM SERPL-SCNC: 135 MMOL/L — SIGNIFICANT CHANGE UP (ref 135–145)
SODIUM SERPL-SCNC: 141 MMOL/L — SIGNIFICANT CHANGE UP (ref 135–145)
TRIGL SERPL-MCNC: 180 MG/DL — HIGH
TROPONIN T, HIGH SENSITIVITY RESULT: 220 NG/L — CRITICAL HIGH
TROPONIN T, HIGH SENSITIVITY RESULT: 254 NG/L — CRITICAL HIGH
TROPONIN T, HIGH SENSITIVITY RESULT: 327 NG/L — CRITICAL HIGH
TSH SERPL-MCNC: 1.56 UIU/ML — SIGNIFICANT CHANGE UP (ref 0.27–4.2)
WBC # BLD: 13.87 K/UL — HIGH (ref 3.8–10.5)
WBC # BLD: 9.79 K/UL — SIGNIFICANT CHANGE UP (ref 3.8–10.5)
WBC # FLD AUTO: 13.87 K/UL — HIGH (ref 3.8–10.5)
WBC # FLD AUTO: 9.79 K/UL — SIGNIFICANT CHANGE UP (ref 3.8–10.5)

## 2025-03-13 PROCEDURE — 71275 CT ANGIOGRAPHY CHEST: CPT | Mod: 26

## 2025-03-13 PROCEDURE — 93970 EXTREMITY STUDY: CPT | Mod: 26

## 2025-03-13 PROCEDURE — 99223 1ST HOSP IP/OBS HIGH 75: CPT

## 2025-03-13 PROCEDURE — 99222 1ST HOSP IP/OBS MODERATE 55: CPT | Mod: GC

## 2025-03-13 RX ORDER — LEVOTHYROXINE SODIUM 300 MCG
75 TABLET ORAL DAILY
Refills: 0 | Status: DISCONTINUED | OUTPATIENT
Start: 2025-03-13 | End: 2025-03-14

## 2025-03-13 RX ORDER — ACETAMINOPHEN 500 MG/5ML
650 LIQUID (ML) ORAL EVERY 6 HOURS
Refills: 0 | Status: DISCONTINUED | OUTPATIENT
Start: 2025-03-13 | End: 2025-03-14

## 2025-03-13 RX ORDER — ATORVASTATIN CALCIUM 80 MG/1
1 TABLET, FILM COATED ORAL
Refills: 0 | DISCHARGE

## 2025-03-13 RX ORDER — SODIUM CHLORIDE 9 G/1000ML
1000 INJECTION, SOLUTION INTRAVENOUS
Refills: 0 | Status: DISCONTINUED | OUTPATIENT
Start: 2025-03-13 | End: 2025-03-14

## 2025-03-13 RX ORDER — INSULIN LISPRO 100 U/ML
INJECTION, SOLUTION INTRAVENOUS; SUBCUTANEOUS AT BEDTIME
Refills: 0 | Status: DISCONTINUED | OUTPATIENT
Start: 2025-03-13 | End: 2025-03-14

## 2025-03-13 RX ORDER — HEPARIN SODIUM 1000 [USP'U]/ML
10000 INJECTION INTRAVENOUS; SUBCUTANEOUS ONCE
Refills: 0 | Status: COMPLETED | OUTPATIENT
Start: 2025-03-13 | End: 2025-03-13

## 2025-03-13 RX ORDER — GLUCAGON 3 MG/1
1 POWDER NASAL ONCE
Refills: 0 | Status: DISCONTINUED | OUTPATIENT
Start: 2025-03-13 | End: 2025-03-14

## 2025-03-13 RX ORDER — HEPARIN SODIUM 1000 [USP'U]/ML
10000 INJECTION INTRAVENOUS; SUBCUTANEOUS EVERY 6 HOURS
Refills: 0 | Status: DISCONTINUED | OUTPATIENT
Start: 2025-03-13 | End: 2025-03-14

## 2025-03-13 RX ORDER — MELATONIN 5 MG
3 TABLET ORAL AT BEDTIME
Refills: 0 | Status: DISCONTINUED | OUTPATIENT
Start: 2025-03-13 | End: 2025-03-14

## 2025-03-13 RX ORDER — DEXTROSE 50 % IN WATER 50 %
12.5 SYRINGE (ML) INTRAVENOUS ONCE
Refills: 0 | Status: DISCONTINUED | OUTPATIENT
Start: 2025-03-13 | End: 2025-03-14

## 2025-03-13 RX ORDER — INSULIN LISPRO 100 U/ML
INJECTION, SOLUTION INTRAVENOUS; SUBCUTANEOUS
Refills: 0 | Status: DISCONTINUED | OUTPATIENT
Start: 2025-03-13 | End: 2025-03-14

## 2025-03-13 RX ORDER — LISINOPRIL 5 MG/1
40 TABLET ORAL DAILY
Refills: 0 | Status: DISCONTINUED | OUTPATIENT
Start: 2025-03-13 | End: 2025-03-14

## 2025-03-13 RX ORDER — DEXTROSE 50 % IN WATER 50 %
25 SYRINGE (ML) INTRAVENOUS ONCE
Refills: 0 | Status: DISCONTINUED | OUTPATIENT
Start: 2025-03-13 | End: 2025-03-14

## 2025-03-13 RX ORDER — ATORVASTATIN CALCIUM 80 MG/1
20 TABLET, FILM COATED ORAL AT BEDTIME
Refills: 0 | Status: DISCONTINUED | OUTPATIENT
Start: 2025-03-13 | End: 2025-03-14

## 2025-03-13 RX ORDER — APIXABAN 2.5 MG/1
0 TABLET, FILM COATED ORAL
Refills: 0 | DISCHARGE

## 2025-03-13 RX ORDER — SERTRALINE 100 MG/1
200 TABLET, FILM COATED ORAL DAILY
Refills: 0 | Status: DISCONTINUED | OUTPATIENT
Start: 2025-03-13 | End: 2025-03-14

## 2025-03-13 RX ORDER — HEPARIN SODIUM 1000 [USP'U]/ML
5000 INJECTION INTRAVENOUS; SUBCUTANEOUS EVERY 6 HOURS
Refills: 0 | Status: DISCONTINUED | OUTPATIENT
Start: 2025-03-13 | End: 2025-03-14

## 2025-03-13 RX ORDER — DEXTROSE 50 % IN WATER 50 %
15 SYRINGE (ML) INTRAVENOUS ONCE
Refills: 0 | Status: DISCONTINUED | OUTPATIENT
Start: 2025-03-13 | End: 2025-03-14

## 2025-03-13 RX ORDER — HEPARIN SODIUM 1000 [USP'U]/ML
INJECTION INTRAVENOUS; SUBCUTANEOUS
Qty: 25000 | Refills: 0 | Status: DISCONTINUED | OUTPATIENT
Start: 2025-03-13 | End: 2025-03-13

## 2025-03-13 RX ADMIN — ATORVASTATIN CALCIUM 20 MILLIGRAM(S): 80 TABLET, FILM COATED ORAL at 21:25

## 2025-03-13 RX ADMIN — INSULIN LISPRO 2: 100 INJECTION, SOLUTION INTRAVENOUS; SUBCUTANEOUS at 11:11

## 2025-03-13 RX ADMIN — HEPARIN SODIUM 1900 UNIT(S)/HR: 1000 INJECTION INTRAVENOUS; SUBCUTANEOUS at 15:29

## 2025-03-13 RX ADMIN — Medication 1000 MILLILITER(S): at 08:34

## 2025-03-13 RX ADMIN — HEPARIN SODIUM 10000 UNIT(S): 1000 INJECTION INTRAVENOUS; SUBCUTANEOUS at 02:08

## 2025-03-13 RX ADMIN — HEPARIN SODIUM 2200 UNIT(S)/HR: 1000 INJECTION INTRAVENOUS; SUBCUTANEOUS at 02:10

## 2025-03-13 RX ADMIN — Medication 20 MILLIGRAM(S): at 11:11

## 2025-03-13 RX ADMIN — Medication 40 MILLIEQUIVALENT(S): at 09:00

## 2025-03-13 RX ADMIN — Medication 20 MILLIEQUIVALENT(S): at 14:05

## 2025-03-13 RX ADMIN — SERTRALINE 200 MILLIGRAM(S): 100 TABLET, FILM COATED ORAL at 11:11

## 2025-03-13 RX ADMIN — HEPARIN SODIUM 1900 UNIT(S)/HR: 1000 INJECTION INTRAVENOUS; SUBCUTANEOUS at 19:50

## 2025-03-13 RX ADMIN — HEPARIN SODIUM 1900 UNIT(S)/HR: 1000 INJECTION INTRAVENOUS; SUBCUTANEOUS at 08:34

## 2025-03-13 RX ADMIN — HEPARIN SODIUM 1900 UNIT(S)/HR: 1000 INJECTION INTRAVENOUS; SUBCUTANEOUS at 23:48

## 2025-03-13 NOTE — ED ADULT NURSE REASSESSMENT NOTE - NS ED NURSE REASSESS COMMENT FT1
BREAK RN: Pt is A&Ox4. Pt has no complaints at this time. Denies chest pain, SOB, headache. Heparin running @ 22mL/hr. IV intact. NSR on cardiac monitor. Respirations even and unlabored on 2L NC. Safety maintained.

## 2025-03-13 NOTE — CONSULT NOTE ADULT - NSCONSULTADDITIONALINFOA_GEN_ALL_CORE
Interventional Attending Addendum:    Patient was discussed with me by phone but case was reviewed and staffed by in-house interventional team at [facility].  I have not physically met, examined, or followed the patient nor was I requested for in-person consultation.    By report, patient stable on RA currently.  Non-compliance with eliquis as inciting factor in PE.  She is hemodynamically and symptomatically doing well and at baseline, no indication for thrombectomy, CDL, or mechanical support at this time.  Heparin transitioned to eliquis at PE dosing.  Follow-up with her cardiologist as scheduled.    ________________  Harjeet Salinas MD  Interventional & Structural Cardiology

## 2025-03-13 NOTE — H&P ADULT - NSHPPHYSICALEXAM_GEN_ALL_CORE
Vital Signs Last 24 Hrs  T(C): 36.6 (13 Mar 2025 04:44), Max: 36.6 (12 Mar 2025 22:10)  T(F): 97.9 (13 Mar 2025 04:44), Max: 97.9 (13 Mar 2025 04:44)  HR: 89 (13 Mar 2025 04:44) (89 - 99)  BP: 116/75 (13 Mar 2025 04:44) (112/80 - 128/72)  BP(mean): --  RR: 17 (13 Mar 2025 04:44) (16 - 19)  SpO2: 98% (13 Mar 2025 04:44) (92% - 100%)    Parameters below as of 13 Mar 2025 04:44  Patient On (Oxygen Delivery Method): nasal cannula  O2 Flow (L/min): 2  ================================================== Vital Signs Last 24 Hrs  T(C): 36.6 (13 Mar 2025 04:44), Max: 36.6 (12 Mar 2025 22:10)  T(F): 97.9 (13 Mar 2025 04:44), Max: 97.9 (13 Mar 2025 04:44)  HR: 89 (13 Mar 2025 04:44) (89 - 99)  BP: 116/75 (13 Mar 2025 04:44) (112/80 - 128/72)  BP(mean): --  RR: 17 (13 Mar 2025 04:44) (16 - 19)  SpO2: 98% (13 Mar 2025 04:44) (92% - 100%)    Parameters below as of 13 Mar 2025 04:44  Patient On (Oxygen Delivery Method): nasal cannula  O2 Flow (L/min): 2  ==================================================  PHYSICAL EXAMINATION:    APPEARANCE: Adequately groomed female, lying propped up in stretcher in NAD  HEENT: Nasal cannula in place.  Dry oral mucosa.  Pupils reactive to light.  EOMI  LYMPHATIC: No lymphadenopathy appreciated  CARDIOVASCULAR: (+) S1 S2.  No JVD.  No murmurs.  (+) equal edema of bilateral legs  RESPIRATORY: No wheezing, rhonchi, crackles appreciated  GASTROINTESTINAL: Soft.  Mild diffuse tenderness to moderate palpation.  (+) BS  GENITOURINARY: No suprapubic tenderness.  No CVA tenderness B/L  EXTREMITIES: (+) equal edema of bilateral legs.  Normal range of motion.  No clubbing or cyanosis  MUSCULOSKELETAL: No atrophy.  No asymmetry.  Good ROM  SKIN: No rashes. No ecchymoses.  No cyanosis  PSYCHIATRIC: A&O x 3.  Mood & affect appropriate to situation  NEUROLOGICAL: Non-focal, MOONEY x 4 against gravity  VASCULAR: Peripheral pulses palpable

## 2025-03-13 NOTE — PROGRESS NOTE ADULT - ASSESSMENT
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 Diagnosed with Pulmonary Embolism in the ED.

## 2025-03-13 NOTE — H&P ADULT - NSICDXPASTMEDICALHX_GEN_ALL_CORE_FT
PAST MEDICAL HISTORY:  Diabetes mellitus, type 2     History of abdominal pain     Hypertension     Hypothyroidism     Obstructive sleep apnea     Ovarian mass, right     Pituitary tumor     Pulmonary embolism     Right-sided headache

## 2025-03-13 NOTE — CONSULT NOTE ADULT - NS ATTEND AMEND GEN_ALL_CORE FT
The patient was seen and examined with the Cardiology Consultation Teaching Service.     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     The patient had previously been diagnosed with PE a very long time ago, and remained on warfarin for over ten years. She was eventually changed to apixaban. She underwent a hypercoagulable work-up as an outpatient, which was unremarkable. Although she was encouraged to continue anticoagulation, the patient had been noticing headaches and neurologic symptoms, and was being evaluated by neurosurgery for a pituitary adenoma. This prompted her to stop her anticoagulation given her concerns for bleeding complications.     Chest CT angiography demonstrates extensive bilateral pulmonary embolus and early right heart strain. Bilateral lower extremity Duplex did not demonstrate DVT.    PMH/PSH:  Diabetes  Hypertension  Dyslipidemia  GERD  Obstructive sleep apnea  Pituitary adenoma  Pulmonary embolism, unprovoked  Gastric bypass  Cholecystectomy  Appendectomy  Myomectomy    Comfortable-appearing woman in no acute distress  Alert and oriented  Afebrile  Vital signs stable  JVP is not elevated  Clear lungs  Normal heart sounds  Extremities are warm and perfused  No peripheral edema     Leukocytosis 13.8K  Normal coagulation  Hypokalemia 3.4  Metabolic acidosis 20, 14    Lactate 2.7    Hs-troponin 327, 220  Pro-BNP 66, 38    Echocardiography demonstrates normal LV systolic function. The RV is not well visualized, but it appears to be dilated with reduced RV function. TAPSE is normal. No significant valve dysfunction. Estimated RA pressure is normal.     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     Echocardiography demonstrates probably right heart strain and hs-troponin is elevated, making this an intermediate-high risk PE. The patient remains hemodynamically stable. Continue observation for 24-48 hours and anticoagulation with unfractionated heparin. Invasive management is currently not indicated.     Anticipate that the patient will be able to transition to apixaban. I would not consider this a treatment failure given the patient had discontinued the medication.    Continue to trend troponin until it decreases.   No need to trend BNP given it was normal.    Johnathan Vivar MD FAC FACP  Cardiology  [phone]

## 2025-03-13 NOTE — H&P ADULT - PROBLEM SELECTOR PLAN 6
Glucose = 127 on CMP  Self discontinued oral antidiabetic meds, due to hypoglycemic episodes  Continues on Ozempic 2 mg weekly on Sunday; patient endorses recent weight loss of 10 lbs over 2 to 3 weeks, but does not think this is related to Ozempic since Ozempic is longstanding therapy  - m-ISS per FS  - consistent carb diet  - f/u A1c level in the AM

## 2025-03-13 NOTE — CONSULT NOTE ADULT - ASSESSMENT
65F PMH PE formerly on chronic A/C, HTN, HLD, T2DM, ABBIE on CPAP, GERD admitted with bilateral PE with evidence of early right heart strain currently hemodynamically stable.  Case discussed with Eder mccabe, MICU Attending and Dr. Salinas, PERT Attending with plan to continue Heparin.

## 2025-03-13 NOTE — CONSULT NOTE ADULT - ASSESSMENT
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  No evidence of acute pulmonary infarction at that time. Subsequent TTE revealing RV not well visualized, RV appears enlarged with reduced RV systolic function.  RA not well visualized.  Interventional cardiology consulted for further evaluation.    #Pulmonary Embolus    - Appears HDS, AOX3, VSS, SpO2 97% RA   - Trops 220 >327, pBNP 197, Lactate 1.4  - CTA PE 3/13:  Extensive bilateral pulmonary embolus, as detailed above. Evidence of early right heart strain. No evidence of acute pulmonary infarction at this time  - LE Duplex 3/13:  negative for acute DVT  - TTE 3/13: EF normal, no RWMA. The RV is not well visualized. In limited views, the right ventricle appears enlarged, with reduced right ventricular systolic function. Right atrium was not well visualized.  - Interventional cards consulted: no acute CV interventions at this time given patient hemodynamic stability. Continue medical management as prescribed.  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  No evidence of acute pulmonary infarction at that time. Subsequent TTE revealing RV not well visualized, RV appears enlarged with reduced RV systolic function.  RA not well visualized.  Interventional cardiology consulted for further evaluation.    # Submassive Pulmonary Embolus    - Appears HDS, AOX3, VSS, SpO2 98% RA   - Mixed cardiac biomarkers:  Trops 220 >327 > 254, pBNP 197, Lactate 1.4  - CTA PE 3/13:  Extensive bilateral pulmonary embolus, as detailed above. Evidence of early right heart strain. No evidence of acute pulmonary infarction at this time  - LE Duplex 3/13: negative for acute DVT  - TTE 3/13: EF normal, no RWMA. The RV is not well visualized. In limited views, the right ventricle appears enlarged, with reduced right ventricular systolic function. Right atrium was not well visualized.  - Interventional cards consulted: no acute CV interventions at this time given patient hemodynamic stability. Continue medical management as prescribed.  May consider catheter directed thrombectomy if patient becomes hemodynamically unstable. Chronic PEs may require thrombectomy vs Endarterectomy  - continue Heparin gtt full AC Protocol, goal PTT 58-99, likely transition to Eliquis on 3/14 if tolerated (may consider life long AC given history of unprovoked PE)    - continue Atorvastatin 20mg daily  - continue Lisinopril 40mg daily  - monitor electrolytes, replete to keep K > 4amd > 2  - continue to monitor hemodynamic status, CTM Telemetry, VS per protocol, O2 saturation monitoring, supplemental O2 as needed- consider ambulatory saturation test on 3/14 if tolerated  - case discussed with interventionalist, Dr Tracy and Dr Harjeet Salinas        65-year-old female, 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  No evidence of acute pulmonary infarction at that time. Subsequent TTE revealing RV not well visualized, RV appears enlarged with reduced RV systolic function.  RA not well visualized.  Interventional cardiology consulted for further evaluation.    # Submassive Pulmonary Embolus    - Appears HDS, AOX3, VSS, SpO2 98% RA   - Mixed cardiac biomarkers:  Trops 220 >327 > 254, pBNP 197, Lactate 1.4  - CTA PE 3/13:  Extensive bilateral pulmonary embolus, as detailed above. Evidence of early right heart strain. No evidence of acute pulmonary infarction at this time  - LE Duplex 3/13: negative for acute DVT  - TTE 3/13: EF normal, no RWMA. The RV is not well visualized. In limited views, the right ventricle appears enlarged, with reduced right ventricular systolic function. Right atrium was not well visualized.  - Interventional cards consulted: no acute CV interventions at this time given patient hemodynamic stability. Continue medical management as prescribed.  May consider catheter directed thrombectomy if patient becomes hemodynamically unstable. Chronic PEs may require thrombectomy vs Endarterectomy  - continue Heparin gtt full AC Protocol, goal PTT 58-99, likely transition to Eliquis on 3/14 if tolerated (may consider life long AC given history of unprovoked PE)    - continue Atorvastatin 20mg daily  - continue Lisinopril 40mg daily  - monitor electrolytes, replete to keep K > 4amd > 2  - continue to monitor hemodynamic status, CTM Telemetry, VS per protocol, O2 saturation monitoring, supplemental O2 as needed  - consider ambulatory saturation test on 3/14 if tolerated  - case discussed with interventionalist, Dr Harjeet Salinas        65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  No evidence of acute pulmonary infarction at that time. Subsequent TTE revealing RV not well visualized, RV appears enlarged with reduced RV systolic function.  RA not well visualized.  Interventional cardiology consulted for further evaluation.    # Submassive Pulmonary Embolus    - Appears HDS, AOX3, VSS, SpO2 98% RA   - Mixed cardiac biomarkers:  Trops 220 >327 > 254, pBNP 197, Lactate 1.4  - CTA PE 3/13:  Extensive bilateral pulmonary embolus, as detailed above. Evidence of early right heart strain. No evidence of acute pulmonary infarction at this time  - LE Duplex 3/13: negative for acute DVT  - TTE 3/13: EF normal, no RWMA. The RV is not well visualized. In limited views, the right ventricle appears enlarged, with reduced right ventricular systolic function. Right atrium was not well visualized. TAPSE is 2.1 cm (normal >=1.7 cm)  - Interventional cards consulted: no acute CV interventions at this time given patient hemodynamic stability. Continue medical management as prescribed.  May consider catheter directed thrombectomy if patient becomes hemodynamically unstable. Chronic PEs may require thrombectomy vs Endarterectomy  - continue Heparin gtt full AC Protocol, goal PTT 58-99, likely transition to Eliquis on 3/14 if tolerated (may consider life long AC given history of unprovoked PE)    - continue Atorvastatin 20mg daily  - continue Lisinopril 40mg daily  - monitor electrolytes, replete to keep K > 4amd > 2  - continue to monitor hemodynamic status, CTM Telemetry, VS per protocol, O2 saturation monitoring, supplemental O2 as needed  - consider ambulatory saturation test on 3/14 if tolerated  - case discussed with interventionalist, Dr Harjeet Slainas

## 2025-03-13 NOTE — CONSULT NOTE ADULT - SUBJECTIVE AND OBJECTIVE BOX
HISTORY OF PRESENT ILLNESS:     This is a 65 year old woman with past medical history of Pulmonary Embolism formerly on chronic anticoagulation Hypertension, Hyperlipidemia, Type 2 Diabetes Mellitus, Obstructive Sleep Apnea, GERD with surgical history of gastric bypass, cholecystectomy, appendectomy, myomectomy presented to [facility] ED for new onset exertional SOB found bilateral pulmonary embolism in right mainstem extending to all lobar branches and left mainstem extending into the upper greater than lower lobar segmental branches with evidence of early right heart strain. Patient started on Heparin full anticoagulation protocol. 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  Patient reports she had a PE in 2015 of unknown provocation and was placed on Coumadin for 10 years with no recurrence.  In late January her PCP had her follow up with Hematology and she was converted to Eliquis and had a hypercoagulable work-up that was negative. She chose to stop taking Eliquis 2 weeks ago against recommendations to continue.  On assessment patient is A+OX3 able to speak in full sentences, SR , /86, Temp 98.7F, Lungs CTA, RR 18, SATing 100% on 2L O2 via NC, trace pedal edema.  Labs show WBC 13.86, no anemia, creatinine 0.46, Elevated Troponin 220, lactate 2.7, pBNP 38.        Allergies    No Known Drug Allergies  latex (Rash)    Intolerances    	    MEDICATIONS:  heparin   Injectable 19030 Unit(s) IV Push every 6 hours PRN  heparin   Injectable 5000 Unit(s) IV Push every 6 hours PRN  heparin  Infusion.  Unit(s)/Hr IV Continuous <Continuous>                  PAST MEDICAL & SURGICAL HISTORY:  Hypertension      Hypothyroid      Diabetes      Sleep apnea      History of abdominal pain      Pulmonary embolism      History of appendectomy      History of cholecystectomy      History of gastric bypass      S/P left oophorectomy      H/O myomectomy          FAMILY HISTORY:  No pertinent family history in first degree relatives        SOCIAL HISTORY:    [ ] Non-smoker  [ ] Smoker  [ ] Alcohol    REVIEW OF SYSTEMS:  See HPI, otherwise complete 10 point review of systems negative    PHYSICAL EXAM:  T(C): 36.6 (03-13-25 @ 01:43), Max: 36.6 (03-12-25 @ 22:10)  HR: 99 (03-13-25 @ 01:43) (91 - 99)  BP: 112/80 (03-13-25 @ 01:43) (112/80 - 128/72)  RR: 19 (03-13-25 @ 01:43) (16 - 19)  SpO2: 100% (03-13-25 @ 01:43) (92% - 100%)  Wt(kg): --  I&O's Summary      Appearance: No Acute Distress	  HEENT:  Normal oral mucosa, PERRL, EOMI	  Cardiovascular: Normal S1 S2, No JVD, No murmurs/rubs/gallops  Respiratory: Lungs clear to auscultation bilaterally  Gastrointestinal:  Soft, Non-tender, + BS	  Skin: No rashes, No ecchymoses, No cyanosis	  Neurologic: Non-focal  Extremities: No clubbing, cyanosis or edema  Vascular: Peripheral pulses palpable 2+ bilaterally  Psychiatry: A & O x 3, Mood & affect appropriate    LABS:	 	    CBC Full  -  ( 13 Mar 2025 00:05 )  WBC Count : 13.87 K/uL  Hemoglobin : 12.9 g/dL  Hematocrit : 40.6 %  Platelet Count - Automated : 250 K/uL  Mean Cell Volume : 88.1 fL  Mean Cell Hemoglobin : 28.0 pg  Mean Cell Hemoglobin Concentration : 31.8 g/dL  Auto Neutrophil # : x  Auto Lymphocyte # : x  Auto Monocyte # : x  Auto Eosinophil # : x  Auto Basophil # : x  Auto Neutrophil % : x  Auto Lymphocyte % : x  Auto Monocyte % : x  Auto Eosinophil % : x  Auto Basophil % : x    03-13    135  |  101  |  15  ----------------------------<  127[H]  3.4[L]   |  20[L]  |  0.46[L]    Ca    9.5      13 Mar 2025 00:05  Phos  3.6     03-13  Mg     2.00     03-13    TPro  6.5  /  Alb  3.9  /  TBili  0.9  /  DBili  x   /  AST  22  /  ALT  18  /  AlkPhos  129[H]  03-13      proBNP:   Lipid Profile:   HgA1c:   TSH:       CARDIAC MARKERS:            TELEMETRY: 	    ECG:  	  RADIOLOGY:  < from: CT Angio Chest PE Protocol w/ IV Cont (03.13.25 @ 00:45) >    ACC: [id number] EXAM:  CT ANGIO CHEST PULM ART WAWIC   ORDERED BY: EMERY GARDNER     PROCEDURE DATE:  03/13/2025          INTERPRETATION:  CLINICAL INFORMATION: Dyspnea on exertion. History of PE   with anticoagulation discontinued approximately 2weeks ago.    COMPARISON: None.    CONTRAST/COMPLICATIONS:  IV Contrast: Omnipaque 350  65 cc administered   35 cc discarded  Oral Contrast: NONE  .    PROCEDURE:  CT Angiography of the Chest.  Sagittal and coronal reformats were performed as well as3D (MIP)   reconstructions.    FINDINGS:    LUNGS AND LARGE AIRWAYS: Patent central airways. Few sub-5 mm stable   pleural-based nodules, for example in the right lower lobe on image   [id number]). Lungs are otherwise clear. No compelling evidence for pulmonary   infarct.  PLEURA: No pleural effusion.  VESSELS: Extensive bilateral PE with areas of thrombus within the right   mainstem pulmonary artery and extending into all visualized lobar   branches and into multiple segmental and subsegmental divisions.   Left-sided thrombus at the branch point of the left main pulmonary with   areas of extension into the upper greater than lower lobar segmental   branches. Few calcified plaques along the aorta.  HEART: Heart size is normal. There is mild flattening of the   interventricular septum. The right ventricle left ventricle ratio is   greater than 1:1. No pericardial effusion.  MEDIASTINUM AND ASRA: No lymphadenopathy.  CHEST WALL AND LOWER NECK: Within normal limits.  VISUALIZED UPPER ABDOMEN: Status post gastric bypass. 2.1 cm left adrenal   adenoma.  BONES: Degenerative changes of spine.    IMPRESSION:  Extensive bilateral pulmonary embolus, as detailed above. Evidence of   early right heart strain. No evidence of acute pulmonary infarctionat   this time.    Findings were discussed with Dr. Gaming of the primary emergency   department at 3/13/2025 1:09 AM by Dr. Calle of radiology with read   back confirmation.    < end of copied text >    OTHER: 	    PREVIOUS DIAGNOSTIC TESTING:    [ ] Echocardiogram:  [ ] Catheterization:  [ ] Stress Test:

## 2025-03-13 NOTE — CONSULT NOTE ADULT - PROBLEM SELECTOR RECOMMENDATION 9
Admit to Telemetry  Continue Heparin gtt full AC  Trend Troponins q8h x 3, Lactate q8h x2 and pBNP q8h x 2  STAT TTE to evaluate RV Strain  Daily CBC, BMP Mag Phos, PT/INR/PTT  If patient become hemodynamically unstable contact [id number]  Cardiology following

## 2025-03-13 NOTE — H&P ADULT - PROBLEM SELECTOR PLAN 1
Patient with sudden onset of shortness of breath, dyspnea on exertion, associated with palpitations - while ambulating   Anterior chest tightness  Self discontinued Eliquis ~ 2 weeks ago.  Was on Eliquis since the latter half of 2024, and was on warfarin for several years prior to then (10 or more years).  No family history of coagulopathy  CTA = "Extensive bilateral pulmonary embolus, as detailed above. Evidence of early right heart strain. No evidence of acute pulmonary infarction at this time."  - US of B/L LE negative for DVT  - Possibility that PE is of multifactorial etiology; cessation of AC, obesity, and complicated by topiramate tx  - on heparin drip; f/u PTT  - seen by Cardiology Team (appreciated)  - f/u TTE (ordered)  - HOB elevation, aspiration precautions  - continuing supplemental oxygen via nasal cannula (on CPAP nightly)  - f/u pulse oximetry

## 2025-03-13 NOTE — PATIENT PROFILE ADULT - FALL HARM RISK - HARM RISK INTERVENTIONS

## 2025-03-13 NOTE — H&P ADULT - PROBLEM SELECTOR PLAN 2
CTA notes right heart strain, in the setting of extensive bilateral PE  Troponin = 220 -->> 327.  Pro-BNP = 38 -->> 66.  Lactate = 2.7  ECGs as noted above  Seen by Cardiology Team (appreciated)  - f/u TTE (ordered)  - f/u repeat troponin, Pro-BNP, lactate CTA notes right heart strain, in the setting of extensive bilateral PE  Troponin = 220 -->> 327.  Pro-BNP = 38 -->> 66.  Lactate = 2.7  ECGs as noted above  Seen by Cardiology Team (appreciated)  - f/u TTE (ordered)  - f/u repeat troponin, Pro-BNP, lactate  - f/u with Cardiology Team for further recommendations

## 2025-03-13 NOTE — H&P ADULT - PROBLEM SELECTOR PLAN 4
Dry oral mucosa, with hemoconcentrated lab-work  BUN/Cr ratio = 33:1  - prescribing IVF of NaCl one liter  - encourage oral hydration  - f/u renal function, electrolytes, clinical status for improvement/resolution

## 2025-03-13 NOTE — CONSULT NOTE ADULT - SUBJECTIVE AND OBJECTIVE BOX
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  Seen and evaluated at bedside; NAD.  Patient reports onset of chest pain and shortness of breath (SOB worse) while ambulating to the bathroom at around 9:20 AM 2 days ago.  Pain involves the anterior chest and is associated with palpitations.  Patient reports being prescribed warfarin many years ago (> 10 years) for pulmonary embolism.  About mid , patient's new PMD referred patient to a hematologist to assess transitioning to Eliquis.  Patient underwent complete evaluation, including dopplers of the lower extremities, and was cleared for starting Eliquis, with the supposition for possibility of discontinuation; patient subsequently began Eliquis.  Two (2) weeks ago, patient discontinued Eliquis.  States no unusual swelling of the legs recently (has intermittent edema at baseline, treated with HCTZ 25 mg one to 2 times daily).  Prior PE was associated with pronounced leg swelling.  No report of fever, chills, cough, or any other related signs/symptoms.    Other history involves patient suffering with new onset of intermittent severe right sided headache, for which she is prescribed Ubrelvy and topiramate; has been using chiefly topiramate, but without resolution of headache.  Patient reports being diagnosed with a pituitary tumor over the past few months.  Has undergone work-up, including with an endocrinologist; hormone levels have been "normal," per patient.  For the pituitary tumor, patient h as an appointment with a neurosurgeon on 2025.  History over recent months, also involves a right ovarian tumor, for which patient underwent different studies, including a PET scan, which has been negative.  Recent study indicates that the ovarian tumor has shrunken in size, per patient.    Vital signs upon ED presentation as follows: BP = 119/86, HR = 91, RR = 16, T = 36.6 C (97.8 F), O2 Sat = 92% on RA.  Diagnosed with Pulmonary Embolism and started on heparin drip in the ED. (13 Mar 2025 07:10)      PMHx:   Hypertension    Hypothyroid    Diabetes    Sleep apnea    History of abdominal pain    Pulmonary embolism    Ovarian mass, right    Pituitary tumor    Right-sided headache    Obstructive sleep apnea    Diabetes mellitus, type 2    Hypothyroidism        PSHx:   No significant past surgical history    History of appendectomy    History of cholecystectomy    History of gastric bypass    S/P left oophorectomy    H/O myomectomy        Allergies:  No Known Drug Allergies  latex (Rash)      Home Meds:    Current Medications:   acetaminophen     Tablet .. 650 milliGRAM(s) Oral every 6 hours PRN  atorvastatin 20 milliGRAM(s) Oral at bedtime  famotidine    Tablet 20 milliGRAM(s) Oral daily  heparin   Injectable 48727 Unit(s) IV Push every 6 hours PRN  heparin   Injectable 5000 Unit(s) IV Push every 6 hours PRN  heparin  Infusion.  Unit(s)/Hr IV Continuous <Continuous>  levothyroxine 75 MICROGram(s) Oral daily  lisinopril 40 milliGRAM(s) Oral daily  melatonin 3 milliGRAM(s) Oral at bedtime PRN  pantoprazole    Tablet 40 milliGRAM(s) Oral before breakfast  potassium chloride    Tablet ER 40 milliEquivalent(s) Oral once  potassium chloride    Tablet ER 20 milliEquivalent(s) Oral once  sertraline 200 milliGRAM(s) Oral daily      FAMILY HISTORY:  Family history of cerebrovascular accident (CVA) (Father)    Family history of diabetes mellitus (Father)    Family history of breast cancer (Mother)    Family history of other heart disease (Father, Mother)    Family history of epilepsy (Mother)    Social History, Smoking History and Alcohol Use: See HPI      REVIEW OF SYSTEMS:  Constitutional:     [ ] negative [ ] fevers [ ] chills [ ] weight loss [ ] weight gain  HEENT:                  [ ] negative [ ] dry eyes [ ] eye irritation [ ] postnasal drip [ ] nasal congestion  CV:                         [ ] negative  [ ] chest pain [ ] orthopnea [ ] palpitations [ ] murmur  Resp:                     [ ] negative [ ] cough [ ] shortness of breath [ ] dyspnea [ ] wheezing [ ] sputum [ ]hemoptysis  GI:                          [ ] negative [ ] nausea [ ] vomiting [ ] diarrhea [ ] constipation [ ] abd pain [ ] dysphagia   :                        [ ] negative [ ] dysuria [ ] nocturia [ ] hematuria [ ] increased urinary frequency  Musculoskeletal: [ ] negative [ ] back pain [ ] myalgias [ ] arthralgias [ ] fracture  Skin:                       [ ] negative [ ] rash [ ] itch  Neurological:        [ ] negative [ ] headache [ ] dizziness [ ] syncope [ ] weakness [ ] numbness  Psychiatric:           [ ] negative [ ] anxiety [ ] depression  Endocrine:            [ ] negative [ ] diabetes [ ] thyroid problem  Heme/Lymph:      [ ] negative [ ] anemia [ ] bleeding problem  Allergic/Immune: [ ] negative [ ] itchy eyes [ ] nasal discharge [ ] hives [ ] angioedema    [ ] All other systems negative  [ ] Unable to assess ROS due to      Physical Exam:  T(F): 97.9 (), Max: 97.9 ()  HR: 89 () (89 - 99)  BP: 116/75 () (112/80 - 128/72)  RR: 17 ()  SpO2: 98% ()    GENERAL: No acute distress, well-developed  HEAD:  Atraumatic, Normocephalic  ENT: EOMI, PERRLA, conjunctiva and sclera clear, Neck supple, No JVD, moist mucosa  CHEST/LUNG: Clear to auscultation bilaterally; No wheeze, equal breath sounds bilaterally   BACK: No spinal tenderness  HEART: Regular rate and rhythm; No murmurs, rubs, or gallops  ABDOMEN: Soft, Nontender, Nondistended; Bowel sounds present  EXTREMITIES:  No clubbing, cyanosis, or edema  PSYCH: Nl behavior, nl affect  NEUROLOGY: AAOx3, non-focal, cranial nerves intact  SKIN: Normal color, No rashes or lesions  LINES:  none        Labs: Personally reviewed                        12.2   9.79  )-----------( 276      ( 13 Mar 2025 07:20 )             37.0         141  |  103  |  13  ----------------------------<  117[H]  3.3[L]   |  25  |  0.54    Ca    9.7      13 Mar 2025 07:20  Phos  3.3     -  Mg     2.00         TPro  6.3  /  Alb  4.0  /  TBili  1.0  /  DBili  x   /  AST  15  /  ALT  17  /  AlkPhos  127[H]      PT/INR - ( 13 Mar 2025 07:20 )   PT: 12.4 sec;   INR: 1.04 ratio         PTT - ( 13 Mar 2025 07:20 )  PTT:120.5 sec    Total Cholesterol: 112  LDL: --  HDL: 31  T      Thyroid Stimulating Hormone, Serum: 1.56 uIU/mL ( @ 07:20)    Diagnostic Imaging  ==============    < from: CT Angio Chest PE Protocol w/ IV Cont (25 @ 00:45) >    IMPRESSION:  Extensive bilateral pulmonary embolus, as detailed above. Evidence of   early right heart strain. No evidence of acute pulmonary infarctionat   this time.    Findings were discussed with Dr. Gaming of the primary emergency   department at 3/13/2025 1:09 AM by Dr. Calle of radiology with read   back confirmation.    ------------------------------------------------------------------------------------------------------    < from: US Duplex Venous Lower Ext Complete, Bilateral (25 @ 03:59) >    IMPRESSION:  No evidence of deep venous thrombosis in either lower extremity.    Stable appearing bilateral popliteal fossa Baker's cysts.    -------------------------------------------------------------------------------------------------------         Patient seen and evaluated at bedside, appears HDS, on room air, speaking in full sentences, denies active CP, SOB, palpitations, diaphoresis, orthopnea, PND, dizziness, pre-syncope, syncope, acute bleeding, LE swelling or any other complaints at this time.     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  Seen and evaluated at bedside; NAD.  Patient reports onset of chest pain and shortness of breath (SOB worse) while ambulating to the bathroom at around 9:20 AM 2 days ago.  Pain involves the anterior chest and is associated with palpitations.  Patient reports being prescribed warfarin many years ago (> 10 years) for pulmonary embolism.  About mid , patient's new PMD referred patient to a hematologist to assess transitioning to Eliquis.  Patient underwent complete evaluation, including dopplers of the lower extremities, and was cleared for starting Eliquis, with the supposition for possibility of discontinuation; patient subsequently began Eliquis.  Two (2) weeks ago, patient discontinued Eliquis.  States no unusual swelling of the legs recently (has intermittent edema at baseline, treated with HCTZ 25 mg one to 2 times daily).  Prior PE was associated with pronounced leg swelling.  No report of fever, chills, cough, or any other related signs/symptoms.    Other history involves patient suffering with new onset of intermittent severe right sided headache, for which she is prescribed Ubrelvy and topiramate; has been using chiefly topiramate, but without resolution of headache.  Patient reports being diagnosed with a pituitary tumor over the past few months.  Has undergone work-up, including with an endocrinologist; hormone levels have been "normal," per patient.  For the pituitary tumor, patient h as an appointment with a neurosurgeon on 2025.  History over recent months, also involves a right ovarian tumor, for which patient underwent different studies, including a PET scan, which has been negative.  Recent study indicates that the ovarian tumor has shrunken in size, per patient.    Vital signs upon ED presentation as follows: BP = 119/86, HR = 91, RR = 16, T = 36.6 C (97.8 F), O2 Sat = 92% on RA.  Diagnosed with Pulmonary Embolism and started on heparin drip in the ED. (13 Mar 2025 07:10)      PMHx:   Hypertension    Hypothyroid    Diabetes    Sleep apnea    History of abdominal pain    Pulmonary embolism    Ovarian mass, right    Pituitary tumor    Right-sided headache    Obstructive sleep apnea    Diabetes mellitus, type 2    Hypothyroidism        PSHx:   No significant past surgical history    History of appendectomy    History of cholecystectomy    History of gastric bypass    S/P left oophorectomy    H/O myomectomy    Allergies:  No Known Drug Allergies  latex (Rash)      Home Meds:    Current Medications:   acetaminophen     Tablet .. 650 milliGRAM(s) Oral every 6 hours PRN  atorvastatin 20 milliGRAM(s) Oral at bedtime  famotidine    Tablet 20 milliGRAM(s) Oral daily  heparin   Injectable 29469 Unit(s) IV Push every 6 hours PRN  heparin   Injectable 5000 Unit(s) IV Push every 6 hours PRN  heparin  Infusion.  Unit(s)/Hr IV Continuous <Continuous>  levothyroxine 75 MICROGram(s) Oral daily  lisinopril 40 milliGRAM(s) Oral daily  melatonin 3 milliGRAM(s) Oral at bedtime PRN  pantoprazole    Tablet 40 milliGRAM(s) Oral before breakfast  potassium chloride    Tablet ER 40 milliEquivalent(s) Oral once  potassium chloride    Tablet ER 20 milliEquivalent(s) Oral once  sertraline 200 milliGRAM(s) Oral daily    FAMILY HISTORY:  Family history of cerebrovascular accident (CVA) (Father)    Family history of diabetes mellitus (Father)    Family history of breast cancer (Mother)    Family history of other heart disease (Father, Mother)    Family history of epilepsy (Mother)    Social History, Smoking History and Alcohol Use: See HPI      REVIEW OF SYSTEMS:  Constitutional:     [ ] negative [ ] fevers [ ] chills [ ] weight loss [ ] weight gain  HEENT:                  [ ] negative [ ] dry eyes [ ] eye irritation [ ] postnasal drip [ ] nasal congestion  CV:                         [ ] negative  [ ] chest pain [ ] orthopnea [ ] palpitations [ ] murmur  Resp:                     [ ] negative [ ] cough [ ] shortness of breath [ ] dyspnea [ ] wheezing [ ] sputum [ ]hemoptysis  GI:                          [ ] negative [ ] nausea [ ] vomiting [ ] diarrhea [ ] constipation [ ] abd pain [ ] dysphagia   :                        [ ] negative [ ] dysuria [ ] nocturia [ ] hematuria [ ] increased urinary frequency  Musculoskeletal: [ ] negative [ ] back pain [ ] myalgias [ ] arthralgias [ ] fracture  Skin:                       [ ] negative [ ] rash [ ] itch  Neurological:        [ ] negative [ ] headache [ ] dizziness [ ] syncope [ ] weakness [ ] numbness  Psychiatric:           [ ] negative [ ] anxiety [ ] depression  Endocrine:            [ ] negative [ ] diabetes [ ] thyroid problem  Heme/Lymph:      [ ] negative [ ] anemia [ ] bleeding problem  Allergic/Immune: [ ] negative [ ] itchy eyes [ ] nasal discharge [ ] hives [ ] angioedema    [ ] All other systems negative  [ ] Unable to assess ROS due to      Physical Exam:  T(F): 97.9 (), Max: 97.9 ()  HR: 89 () (89 - 99)  BP: 116/75 () (112/80 - 128/72)  RR: 17 ()  SpO2: 98% ()    GENERAL: Obese female, no acute distress, well-developed  HEAD:  Atraumatic, Normocephalic  ENT: EOMI, PERRLA, conjunctiva and sclera clear, Neck supple, No JVD, moist mucosa  CHEST/LUNG: Clear to auscultation bilaterally; No wheeze, equal breath sounds bilaterally   BACK: No spinal tenderness  HEART: Regular rate and rhythm; No murmurs, rubs, or gallops  ABDOMEN: Soft, Nontender, Nondistended; Bowel sounds present  EXTREMITIES:  No clubbing, cyanosis, or edema  PSYCH: Nl behavior, nl affect  NEUROLOGY: AAOx3, non-focal, cranial nerves intact  SKIN: Normal color, No rashes or lesions  LINES:  none      Labs: Personally reviewed                        12.2   9.79  )-----------( 276      ( 13 Mar 2025 07:20 )             37.0         141  |  103  |  13  ----------------------------<  117[H]  3.3[L]   |  25  |  0.54    Ca    9.7      13 Mar 2025 07:20  Phos  3.3       Mg     2.00         TPro  6.3  /  Alb  4.0  /  TBili  1.0  /  DBili  x   /  AST  15  /  ALT  17  /  AlkPhos  127[H]      PT/INR - ( 13 Mar 2025 07:20 )   PT: 12.4 sec;   INR: 1.04 ratio         PTT - ( 13 Mar 2025 07:20 )  PTT:120.5 sec    Total Cholesterol: 112  LDL: --  HDL: 31  T      Thyroid Stimulating Hormone, Serum: 1.56 uIU/mL ( @ 07:20)    Diagnostic Imaging  ==============    < from: CT Angio Chest PE Protocol w/ IV Cont (25 @ 00:45) >    IMPRESSION:  Extensive bilateral pulmonary embolus, as detailed above. Evidence of   early right heart strain. No evidence of acute pulmonary infarctionat   this time.    Findings were discussed with Dr. Gaming of the primary emergency   department at 3/13/2025 1:09 AM by Dr. Calle of radiology with read   back confirmation.    ------------------------------------------------------------------------------------------------------    < from: US Duplex Venous Lower Ext Complete, Bilateral (25 @ 03:59) >    IMPRESSION:  No evidence of deep venous thrombosis in either lower extremity.    Stable appearing bilateral popliteal fossa Baker's cysts.    -------------------------------------------------------------------------------------------------------    < from: TTE W or WO Ultrasound Enhancing Agent (25 @ 07:35) >    CONCLUSIONS:      1. Technically difficult image quality.   2. Left ventricular cavity is normal in size. Left ventricular wall thickness is normal. Left ventricular systolic function is normal. There are no regional wall motion abnormalities seen.   3. The right ventricle is not well visualized. In limited views, the right ventricle appears enlarged, with reduced right ventricular systolic function.   4. Right atrium was not well visualized.   5. Aortic valve anatomy cannot be determined with normal systolic excursion.   6. No pericardial effusion seen.    ________________________________________________________________________________________                Incomplete    Patient seen and evaluated at bedside, appears HDS, on room air, speaking in full sentences, denies active CP, SOB, palpitations, diaphoresis, orthopnea, PND, dizziness, pre-syncope, syncope, acute bleeding, LE swelling or any other complaints at this time.     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  Seen and evaluated at bedside; NAD.  Patient reports onset of chest pain and shortness of breath (SOB worse) while ambulating to the bathroom at around 9:20 AM 2 days ago.  Pain involves the anterior chest and is associated with palpitations.  Patient reports being prescribed warfarin many years ago (> 10 years) for pulmonary embolism.  About mid , patient's new PMD referred patient to a hematologist to assess transitioning to Eliquis.  Patient underwent complete evaluation, including dopplers of the lower extremities, and was cleared for starting Eliquis, with the supposition for possibility of discontinuation; patient subsequently began Eliquis.  Two (2) weeks ago, patient discontinued Eliquis.  States no unusual swelling of the legs recently (has intermittent edema at baseline, treated with HCTZ 25 mg one to 2 times daily).  Prior PE was associated with pronounced leg swelling.  No report of fever, chills, cough, or any other related signs/symptoms.    Other history involves patient suffering with new onset of intermittent severe right sided headache, for which she is prescribed Ubrelvy and topiramate; has been using chiefly topiramate, but without resolution of headache.  Patient reports being diagnosed with a pituitary tumor over the past few months.  Has undergone work-up, including with an endocrinologist; hormone levels have been "normal," per patient.  For the pituitary tumor, patient h as an appointment with a neurosurgeon on 2025.  History over recent months, also involves a right ovarian tumor, for which patient underwent different studies, including a PET scan, which has been negative.  Recent study indicates that the ovarian tumor has shrunken in size, per patient.    Vital signs upon ED presentation as follows: BP = 119/86, HR = 91, RR = 16, T = 36.6 C (97.8 F), O2 Sat = 92% on RA.  Diagnosed with Pulmonary Embolism and started on heparin drip in the ED. (13 Mar 2025 07:10)      PMHx:   Hypertension    Hypothyroid    Diabetes    Sleep apnea    History of abdominal pain    Pulmonary embolism    Ovarian mass, right    Pituitary tumor    Right-sided headache    Obstructive sleep apnea    Diabetes mellitus, type 2    Hypothyroidism        PSHx:   No significant past surgical history    History of appendectomy    History of cholecystectomy    History of gastric bypass    S/P left oophorectomy    H/O myomectomy    Allergies:  No Known Drug Allergies  latex (Rash)      Home Meds:    Current Medications:   acetaminophen     Tablet .. 650 milliGRAM(s) Oral every 6 hours PRN  atorvastatin 20 milliGRAM(s) Oral at bedtime  famotidine    Tablet 20 milliGRAM(s) Oral daily  heparin   Injectable 48618 Unit(s) IV Push every 6 hours PRN  heparin   Injectable 5000 Unit(s) IV Push every 6 hours PRN  heparin  Infusion.  Unit(s)/Hr IV Continuous <Continuous>  levothyroxine 75 MICROGram(s) Oral daily  lisinopril 40 milliGRAM(s) Oral daily  melatonin 3 milliGRAM(s) Oral at bedtime PRN  pantoprazole    Tablet 40 milliGRAM(s) Oral before breakfast  potassium chloride    Tablet ER 40 milliEquivalent(s) Oral once  potassium chloride    Tablet ER 20 milliEquivalent(s) Oral once  sertraline 200 milliGRAM(s) Oral daily    FAMILY HISTORY:  Family history of cerebrovascular accident (CVA) (Father)    Family history of diabetes mellitus (Father)    Family history of breast cancer (Mother)    Family history of other heart disease (Father, Mother)    Family history of epilepsy (Mother)    Social History, Smoking History and Alcohol Use: See HPI      REVIEW OF SYSTEMS:  Constitutional:     [ ] negative [ ] fevers [ ] chills [ ] weight loss [ ] weight gain  HEENT:                  [ ] negative [ ] dry eyes [ ] eye irritation [ ] postnasal drip [ ] nasal congestion  CV:                         [ ] negative  [ ] chest pain [ ] orthopnea [ ] palpitations [ ] murmur  Resp:                     [ ] negative [ ] cough [ ] shortness of breath [ ] dyspnea [ ] wheezing [ ] sputum [ ]hemoptysis  GI:                          [ ] negative [ ] nausea [ ] vomiting [ ] diarrhea [ ] constipation [ ] abd pain [ ] dysphagia   :                        [ ] negative [ ] dysuria [ ] nocturia [ ] hematuria [ ] increased urinary frequency  Musculoskeletal: [ ] negative [ ] back pain [ ] myalgias [ ] arthralgias [ ] fracture  Skin:                       [ ] negative [ ] rash [ ] itch  Neurological:        [ ] negative [ ] headache [ ] dizziness [ ] syncope [ ] weakness [ ] numbness  Psychiatric:           [ ] negative [ ] anxiety [ ] depression  Endocrine:            [ ] negative [ ] diabetes [ ] thyroid problem  Heme/Lymph:      [ ] negative [ ] anemia [ ] bleeding problem  Allergic/Immune: [ ] negative [ ] itchy eyes [ ] nasal discharge [ ] hives [ ] angioedema    [ ] All other systems negative  [ ] Unable to assess ROS due to      Physical Exam:  T(F): 97.9 (), Max: 97.9 ()  HR: 89 () (89 - 99)  BP: 116/75 () (112/80 - 128/72)  RR: 17 ()  SpO2: 98% ()    GENERAL: Obese female, no acute distress, well-developed  HEAD:  Atraumatic, Normocephalic  ENT: EOMI, PERRLA, conjunctiva and sclera clear, Neck supple, No JVD, moist mucosa  CHEST/LUNG: Clear to auscultation bilaterally; No wheeze, equal breath sounds bilaterally   BACK: No spinal tenderness  HEART: Regular rate and rhythm; No murmurs, rubs, or gallops  ABDOMEN: Soft, Nontender, Nondistended; Bowel sounds present  EXTREMITIES:  No clubbing, cyanosis, or edema  PSYCH: Nl behavior, nl affect  NEUROLOGY: AAOx3, non-focal, cranial nerves intact  SKIN: Normal color, No rashes or lesions  LINES:  none      Labs: Personally reviewed                        12.2   9.79  )-----------( 276      ( 13 Mar 2025 07:20 )             37.0     -    141  |  103  |  13  ----------------------------<  117[H]  3.3[L]   |  25  |  0.54    Ca    9.7      13 Mar 2025 07:20  Phos  3.3       Mg     2.00         TPro  6.3  /  Alb  4.0  /  TBili  1.0  /  DBili  x   /  AST  15  /  ALT  17  /  AlkPhos  127[H]      PT/INR - ( 13 Mar 2025 07:20 )   PT: 12.4 sec;   INR: 1.04 ratio         PTT - ( 13 Mar 2025 07:20 )  PTT:120.5 sec    Total Cholesterol: 112  LDL: --  HDL: 31  T      Thyroid Stimulating Hormone, Serum: 1.56 uIU/mL ( @ 07:20)    Diagnostic Imaging  ==============    < from: CT Angio Chest PE Protocol w/ IV Cont (25 @ 00:45) >    IMPRESSION:  Extensive bilateral pulmonary embolus, as detailed above. Evidence of   early right heart strain. No evidence of acute pulmonary infarctionat   this time.    Findings were discussed with Dr. Gaming of the primary emergency   department at 3/13/2025 1:09 AM by Dr. Calle of radiology with read   back confirmation.    ------------------------------------------------------------------------------------------------------    < from: US Duplex Venous Lower Ext Complete, Bilateral (25 @ 03:59) >    IMPRESSION:  No evidence of deep venous thrombosis in either lower extremity.    Stable appearing bilateral popliteal fossa Baker's cysts.    -------------------------------------------------------------------------------------------------------    < from: TTE W or WO Ultrasound Enhancing Agent (25 @ 07:35) >    CONCLUSIONS:      1. Technically difficult image quality.   2. Left ventricular cavity is normal in size. Left ventricular wall thickness is normal. Left ventricular systolic function is normal. There are no regional wall motion abnormalities seen.   3. The right ventricle is not well visualized. In limited views, the right ventricle appears enlarged, with reduced right ventricular systolic function.   4. Right atrium was not well visualized.   5. Aortic valve anatomy cannot be determined with normal systolic excursion.   6. No pericardial effusion seen.    ________________________________________________________________________________________                Patient seen and evaluated at bedside, appears HDS, on room air, speaking in full sentences, denies active CP, SOB, palpitations, diaphoresis, orthopnea, PND, dizziness, pre-syncope, syncope, acute bleeding, LE swelling or any other complaints at this time.     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  Seen and evaluated at bedside; NAD.  Patient reports onset of chest pain and shortness of breath (SOB worse) while ambulating to the bathroom at around 9:20 AM 2 days ago.  Pain involves the anterior chest and is associated with palpitations.  Patient reports being prescribed warfarin many years ago (> 10 years) for pulmonary embolism.  About mid , patient's new PMD referred patient to a hematologist to assess transitioning to Eliquis.  Patient underwent complete evaluation, including dopplers of the lower extremities, and was cleared for starting Eliquis, with the supposition for possibility of discontinuation; patient subsequently began Eliquis.  Two (2) weeks ago, patient discontinued Eliquis.  States no unusual swelling of the legs recently (has intermittent edema at baseline, treated with HCTZ 25 mg one to 2 times daily).  Prior PE was associated with pronounced leg swelling.  No report of fever, chills, cough, or any other related signs/symptoms.    Other history involves patient suffering with new onset of intermittent severe right sided headache, for which she is prescribed Ubrelvy and topiramate; has been using chiefly topiramate, but without resolution of headache.  Patient reports being diagnosed with a pituitary tumor over the past few months.  Has undergone work-up, including with an endocrinologist; hormone levels have been "normal," per patient.  For the pituitary tumor, patient h as an appointment with a neurosurgeon on 2025.  History over recent months, also involves a right ovarian tumor, for which patient underwent different studies, including a PET scan, which has been negative.  Recent study indicates that the ovarian tumor has shrunken in size, per patient.    Vital signs upon ED presentation as follows: BP = 119/86, HR = 91, RR = 16, T = 36.6 C (97.8 F), O2 Sat = 92% on RA.  Diagnosed with Pulmonary Embolism and started on heparin drip in the ED. (13 Mar 2025 07:10)      PMHx:   Hypertension    Hypothyroid    Diabetes    Sleep apnea    History of abdominal pain    Pulmonary embolism    Ovarian mass, right    Pituitary tumor    Right-sided headache    Obstructive sleep apnea    Diabetes mellitus, type 2    Hypothyroidism    PSHx:   No significant past surgical history    History of appendectomy    History of cholecystectomy    History of gastric bypass    S/P left oophorectomy    H/O myomectomy    Allergies:  No Known Drug Allergies  latex (Rash)      Home Meds:    Current Medications:   acetaminophen     Tablet .. 650 milliGRAM(s) Oral every 6 hours PRN  atorvastatin 20 milliGRAM(s) Oral at bedtime  famotidine    Tablet 20 milliGRAM(s) Oral daily  heparin   Injectable 58535 Unit(s) IV Push every 6 hours PRN  heparin   Injectable 5000 Unit(s) IV Push every 6 hours PRN  heparin  Infusion.  Unit(s)/Hr IV Continuous <Continuous>  levothyroxine 75 MICROGram(s) Oral daily  lisinopril 40 milliGRAM(s) Oral daily  melatonin 3 milliGRAM(s) Oral at bedtime PRN  pantoprazole    Tablet 40 milliGRAM(s) Oral before breakfast  potassium chloride    Tablet ER 40 milliEquivalent(s) Oral once  potassium chloride    Tablet ER 20 milliEquivalent(s) Oral once  sertraline 200 milliGRAM(s) Oral daily    FAMILY HISTORY:  Family history of cerebrovascular accident (CVA) (Father)    Family history of diabetes mellitus (Father)    Family history of breast cancer (Mother)    Family history of other heart disease (Father, Mother)    Family history of epilepsy (Mother)    Social History, Smoking History and Alcohol Use: See HPI      REVIEW OF SYSTEMS:  Constitutional:     [ ] negative [ ] fevers [ ] chills [ ] weight loss [ ] weight gain  HEENT:                  [ ] negative [ ] dry eyes [ ] eye irritation [ ] postnasal drip [ ] nasal congestion  CV:                         [ ] negative  [x] chest pain [ ] orthopnea [ ] palpitations [ ] murmur  Resp:                     [ ] negative [ ] cough [x] shortness of breath [ ] dyspnea [ ] wheezing [ ] sputum [ ]hemoptysis  GI:                          [ ] negative [ ] nausea [ ] vomiting [ ] diarrhea [ ] constipation [ ] abd pain [ ] dysphagia   :                        [ ] negative [ ] dysuria [ ] nocturia [ ] hematuria [ ] increased urinary frequency  Musculoskeletal: [ ] negative [ ] back pain [ ] myalgias [ ] arthralgias [ ] fracture  Skin:                       [ ] negative [ ] rash [ ] itch  Neurological:        [ ] negative [ ] headache [ ] dizziness [ ] syncope [ ] weakness [ ] numbness  Psychiatric:           [ ] negative [ ] anxiety [ ] depression  Endocrine:            [ ] negative [ ] diabetes [ ] thyroid problem  Heme/Lymph:      [ ] negative [ ] anemia [ ] bleeding problem  Allergic/Immune: [ ] negative [ ] itchy eyes [ ] nasal discharge [ ] hives [ ] angioedema    [ ] All other systems negative  [ ] Unable to assess ROS due to      Physical Exam:  T(F): 97.9 (), Max: 97.9 ()  HR: 89 () (89 - 99)  BP: 116/75 () (112/80 - 128/72)  RR: 17 ()  SpO2: 98% ()    GENERAL: Obese female, no acute distress, well-developed, on supplemental O2  HEAD:  Atraumatic, Normocephalic  ENT: EOMI, PERRLA, conjunctiva and sclera clear, Neck supple, No JVD, moist mucosa  CHEST/LUNG: Clear to auscultation bilaterally; No wheeze, equal breath sounds bilaterally   BACK: No spinal tenderness  HEART: Regular rate and rhythm; No murmurs, rubs, or gallops  ABDOMEN: Obese, soft, Nontender, Nondistended; Bowel sounds present  EXTREMITIES:  No clubbing, cyanosis, or edema  PSYCH: Nl behavior, nl affect  NEUROLOGY: AAOx3, non-focal, cranial nerves intact  SKIN: Normal color, No rashes or lesions  LINES:  none      Labs: Personally reviewed                        12.2   9.79  )-----------( 276      ( 13 Mar 2025 07:20 )             37.0     -13    141  |  103  |  13  ----------------------------<  117[H]  3.3[L]   |  25  |  0.54    Ca    9.7      13 Mar 2025 07:20  Phos  3.3     -  Mg     2.00         TPro  6.3  /  Alb  4.0  /  TBili  1.0  /  DBili  x   /  AST  15  /  ALT  17  /  AlkPhos  127[H]      PT/INR - ( 13 Mar 2025 07:20 )   PT: 12.4 sec;   INR: 1.04 ratio         PTT - ( 13 Mar 2025 07:20 )  PTT:120.5 sec    Total Cholesterol: 112  LDL: --  HDL: 31  T      Thyroid Stimulating Hormone, Serum: 1.56 uIU/mL ( @ 07:20)    Diagnostic Imaging  ==============    < from: CT Angio Chest PE Protocol w/ IV Cont (25 @ 00:45) >    IMPRESSION:  Extensive bilateral pulmonary embolus, as detailed above. Evidence of   early right heart strain. No evidence of acute pulmonary infarctionat   this time.    Findings were discussed with Dr. Gaming of the primary emergency   department at 3/13/2025 1:09 AM by Dr. Calle of radiology with read   back confirmation.    ------------------------------------------------------------------------------------------------------    < from: US Duplex Venous Lower Ext Complete, Bilateral (25 @ 03:59) >    IMPRESSION:  No evidence of deep venous thrombosis in either lower extremity.    Stable appearing bilateral popliteal fossa Baker's cysts.    -------------------------------------------------------------------------------------------------------    < from: TTE W or WO Ultrasound Enhancing Agent (25 @ 07:35) >    CONCLUSIONS:      1. Technically difficult image quality.   2. Left ventricular cavity is normal in size. Left ventricular wall thickness is normal. Left ventricular systolic function is normal. There are no regional wall motion abnormalities seen.   3. The right ventricle is not well visualized. In limited views, the right ventricle appears enlarged, with reduced right ventricular systolic function.   4. Right atrium was not well visualized.   5. Aortic valve anatomy cannot be determined with normal systolic excursion.   6. No pericardial effusion seen.    ________________________________________________________________________________________

## 2025-03-13 NOTE — H&P ADULT - PROBLEM SELECTOR PLAN 5
Potassium = 3.4 on CMP  - supplementing same to ~ 4  - f/u repeat level for resolution  - f/u also magnesium level

## 2025-03-13 NOTE — H&P ADULT - HISTORY OF PRESENT ILLNESS
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  Seen and evaluated at bedside; NAD.  Patient reports onset of chest pain and shortness of breath (SOB worse) while ambulating to the bathroom at around 9:20 AM 2 days ago.  Pain involves the anterior chest and is associated with palpitations.  Patient reports being prescribed warfarin many years ago (> 10 years) for pulmonary embolism.  About mid 2024, patient's new PMD referred patient to a hematologist to assess transitioning to Eliquis.  Patient underwent complete evaluation, including dopplers of the lower extremities, and was cleared for starting Eliquis, with the supposition for possibility of discontinuation; patient subsequently began Eliquis.  Two (2) weeks ago, patient discontinued Eliquis.  States no unusual swelling of the legs recently (has intermittent edema at baseline, treated with HCTZ 25 mg one to 2 times daily).  Prior PE was associated with pronounced leg swelling.  No report of fever, chills, cough, or any other related signs/symptoms.    Other history involves patient suffering with new onset of intermittent severe right sided headache, for which she is prescribed Ubrelvy and topiramate; has been using chiefly topiramate, but without resolution of headache.  Patient reports being diagnosed with a pituitary tumor over the past few months.  Has undergone work-up, including with an endocrinologist; hormone levels have been "normal," per patient.  For the pituitary tumor, patient h as an appointment with a neurosurgeon on 04/02/2025.  History over recent months, also involves a right ovarian tumor, for which patient underwent different studies, including a PET scan, which has been negative.  Recent study indicates that the ovarian tumor has shrunken in size, per patient.    Vital signs upon ED presentation as follows: BP = 119/86, HR = 91, RR = 16, T = 36.6 C (97.8 F), O2 Sat = 92% on RA.  Diagnosed with Pulmonary Embolism and started on heparin drip in the ED. 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  Seen and evaluated at bedside; NAD.  Patient reports onset of chest pain and shortness of breath (SOB worse) while ambulating to the bathroom at around 9:20 AM 2 days ago.  Pain involves the anterior chest and is associated with palpitations.  Patient reports being prescribed warfarin many years ago (> 10 years) for pulmonary embolism.  About mid 2024, patient's new PMD referred patient to a hematologist to assess transitioning to Eliquis.  Patient underwent complete evaluation, including dopplers of the lower extremities, and was cleared for starting Eliquis, with the supposition for possibility of discontinuation; patient subsequently began Eliquis.  Two (2) weeks ago, patient discontinued Eliquis.  States no unusual swelling of the legs recently (has intermittent edema at baseline, treated with HCTZ 25 mg one to 2 times daily).  Prior PE was associated with pronounced leg swelling.  No report of fever, chills, cough, or any other related signs/symptoms.    Other history involves patient suffering with new onset of intermittent severe right sided headache, for which she is prescribed Ubrelvy and topiramate; has been using chiefly topiramate, but without resolution of headache.  Patient reports being diagnosed with a pituitary tumor over the past few months.  States right sided vision difficulty.  Has undergone work-up, including with an endocrinologist; hormone levels have been "normal," per patient.  For the pituitary tumor, patient h as an appointment with a neurosurgeon on 04/02/2025.  History over recent months, also involves a right ovarian tumor, for which patient underwent different studies, including a PET scan, which has been negative.  Recent study indicates that the ovarian tumor has shrunken in size, per patient.    Vital signs upon ED presentation as follows: BP = 119/86, HR = 91, RR = 16, T = 36.6 C (97.8 F), O2 Sat = 92% on RA.  Diagnosed with Pulmonary Embolism and started on heparin drip in the ED.

## 2025-03-13 NOTE — ED ADULT NURSE REASSESSMENT NOTE - NS ED NURSE REASSESS COMMENT FT1
Pt resting on stretcher, NAD noted. Ambulated to restroom without event. Pending bed placement. Frequent monitoring in place.

## 2025-03-13 NOTE — H&P ADULT - PROBLEM SELECTOR PLAN 3
Recent onset and has followed up with a neurologist as outpatient  Reports also right sided vision difficulty, recently diagnosed pituitary mass  Prescribed Ubrelvy and topiramate; taking topiramate chiefly, but w/o resolution of headache   Has appointment scheduled with neurosurgery on 04/02/2025  Does not suffer with sinus issues, per patient  - ensure optimal hydration  - if headache recurs, would likely need Neurology Consult

## 2025-03-13 NOTE — H&P ADULT - NSICDXFAMILYHX_GEN_ALL_CORE_FT
FAMILY HISTORY:  Father  Still living? Unknown  Family history of cerebrovascular accident (CVA), Age at diagnosis: Age Unknown  Family history of diabetes mellitus, Age at diagnosis: Age Unknown  Family history of other heart disease, Age at diagnosis: Age Unknown    Mother  Still living? Unknown  Family history of breast cancer, Age at diagnosis: Age Unknown  Family history of epilepsy, Age at diagnosis: Age Unknown  Family history of other heart disease, Age at diagnosis: Age Unknown

## 2025-03-13 NOTE — ED ADULT NURSE REASSESSMENT NOTE - NS ED NURSE REASSESS COMMENT FT1
Pt lying in stretcher comfortably. Shows no signs of acute distress. Offers no medical complaints at this time. VSS. Respirations even and unlabored. 2L O2 via nasal cannula. Continuous telemetry and SpO2 monitoring in place. Found to have multiple PE's on CTA. 22 gauge IV line placed in right forearm. Heparin infusion initiated at 2200 units/hr. Pending US and TTE. To be admitted. Repeat ApTT due @ 0710.

## 2025-03-13 NOTE — ED ADULT NURSE REASSESSMENT NOTE - NS ED NURSE REASSESS COMMENT FT1
Report received from LALITHA Earl. Pt resting on stretcher, NAD noted. Denies complaints currently. Pending bed placement. Frequent monitoring in place.

## 2025-03-13 NOTE — PROGRESS NOTE ADULT - SUBJECTIVE AND OBJECTIVE BOX
Patient is a 65y old  Female who presents with a chief complaint of Bilateral pulmonary embolism (13 Mar 2025 08:42)      SUBJECTIVE / OVERNIGHT EVENTS: patient seen and examined by bedside, pt feeling better, Chest pain resolved, denies SOB at this time     MEDICATIONS  (STANDING):  atorvastatin 20 milliGRAM(s) Oral at bedtime  dextrose 5%. 1000 milliLiter(s) (50 mL/Hr) IV Continuous <Continuous>  dextrose 5%. 1000 milliLiter(s) (100 mL/Hr) IV Continuous <Continuous>  dextrose 50% Injectable 25 Gram(s) IV Push once  dextrose 50% Injectable 12.5 Gram(s) IV Push once  dextrose 50% Injectable 25 Gram(s) IV Push once  famotidine    Tablet 20 milliGRAM(s) Oral daily  glucagon  Injectable 1 milliGRAM(s) IntraMuscular once  heparin  Infusion.  Unit(s)/Hr (22 mL/Hr) IV Continuous <Continuous>  insulin lispro (ADMELOG) corrective regimen sliding scale   SubCutaneous three times a day before meals  insulin lispro (ADMELOG) corrective regimen sliding scale   SubCutaneous at bedtime  levothyroxine 75 MICROGram(s) Oral daily  lisinopril 40 milliGRAM(s) Oral daily  pantoprazole    Tablet 40 milliGRAM(s) Oral before breakfast  sertraline 200 milliGRAM(s) Oral daily    MEDICATIONS  (PRN):  acetaminophen     Tablet .. 650 milliGRAM(s) Oral every 6 hours PRN Mild Pain (1 - 3)  dextrose Oral Gel 15 Gram(s) Oral once PRN Blood Glucose LESS THAN 70 milliGRAM(s)/deciliter  heparin   Injectable 93447 Unit(s) IV Push every 6 hours PRN For aPTT less than 40  heparin   Injectable 5000 Unit(s) IV Push every 6 hours PRN For aPTT between 40 - 57  melatonin 3 milliGRAM(s) Oral at bedtime PRN Insomnia      Vital Signs Last 24 Hrs  T(C): 36.4 (13 Mar 2025 13:36), Max: 36.6 (12 Mar 2025 22:10)  T(F): 97.6 (13 Mar 2025 13:36), Max: 97.9 (13 Mar 2025 04:44)  HR: 92 (13 Mar 2025 13:36) (83 - 99)  BP: 103/67 (13 Mar 2025 13:36) (103/67 - 128/72)  BP(mean): --  RR: 17 (13 Mar 2025 13:36) (16 - 19)  SpO2: 98% (13 Mar 2025 13:36) (92% - 100%)    Parameters below as of 13 Mar 2025 13:36  Patient On (Oxygen Delivery Method): room air      CAPILLARY BLOOD GLUCOSE      POCT Blood Glucose.: 121 mg/dL (13 Mar 2025 15:48)  POCT Blood Glucose.: 170 mg/dL (13 Mar 2025 10:52)  POCT Blood Glucose.: 167 mg/dL (12 Mar 2025 22:10)    I&O's Summary      PHYSICAL EXAM:  GENERAL: NAD,   HEAD:  Atraumatic, Normocephalic  EYES: EOMI, PERRLA, conjunctiva and sclera clear  NECK: Supple, No JVD  CHEST/LUNG: Clear to auscultation bilaterally; No wheeze  HEART: Regular rate and rhythm; No murmurs, rubs, or gallops  ABDOMEN: Soft, Nontender, Nondistended; Bowel sounds present  EXTREMITIES:  2+ Peripheral Pulses, No clubbing, cyanosis, or mild pedal edema  PSYCH: AAOx3  NEUROLOGY: non-focal  SKIN: No rashes or lesions    LABS:                        12.2   9.79  )-----------( 276      ( 13 Mar 2025 07:20 )             37.0     03-13    141  |  103  |  13  ----------------------------<  117[H]  3.3[L]   |  25  |  0.54    Ca    9.7      13 Mar 2025 07:20  Phos  3.3     03-13  Mg     2.00     03-13    TPro  6.3  /  Alb  4.0  /  TBili  1.0  /  DBili  x   /  AST  15  /  ALT  17  /  AlkPhos  127[H]  03-13    PT/INR - ( 13 Mar 2025 07:20 )   PT: 12.4 sec;   INR: 1.04 ratio         PTT - ( 13 Mar 2025 14:00 )  PTT:69.0 sec  CARDIAC MARKERS ( 13 Mar 2025 00:05 )  x     / x     / x     / x     / 2.7 ng/mL      Urinalysis Basic - ( 13 Mar 2025 07:20 )    Color: x / Appearance: x / SG: x / pH: x  Gluc: 117 mg/dL / Ketone: x  / Bili: x / Urobili: x   Blood: x / Protein: x / Nitrite: x   Leuk Esterase: x / RBC: x / WBC x   Sq Epi: x / Non Sq Epi: x / Bacteria: x        RADIOLOGY & ADDITIONAL TESTS:  < from: TTE W or WO Ultrasound Enhancing Agent (03.13.25 @ 07:35) >     1. Technically difficult image quality.   2. Left ventricular cavity is normal in size. Left ventricular wall thickness is normal. Left ventricular systolic function is normal. There are no regional wall motion abnormalities seen.   3. The right ventricle is not well visualized. In limited views, the right ventricle appears enlarged, with reduced right ventricular systolic function.   4. Right atrium was not well visualized.   5. Aortic valve anatomy cannot be determined with normal systolic excursion.   6. No pericardial effusion seen.    < end of copied text >    Imaging Personally Reviewed:    Consultant(s) Notes Reviewed:  cardiology ,interventional cardiology     Care Discussed with Consultants/Other Providers:

## 2025-03-13 NOTE — H&P ADULT - PROBLEM SELECTOR PLAN 7
Hypoxic to 92 on room air initially, but this is in the context of bilateral PE with RHS  - CPAP with EPAP of 6 prescribed (patient's setting PTA)  - supplemental oxygen 2 liters when off CPAP  - HOB elevation  - aspiration precautions  - aspiration precautions

## 2025-03-13 NOTE — H&P ADULT - PROBLEM SELECTOR PLAN 8
Patient endorses being recently diagnosed with same  Being evaluated as outpatient  Possibility that right sided vision difficulty is associated with same  States all lab-work associated with the tumor has been within normal range so far  Has OP appointment scheduled with neurosurgery for 04/02/2025  - if worsening signs/symptoms, may need inpatient consultants

## 2025-03-13 NOTE — CONSULT NOTE ADULT - NS_MD_PANP_GEN_ALL_CORE
Attending and PA/NP shared services statement (NON-critical care): GOLDEN Cohen [facility]

## 2025-03-13 NOTE — H&P ADULT - NSHPSOCIALHISTORY_GEN_ALL_CORE
SOCIAL HISTORY:    Marital Status:  (  )    ( x ) Single        (  )        (  )        (  )   Lives with:          (  ) Alone      (  ) Spouse     (  ) Children         (  ) Parents             ( x ) Other/Brother    No history of smoking  No history of alcohol abuse  No history of illegal drug use    Occupation: Physician at [employer]

## 2025-03-13 NOTE — H&P ADULT - ASSESSMENT
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 Diagnosed with Pulmonary Embolism in the ED.

## 2025-03-13 NOTE — H&P ADULT - NSHPLABSRESULTS_GEN_ALL_CORE
LAB-WORK/STUDIES:                          12.9   13.87 )-----------( 250      ( 13 Mar 2025 00:05 )             40.6     135  |  101  |  15  ----------------------------<  127[H]     03-13  3.4[L]   |  20[L]  |  0.46[L]    Ca    9.5      13 Mar 2025 00:05  Phos  3.6     03-13  Mg     2.00     03-13    TPro  6.5  /  Alb  3.9  /  TBili  0.9  /  DBili  x   /  AST  22  /  ALT  18  /  AlkPhos  129[H]  03-13    PT/INR: 11.4/0.98 (03-13-25 @ 00:05)  PTT: 25.0 (03-13-25 @ 00:05)    23:56 - VBG - pH: 7.38  | pCO2: 45    | pO2: 61    | Lactate: 2.7      hs Troponin, T - 327 ng/L (03-13-25 @ 02:50)  hs Troponin, T - 220 ng/L (03-13-25 @ 00:05)    Pro-BNP: 66 (03-13-25 @ 02:50)  Pro-BNP: 38 (03-13-25 @ 00:05)    =======================================================    ECG [  1:08:21] = NSR w/ sinus arrhythmia at 68 bpm, QTc = 391    ECG [22:09:29] = NSR at 98 bpm, QTc = 451  =======================================================  . LAB-WORK/STUDIES:                          12.9   13.87 )-----------( 250      ( 13 Mar 2025 00:05 )             40.6     135  |  101  |  15  ----------------------------<  127[H]     03-13  3.4[L]   |  20[L]  |  0.46[L]    Ca    9.5      13 Mar 2025 00:05  Phos  3.6     03-13  Mg     2.00     03-13    TPro  6.5  /  Alb  3.9  /  TBili  0.9  /  DBili  x   /  AST  22  /  ALT  18  /  AlkPhos  129[H]  03-13    PT/INR: 11.4/0.98 (03-13-25 @ 00:05)  PTT: 25.0 (03-13-25 @ 00:05)    23:56 - VBG - pH: 7.38  | pCO2: 45    | pO2: 61    | Lactate: 2.7      hs Troponin, T - 327 ng/L (03-13-25 @ 02:50)  hs Troponin, T - 220 ng/L (03-13-25 @ 00:05)    Pro-BNP: 66 (03-13-25 @ 02:50)  Pro-BNP: 38 (03-13-25 @ 00:05)    =======================================================  ECG [  1:08:21] = NSR w/ sinus arrhythmia at 68 bpm, QTc = 391    ECG [22:09:29] = NSR at 98 bpm, QTc = 451  =======================================================  .

## 2025-03-13 NOTE — H&P ADULT - NSHPREVIEWOFSYSTEMS_GEN_ALL_CORE
REVIEW OF SYSTEMS:    CONSTITUTIONAL: Episodic severe right sided headache; not adequately ameliorated with topiramate or Ubrelvy.  No weakness, fever, chills or sweating  EYES/ENT: No visual changes.  No dysphagia  NECK: No pain or stiffness  RESPIRATORY: No cough or hemoptysis.  (+) shortness of breath with significant dyspnea on exertion  CARDIOVASCULAR: Significant shortness of breath with dyspnea on exertion and some chest discomfort.  (+) palpitations.  Chronic intermittent edema of the lower extremities (unchanged from baseline)  GASTROINTESTINAL: No epigastric or abdominal pain. No nausea, vomiting or hematemesis.  No diarrhea or constipation. No melena or hematochezia.  GENITOURINARY: No dysuria, frequency or hematuria  MUSCULOSKELETAL: Chronic intermittent edema of the lower extremities (unchanged from baseline).  No erythema or warmth  NEUROLOGICAL: No numbness or weakness  PSYCHIATRY: No anxiety, or depression.  SKIN: No itching, burning, rashes, or lesions   All other review of systems is negative unless indicated above.

## 2025-03-14 ENCOUNTER — TRANSCRIPTION ENCOUNTER (OUTPATIENT)
Age: 65
End: 2025-03-14

## 2025-03-14 VITALS — HEIGHT: 65 IN | WEIGHT: 268.3 LBS

## 2025-03-14 LAB
ANION GAP SERPL CALC-SCNC: 12 MMOL/L — SIGNIFICANT CHANGE UP (ref 7–14)
APTT BLD: 82.4 SEC — HIGH (ref 24.5–35.6)
BUN SERPL-MCNC: 13 MG/DL — SIGNIFICANT CHANGE UP (ref 7–23)
CALCIUM SERPL-MCNC: 9.3 MG/DL — SIGNIFICANT CHANGE UP (ref 8.4–10.5)
CHLORIDE SERPL-SCNC: 106 MMOL/L — SIGNIFICANT CHANGE UP (ref 98–107)
CO2 SERPL-SCNC: 22 MMOL/L — SIGNIFICANT CHANGE UP (ref 22–31)
CREAT SERPL-MCNC: 0.53 MG/DL — SIGNIFICANT CHANGE UP (ref 0.5–1.3)
EGFR: 103 ML/MIN/1.73M2 — SIGNIFICANT CHANGE UP
EGFR: 103 ML/MIN/1.73M2 — SIGNIFICANT CHANGE UP
GLUCOSE BLDC GLUCOMTR-MCNC: 117 MG/DL — HIGH (ref 70–99)
GLUCOSE SERPL-MCNC: 124 MG/DL — HIGH (ref 70–99)
HCT VFR BLD CALC: 36.6 % — SIGNIFICANT CHANGE UP (ref 34.5–45)
HGB BLD-MCNC: 11.9 G/DL — SIGNIFICANT CHANGE UP (ref 11.5–15.5)
MAGNESIUM SERPL-MCNC: 2 MG/DL — SIGNIFICANT CHANGE UP (ref 1.6–2.6)
MCHC RBC-ENTMCNC: 28.8 PG — SIGNIFICANT CHANGE UP (ref 27–34)
MCHC RBC-ENTMCNC: 32.5 G/DL — SIGNIFICANT CHANGE UP (ref 32–36)
MCV RBC AUTO: 88.6 FL — SIGNIFICANT CHANGE UP (ref 80–100)
MRSA PCR RESULT.: SIGNIFICANT CHANGE UP
NRBC # BLD AUTO: 0 K/UL — SIGNIFICANT CHANGE UP (ref 0–0)
NRBC # FLD: 0 K/UL — SIGNIFICANT CHANGE UP (ref 0–0)
NRBC BLD AUTO-RTO: 0 /100 WBCS — SIGNIFICANT CHANGE UP (ref 0–0)
PHOSPHATE SERPL-MCNC: 3.1 MG/DL — SIGNIFICANT CHANGE UP (ref 2.5–4.5)
PLATELET # BLD AUTO: 223 K/UL — SIGNIFICANT CHANGE UP (ref 150–400)
POTASSIUM SERPL-MCNC: 4.3 MMOL/L — SIGNIFICANT CHANGE UP (ref 3.5–5.3)
POTASSIUM SERPL-SCNC: 4.3 MMOL/L — SIGNIFICANT CHANGE UP (ref 3.5–5.3)
RBC # BLD: 4.13 M/UL — SIGNIFICANT CHANGE UP (ref 3.8–5.2)
RBC # FLD: 15.4 % — HIGH (ref 10.3–14.5)
S AUREUS DNA NOSE QL NAA+PROBE: DETECTED
SODIUM SERPL-SCNC: 140 MMOL/L — SIGNIFICANT CHANGE UP (ref 135–145)
WBC # BLD: 7.29 K/UL — SIGNIFICANT CHANGE UP (ref 3.8–10.5)
WBC # FLD AUTO: 7.29 K/UL — SIGNIFICANT CHANGE UP (ref 3.8–10.5)

## 2025-03-14 PROCEDURE — 99232 SBSQ HOSP IP/OBS MODERATE 35: CPT | Mod: GC

## 2025-03-14 PROCEDURE — 99239 HOSP IP/OBS DSCHRG MGMT >30: CPT

## 2025-03-14 RX ORDER — HEPARIN SODIUM 1000 [USP'U]/ML
1900 INJECTION INTRAVENOUS; SUBCUTANEOUS
Qty: 25000 | Refills: 0 | Status: DISCONTINUED | OUTPATIENT
Start: 2025-03-14 | End: 2025-03-14

## 2025-03-14 RX ORDER — APIXABAN 2.5 MG/1
10 TABLET, FILM COATED ORAL EVERY 12 HOURS
Refills: 0 | Status: DISCONTINUED | OUTPATIENT
Start: 2025-03-14 | End: 2025-03-14

## 2025-03-14 RX ORDER — APIXABAN 2.5 MG/1
1 TABLET, FILM COATED ORAL
Qty: 70 | Refills: 0
Start: 2025-03-14 | End: 2025-04-12

## 2025-03-14 RX ORDER — APIXABAN 2.5 MG/1
1 TABLET, FILM COATED ORAL
Refills: 0 | DISCHARGE

## 2025-03-14 RX ADMIN — HEPARIN SODIUM 1900 UNIT(S)/HR: 1000 INJECTION INTRAVENOUS; SUBCUTANEOUS at 00:07

## 2025-03-14 RX ADMIN — HEPARIN SODIUM 1900 UNIT(S)/HR: 1000 INJECTION INTRAVENOUS; SUBCUTANEOUS at 07:44

## 2025-03-14 RX ADMIN — Medication 75 MICROGRAM(S): at 05:23

## 2025-03-14 RX ADMIN — APIXABAN 10 MILLIGRAM(S): 2.5 TABLET, FILM COATED ORAL at 11:17

## 2025-03-14 RX ADMIN — LISINOPRIL 40 MILLIGRAM(S): 5 TABLET ORAL at 05:23

## 2025-03-14 RX ADMIN — Medication 40 MILLIGRAM(S): at 05:23

## 2025-03-14 RX ADMIN — Medication 650 MILLIGRAM(S): at 08:08

## 2025-03-14 RX ADMIN — HEPARIN SODIUM 1900 UNIT(S)/HR: 1000 INJECTION INTRAVENOUS; SUBCUTANEOUS at 07:46

## 2025-03-14 RX ADMIN — Medication 20 MILLIGRAM(S): at 11:18

## 2025-03-14 RX ADMIN — SERTRALINE 200 MILLIGRAM(S): 100 TABLET, FILM COATED ORAL at 11:18

## 2025-03-14 RX ADMIN — HEPARIN SODIUM 1900 UNIT(S)/HR: 1000 INJECTION INTRAVENOUS; SUBCUTANEOUS at 03:12

## 2025-03-14 NOTE — DISCHARGE NOTE NURSING/CASE MANAGEMENT/SOCIAL WORK - NURSING SECTION COMPLETE
Patient/Caregiver provided printed discharge information. This was a shared visit with the VANGIE. I reviewed and verified the documentation and independently performed the documented:

## 2025-03-14 NOTE — PROGRESS NOTE ADULT - PROBLEM SELECTOR PLAN 5
Potassium = 3.4 -->3.3 on CMP  - supplementing same to ~ 4  - f/u repeat level for resolution  - magnesium level WNL
resolved   - magnesium level WNL

## 2025-03-14 NOTE — PROGRESS NOTE ADULT - PROBLEM SELECTOR PLAN 1
Patient with sudden onset of shortness of breath, dyspnea on exertion, associated with palpitations - while ambulating   Anterior chest tightness  Self discontinued Eliquis ~ 2 weeks ago.  Was on Eliquis since the latter half of 2024, and was on warfarin for several years prior to then (10 or more years).  No family history of coagulopathy  CTA = "Extensive bilateral pulmonary embolus, as detailed above. Evidence of early right heart strain. No evidence of acute pulmonary infarction at this time."  - US of B/L LE negative for DVT  - PE likely secondary to stopping A/C , do not suspect Eliquis failure . Case was d/w her hematologist Dr Doe , pt was started pn prophylactic eliquis 2.5 mg BID which pt had stopped 2-3 weeks ago,   - on heparin drip; f/u PTT, will transition to Eliquis 5 mg po BID   - seen by Cardiology Team (appreciated)  -TTE as above   interventional cardiology eval noted, no acute CV interventions at this time given patient hemodynamic stability. Int cardiology  may consider catheter directed thrombectomy if patient becomes hemodynamically unstable.  -  cardiology f/u appreciated, rec if pt able to ambulate today without difficulty,  transition her to apixaban and plan for discharge home. If she is unable to ambulate without dyspnea, transition her to apixaban but continue monitor her for another day.,   Pt however denies SOB with ambulation, will check ambulatory sat   will transition to Eliquis, will likely need  life long AC given history of unprovoked PE  Pt on RA , saturating well   Pt follows willow Doe (hematology), , case discussed, his office called patient and appointment set up for 4/11
Patient with sudden onset of shortness of breath, dyspnea on exertion, associated with palpitations - while ambulating   Anterior chest tightness  Self discontinued Eliquis ~ 2 weeks ago.  Was on Eliquis since the latter half of 2024, and was on warfarin for several years prior to then (10 or more years).  No family history of coagulopathy  CTA = "Extensive bilateral pulmonary embolus, as detailed above. Evidence of early right heart strain. No evidence of acute pulmonary infarction at this time."  - US of B/L LE negative for DVT  - Possibility that PE is of multifactorial etiology; cessation of AC, obesity, and complicated by topiramate tx  - on heparin drip; f/u PTT  - seen by Cardiology Team (appreciated)  -TTE as above   interventional cardiology eval noted, no acute CV interventions at this time given patient hemodynamic stability. Int cardiology  may consider catheter directed thrombectomy if patient becomes hemodynamically unstable.  - rec to continue Heparin gtt full AC Protocol, goal PTT 58-99, likely transition to Eliquis on 3/14 if tolerated , will likely need  life long AC given history of unprovoked PE  - HOB elevation, aspiration precautions  - continuing supplemental oxygen via nasal cannula (on CPAP nightly)  - f/u pulse oximetry  Pt follows willow Doe (hematology), message left with office staff at [phone]

## 2025-03-14 NOTE — DISCHARGE NOTE PROVIDER - NSDCFUSCHEDAPPT_GEN_ALL_CORE_FT
Keesha Monson  [facility]  ENDOCRIN [address] [facility]  Scheduled Appointment: 03/26/2025    Jonathan Montalvo  [facility]  SPINECTR [address] [facility]  Scheduled Appointment: 04/02/2025    Ken Nowak  [facility]  NEUROLOGY [address]  Scheduled Appointment: 04/08/2025

## 2025-03-14 NOTE — PROGRESS NOTE ADULT - PROBLEM SELECTOR PLAN 4
Dry oral mucosa, with hemoconcentrated lab-work  BUN/Cr ratio = 33:1  - prescribing IVF of NaCl one liter  - encourage oral hydration  -renal fn stable

## 2025-03-14 NOTE — DISCHARGE NOTE PROVIDER - NSDCMRMEDTOKEN_GEN_ALL_CORE_FT
Eliquis 2.5 mg oral tablet: 1 tab(s) orally every 12 hours ~ No longer taking  hydroCHLOROthiazide 25 mg oral tablet: 1 tab(s) orally 2 times a day  Lipitor 20 mg oral tablet: 1 tab(s) orally once a day (at bedtime)  lisinopril 40 mg oral tablet: 1 tab(s) orally once a day  Ozempic 2 mg/1.5 mL (1 mg dose) subcutaneous solution: subcutaneous ~ on Sunday  pantoprazole 40 mg oral delayed release tablet: 1 tab(s) orally once a day  Pepcid 20 mg oral tablet: 1 tab(s) orally once a day  potassium chloride: OTC  sertraline 200 mg oral capsule: 1 cap(s) orally once a day  Synthroid 75 mcg (0.075 mg) oral tablet: 1 tab(s) orally once a day   apixaban 5 mg oral tablet: 1 tab(s) orally every 12 hours please take 2 tabs (10mg) twice a day through 3/19  Starting 03/20, take 5mg (1 tab) twice a day  hydroCHLOROthiazide 25 mg oral tablet: 1 tab(s) orally 2 times a day  Lipitor 20 mg oral tablet: 1 tab(s) orally once a day (at bedtime)  lisinopril 40 mg oral tablet: 1 tab(s) orally once a day  Ozempic 2 mg/1.5 mL (1 mg dose) subcutaneous solution: subcutaneous ~ on Sunday  pantoprazole 40 mg oral delayed release tablet: 1 tab(s) orally once a day  Pepcid 20 mg oral tablet: 1 tab(s) orally once a day  potassium chloride: OTC  sertraline 200 mg oral capsule: 1 cap(s) orally once a day  Synthroid 75 mcg (0.075 mg) oral tablet: 1 tab(s) orally once a day

## 2025-03-14 NOTE — PROGRESS NOTE ADULT - PROBLEM SELECTOR PLAN 7
Hypoxic to 92 on room air initially, but this is in the context of bilateral PE with RHS  - CPAP with EPAP of 6 prescribed (patient's setting PTA)
Hypoxic to 92 on room air initially, but this is in the context of bilateral PE with RHS  - CPAP with EPAP of 6 prescribed (patient's setting PTA)  - supplemental oxygen 2 liters when off CPAP  - HOB elevation  - aspiration precautions

## 2025-03-14 NOTE — PROGRESS NOTE ADULT - PROBLEM SELECTOR PLAN 6
Glucose = 127 on CMP  Self discontinued oral antidiabetic meds, due to hypoglycemic episodes  Continues on Ozempic 2 mg weekly on Sunday; patient endorses recent weight loss of 10 lbs over 2 to 3 weeks, but does not think this is related to Ozempic since Ozempic is longstanding therapy  - m-ISS per FS  - consistent carb diet  - f/u A1c level 6.2
Glucose = 127 on CMP  Self discontinued oral antidiabetic meds, due to hypoglycemic episodes  Continues on Ozempic 2 mg weekly on Sunday; patient endorses recent weight loss of 10 lbs over 2 to 3 weeks, but does not think this is related to Ozempic since Ozempic is longstanding therapy  - m-ISS per FS  - consistent carb diet  - f/u A1c level 6.2

## 2025-03-14 NOTE — PROGRESS NOTE ADULT - SUBJECTIVE AND OBJECTIVE BOX
Patient is a 65y old  Female who presents with a chief complaint of Bilateral pulmonary embolism (14 Mar 2025 07:58)      SUBJECTIVE / OVERNIGHT EVENTS: patient seen and examined by bedside, pt alert and awake, denies headache, dizziness, SOB, CP, Palpitations , N/V/D, abdominal pain. Pt ambulated to the bathroom , denies dyspnea on ambulation       MEDICATIONS  (STANDING):  atorvastatin 20 milliGRAM(s) Oral at bedtime  chlorhexidine 2% Cloths 1 Application(s) Topical daily  dextrose 5%. 1000 milliLiter(s) (100 mL/Hr) IV Continuous <Continuous>  dextrose 5%. 1000 milliLiter(s) (50 mL/Hr) IV Continuous <Continuous>  dextrose 50% Injectable 25 Gram(s) IV Push once  dextrose 50% Injectable 12.5 Gram(s) IV Push once  dextrose 50% Injectable 25 Gram(s) IV Push once  famotidine    Tablet 20 milliGRAM(s) Oral daily  glucagon  Injectable 1 milliGRAM(s) IntraMuscular once  heparin  Infusion. 1900 Unit(s)/Hr (19 mL/Hr) IV Continuous <Continuous>  hydrochlorothiazide 25 milliGRAM(s) Oral two times a day  insulin lispro (ADMELOG) corrective regimen sliding scale   SubCutaneous three times a day before meals  insulin lispro (ADMELOG) corrective regimen sliding scale   SubCutaneous at bedtime  levothyroxine 75 MICROGram(s) Oral daily  lisinopril 40 milliGRAM(s) Oral daily  pantoprazole    Tablet 40 milliGRAM(s) Oral before breakfast  sertraline 200 milliGRAM(s) Oral daily    MEDICATIONS  (PRN):  acetaminophen     Tablet .. 650 milliGRAM(s) Oral every 6 hours PRN Mild Pain (1 - 3)  dextrose Oral Gel 15 Gram(s) Oral once PRN Blood Glucose LESS THAN 70 milliGRAM(s)/deciliter  heparin   Injectable 53321 Unit(s) IV Push every 6 hours PRN For aPTT less than 40  heparin   Injectable 5000 Unit(s) IV Push every 6 hours PRN For aPTT between 40 - 57  melatonin 3 milliGRAM(s) Oral at bedtime PRN Insomnia      Vital Signs Last 24 Hrs  T(C): 36.3 (14 Mar 2025 05:20), Max: 36.7 (13 Mar 2025 19:02)  T(F): 97.3 (14 Mar 2025 05:20), Max: 98.1 (13 Mar 2025 19:02)  HR: 81 (14 Mar 2025 05:20) (67 - 92)  BP: 130/84 (14 Mar 2025 05:20) (103/67 - 137/86)  BP(mean): --  RR: 17 (14 Mar 2025 05:20) (17 - 18)  SpO2: 100% (14 Mar 2025 05:20) (96% - 100%)    Parameters below as of 14 Mar 2025 05:20  Patient On (Oxygen Delivery Method): room air      CAPILLARY BLOOD GLUCOSE      POCT Blood Glucose.: 131 mg/dL (13 Mar 2025 22:21)  POCT Blood Glucose.: 115 mg/dL (13 Mar 2025 17:38)  POCT Blood Glucose.: 121 mg/dL (13 Mar 2025 15:48)  POCT Blood Glucose.: 170 mg/dL (13 Mar 2025 10:52)    I&O's Summary    PHYSICAL EXAM:  GENERAL: NAD,   HEAD:  Atraumatic, Normocephalic  EYES: EOMI, PERRLA, conjunctiva and sclera clear  NECK: Supple, No JVD  CHEST/LUNG: Clear to auscultation bilaterally; No wheeze  HEART: Regular rate and rhythm; No murmurs, rubs, or gallops  ABDOMEN: Soft, Nontender, Nondistended; Bowel sounds present  EXTREMITIES:  2+ Peripheral Pulses, No clubbing, cyanosis, or mild pedal edema  PSYCH: AAOx3  NEUROLOGY: non-focal  SKIN: No rashes or lesions    LABS:                        11.9   7.29  )-----------( 223      ( 14 Mar 2025 06:06 )             36.6     03-14    140  |  106  |  13  ----------------------------<  124[H]  4.3   |  22  |  0.53    Ca    9.3      14 Mar 2025 06:06  Phos  3.1     03-14  Mg     2.00     03-14    TPro  6.3  /  Alb  4.0  /  TBili  1.0  /  DBili  x   /  AST  15  /  ALT  17  /  AlkPhos  127[H]  03-13    PT/INR - ( 13 Mar 2025 07:20 )   PT: 12.4 sec;   INR: 1.04 ratio         PTT - ( 14 Mar 2025 06:06 )  PTT:82.4 sec  CARDIAC MARKERS ( 13 Mar 2025 00:05 )  x     / x     / x     / x     / 2.7 ng/mL      Urinalysis Basic - ( 14 Mar 2025 06:06 )    Color: x / Appearance: x / SG: x / pH: x  Gluc: 124 mg/dL / Ketone: x  / Bili: x / Urobili: x   Blood: x / Protein: x / Nitrite: x   Leuk Esterase: x / RBC: x / WBC x   Sq Epi: x / Non Sq Epi: x / Bacteria: x        RADIOLOGY & ADDITIONAL TESTS:  < from: TTE W or WO Ultrasound Enhancing Agent (03.13.25 @ 07:35) >     1. Technically difficult image quality.   2. Left ventricular cavity is normal in size. Left ventricular wall thickness is normal. Left ventricular systolic function is normal. There are no regional wall motion abnormalities seen.   3. The right ventricle is not well visualized. In limited views, the right ventricle appears enlarged, with reduced right ventricular systolic function.   4. Right atrium was not well visualized.   5. Aortic valve anatomy cannot be determined with normal systolic excursion.   6. No pericardial effusion seen.    < end of copied text >      Imaging Personally Reviewed:    Consultant(s) Notes Reviewed:  cardiology     Care Discussed with Consultants/Other Providers:

## 2025-03-14 NOTE — DISCHARGE NOTE NURSING/CASE MANAGEMENT/SOCIAL WORK - PATIENT PORTAL LINK FT
You can access the FollowMyHealth Patient Portal offered by [facility] by registering at the following website: [email or web address]. By joining [employer]’s FollowMyHealth portal, you will also be able to view your health information using other applications (apps) compatible with our system.

## 2025-03-14 NOTE — PROGRESS NOTE ADULT - SUBJECTIVE AND OBJECTIVE BOX
Patient seen and examined at bedside. HDS with ambulatory sat 96% RA.  Pt reports feeling "fine" today, denies CP, SOB, palpitations, diaphoresis, dizziness, Syncope, LE swelling, acute bleed or any other complaints at this time    - No events on telemetry overnight, remains in sinus rhythm      PERTINENT REVIEW OF SYSTEMS:  Constitutional:     [ ] negative [ ] fevers [ ] chills [ ] weight loss [ ] weight gain  CV:                         [ ] negative  [ ] chest pain [ ] orthopnea [ ] palpitations [ ] murmur  Resp:                     [ ] negative [ ] cough [ ] shortness of breath [ ] dyspnea [ ] wheezing [ ] sputum [ ]hemoptysis  GI:                          [ ] negative [ ] nausea [ ] vomiting [ ] diarrhea [ ] constipation [ ] abd pain [ ] dysphagia   Skin:                       [ ] negative [ ] rash [ ] itch  Neurological:        [ ] negative [ ] headache [ ] dizziness [ ] syncope [ ] weakness [ ] numbness  Psychiatric:           [ ] negative [ ] anxiety [ ] depression  Endocrine:            [ ] negative [ ] diabetes [ ] thyroid problem  Heme/Lymph:      [ ] negative [ ] anemia [ ] bleeding problem  Allergic/Immune: [ ] negative [ ] itchy eyes [ ] nasal discharge [ ] hives [ ] angioedema    [x] All other systems negative  [ ] Unable to assess ROS due to    Home Medications  Home Medications:  hydroCHLOROthiazide 25 mg oral tablet: 1 tab(s) orally 2 times a day (13 Mar 2025 07:04)  Lipitor 20 mg oral tablet: 1 tab(s) orally once a day (at bedtime) (13 Mar 2025 07:02)  lisinopril 40 mg oral tablet: 1 tab(s) orally once a day (13 Mar 2025 07:04)  Ozempic 2 mg/1.5 mL (1 mg dose) subcutaneous solution: subcutaneous ~ on Sunday (13 Mar 2025 07:04)  pantoprazole 40 mg oral delayed release tablet: 1 tab(s) orally once a day (13 Mar 2025 07:02)  Pepcid 20 mg oral tablet: 1 tab(s) orally once a day (13 Mar 2025 07:02)  potassium chloride: OTC (13 Mar 2025 07:04)  sertraline 200 mg oral capsule: 1 cap(s) orally once a day (13 Mar 2025 07:04)  Synthroid 75 mcg (0.075 mg) oral tablet: 1 tab(s) orally once a day (13 Mar 2025 07:04)      Current Meds:  acetaminophen     Tablet .. 650 milliGRAM(s) Oral every 6 hours PRN  apixaban 10 milliGRAM(s) Oral every 12 hours  atorvastatin 20 milliGRAM(s) Oral at bedtime  chlorhexidine 2% Cloths 1 Application(s) Topical daily  dextrose 5%. 1000 milliLiter(s) IV Continuous <Continuous>  dextrose 5%. 1000 milliLiter(s) IV Continuous <Continuous>  dextrose 50% Injectable 25 Gram(s) IV Push once  dextrose 50% Injectable 12.5 Gram(s) IV Push once  dextrose 50% Injectable 25 Gram(s) IV Push once  dextrose Oral Gel 15 Gram(s) Oral once PRN  famotidine    Tablet 20 milliGRAM(s) Oral daily  glucagon  Injectable 1 milliGRAM(s) IntraMuscular once  heparin   Injectable 36137 Unit(s) IV Push every 6 hours PRN  heparin   Injectable 5000 Unit(s) IV Push every 6 hours PRN  hydrochlorothiazide 25 milliGRAM(s) Oral two times a day  insulin lispro (ADMELOG) corrective regimen sliding scale   SubCutaneous three times a day before meals  insulin lispro (ADMELOG) corrective regimen sliding scale   SubCutaneous at bedtime  levothyroxine 75 MICROGram(s) Oral daily  lisinopril 40 milliGRAM(s) Oral daily  melatonin 3 milliGRAM(s) Oral at bedtime PRN  pantoprazole    Tablet 40 milliGRAM(s) Oral before breakfast  sertraline 200 milliGRAM(s) Oral daily      PAST MEDICAL & SURGICAL HISTORY:  Hypertension      History of abdominal pain      Pulmonary embolism      Ovarian mass, right      Pituitary tumor      Right-sided headache      Obstructive sleep apnea      Diabetes mellitus, type 2      Hypothyroidism      History of appendectomy      History of cholecystectomy      History of gastric bypass      S/P left oophorectomy      H/O myomectomy      Vitals:  T(F): 97.3 (03-14), Max: 98.1 (03-13)  HR: 81 (03-14) (67 - 90)  BP: 130/84 (03-14) (112/82 - 137/86)  RR: 20 (03-14)  SpO2: 96% (03-14)  I&O's Summary    Physical Exam:  Appearance: Obese female.  No acute distress; well appearing  Neck: Supple, no JVD B/L, no Carotid Bruit B/L  Cardiovascular: RRR, S1, S2, no murmurs, rubs, or gallops, no edema  Respiratory: Clear to auscultation bilaterally, no RRW B/L  Gastrointestinal: soft, non-tender, non-distended with normal bowel sounds  Neurologic: No focal or neuro deficits noted  Psychiatry: AAOx3, mood & affect appropriate  Extremities:   No LE swelling bilaterally, palpable pulses throughout                          11.9   7.29  )-----------( 223      ( 14 Mar 2025 06:06 )             36.6     03-14    140  |  106  |  13  ----------------------------<  124[H]  4.3   |  22  |  0.53    Ca    9.3      14 Mar 2025 06:06  Phos  3.1     03-14  Mg     2.00     03-14    TPro  6.3  /  Alb  4.0  /  TBili  1.0  /  DBili  x   /  AST  15  /  ALT  17  /  AlkPhos  127[H]  03-13    PT/INR - ( 13 Mar 2025 07:20 )   PT: 12.4 sec;   INR: 1.04 ratio         PTT - ( 14 Mar 2025 06:06 )  PTT:82.4 sec  CARDIAC MARKERS ( 13 Mar 2025 00:05 )  x     / x     / x     / x     / 2.7 ng/mL    Cardiac Imaging    CARDIAC MARKERS ( 13 Mar 2025 00:05 )  x     / x     / x     / x     / 2.7 ng/mL    Diagnostic Imaging  ==============    < from: CT Angio Chest PE Protocol w/ IV Cont (03.13.25 @ 00:45) >    IMPRESSION:  Extensive bilateral pulmonary embolus, as detailed above. Evidence of   early right heart strain. No evidence of acute pulmonary infarctionat   this time.    Findings were discussed with Dr. Gaming of the primary emergency   department at 3/13/2025 1:09 AM by Dr. Calle of radiology with read   back confirmation.    ------------------------------------------------------------------------------------------------------    < from: US Duplex Venous Lower Ext Complete, Bilateral (03.13.25 @ 03:59) >    IMPRESSION:  No evidence of deep venous thrombosis in either lower extremity.    Stable appearing bilateral popliteal fossa Baker's cysts.    -------------------------------------------------------------------------------------------------------    < from: TTE W or WO Ultrasound Enhancing Agent (03.13.25 @ 07:35) >    CONCLUSIONS:      1. Technically difficult image quality.   2. Left ventricular cavity is normal in size. Left ventricular wall thickness is normal. Left ventricular systolic function is normal. There are no regional wall motion abnormalities seen.   3. The right ventricle is not well visualized. In limited views, the right ventricle appears enlarged, with reduced right ventricular systolic function.   4. Right atrium was not well visualized.   5. Aortic valve anatomy cannot be determined with normal systolic excursion.   6. No pericardial effusion seen.    ________________________________________________________________________________________    Interpretation of Telemetry: Sinus Rhythm

## 2025-03-14 NOTE — DISCHARGE NOTE NURSING/CASE MANAGEMENT/SOCIAL WORK - FINANCIAL ASSISTANCE
[facility] provides services at a reduced cost to those who are determined to be eligible through [facility]’s financial assistance program. Information regarding [facility]’s financial assistance program can be found by going to [email or web address] or by calling [phone].

## 2025-03-14 NOTE — PROGRESS NOTE ADULT - ATTENDING COMMENTS
The patient was seen and examined with the Cardiology Consultation Teaching Service.     No overnight events    No chest pain  No dyspnea, orthopnea or PND  No palpitations or dizziness     Patient has not yet been ambulating  No supplemental oxygen required    PMH/PSH:  Diabetes  Hypertension  Dyslipidemia  GERD  Obstructive sleep apnea  Pituitary adenoma  Pulmonary embolism, unprovoked  Gastric bypass  Cholecystectomy  Appendectomy  Myomectomy    Comfortable-appearing woman in no acute distress  Alert and oriented  Afebrile  Vital signs stable  JVP is not elevated  Clear lungs  Normal heart sounds  Extremities are warm and perfused  No peripheral edema     Normal blood counts  PTT 82 on unfractionated heparin      hs-troponin 254, 327, 220  pro-BNP 66, 38    Echocardiography demonstrates normal LV systolic function. The RV is not well visualized, but it appears to be dilated with reduced RV function. TAPSE is normal. No significant valve dysfunction. Estimated RA pressure is normal.     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     Echocardiography demonstrates probable right heart strain (though TAPSE is normal) and hs-troponin is elevated, making this an intermediate-high risk PE. The patient remains hemodynamically stable. Troponin is downtrending.     If the patient is able to ambulate today without difficulty, I would transition her to apixaban and plan for discharge home. If she is unable to ambulate without dyspnea, I would still transition her to apixaban but continue monitoring her for another day.    Johnathan Vivar MD [facility] FACP  Cardiology  [phone]

## 2025-03-14 NOTE — PROGRESS NOTE ADULT - PROBLEM SELECTOR PLAN 2
CTA notes right heart strain, in the setting of extensive bilateral PE  Troponin = 220 -->> 327.  Pro-BNP = 38 -->> 66.  Lactate = 2.7  ECGs as noted above  Seen by Cardiology Team (appreciated)  -  TTE as above   -  repeat troponin,  trending down Pro-BNP increased to 197, , lactate  decreased to 1.4   - cardiology recs as above
CTA notes right heart strain, in the setting of extensive bilateral PE  Troponin = 220 -->> 327.  Pro-BNP = 38 -->> 66.  Lactate = 2.7  ECGs as noted above  Seen by Cardiology Team (appreciated)  -  TTE as above   -  repeat troponin,  trending down Pro-BNP increased to 197, , lactate  decreased to 1.4   - cardiology recs as above

## 2025-03-14 NOTE — DISCHARGE NOTE PROVIDER - HOSPITAL COURSE
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 Diagnosed with Pulmonary Embolism in the ED.       Acute pulmonary embolism.    Patient with sudden onset of shortness of breath, dyspnea on exertion, associated with palpitations - while ambulating   Anterior chest tightness  Self discontinued Eliquis ~ 2 weeks ago.  Was on Eliquis since the latter half of 2024, and was on warfarin for several years prior to then (10 or more years).  No family history of coagulopathy  CTA = "Extensive bilateral pulmonary embolus, as detailed above. Evidence of early right heart strain. No evidence of acute pulmonary infarction at this time."  - US of B/L LE negative for DVT  - PE likely secondary to stopping A/C , do not suspect Eliquis failure . Case was d/w her hematologist Dr Doe , pt was started pn prophylactic eliquis 2.5 mg BID which pt had stopped 2-3 weeks ago,   - on heparin drip; f/u PTT, will transition to Eliquis 5 mg po BID   - seen by Cardiology Team (appreciated)   interventional cardiology eval noted, no acute CV interventions at this time given patient hemodynamic stability. Int cardiology  may consider catheter directed thrombectomy if patient becomes hemodynamically unstable.  -  cardiology rec  transition her to apixaban , will likely need  life long AC given history of unprovoked PE  Pt on RA , saturating well   Pt follows willow Doe (hematology), , case discussed, his office called patient and appointment set up for 4/11.     Finding of right side of heart.   ·  Plan: CTA notes right heart strain, in the setting of extensive bilateral PE  Troponin = 220 -->> 327.  Pro-BNP = 38 -->> 66.  Lactate = 2.7  Seen by Cardiology Team  -  TTE -Left ventricular systolic function is normal. There are no regional wall motion abnormalities seen. The right ventricle is not well visualized. In limited views, the right ventricle appears enlarged, with reduced right ventricular systolic function.  .     Right-sided headache.   ·  Plan: Recent onset and has followed up with a neurologist as outpatient  Reports also right sided vision difficulty, recently diagnosed pituitary mass  Prescribed Ubrelvy and topiramate; taking topiramate chiefly, but w/o resolution of headache   Has appointment scheduled with neurosurgery on 04/02/2025     Dehydration.   ·  Plan: Dry oral mucosa, with hemoconcentrated lab-work  BUN/Cr ratio = 33:1  - prescribing IVF of NaCl one liter  - encourage oral hydration  -renal fn stable.    Hypokalemia.   ·  Plan: resolved   - magnesium level WNL.     Type 2 diabetes mellitus treated without insulin.   ·  Plan: Glucose = 127 on CMP  Self discontinued oral antidiabetic meds, due to hypoglycemic episodes  Continues on Ozempic 2 mg weekly on Sunday; patient endorses recent weight loss of 10 lbs over 2 to 3 weeks, but does not think this is related to Ozempic since Ozempic is longstanding therapy  - consistent carb diet  A1c level 6.2.    : Obstructive sleep apnea on CPAP.   ·  Plan: Hypoxic to 92 on room air initially, but this is in the context of bilateral PE with RHS  - CPAP with EPAP of 6 prescribed (patient's setting PTA).     Pituitary tumor.   Patient endorses being recently diagnosed with same  Being evaluated as outpatient  Possibility that right sided vision difficulty is associated with same  States all lab-work associated with the tumor has been within normal range so far  Has OP appointment scheduled with neurosurgery for 04/02/2025.    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 Diagnosed with Pulmonary Embolism in the ED.       Acute pulmonary embolism.    Patient with sudden onset of shortness of breath, dyspnea on exertion, associated with palpitations - while ambulating   Anterior chest tightness  Self discontinued Eliquis ~ 2 weeks ago.  Was on Eliquis since the latter half of 2024, and was on warfarin for several years prior to then (10 or more years).  No family history of coagulopathy  CTA = "Extensive bilateral pulmonary embolus, as detailed above. Evidence of early right heart strain. No evidence of acute pulmonary infarction at this time."  - US of B/L LE negative for DVT  - PE likely secondary to stopping A/C , do not suspect Eliquis failure . Case was d/w her hematologist Dr Doe , pt was started pn prophylactic eliquis 2.5 mg BID which pt had stopped 2-3 weeks ago,   - on heparin drip; f/u PTT, will transition to Eliquis 5 mg po BID   - seen by Cardiology Team (appreciated)   interventional cardiology eval noted, no acute CV interventions at this time given patient hemodynamic stability. Int cardiology  may consider catheter directed thrombectomy if patient becomes hemodynamically unstable.  -  cardiology rec  transition her to apixaban , will likely need  life long AC given history of unprovoked PE  Pt on RA , saturating well   Pt follows willow Doe (hematology), , case discussed, his office called patient and appointment set up for 4/11.     Finding of right side of heart.   ·  Plan: CTA notes right heart strain, in the setting of extensive bilateral PE  Troponin = 220 -->> 327.  Pro-BNP = 38 -->> 66.  Lactate = 2.7  Seen by Cardiology Team  -  TTE -Left ventricular systolic function is normal. There are no regional wall motion abnormalities seen. The right ventricle is not well visualized. In limited views, the right ventricle appears enlarged, with reduced right ventricular systolic function.  .     Right-sided headache.   ·  Plan: Recent onset and has followed up with a neurologist as outpatient  Reports also right sided vision difficulty, recently diagnosed pituitary mass  Prescribed Ubrelvy and topiramate; taking topiramate chiefly, but w/o resolution of headache   Has appointment scheduled with neurosurgery on 04/02/2025     Dehydration.   ·  Plan: Dry oral mucosa, with hemoconcentrated lab-work  BUN/Cr ratio = 33:1  - prescribing IVF of NaCl one liter  - encourage oral hydration  -renal fn stable.    Hypokalemia.   ·  Plan: resolved   - magnesium level WNL.     Type 2 diabetes mellitus treated without insulin.   ·  Plan: Glucose = 127 on CMP  Self discontinued oral antidiabetic meds, due to hypoglycemic episodes  Continues on Ozempic 2 mg weekly on Sunday; patient endorses recent weight loss of 10 lbs over 2 to 3 weeks, but does not think this is related to Ozempic since Ozempic is longstanding therapy  - consistent carb diet  A1c level 6.2.    : Obstructive sleep apnea on CPAP.   ·  Plan: Hypoxic to 92 on room air initially, but this is in the context of bilateral PE with RHS  - CPAP with EPAP of 6 prescribed (patient's setting PTA).     Pituitary tumor.   Patient endorses being recently diagnosed with same  Being evaluated as outpatient  Possibility that right sided vision difficulty is associated with same  States all lab-work associated with the tumor has been within normal range so far  Has OP appointment scheduled with neurosurgery for 04/02/2025.    will DC home today   Patient hemodynamically stable for discharge home  Time spent in discharge process is 37 min  plan of care d/w pt and ACP

## 2025-03-14 NOTE — DISCHARGE NOTE PROVIDER - NSDCCPTREATMENT_GEN_ALL_CORE_FT
PRINCIPAL PROCEDURE  Procedure: CTA chest w/w/o contrast  Findings and Treatment:   < end of copied text >  FINDINGS:  LUNGS AND LARGE AIRWAYS: Patent central airways. Few sub-5 mm stable   pleural-based nodules, for example in the right lower lobe on image   [id number]). Lungs are otherwise clear. No compelling evidence for pulmonary   infarct.  PLEURA: No pleural effusion.  VESSELS: Extensive bilateral PE with areas of thrombus within the right   mainstem pulmonary artery and extending into all visualized lobar   branches and into multiple segmental and subsegmental divisions.   Left-sided thrombus at the branch point of the left main pulmonary with   areas of extension into the upper greater than lower lobar segmental   branches. Few calcified plaques along the aorta.  HEART: Heart size is normal. There is mild flattening of the   interventricular septum. The right ventricle left ventricle ratio is   greater than 1:1. No pericardial effusion.  MEDIASTINUM AND SARA: No lymphadenopathy.  CHEST WALL AND LOWER NECK: Within normal limits.  VISUALIZED UPPER ABDOMEN: Status post gastric bypass. 2.1 cm left adrenal   adenoma.  BONES: Degenerative changes of spine.  IMPRESSION:  Extensive bilateral pulmonary embolus, as detailed above. Evidence of   early right heart strain. No evidence of acute pulmonary infarctionat   this time.< from: CT Angio Chest PE Protocol w/ IV Cont (03.13.25 @ 00:45) >        SECONDARY PROCEDURE  Procedure: 2D echo  Findings and Treatment:   < end of copied text >     CONCLUSIONS:      1. Technically difficult image quality.   2. Left ventricular cavity is normal in size. Left ventricular wall thickness is normal. Left ventricular systolic function is normal. There are no regional wall motion abnormalities seen.   3. The right ventricle is not well visualized. In limited views, the right ventricle appears enlarged, with reduced right ventricular systolic function.   4. Right atrium was not well visualized.   5. Aortic valve anatomy cannot be determined with normal systolic excursion.   6. No pericardial effusion seen.< from: TTE W or WO Ultrasound Enhancing Agent (03.13.25 @ 07:35) >

## 2025-03-14 NOTE — PROGRESS NOTE ADULT - PROBLEM SELECTOR PLAN 8
Patient endorses being recently diagnosed with same  Being evaluated as outpatient  Possibility that right sided vision difficulty is associated with same  States all lab-work associated with the tumor has been within normal range so far  Has OP appointment scheduled with neurosurgery for 04/02/2025
Patient endorses being recently diagnosed with same  Being evaluated as outpatient  Possibility that right sided vision difficulty is associated with same  States all lab-work associated with the tumor has been within normal range so far  Has OP appointment scheduled with neurosurgery for 04/02/2025  - if worsening signs/symptoms, may need inpatient consultants

## 2025-03-14 NOTE — PROGRESS NOTE ADULT - SUBJECTIVE AND OBJECTIVE BOX
Cardiology Progress Note  ------------------------------------------------------------------------------------------  SUBJECTIVE:     -------------------------------------------------------------------------------------------  VS:  T(F): 97.3 (), Max: 98.1 ()  HR: 81 () (67 - 92)  BP: 130/84 () (103/67 - 137/86)  RR: 17 (-)  SpO2: 100% ()  I&O's Summary    PHYSICAL EXAM:  GENERAL: NAD  HEAD: Atraumatic, Normocephalic.  ENT: Moist mucous membranes.  NECK: Supple, No JVD.  CHEST/LUNG: Clear to auscultation bilaterally; No rales, rhonchi, wheezing, or rubs. Unlabored respirations.  HEART: Regular rate and rhythm; No murmurs, rubs, or gallops.  ABDOMEN: Bowel sounds present; Soft, Nontender, Nondistended.   EXTREMITIES: No edema. 2+ Peripheral Pulses, brisk capillary refill. No clubbing or cyanosis.     -------------------------------------------------------------------------------------------  LABS:                          11.9   7.29  )-----------( 223      ( 14 Mar 2025 06:06 )             36.6     03-14    140  |  106  |  13  ----------------------------<  124[H]  4.3   |  22  |  0.53    Ca    9.3      14 Mar 2025 06:06  Phos  3.1     03-14  Mg     2.00     03-14    TPro  6.3  /  Alb  4.0  /  TBili  1.0  /  DBili  x   /  AST  15  /  ALT  17  /  AlkPhos  127[H]  03-13    PT/INR - ( 13 Mar 2025 07:20 )   PT: 12.4 sec;   INR: 1.04 ratio         PTT - ( 14 Mar 2025 06:06 )  PTT:82.4 sec  CARDIAC MARKERS ( 13 Mar 2025 07:20 )  254 ng/L / x     / x     / x     / x     / x      CARDIAC MARKERS ( 13 Mar 2025 02:50 )  327 ng/L / x     / x     / x     / x     / x      CARDIAC MARKERS ( 13 Mar 2025 00:05 )  220 ng/L / x     / x     / x     / x     / 2.7 ng/mL      Total Cholesterol: 112  LDL: --  HDL: 31  T        -------------------------------------------------------------------------------------------  Meds:  acetaminophen     Tablet .. 650 milliGRAM(s) Oral every 6 hours PRN  atorvastatin 20 milliGRAM(s) Oral at bedtime  chlorhexidine 2% Cloths 1 Application(s) Topical daily  dextrose 5%. 1000 milliLiter(s) IV Continuous <Continuous>  dextrose 5%. 1000 milliLiter(s) IV Continuous <Continuous>  dextrose 50% Injectable 25 Gram(s) IV Push once  dextrose 50% Injectable 12.5 Gram(s) IV Push once  dextrose 50% Injectable 25 Gram(s) IV Push once  dextrose Oral Gel 15 Gram(s) Oral once PRN  famotidine    Tablet 20 milliGRAM(s) Oral daily  glucagon  Injectable 1 milliGRAM(s) IntraMuscular once  heparin   Injectable 32284 Unit(s) IV Push every 6 hours PRN  heparin   Injectable 5000 Unit(s) IV Push every 6 hours PRN  heparin  Infusion. 1900 Unit(s)/Hr IV Continuous <Continuous>  hydrochlorothiazide 25 milliGRAM(s) Oral two times a day  insulin lispro (ADMELOG) corrective regimen sliding scale   SubCutaneous three times a day before meals  insulin lispro (ADMELOG) corrective regimen sliding scale   SubCutaneous at bedtime  levothyroxine 75 MICROGram(s) Oral daily  lisinopril 40 milliGRAM(s) Oral daily  melatonin 3 milliGRAM(s) Oral at bedtime PRN  pantoprazole    Tablet 40 milliGRAM(s) Oral before breakfast  sertraline 200 milliGRAM(s) Oral daily    -------------------------------------------------------------------------------------------  Cardiovascular Diagnostic Testing:    ECG:     Echo:     Stress Testing:    Cath:    -------------------------------------------------------------------------------------------   Cardiology Progress Note  ------------------------------------------------------------------------------------------  SUBJECTIVE:     -------------------------------------------------------------------------------------------  VS:  T(F): 97.3 (), Max: 98.1 ()  HR: 81 () (67 - 92)  BP: 130/84 () (103/67 - 137/86)  RR: 17 ()  SpO2: 100% ()  I&O's Summary      -------------------------------------------------------------------------------------------  LABS:                          11.9   7.29  )-----------( 223      ( 14 Mar 2025 06:06 )             36.6     14    140  |  106  |  13  ----------------------------<  124[H]  4.3   |  22  |  0.53    Ca    9.3      14 Mar 2025 06:06  Phos  3.1     -14  Mg     2.00     -14    TPro  6.3  /  Alb  4.0  /  TBili  1.0  /  DBili  x   /  AST  15  /  ALT  17  /  AlkPhos  127[H]  0313    PT/INR - ( 13 Mar 2025 07:20 )   PT: 12.4 sec;   INR: 1.04 ratio         PTT - ( 14 Mar 2025 06:06 )  PTT:82.4 sec  CARDIAC MARKERS ( 13 Mar 2025 07:20 )  254 ng/L / x     / x     / x     / x     / x      CARDIAC MARKERS ( 13 Mar 2025 02:50 )  327 ng/L / x     / x     / x     / x     / x      CARDIAC MARKERS ( 13 Mar 2025 00:05 )  220 ng/L / x     / x     / x     / x     / 2.7 ng/mL      Total Cholesterol: 112  LDL: --  HDL: 31  T        -------------------------------------------------------------------------------------------  Meds:  acetaminophen     Tablet .. 650 milliGRAM(s) Oral every 6 hours PRN  atorvastatin 20 milliGRAM(s) Oral at bedtime  chlorhexidine 2% Cloths 1 Application(s) Topical daily  dextrose 5%. 1000 milliLiter(s) IV Continuous <Continuous>  dextrose 5%. 1000 milliLiter(s) IV Continuous <Continuous>  dextrose 50% Injectable 25 Gram(s) IV Push once  dextrose 50% Injectable 12.5 Gram(s) IV Push once  dextrose 50% Injectable 25 Gram(s) IV Push once  dextrose Oral Gel 15 Gram(s) Oral once PRN  famotidine    Tablet 20 milliGRAM(s) Oral daily  glucagon  Injectable 1 milliGRAM(s) IntraMuscular once  heparin   Injectable 32504 Unit(s) IV Push every 6 hours PRN  heparin   Injectable 5000 Unit(s) IV Push every 6 hours PRN  heparin  Infusion. 1900 Unit(s)/Hr IV Continuous <Continuous>  hydrochlorothiazide 25 milliGRAM(s) Oral two times a day  insulin lispro (ADMELOG) corrective regimen sliding scale   SubCutaneous three times a day before meals  insulin lispro (ADMELOG) corrective regimen sliding scale   SubCutaneous at bedtime  levothyroxine 75 MICROGram(s) Oral daily  lisinopril 40 milliGRAM(s) Oral daily  melatonin 3 milliGRAM(s) Oral at bedtime PRN  pantoprazole    Tablet 40 milliGRAM(s) Oral before breakfast  sertraline 200 milliGRAM(s) Oral daily    -------------------------------------------------------------------------------------------  Cardiovascular Diagnostic Testing:    ECG:     Echo:     Stress Testing:    Cath:    -------------------------------------------------------------------------------------------

## 2025-03-14 NOTE — DISCHARGE NOTE PROVIDER - NSDCCPCAREPLAN_GEN_ALL_CORE_FT
PRINCIPAL DISCHARGE DIAGNOSIS  Diagnosis: Pulmonary embolism  Assessment and Plan of Treatment: You presented with sudden onset of shortness of breath, dyspnea on exertion, associated with palpitations while ambulating   CTA showed  "Extensive bilateral pulmonary embolus, as detailed above. Evidence of early right heart strain. No evidence of acute pulmonary infarction at this time."  doppler of lower extremity negative for DVT.  PE likely secondary to stopping Eliquis . You were evaluated by cardiology , no acute intervention recommended as you are hemodynamically stable . Please continue Eliquis as directed and follow up with your hematologist Dr Doe on 4/11/25         SECONDARY DISCHARGE DIAGNOSES  Diagnosis: Right-sided headache  Assessment and Plan of Treatment: Continue your medications as directed , You have  appointment scheduled with neurosurgery on 04/02/2025. Follow up with neurologist    Diagnosis: Obstructive sleep apnea on CPAP  Assessment and Plan of Treatment: Continue your CPAP machine at night . Follow uop with your PMD and Pulmonologist    Diagnosis: Pituitary tumor  Assessment and Plan of Treatment: Please follow up with neurosurgurgy and Endocrinologist as outpatient. Youhave  appointment scheduled with neurosurgery on 04/02/2025    Diagnosis: Type 2 diabetes mellitus treated without insulin  Assessment and Plan of Treatment: Continue consistent carbohydrate diet.  Monitor blood glucose levels throughout the day before meals and at bedtime.  Record blood sugars and bring to outpatient providers appointment in order to be reviewed by your doctor for management modifications.  Be aware of diabetes management symptoms including feeling cool and clammy, which may be related to low glucose levels.  Feeling hot and dry may indicate high glucose levels.  If  you feel these symptoms, check your blood sugar.  Make regular podiatry appointments in order to have feet checked for wounds and toe nails cut by a doctor to prevent infections.

## 2025-03-14 NOTE — PROGRESS NOTE ADULT - ASSESSMENT
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  No evidence of acute pulmonary infarction at that time. Subsequent TTE revealing RV not well visualized, RV appears enlarged with reduced RV systolic function.  RA not well visualized.  Interventional cardiology consulted for further evaluation.    # Submassive Pulmonary Embolus    - Appears HDS, AOX3, VSS, SpO2 98% RA, ambulatory Sat 96% RA  - Mixed cardiac biomarkers:  Trops 220 >327 > 254, pBNP 197, Lactate 1.4  - CTA PE 3/13:  Extensive bilateral pulmonary embolus, as detailed above. Evidence of early right heart strain. No evidence of acute pulmonary infarction at this time  - LE Duplex 3/13: negative for acute DVT  - TTE 3/13: EF normal, no RWMA. The RV is not well visualized. In limited views, the right ventricle appears enlarged, with reduced right ventricular systolic function. Right atrium was not well visualized. TAPSE is 2.1 cm (normal >=1.7 cm)  -  PE likely secondary to stopping A/C, do not suspect Eliquis failure.  Patient was evaluated by her  hematologist Dr Doe  and started on prophylactic Eliquis 2.5 mg BID which pt had stopped 2-3 weeks ago,   - Interventional cards consulted: no acute CV interventions at this time given patient hemodynamic stability. Continue medical management as prescribed.  May consider catheter directed thrombectomy if patient becomes hemodynamically unstable. Chronic PEs may require thrombectomy vs Endarterectomy  - s/p Heparin gtt, transitioned to Eliquis 10mg BID  x 7 days, then 5mg BID thereafter indefinitely (may consider life long AC given history of unprovoked PE)    - continue Atorvastatin 20mg daily  - continue Lisinopril 40mg daily  - monitor electrolytes, replete to keep K > 4amd > 2  - continue to monitor hemodynamic status, CTM Telemetry, VS per protocol, O2 saturation monitoring, supplemental O2 as needed  - Pt with schedule follow up appointment with Dr Doe  for 4/11  - case discussed with interventionalist, Dr Harjeet Salinas.

## 2025-03-14 NOTE — PROGRESS NOTE ADULT - PROBLEM SELECTOR PLAN 3
Recent onset and has followed up with a neurologist as outpatient  Reports also right sided vision difficulty, recently diagnosed pituitary mass  Prescribed Ubrelvy and topiramate; taking topiramate chiefly, but w/o resolution of headache   Has appointment scheduled with neurosurgery on 04/02/2025  Does not suffer with sinus issues, per patient  - ensure optimal hydration  - if headache recurs, would likely need Neurology Consult
Recent onset and has followed up with a neurologist as outpatient  Reports also right sided vision difficulty, recently diagnosed pituitary mass  Prescribed Ubrelvy and topiramate; taking topiramate chiefly, but w/o resolution of headache   Has appointment scheduled with neurosurgery on 04/02/2025  Does not suffer with sinus issues, per patient  - ensure optimal hydration  - if headache recurs, would likely need Neurology Consult

## 2025-03-14 NOTE — PROGRESS NOTE ADULT - ASSESSMENT
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 Diagnosed with Pulmonary Embolism in the ED.

## 2025-03-14 NOTE — PROGRESS NOTE ADULT - ASSESSMENT
65F PMH PE formerly on chronic A/C, HTN, HLD, T2DM, ABBIE on CPAP, GERD admitted with bilateral PE.    Trop downtrending. On hep gtt > 24 hr now. Will consider switch to DOAC today.    Recommendations:   65F PMH PE formerly on chronic A/C, HTN, HLD, T2DM, ABBIE on CPAP, GERD admitted with bilateral PE.    Trop downtrending. On hep gtt > 24 hr now. Will consider switch to DOAC today.

## 2025-03-14 NOTE — PROGRESS NOTE ADULT - NSPROGADDITIONALINFOA_GEN_ALL_CORE
Plan of care d/w pt and ACP
will DC home today if ambulatory saturation WNL   Patient hemodynamically stable for discharge home  plan of care d/w pt and ACP
Interventional Attending Addendum:    Patient was discussed with me by phone but case was reviewed and staffed by in-house interventional team at [facility].  I have not physically met, examined, or followed the patient nor was I requested for in-person consultation.    By report, patient stable on RA currently.  Non-compliance with eliquis as inciting factor in PE.  She is hemodynamically and symptomatically doing well and at baseline, no indication for thrombectomy, CDL, or mechanical support at this time.  Heparin transitioned to eliquis at PE dosing.  Follow-up with her cardiologist as scheduled.    ________________  Harjeet Salinas MD  Interventional & Structural Cardiology

## 2025-03-20 ENCOUNTER — NON-APPOINTMENT (OUTPATIENT)
Age: 65
End: 2025-03-20

## 2025-03-21 ENCOUNTER — NON-APPOINTMENT (OUTPATIENT)
Age: 65
End: 2025-03-21

## 2025-03-21 PROBLEM — N83.8 OTHER NONINFLAMMATORY DISORDERS OF OVARY, FALLOPIAN TUBE AND BROAD LIGAMENT: Chronic | Status: ACTIVE | Noted: 2025-03-13

## 2025-03-21 PROBLEM — E11.9 TYPE 2 DIABETES MELLITUS WITHOUT COMPLICATIONS: Chronic | Status: ACTIVE | Noted: 2025-03-13

## 2025-03-21 PROBLEM — G47.33 OBSTRUCTIVE SLEEP APNEA (ADULT) (PEDIATRIC): Chronic | Status: ACTIVE | Noted: 2025-03-13

## 2025-03-21 PROBLEM — D49.7 NEOPLASM OF UNSPECIFIED BEHAVIOR OF ENDOCRINE GLANDS AND OTHER PARTS OF NERVOUS SYSTEM: Chronic | Status: ACTIVE | Noted: 2025-03-13

## 2025-03-21 PROBLEM — E03.9 HYPOTHYROIDISM, UNSPECIFIED: Chronic | Status: ACTIVE | Noted: 2025-03-13

## 2025-03-21 PROBLEM — R51.9 HEADACHE, UNSPECIFIED: Chronic | Status: ACTIVE | Noted: 2025-03-13

## 2025-03-21 RX ORDER — FLASH GLUCOSE SENSOR
KIT MISCELLANEOUS
Qty: 6 | Refills: 3 | Status: ACTIVE | COMMUNITY
Start: 2025-03-21 | End: 1900-01-01

## 2025-03-26 ENCOUNTER — APPOINTMENT (OUTPATIENT)
Dept: ENDOCRINOLOGY | Facility: CLINIC | Age: 65
End: 2025-03-26

## 2025-04-01 NOTE — DISCHARGE NOTE PROVIDER - NSDCCONDITION_GEN_ALL_CORE
Subjective :No subjective change.  Only activity is OOB to BR.  Daughter doubts any noticeable change in \"energy level\".    Objective 02 weened off.    I/O's    Intake/Output Summary (Last 24 hours) at 4/1/2025 1827  Last data filed at 4/1/2025 1324  Gross per 24 hour   Intake 600 ml   Output 1500 ml   Net -900 ml       Review of Systems    Current Facility-Administered Medications   Medication Dose Route Frequency Provider Last Rate Last Admin    furosemide (LASIX INJECT) injection 20 mg  20 mg Intravenous BID Forrest Christensen MD   20 mg at 04/01/25 1817    amLODIPine (NORVASC) tablet 10 mg  10 mg Oral Daily Kate Ortiz MD   10 mg at 04/01/25 0855    apixaBAN (ELIQUIS) tablet 5 mg  5 mg Oral 2 times per day Kate Ortiz MD   5 mg at 04/01/25 0855    atorvastatin (LIPITOR) tablet 20 mg  20 mg Oral Nightly Kate Ortiz MD   20 mg at 03/31/25 2120    carvedilol (COREG) tablet 12.5 mg  12.5 mg Oral BID WC Kate Ortiz MD   12.5 mg at 04/01/25 1817    donepezil (ARICEPT) tablet 10 mg  10 mg Oral Nightly Kate Ortiz MD   10 mg at 03/31/25 2120    gabapentin (NEURONTIN) capsule 200 mg  200 mg Oral Nightly Kate Ortiz MD   200 mg at 03/31/25 2120    memantine (NAMENDA) tablet 10 mg  10 mg Oral BID Kate Ortiz MD   10 mg at 04/01/25 0854    pantoprazole (PROTONIX) EC tablet 20 mg  20 mg Oral Daily Kate Ortiz MD   20 mg at 04/01/25 0854    psyllium (METAMUCIL) packet 1 packet  1 packet Oral Daily Kate Ortiz MD   1 packet at 04/01/25 0854    zolpidem (AMBIEN) tablet 5 mg  5 mg Oral Nightly PRN Kate Ortiz MD        acetaminophen (TYLENOL) tablet 650 mg  650 mg Oral Q4H PRN Kate Ortiz MD        ferrous sulfate (65 mg Fe per 325 mg) tablet 325 mg  325 mg Oral BID  Kate Ortiz MD   325 mg at 04/01/25 1817    sodium chloride 0.9 % injection 10 mL  10 mL Intravenous PRN Kate Ortiz MD        sodium chloride 0.9 % injection 2 mL  2 mL  Intracatheter 2 times per day Kate Ortiz MD   2 mL at 04/01/25 0855    sodium chloride (NORMAL SALINE) 0.9 % bolus 500 mL  500 mL Intravenous PRN Kate Ortiz MD           Last Recorded Vitals  Blood pressure 115/55, pulse (!) 56, temperature 98.1 °F (36.7 °C), temperature source Oral, resp. rate 18, height 5' (1.524 m), weight 63.3 kg (139 lb 8.8 oz), SpO2 90%.  Body mass index is 27.25 kg/m².    Physical Exam  Constitutional:       Appearance: Normal appearance. She is normal weight.   HENT:      Mouth/Throat:      Mouth: Mucous membranes are moist.      Neck: Normal range of motion and neck supple.   Eyes:      Extraocular Movements: Extraocular movements intact.      Conjunctiva/sclera: Conjunctivae normal.      Pupils: Pupils are equal, round, and reactive to light.   Cardiovascular:      Rate and Rhythm: Normal rate and regular rhythm.   Pulmonary:      Effort: Pulmonary effort is normal.      Breath sounds: Normal breath sounds.   Abdominal:      General: Abdomen is flat. Bowel sounds are normal.      Palpations: Abdomen is soft.   Musculoskeletal:         General: Normal range of motion.   Skin:     General: Skin is warm and dry.   Neurological:      General: No focal deficit present.      Mental Status: She is alert. Mental status is at baseline.   Psychiatric:         Mood and Affect: Mood normal.         Behavior: Behavior normal.         Labs     Recent Results (from the past 24 hour(s))   Comprehensive Metabolic Panel    Collection Time: 04/01/25  4:13 AM    Specimen: Blood, Venous   Result Value Ref Range    Fasting Status      Sodium 145 135 - 145 mmol/L    Potassium 3.8 3.4 - 5.1 mmol/L    Chloride 106 97 - 110 mmol/L    Carbon Dioxide 28 21 - 32 mmol/L    Anion Gap 15 7 - 19 mmol/L    Glucose 102 (H) 70 - 99 mg/dL    BUN 39 (H) 6 - 20 mg/dL    Creatinine 1.34 (H) 0.51 - 0.95 mg/dL    Glomerular Filtration Rate 38 (L) >=60    BUN/Cr 29 (H) 7 - 25    Calcium 8.7 8.4 - 10.2 mg/dL     Bilirubin, Total 0.4 0.2 - 1.0 mg/dL    GOT/AST 58 (H) <=37 Units/L    GPT/ALT 72 (H) <64 Units/L    Alkaline Phosphatase 163 (H) 45 - 117 Units/L    Albumin 2.7 (L) 3.4 - 5.0 g/dL    Protein, Total 6.6 6.4 - 8.2 g/dL    Globulin 3.9 2.0 - 4.0 g/dL    A/G Ratio 0.7 (L) 1.0 - 2.4   Phosphorus    Collection Time: 04/01/25  4:13 AM    Specimen: Blood, Venous   Result Value Ref Range    Phosphorus 3.5 2.4 - 4.7 mg/dL   Basic Metabolic Panel    Collection Time: 04/01/25 10:38 AM    Specimen: Blood, Venous   Result Value Ref Range    Fasting Status      Sodium 141 135 - 145 mmol/L    Potassium 3.7 3.4 - 5.1 mmol/L    Chloride 104 97 - 110 mmol/L    Carbon Dioxide 26 21 - 32 mmol/L    Anion Gap 15 7 - 19 mmol/L    Glucose 161 (H) 70 - 99 mg/dL    BUN 33 (H) 6 - 20 mg/dL    Creatinine 1.15 (H) 0.51 - 0.95 mg/dL    Glomerular Filtration Rate 46 (L) >=60    BUN/Cr 29 (H) 7 - 25    Calcium 8.6 8.4 - 10.2 mg/dL   CBC with Automated Differential (performable only)    Collection Time: 04/01/25 10:38 AM    Specimen: Blood, Venous   Result Value Ref Range    WBC 7.5 4.2 - 11.0 K/mcL    RBC 3.66 (L) 4.00 - 5.20 mil/mcL    HGB 10.9 (L) 12.0 - 15.5 g/dL    HCT 34.4 (L) 36.0 - 46.5 %    MCV 94.0 78.0 - 100.0 fl    MCH 29.8 26.0 - 34.0 pg    MCHC 31.7 (L) 32.0 - 36.5 g/dL    RDW-CV 12.4 11.0 - 15.0 %    RDW-SD 43.3 39.0 - 50.0 fL     140 - 450 K/mcL    NRBC 0 <=0 /100 WBC    Neutrophil, Percent 62 %    Lymphocytes, Percent 20 %    Mono, Percent 13 %    Eosinophils, Percent 3 %    Basophils, Percent 1 %    Immature Granulocytes 1 %    Absolute Neutrophils 4.7 1.8 - 7.7 K/mcL    Absolute Lymphocytes 1.5 1.0 - 4.0 K/mcL    Absolute Monocytes 1.0 (H) 0.3 - 0.9 K/mcL    Absolute Eosinophils  0.2 0.0 - 0.5 K/mcL    Absolute Basophils 0.0 0.0 - 0.3 K/mcL    Absolute Immature Granulocytes 0.0 0.0 - 0.2 K/mcL       Imaging  Result Text      *[facility]*  [address] [place] [address]  Transthoracic  Echocardiogram (TTE)     Patient: Mariajose Galloway    Study Date/Time:        Mar 31 2025 10:05AM  MRN:     [id number]           FIN#:                   [id number]  :     1937        Ht/Wt:                  152cm 64kg  Age:     88                BSA/BMI:                1.66m^2 27.7kg/m^2  Gender:  F                 Baseline BP:            129 / 66  *Ordering Physician:* Forrest Christensen MD     *Referring Physician:* Forrest Christensen MD     *Attending Physician:* Kate Ortiz  *Performing Physician:* Tip Hadley MD  *Diagnostic Physician:* Tip Hadley MD  *Sonographer:* Ean Nguyen RDCS, JENNIFER-PE     ------------------------------------------------------------------------------  INDICATIONS:   Cardiomyopathy.     ------------------------------------------------------------------------------  STUDY CONCLUSIONS  SUMMARY:     1. Left ventricle: The cavity size is normal. Wall thickness is at the upper     limits of normal. Systolic function is normal. The ejection fraction was     measured by biplane method of disks. Left ventricular diastolic function     parameters are normal. The ejection fraction is 69%.  2. Aortic valve: The annulus is mildly calcified. The valve is trileaflet. The     leaflets are mildly thickened. Velocity is within the normal range. There     is no stenosis. There is mild regurgitation.  3. Mitral valve: There is mild to moderate regurgitation.  4. Left atrium: The atrium is dilated.  5. Right ventricle: The cavity size is dilated. Systolic function is normal.     The RV pressure during systole is 64mm Hg.  6. Tricuspid valve: There is moderate regurgitation.  7. Pulmonary arteries: Systolic pressure is moderately increased.  8. Pericardium, extracardiac: There is no pericardial effusion.            Assessment & Plan   Patient Active Problem List   Diagnosis    Colitis    Diarrhea    Hypercalcemia    Post-menopausal    Other osteoporosis without current pathological  fracture    Heart failure (CMD)     Impression:  -Generalized fatigue and loss of energy of unclear etiology.  -History of pulmonary embolism with right ventricular dilation-2024.  -No evidence for infection\pneumonia as per ID and pulmonary consultants.  -Question diagnosis of acute hypoxic respiratory failure. All oxygen saturations greater than 90% (except for 1 reading-87%) since admission.  -Echocardiography notable for preserved left ventricular ejection fraction, dilated right ventricle with severe pulmonary hypertension (64 mmHg).  No significant valvular disease except for moderate tricuspid regurgitation consistent with severe pulmonary hypertension.  No evidence for diastolic dysfunction on echo.  -Severe pulmonary hypertension likely secondary to history of pulmonary embolism.  -Elevated BNP likely secondary to pulmonary hypertension rather than HFpEF.  Suspect that initial chest x-ray demonstrating \"mild pulmonary edema\" likely also secondary to severe pulmonary hypertension.  -Significant improvement in renal function likely secondary to diuresis and treatment of probable renal vein congestion from pulmonary hypertension.  -Normal Lexiscan pharmacologic stress test-2024.    Recommendations:  -The patient is hemodynamically stable, with adequate oxygen saturations on room air at this time, and appears stable for discharge to home from a cardiac standpoint.  -Maintenance of normotension and euvolemic state.  -Renal function follow-up.  Patient may require increased oral diuretics at home if renal vein congestion from severe pulmonary hypertension is operative.                   Forrest Christensen MD, FACC  4/1/2025         Stable

## 2025-04-02 ENCOUNTER — NON-APPOINTMENT (OUTPATIENT)
Age: 65
End: 2025-04-02

## 2025-04-02 ENCOUNTER — APPOINTMENT (OUTPATIENT)
Dept: SPINE | Facility: CLINIC | Age: 65
End: 2025-04-02
Payer: COMMERCIAL

## 2025-04-02 VITALS
DIASTOLIC BLOOD PRESSURE: 70 MMHG | WEIGHT: 266 LBS | SYSTOLIC BLOOD PRESSURE: 103 MMHG | BODY MASS INDEX: 42.75 KG/M2 | HEART RATE: 84 BPM | OXYGEN SATURATION: 97 % | HEIGHT: 66 IN

## 2025-04-02 PROCEDURE — 99204 OFFICE O/P NEW MOD 45 MIN: CPT

## 2025-04-04 ENCOUNTER — APPOINTMENT (OUTPATIENT)
Dept: ENDOCRINOLOGY | Facility: CLINIC | Age: 65
End: 2025-04-04
Payer: COMMERCIAL

## 2025-04-04 VITALS
OXYGEN SATURATION: 96 % | RESPIRATION RATE: 17 BRPM | WEIGHT: 269 LBS | DIASTOLIC BLOOD PRESSURE: 76 MMHG | TEMPERATURE: 97.3 F | HEIGHT: 66 IN | SYSTOLIC BLOOD PRESSURE: 126 MMHG | HEART RATE: 86 BPM | BODY MASS INDEX: 43.23 KG/M2

## 2025-04-04 DIAGNOSIS — E11.9 TYPE 2 DIABETES MELLITUS W/OUT COMPLICATIONS: ICD-10-CM

## 2025-04-04 DIAGNOSIS — E03.9 HYPOTHYROIDISM, UNSPECIFIED: ICD-10-CM

## 2025-04-04 DIAGNOSIS — E78.5 HYPERLIPIDEMIA, UNSPECIFIED: ICD-10-CM

## 2025-04-04 DIAGNOSIS — G47.33 OBSTRUCTIVE SLEEP APNEA (ADULT) (PEDIATRIC): ICD-10-CM

## 2025-04-04 DIAGNOSIS — I10 ESSENTIAL (PRIMARY) HYPERTENSION: ICD-10-CM

## 2025-04-04 DIAGNOSIS — R79.89 OTHER SPECIFIED ABNORMAL FINDINGS OF BLOOD CHEMISTRY: ICD-10-CM

## 2025-04-04 PROCEDURE — 99215 OFFICE O/P EST HI 40 MIN: CPT

## 2025-04-04 PROCEDURE — G2211 COMPLEX E/M VISIT ADD ON: CPT

## 2025-04-08 ENCOUNTER — APPOINTMENT (OUTPATIENT)
Dept: NEUROLOGY | Facility: CLINIC | Age: 65
End: 2025-04-08
Payer: COMMERCIAL

## 2025-04-08 VITALS
HEART RATE: 81 BPM | HEIGHT: 66 IN | DIASTOLIC BLOOD PRESSURE: 72 MMHG | SYSTOLIC BLOOD PRESSURE: 107 MMHG | WEIGHT: 69 LBS | BODY MASS INDEX: 11.09 KG/M2 | OXYGEN SATURATION: 98 % | TEMPERATURE: 97.1 F

## 2025-04-08 VITALS
WEIGHT: 269 LBS | OXYGEN SATURATION: 98 % | TEMPERATURE: 97.1 F | SYSTOLIC BLOOD PRESSURE: 107 MMHG | DIASTOLIC BLOOD PRESSURE: 72 MMHG | BODY MASS INDEX: 43.23 KG/M2 | HEART RATE: 81 BPM | HEIGHT: 66 IN

## 2025-04-08 DIAGNOSIS — R51.9 HEADACHE, UNSPECIFIED: ICD-10-CM

## 2025-04-08 DIAGNOSIS — G89.29 HEADACHE, UNSPECIFIED: ICD-10-CM

## 2025-04-08 DIAGNOSIS — D35.2 BENIGN NEOPLASM OF PITUITARY GLAND: ICD-10-CM

## 2025-04-08 PROCEDURE — 99215 OFFICE O/P EST HI 40 MIN: CPT

## 2025-04-08 PROCEDURE — G2211 COMPLEX E/M VISIT ADD ON: CPT

## 2025-04-08 RX ORDER — DEXAMETHASONE 1 MG/1
1 TABLET ORAL
Qty: 1 | Refills: 0 | Status: COMPLETED | COMMUNITY
Start: 2025-04-04 | End: 2025-04-08

## 2025-04-16 ENCOUNTER — RX RENEWAL (OUTPATIENT)
Age: 65
End: 2025-04-16

## 2025-04-22 ENCOUNTER — APPOINTMENT (OUTPATIENT)
Dept: FAMILY MEDICINE | Facility: CLINIC | Age: 65
End: 2025-04-22
Payer: COMMERCIAL

## 2025-04-22 VITALS
HEART RATE: 90 BPM | TEMPERATURE: 96.4 F | DIASTOLIC BLOOD PRESSURE: 70 MMHG | BODY MASS INDEX: 45 KG/M2 | RESPIRATION RATE: 16 BRPM | WEIGHT: 280 LBS | OXYGEN SATURATION: 98 % | HEIGHT: 66 IN | SYSTOLIC BLOOD PRESSURE: 128 MMHG

## 2025-04-22 DIAGNOSIS — E03.9 HYPOTHYROIDISM, UNSPECIFIED: ICD-10-CM

## 2025-04-22 DIAGNOSIS — S83.207A UNSPECIFIED TEAR OF UNSPECIFIED MENISCUS, CURRENT INJURY, LEFT KNEE, INITIAL ENCOUNTER: ICD-10-CM

## 2025-04-22 DIAGNOSIS — I10 ESSENTIAL (PRIMARY) HYPERTENSION: ICD-10-CM

## 2025-04-22 DIAGNOSIS — I26.99 OTHER PULMONARY EMBOLISM W/OUT ACUTE COR PULMONALE: ICD-10-CM

## 2025-04-22 DIAGNOSIS — Z09 ENCOUNTER FOR FOLLOW-UP EXAMINATION AFTER COMPLETED TREATMENT FOR CONDITIONS OTHER THAN MALIGNANT NEOPLASM: ICD-10-CM

## 2025-04-22 PROCEDURE — 99214 OFFICE O/P EST MOD 30 MIN: CPT

## 2025-04-22 RX ORDER — GLIPIZIDE 10 MG/1
TABLET, EXTENDED RELEASE ORAL
Refills: 0 | Status: ACTIVE | COMMUNITY

## 2025-04-22 RX ORDER — METFORMIN ER 500 MG 500 MG/1
500 TABLET ORAL
Refills: 0 | Status: ACTIVE | COMMUNITY

## 2025-05-13 ENCOUNTER — RX RENEWAL (OUTPATIENT)
Age: 65
End: 2025-05-13

## 2025-05-20 ENCOUNTER — APPOINTMENT (OUTPATIENT)
Dept: ENDOCRINOLOGY | Facility: CLINIC | Age: 65
End: 2025-05-20
Payer: COMMERCIAL

## 2025-05-20 VITALS
DIASTOLIC BLOOD PRESSURE: 76 MMHG | HEIGHT: 66 IN | HEART RATE: 85 BPM | RESPIRATION RATE: 16 BRPM | WEIGHT: 280 LBS | BODY MASS INDEX: 45 KG/M2 | SYSTOLIC BLOOD PRESSURE: 126 MMHG | TEMPERATURE: 97.4 F | OXYGEN SATURATION: 98 %

## 2025-05-20 DIAGNOSIS — I10 ESSENTIAL (PRIMARY) HYPERTENSION: ICD-10-CM

## 2025-05-20 DIAGNOSIS — E03.9 HYPOTHYROIDISM, UNSPECIFIED: ICD-10-CM

## 2025-05-20 DIAGNOSIS — G47.33 OBSTRUCTIVE SLEEP APNEA (ADULT) (PEDIATRIC): ICD-10-CM

## 2025-05-20 DIAGNOSIS — D35.2 BENIGN NEOPLASM OF PITUITARY GLAND: ICD-10-CM

## 2025-05-20 DIAGNOSIS — E11.9 TYPE 2 DIABETES MELLITUS W/OUT COMPLICATIONS: ICD-10-CM

## 2025-05-20 DIAGNOSIS — R79.89 OTHER SPECIFIED ABNORMAL FINDINGS OF BLOOD CHEMISTRY: ICD-10-CM

## 2025-05-20 PROCEDURE — G2211 COMPLEX E/M VISIT ADD ON: CPT

## 2025-05-20 PROCEDURE — 99215 OFFICE O/P EST HI 40 MIN: CPT

## 2025-05-28 ENCOUNTER — APPOINTMENT (OUTPATIENT)
Dept: SPINE | Facility: CLINIC | Age: 65
End: 2025-05-28

## 2025-05-29 ENCOUNTER — APPOINTMENT (OUTPATIENT)
Dept: PULMONOLOGY | Facility: CLINIC | Age: 65
End: 2025-05-29
Payer: COMMERCIAL

## 2025-05-29 VITALS
OXYGEN SATURATION: 94 % | BODY MASS INDEX: 45.48 KG/M2 | WEIGHT: 283 LBS | HEART RATE: 82 BPM | DIASTOLIC BLOOD PRESSURE: 64 MMHG | TEMPERATURE: 97.6 F | SYSTOLIC BLOOD PRESSURE: 101 MMHG | HEIGHT: 66 IN

## 2025-05-29 DIAGNOSIS — G47.30 SLEEP APNEA, UNSPECIFIED: ICD-10-CM

## 2025-05-29 DIAGNOSIS — R06.09 OTHER FORMS OF DYSPNEA: ICD-10-CM

## 2025-05-29 DIAGNOSIS — I26.99 OTHER PULMONARY EMBOLISM W/OUT ACUTE COR PULMONALE: ICD-10-CM

## 2025-05-29 DIAGNOSIS — E66.01 MORBID (SEVERE) OBESITY DUE TO EXCESS CALORIES: ICD-10-CM

## 2025-05-29 PROCEDURE — 99214 OFFICE O/P EST MOD 30 MIN: CPT

## 2025-05-31 ENCOUNTER — NON-APPOINTMENT (OUTPATIENT)
Age: 65
End: 2025-05-31

## 2025-06-10 ENCOUNTER — RX RENEWAL (OUTPATIENT)
Age: 65
End: 2025-06-10

## 2025-06-16 ENCOUNTER — OUTPATIENT (OUTPATIENT)
Dept: OUTPATIENT SERVICES | Facility: HOSPITAL | Age: 65
LOS: 1 days | End: 2025-06-16
Payer: COMMERCIAL

## 2025-06-16 ENCOUNTER — APPOINTMENT (OUTPATIENT)
Dept: MRI IMAGING | Facility: HOSPITAL | Age: 65
End: 2025-06-16
Payer: COMMERCIAL

## 2025-06-16 DIAGNOSIS — Z90.49 ACQUIRED ABSENCE OF OTHER SPECIFIED PARTS OF DIGESTIVE TRACT: Chronic | ICD-10-CM

## 2025-06-16 DIAGNOSIS — Z98.890 OTHER SPECIFIED POSTPROCEDURAL STATES: Chronic | ICD-10-CM

## 2025-06-16 DIAGNOSIS — Z98.84 BARIATRIC SURGERY STATUS: Chronic | ICD-10-CM

## 2025-06-16 DIAGNOSIS — E23.0 HYPOPITUITARISM: ICD-10-CM

## 2025-06-16 DIAGNOSIS — Z90.721 ACQUIRED ABSENCE OF OVARIES, UNILATERAL: Chronic | ICD-10-CM

## 2025-06-16 PROCEDURE — A9579: CPT

## 2025-06-16 PROCEDURE — 70553 MRI BRAIN STEM W/O & W/DYE: CPT

## 2025-06-16 PROCEDURE — 70553 MRI BRAIN STEM W/O & W/DYE: CPT | Mod: 26

## 2025-06-30 ENCOUNTER — APPOINTMENT (OUTPATIENT)
Dept: RADIOLOGY | Facility: HOSPITAL | Age: 65
End: 2025-06-30

## 2025-06-30 ENCOUNTER — OUTPATIENT (OUTPATIENT)
Dept: OUTPATIENT SERVICES | Facility: HOSPITAL | Age: 65
LOS: 1 days | End: 2025-06-30
Payer: COMMERCIAL

## 2025-06-30 ENCOUNTER — APPOINTMENT (OUTPATIENT)
Dept: FAMILY MEDICINE | Facility: CLINIC | Age: 65
End: 2025-06-30
Payer: COMMERCIAL

## 2025-06-30 VITALS
OXYGEN SATURATION: 98 % | BODY MASS INDEX: 46.12 KG/M2 | RESPIRATION RATE: 16 BRPM | SYSTOLIC BLOOD PRESSURE: 117 MMHG | HEART RATE: 70 BPM | TEMPERATURE: 97.1 F | HEIGHT: 66 IN | DIASTOLIC BLOOD PRESSURE: 75 MMHG | WEIGHT: 287 LBS

## 2025-06-30 DIAGNOSIS — Z98.890 OTHER SPECIFIED POSTPROCEDURAL STATES: Chronic | ICD-10-CM

## 2025-06-30 DIAGNOSIS — Z90.49 ACQUIRED ABSENCE OF OTHER SPECIFIED PARTS OF DIGESTIVE TRACT: Chronic | ICD-10-CM

## 2025-06-30 DIAGNOSIS — M54.50 LOW BACK PAIN, UNSPECIFIED: ICD-10-CM

## 2025-06-30 DIAGNOSIS — Z90.721 ACQUIRED ABSENCE OF OVARIES, UNILATERAL: Chronic | ICD-10-CM

## 2025-06-30 PROCEDURE — 72100 X-RAY EXAM L-S SPINE 2/3 VWS: CPT

## 2025-06-30 PROCEDURE — 73522 X-RAY EXAM HIPS BI 3-4 VIEWS: CPT | Mod: 26

## 2025-06-30 PROCEDURE — 72100 X-RAY EXAM L-S SPINE 2/3 VWS: CPT | Mod: 26

## 2025-06-30 PROCEDURE — 73522 X-RAY EXAM HIPS BI 3-4 VIEWS: CPT

## 2025-06-30 PROCEDURE — 99213 OFFICE O/P EST LOW 20 MIN: CPT

## 2025-07-01 ENCOUNTER — EMERGENCY (EMERGENCY)
Facility: HOSPITAL | Age: 65
LOS: 1 days | End: 2025-07-01
Attending: EMERGENCY MEDICINE | Admitting: EMERGENCY MEDICINE
Payer: COMMERCIAL

## 2025-07-01 VITALS
OXYGEN SATURATION: 97 % | HEART RATE: 69 BPM | SYSTOLIC BLOOD PRESSURE: 123 MMHG | WEIGHT: 287.04 LBS | TEMPERATURE: 98 F | DIASTOLIC BLOOD PRESSURE: 83 MMHG | RESPIRATION RATE: 18 BRPM | HEIGHT: 65 IN

## 2025-07-01 DIAGNOSIS — Z90.721 ACQUIRED ABSENCE OF OVARIES, UNILATERAL: Chronic | ICD-10-CM

## 2025-07-01 DIAGNOSIS — Z98.890 OTHER SPECIFIED POSTPROCEDURAL STATES: Chronic | ICD-10-CM

## 2025-07-01 DIAGNOSIS — Z90.49 ACQUIRED ABSENCE OF OTHER SPECIFIED PARTS OF DIGESTIVE TRACT: Chronic | ICD-10-CM

## 2025-07-01 DIAGNOSIS — Z98.84 BARIATRIC SURGERY STATUS: Chronic | ICD-10-CM

## 2025-07-01 LAB
ALBUMIN SERPL ELPH-MCNC: 3.5 G/DL — SIGNIFICANT CHANGE UP (ref 3.3–5)
ALBUMIN SERPL ELPH-MCNC: 4.1 G/DL
ALP BLD-CCNC: 134 U/L
ALP SERPL-CCNC: 130 U/L — HIGH (ref 40–120)
ALT FLD-CCNC: 31 U/L — SIGNIFICANT CHANGE UP (ref 10–45)
ALT SERPL-CCNC: 26 U/L
ANION GAP SERPL CALC-SCNC: 12 MMOL/L
ANION GAP SERPL CALC-SCNC: 12 MMOL/L — SIGNIFICANT CHANGE UP (ref 5–17)
AST SERPL-CCNC: 16 U/L — SIGNIFICANT CHANGE UP (ref 10–40)
AST SERPL-CCNC: 19 U/L
BASOPHILS # BLD AUTO: 0.05 K/UL — SIGNIFICANT CHANGE UP (ref 0–0.2)
BASOPHILS NFR BLD AUTO: 0.6 % — SIGNIFICANT CHANGE UP (ref 0–2)
BILIRUB SERPL-MCNC: 0.6 MG/DL
BILIRUB SERPL-MCNC: 0.8 MG/DL — SIGNIFICANT CHANGE UP (ref 0.2–1.2)
BUN SERPL-MCNC: 16 MG/DL — SIGNIFICANT CHANGE UP (ref 7–23)
BUN SERPL-MCNC: 17 MG/DL
CALCIUM SERPL-MCNC: 10 MG/DL
CALCIUM SERPL-MCNC: 9.3 MG/DL — SIGNIFICANT CHANGE UP (ref 8.4–10.5)
CHLORIDE SERPL-SCNC: 104 MMOL/L
CHLORIDE SERPL-SCNC: 104 MMOL/L — SIGNIFICANT CHANGE UP (ref 96–108)
CO2 SERPL-SCNC: 24 MMOL/L
CO2 SERPL-SCNC: 24 MMOL/L — SIGNIFICANT CHANGE UP (ref 22–31)
CREAT SERPL-MCNC: 0.54 MG/DL
CREAT SERPL-MCNC: 0.6 MG/DL — SIGNIFICANT CHANGE UP (ref 0.5–1.3)
EGFR: 100 ML/MIN/1.73M2 — SIGNIFICANT CHANGE UP
EGFR: 100 ML/MIN/1.73M2 — SIGNIFICANT CHANGE UP
EGFRCR SERPLBLD CKD-EPI 2021: 103 ML/MIN/1.73M2
EOSINOPHIL # BLD AUTO: 0.04 K/UL — SIGNIFICANT CHANGE UP (ref 0–0.5)
EOSINOPHIL NFR BLD AUTO: 0.5 % — SIGNIFICANT CHANGE UP (ref 0–6)
GLUCOSE SERPL-MCNC: 112 MG/DL
GLUCOSE SERPL-MCNC: 138 MG/DL — HIGH (ref 70–99)
HCT VFR BLD CALC: 37.9 % — SIGNIFICANT CHANGE UP (ref 34.5–45)
HGB BLD-MCNC: 12.6 G/DL — SIGNIFICANT CHANGE UP (ref 11.5–15.5)
IMM GRANULOCYTES NFR BLD AUTO: 0.4 % — SIGNIFICANT CHANGE UP (ref 0–0.9)
LYMPHOCYTES # BLD AUTO: 1.25 K/UL — SIGNIFICANT CHANGE UP (ref 1–3.3)
LYMPHOCYTES # BLD AUTO: 16.2 % — SIGNIFICANT CHANGE UP (ref 13–44)
MCHC RBC-ENTMCNC: 29.9 PG — SIGNIFICANT CHANGE UP (ref 27–34)
MCHC RBC-ENTMCNC: 33.2 G/DL — SIGNIFICANT CHANGE UP (ref 32–36)
MCV RBC AUTO: 89.8 FL — SIGNIFICANT CHANGE UP (ref 80–100)
MONOCYTES # BLD AUTO: 0.6 K/UL — SIGNIFICANT CHANGE UP (ref 0–0.9)
MONOCYTES NFR BLD AUTO: 7.8 % — SIGNIFICANT CHANGE UP (ref 2–14)
NEUTROPHILS # BLD AUTO: 5.74 K/UL — SIGNIFICANT CHANGE UP (ref 1.8–7.4)
NEUTROPHILS NFR BLD AUTO: 74.5 % — SIGNIFICANT CHANGE UP (ref 43–77)
NRBC BLD AUTO-RTO: 0 /100 WBCS — SIGNIFICANT CHANGE UP (ref 0–0)
PLATELET # BLD AUTO: 303 K/UL — SIGNIFICANT CHANGE UP (ref 150–400)
POTASSIUM SERPL-MCNC: 3.4 MMOL/L — LOW (ref 3.5–5.3)
POTASSIUM SERPL-SCNC: 3.4 MMOL/L — LOW (ref 3.5–5.3)
POTASSIUM SERPL-SCNC: 4.1 MMOL/L
PROT SERPL-MCNC: 6.2 G/DL
PROT SERPL-MCNC: 6.7 G/DL — SIGNIFICANT CHANGE UP (ref 6–8.3)
RBC # BLD: 4.22 M/UL — SIGNIFICANT CHANGE UP (ref 3.8–5.2)
RBC # FLD: 16 % — HIGH (ref 10.3–14.5)
SODIUM SERPL-SCNC: 140 MMOL/L — SIGNIFICANT CHANGE UP (ref 135–145)
SODIUM SERPL-SCNC: 141 MMOL/L
WBC # BLD: 7.71 K/UL — SIGNIFICANT CHANGE UP (ref 3.8–10.5)
WBC # FLD AUTO: 7.71 K/UL — SIGNIFICANT CHANGE UP (ref 3.8–10.5)

## 2025-07-01 PROCEDURE — 85025 COMPLETE CBC W/AUTO DIFF WBC: CPT

## 2025-07-01 PROCEDURE — 80053 COMPREHEN METABOLIC PANEL: CPT

## 2025-07-01 PROCEDURE — 74177 CT ABD & PELVIS W/CONTRAST: CPT

## 2025-07-01 PROCEDURE — 96374 THER/PROPH/DIAG INJ IV PUSH: CPT | Mod: XU

## 2025-07-01 PROCEDURE — 36415 COLL VENOUS BLD VENIPUNCTURE: CPT

## 2025-07-01 PROCEDURE — 74177 CT ABD & PELVIS W/CONTRAST: CPT | Mod: 26

## 2025-07-01 PROCEDURE — 99284 EMERGENCY DEPT VISIT MOD MDM: CPT | Mod: 25

## 2025-07-01 PROCEDURE — 99285 EMERGENCY DEPT VISIT HI MDM: CPT

## 2025-07-01 RX ADMIN — Medication 4 MILLIGRAM(S): at 21:22

## 2025-07-01 NOTE — ED PROVIDER NOTE - CLINICAL SUMMARY MEDICAL DECISION MAKING FREE TEXT BOX
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  Pain worsening with position change, getting up from reclining position.  No radiation of pain down leg.  No leg pain or swelling.  No fever or chills.  No abdominal pain nausea vomiting diarrhea.  No leg weakness numbness tingling.  She took 5 extra strength Tylenol for pain prior to arrival.  She saw her PMD who had her get x-ray of her hips and pelvis and L-spine yesterday.  Results pending.    Patient well-appearing, no distress.  Vitals unremarkable.  She has focal tenderness in the right upper buttock area and some mild right paralumbar tenderness.  Mild tenderness right lateral hip.  Mildly increased pain with position change/sitting up, hip range of motion.  Right lower extremity otherwise neurovascularly intact.  Seems possibly musculoskeletal in nature, low back strain or right hip arthritis.  Reviewed patient's x-ray L-spine and pelvis hips done June 30, 2025.  No obvious fracture or acute bone pathology.  DJD of hip spine.  Also DDx: Low back strain, arthritis of hip, herniated disc, occult hip fracture, less likely pelvic mass.  Will check labs, CT abdomen pelvis, bony pelvis/hip.  Give morphine for pain.  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  Pain worsening with position change, getting up from reclining position.  No radiation of pain down leg.  No leg pain or swelling.  No fever or chills.  No abdominal pain nausea vomiting diarrhea.  No leg weakness numbness tingling.  She took 5 extra strength Tylenol for pain prior to arrival.  She saw her PMD who had her get x-ray of her hips and pelvis and L-spine yesterday.  Results pending.    Patient well-appearing, no distress.  Vitals unremarkable.  She has focal tenderness in the right upper buttock area and some mild right paralumbar tenderness.  Mild tenderness right lateral hip.  Mildly increased pain with position change/sitting up, hip range of motion.  Right lower extremity otherwise neurovascularly intact.  Seems possibly musculoskeletal in nature, low back strain or right hip arthritis.  Reviewed patient's x-ray L-spine and pelvis hips done June 30, 2025.  No obvious fracture or acute bone pathology.  DJD of hip spine.  Also DDx: Low back strain, arthritis of hip, herniated disc, occult hip fracture, less likely pelvic mass.  Will check labs, CT abdomen pelvis, bony pelvis/hip.  Give morphine for pain.  Reassess    Update: Labs unremarkable.  CT shows no acute finding.  Shows osteopenia.  No occult fractures.  No pelvic mass.  Pain improved with morphine.  Oxycodone and Robaxin prescribed.  Follow-up PMD and Ortho.

## 2025-07-01 NOTE — ED PROVIDER NOTE - PHYSICAL EXAMINATION
exam:   General: well appearing, NAD.   HEENT: eyes perrl, nose normal, OP no erythema/exudate/swelling.   cor: RRR, s1s2, 2+rad pulses.   lungs: ctabl, no resp distress.   abd: soft, ntnd. obese abd.  : no cvat  neuro: a&ox3, cn2-12 intact, MOONEY, 5/5 strength c nl sensation all extremities, nl coordination.   MSK: no extremity swelling. no c/t/L spine tenderness. Mild right paralumbar tenderness.  Mild to moderate focal tenderness right upper buttock.  Mild tenderness right lateral hip.  Pain with range of motion right hip.  Back/hip pain reproduced sitting up and reclining in stretcher.  Rest of right lower extremity nontender, no swelling.  2+ DP pulse.  Normal distal sensation and strength  Skin: normal, no rash

## 2025-07-01 NOTE — ED ADULT NURSE NOTE - NSFALLHARMRISKINTERV_ED_ALL_ED

## 2025-07-01 NOTE — ED PROVIDER NOTE - OBJECTIVE STATEMENT
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  Pain worsening with position change, getting up from reclining position.  No radiation of pain down leg.  No leg pain or swelling.  No fever or chills.  No abdominal pain nausea vomiting diarrhea.  No leg weakness numbness tingling.  She took 5 extra strength Tylenol for pain prior to arrival.  She saw her PMD who had her get x-ray of her hips and pelvis and L-spine yesterday.  Results pending.

## 2025-07-01 NOTE — ED PROVIDER NOTE - CHIEF COMPLAINT
Patient notified of test results, per MD and verbalized understanding.       The patient is a 65y Female complaining of pain, hip.

## 2025-07-01 NOTE — ED PROVIDER NOTE - PATIENT PORTAL LINK FT
You can access the FollowMyHealth Patient Portal offered by [facility] by registering at the following website: [email or web address]. By joining [facility]’s FollowMyHealth portal, you will also be able to view your health information using other applications (apps) compatible with our system.

## 2025-07-01 NOTE — ED PROVIDER NOTE - CARE PROVIDER_API CALL
Milad Feliz  Adult Reconstructive Orthopaedic Surgery  [address], [address]  [place] [address]  Phone: [phone]  Fax: [phone]  Follow Up Time: 1-3 Days

## 2025-07-01 NOTE — ED ADULT NURSE NOTE - OBJECTIVE STATEMENT
c/o right hip pain x few weeks. PCP Wally ordered xray for hip/pelvis and lumbar spine. Results still pending. Denies trauma, fall or strenuous activity. Took 2500mg tylenol prior to arrival at 730pm.

## 2025-07-01 NOTE — ED PROVIDER NOTE - NSFOLLOWUPINSTRUCTIONS_ED_ALL_ED_FT
- Take Tylenol 1000 mg every 6 hours as needed for pain  - In addition to Tylenol you can take oxycodone 5 mg every 6 hours as needed for pain  - you can also take methocarbamol 1500 mg every 8 hours for muscle spasm  - Follow-up with your primary care doctor and orthopedist this week    Follow Up in 1-3 Days with your own [place] [address]  Phone: [phone]    Back Pain    WHAT YOU NEED TO KNOW:    Back pain is common. It can be caused by many conditions, such as arthritis or the breakdown of spinal discs. Your risk for back pain is increased by injuries, lack of activity, or repeated bending and twisting. You may feel sore or stiff on one or both sides of your back. The pain may spread to your buttocks or thighs.    DISCHARGE INSTRUCTIONS:    Return to the emergency department if:     You have pain, numbness, or weakness in one or both legs.    Your pain becomes so severe that you cannot walk.    You cannot control your urine or bowel movements.    You have severe back pain with chest pain.    You have severe back pain, nausea, and vomiting.    You have severe back pain that spreads to your side or genital area.    Contact your healthcare provider if:     You have back pain that does not get better with rest and pain medicine.    You have a fever    You have pain that worsens when you are on your back or when you rest.    You have pain that worsens when you cough or sneeze.    You lose weight without trying.    You have questions or concerns about your condition or care.    Medicines:     NSAIDs help decrease swelling and pain. This medicine is available with or without a doctor's order. NSAIDs can cause stomach bleeding or kidney problems in certain people. If you take blood thinner medicine, always ask your healthcare provider if NSAIDs are safe for you. Always read the medicine label and follow directions.    Acetaminophen decreases pain and fever. It is available without a doctor's order. Ask how much to take and how often to take it. Follow directions. Read the labels of all other medicines you are using to see if they also contain acetaminophen, or ask your doctor or pharmacist. Acetaminophen can cause liver damage if not taken correctly. Do not use more than 4 grams (4,000 milligrams) total of acetaminophen in one day.     Muscle relaxers help decrease muscle spasms and back pain.    Prescription pain medicine may be given. Ask your healthcare provider how to take this medicine safely. Some prescription pain medicines contain acetaminophen. Do not take other medicines that contain acetaminophen without talking to your healthcare provider. Too much acetaminophen may cause liver damage. Prescription pain medicine may cause constipation. Ask your healthcare provider how to prevent or treat constipation.     Take your medicine as directed. Contact your healthcare provider if you think your medicine is not helping or if you have side effects. Tell him or her if you are allergic to any medicine. Keep a list of the medicines, vitamins, and herbs you take. Include the amounts, and when and why you take them. Bring the list or the pill bottles to follow-up visits. Carry your medicine list with you in case of an emergency.    How to manage your back pain:     Apply ice on your back for 15 to 20 minutes every hour or as directed. Use an ice pack, or put crushed ice in a plastic bag. Cover it with a towel before you apply it to your skin. Ice helps prevent tissue damage and decreases pain.    Apply heat on your back for 20 to 30 minutes every 2 hours for as many days as directed. Heat helps decrease pain and muscle spasms.    Stay active as much as you can without causing more pain. Bed rest could make your back pain worse. Avoid heavy lifting until your pain is gone.    Go to physical therapy as directed. A physical therapist can teach you exercises to help improve movement and strength, and to decrease pain.    Follow up with your healthcare provider in 2 weeks, or as directed: Write down your questions so you remember to ask them during your visits

## 2025-07-01 NOTE — ED ADULT TRIAGE NOTE - CHIEF COMPLAINT QUOTE
v/o right hip pain x few weeks. PCP Wally ordered xray for hip/pelvis and lumbar spine. Results still pending. Denies trauma, fall or strenuous activity. Took 2500mgh tylenol prior to arrival at 730pm.

## 2025-07-02 ENCOUNTER — TRANSCRIPTION ENCOUNTER (OUTPATIENT)
Age: 65
End: 2025-07-02

## 2025-07-02 VITALS
HEART RATE: 70 BPM | OXYGEN SATURATION: 98 % | RESPIRATION RATE: 17 BRPM | SYSTOLIC BLOOD PRESSURE: 115 MMHG | TEMPERATURE: 98 F | DIASTOLIC BLOOD PRESSURE: 80 MMHG

## 2025-07-02 PROBLEM — M25.551 RIGHT HIP PAIN: Status: ACTIVE | Noted: 2025-07-02

## 2025-07-02 RX ORDER — OXYCODONE HYDROCHLORIDE 30 MG/1
1 TABLET ORAL
Qty: 12 | Refills: 0
Start: 2025-07-02

## 2025-07-02 RX ORDER — METHOCARBAMOL 500 MG/1
2 TABLET, FILM COATED ORAL
Qty: 50 | Refills: 0
Start: 2025-07-02

## 2025-07-02 RX ADMIN — Medication 4 MILLIGRAM(S): at 00:25

## 2025-07-03 ENCOUNTER — APPOINTMENT (OUTPATIENT)
Dept: ORTHOPEDIC SURGERY | Facility: CLINIC | Age: 65
End: 2025-07-03
Payer: COMMERCIAL

## 2025-07-03 VITALS
SYSTOLIC BLOOD PRESSURE: 128 MMHG | HEIGHT: 65 IN | OXYGEN SATURATION: 95 % | WEIGHT: 287 LBS | DIASTOLIC BLOOD PRESSURE: 76 MMHG | BODY MASS INDEX: 47.82 KG/M2 | HEART RATE: 82 BPM

## 2025-07-03 PROBLEM — M54.50 LUMBAGO: Status: ACTIVE | Noted: 2025-07-03

## 2025-07-03 PROCEDURE — 73502 X-RAY EXAM HIP UNI 2-3 VIEWS: CPT

## 2025-07-03 PROCEDURE — 99203 OFFICE O/P NEW LOW 30 MIN: CPT

## 2025-07-08 ENCOUNTER — APPOINTMENT (OUTPATIENT)
Dept: NEUROLOGY | Facility: CLINIC | Age: 65
End: 2025-07-08
Payer: COMMERCIAL

## 2025-07-08 VITALS
HEIGHT: 65 IN | TEMPERATURE: 97.5 F | OXYGEN SATURATION: 98 % | DIASTOLIC BLOOD PRESSURE: 67 MMHG | BODY MASS INDEX: 47.82 KG/M2 | WEIGHT: 287 LBS | SYSTOLIC BLOOD PRESSURE: 119 MMHG

## 2025-07-08 PROCEDURE — G2211 COMPLEX E/M VISIT ADD ON: CPT

## 2025-07-08 PROCEDURE — 99214 OFFICE O/P EST MOD 30 MIN: CPT

## 2025-07-08 RX ORDER — OMEPRAZOLE 20 MG/1
TABLET, DELAYED RELEASE ORAL
Refills: 0 | Status: ACTIVE | COMMUNITY

## 2025-07-08 RX ORDER — SERTRALINE HYDROCHLORIDE 100 MG/1
100 TABLET, FILM COATED ORAL
Refills: 0 | Status: ACTIVE | COMMUNITY

## 2025-07-10 ENCOUNTER — NON-APPOINTMENT (OUTPATIENT)
Age: 65
End: 2025-07-10

## 2025-07-21 ENCOUNTER — APPOINTMENT (OUTPATIENT)
Dept: ORTHOPEDIC SURGERY | Facility: CLINIC | Age: 65
End: 2025-07-21

## 2025-07-25 ENCOUNTER — APPOINTMENT (OUTPATIENT)
Dept: ENDOCRINOLOGY | Facility: CLINIC | Age: 65
End: 2025-07-25
Payer: COMMERCIAL

## 2025-07-25 VITALS
RESPIRATION RATE: 18 BRPM | SYSTOLIC BLOOD PRESSURE: 128 MMHG | HEART RATE: 84 BPM | WEIGHT: 281 LBS | HEIGHT: 65 IN | DIASTOLIC BLOOD PRESSURE: 72 MMHG | TEMPERATURE: 97.2 F | OXYGEN SATURATION: 97 % | BODY MASS INDEX: 46.82 KG/M2

## 2025-07-25 DIAGNOSIS — E11.9 TYPE 2 DIABETES MELLITUS W/OUT COMPLICATIONS: ICD-10-CM

## 2025-07-25 DIAGNOSIS — D35.02 BENIGN NEOPLASM OF RIGHT ADRENAL GLAND: ICD-10-CM

## 2025-07-25 DIAGNOSIS — E03.9 HYPOTHYROIDISM, UNSPECIFIED: ICD-10-CM

## 2025-07-25 DIAGNOSIS — E24.9 CUSHING'S SYNDROME, UNSPECIFIED: ICD-10-CM

## 2025-07-25 DIAGNOSIS — E78.5 HYPERLIPIDEMIA, UNSPECIFIED: ICD-10-CM

## 2025-07-25 DIAGNOSIS — E66.01 MORBID (SEVERE) OBESITY DUE TO EXCESS CALORIES: ICD-10-CM

## 2025-07-25 DIAGNOSIS — D35.2 BENIGN NEOPLASM OF PITUITARY GLAND: ICD-10-CM

## 2025-07-25 DIAGNOSIS — G47.33 OBSTRUCTIVE SLEEP APNEA (ADULT) (PEDIATRIC): ICD-10-CM

## 2025-07-25 DIAGNOSIS — D35.01 BENIGN NEOPLASM OF RIGHT ADRENAL GLAND: ICD-10-CM

## 2025-07-25 DIAGNOSIS — I10 ESSENTIAL (PRIMARY) HYPERTENSION: ICD-10-CM

## 2025-07-25 PROCEDURE — G2211 COMPLEX E/M VISIT ADD ON: CPT

## 2025-07-25 PROCEDURE — 99215 OFFICE O/P EST HI 40 MIN: CPT

## 2025-07-25 PROCEDURE — 95251 CONT GLUC MNTR ANALYSIS I&R: CPT

## 2025-07-25 RX ORDER — AMLODIPINE BESYLATE 10 MG/1
10 TABLET ORAL
Qty: 90 | Refills: 3 | Status: ACTIVE | COMMUNITY
Start: 2025-07-25 | End: 1900-01-01

## 2025-07-25 RX ORDER — BLOOD-GLUCOSE,RECEIVER,CONT
EACH MISCELLANEOUS
Qty: 1 | Refills: 0 | Status: ACTIVE | COMMUNITY
Start: 2025-07-25 | End: 1900-01-01

## 2025-08-04 ENCOUNTER — RX RENEWAL (OUTPATIENT)
Age: 65
End: 2025-08-04

## 2025-08-18 ENCOUNTER — APPOINTMENT (OUTPATIENT)
Dept: HEPATOLOGY | Facility: CLINIC | Age: 65
End: 2025-08-18
Payer: COMMERCIAL

## 2025-08-18 VITALS
HEART RATE: 84 BPM | BODY MASS INDEX: 47.65 KG/M2 | OXYGEN SATURATION: 96 % | HEIGHT: 65 IN | DIASTOLIC BLOOD PRESSURE: 86 MMHG | TEMPERATURE: 97.2 F | RESPIRATION RATE: 18 BRPM | WEIGHT: 286 LBS | SYSTOLIC BLOOD PRESSURE: 159 MMHG

## 2025-08-18 DIAGNOSIS — R74.8 ABNORMAL LEVELS OF OTHER SERUM ENZYMES: ICD-10-CM

## 2025-08-18 DIAGNOSIS — R76.8 OTHER SPECIFIED ABNORMAL IMMUNOLOGICAL FINDINGS IN SERUM: ICD-10-CM

## 2025-08-18 PROCEDURE — 99204 OFFICE O/P NEW MOD 45 MIN: CPT

## 2025-08-18 RX ORDER — FAMOTIDINE 20 MG/1
20 TABLET, FILM COATED ORAL DAILY
Refills: 0 | Status: ACTIVE | COMMUNITY

## 2025-08-19 LAB
ACE BLD-CCNC: 63 U/L
ALBUMIN MFR SERPL ELPH: 61.4 %
ALBUMIN SERPL-MCNC: 4 G/DL
ALBUMIN/GLOB SERPL: 1.6 RATIO
ALPHA1 GLOB MFR SERPL ELPH: 4.8 %
ALPHA1 GLOB SERPL ELPH-MCNC: 0.3 G/DL
ALPHA2 GLOB MFR SERPL ELPH: 11.1 %
ALPHA2 GLOB SERPL ELPH-MCNC: 0.7 G/DL
B-GLOBULIN MFR SERPL ELPH: 13.3 %
B-GLOBULIN SERPL ELPH-MCNC: 0.9 G/DL
GAMMA GLOB FLD ELPH-MCNC: 0.6 G/DL
GAMMA GLOB MFR SERPL ELPH: 9.4 %
HBV CORE IGG+IGM SER QL: NONREACTIVE
HEPATITIS A IGG ANTIBODY: NONREACTIVE
IGG SERPL-MCNC: 585 MG/DL
IGM SERPL-MCNC: 27 MG/DL
INTERPRETATION SERPL IEP-IMP: NORMAL
PROT SERPL-MCNC: 6.5 G/DL
PROT SERPL-MCNC: 6.5 G/DL

## 2025-08-23 ENCOUNTER — APPOINTMENT (OUTPATIENT)
Dept: MRI IMAGING | Facility: HOSPITAL | Age: 65
End: 2025-08-23

## 2025-08-25 ENCOUNTER — APPOINTMENT (OUTPATIENT)
Dept: OBGYN | Facility: CLINIC | Age: 65
End: 2025-08-25
Payer: COMMERCIAL

## 2025-08-25 VITALS
TEMPERATURE: 97.6 F | OXYGEN SATURATION: 95 % | SYSTOLIC BLOOD PRESSURE: 148 MMHG | DIASTOLIC BLOOD PRESSURE: 90 MMHG | HEIGHT: 65 IN | BODY MASS INDEX: 47.65 KG/M2 | HEART RATE: 88 BPM | WEIGHT: 286 LBS | RESPIRATION RATE: 16 BRPM

## 2025-08-25 DIAGNOSIS — R10.2 PELVIC AND PERINEAL PAIN: ICD-10-CM

## 2025-08-25 DIAGNOSIS — N83.201 UNSPECIFIED OVARIAN CYST, RIGHT SIDE: ICD-10-CM

## 2025-08-25 PROCEDURE — 99214 OFFICE O/P EST MOD 30 MIN: CPT

## 2025-08-26 LAB
ANA TITR SER: NEGATIVE
ANTI - GP210 ANTIBODY, IGG: 0.7 UNITS
ANTI - SP100 ANTIBODY, IGG: 1.7 UNITS
CANCER AG125 SERPL-ACNC: 9 U/ML
HPV HIGH+LOW RISK DNA PNL CVX: NOT DETECTED
MITOCHONDRIA AB SER IF-ACNC: NORMAL
SMOOTH MUSCLE AB SER QL IF: NORMAL
SOLUBLE LIVER IGG SER IA-ACNC: 1.2

## 2025-08-28 DIAGNOSIS — K21.9 GASTRO-ESOPHAGEAL REFLUX DISEASE W/OUT ESOPHAGITIS: ICD-10-CM

## 2025-08-29 LAB — CYTOLOGY CVX/VAG DOC THIN PREP: NORMAL

## 2025-09-02 ENCOUNTER — APPOINTMENT (OUTPATIENT)
Dept: FAMILY MEDICINE | Facility: CLINIC | Age: 65
End: 2025-09-02
Payer: COMMERCIAL

## 2025-09-02 VITALS
HEART RATE: 83 BPM | WEIGHT: 279 LBS | OXYGEN SATURATION: 95 % | RESPIRATION RATE: 16 BRPM | TEMPERATURE: 96.9 F | HEIGHT: 65 IN | SYSTOLIC BLOOD PRESSURE: 118 MMHG | BODY MASS INDEX: 46.48 KG/M2 | DIASTOLIC BLOOD PRESSURE: 68 MMHG

## 2025-09-02 DIAGNOSIS — I10 ESSENTIAL (PRIMARY) HYPERTENSION: ICD-10-CM

## 2025-09-02 DIAGNOSIS — R10.9 UNSPECIFIED ABDOMINAL PAIN: ICD-10-CM

## 2025-09-02 DIAGNOSIS — E78.5 HYPERLIPIDEMIA, UNSPECIFIED: ICD-10-CM

## 2025-09-02 DIAGNOSIS — E03.9 HYPOTHYROIDISM, UNSPECIFIED: ICD-10-CM

## 2025-09-02 PROCEDURE — 99214 OFFICE O/P EST MOD 30 MIN: CPT

## 2025-09-02 PROCEDURE — G2211 COMPLEX E/M VISIT ADD ON: CPT

## 2025-09-02 RX ORDER — HYOSCYAMINE SULFATE 0.12 MG/1
0.12 TABLET, ORALLY DISINTEGRATING ORAL
Qty: 60 | Refills: 0 | Status: ACTIVE | COMMUNITY
Start: 2025-07-24

## 2025-09-02 RX ORDER — TIRZEPATIDE 7.5 MG/.5ML
7.5 INJECTION, SOLUTION SUBCUTANEOUS
Qty: 2 | Refills: 0 | Status: COMPLETED | COMMUNITY
Start: 2025-06-13

## 2025-09-02 RX ORDER — APIXABAN 5 MG/1
5 TABLET, FILM COATED ORAL
Qty: 60 | Refills: 2 | Status: ACTIVE | COMMUNITY
Start: 2025-03-02

## 2025-09-02 RX ORDER — OXYCODONE 5 MG/1
5 TABLET ORAL
Qty: 12 | Refills: 0 | Status: COMPLETED | COMMUNITY
Start: 2025-07-02

## 2025-09-02 RX ORDER — AMOXICILLIN 500 MG/1
500 TABLET, FILM COATED ORAL
Qty: 28 | Refills: 0 | Status: COMPLETED | COMMUNITY
Start: 2025-06-15

## 2025-09-02 RX ORDER — TIRZEPATIDE 2.5 MG/.5ML
2.5 INJECTION, SOLUTION SUBCUTANEOUS
Qty: 2 | Refills: 0 | Status: COMPLETED | COMMUNITY
Start: 2025-04-08

## 2025-09-02 RX ORDER — METHOCARBAMOL 750 MG/1
750 TABLET, FILM COATED ORAL
Qty: 50 | Refills: 0 | Status: COMPLETED | COMMUNITY
Start: 2025-07-02

## 2025-09-02 RX ORDER — TIRZEPATIDE 5 MG/.5ML
5 INJECTION, SOLUTION SUBCUTANEOUS
Qty: 2 | Refills: 0 | Status: COMPLETED | COMMUNITY
Start: 2025-05-12

## 2025-09-02 RX ORDER — LEVOFLOXACIN 500 MG/1
500 TABLET, FILM COATED ORAL
Qty: 7 | Refills: 0 | Status: COMPLETED | COMMUNITY
Start: 2025-08-31

## 2025-09-02 RX ORDER — METRONIDAZOLE 500 MG/1
500 TABLET ORAL
Qty: 21 | Refills: 0 | Status: COMPLETED | COMMUNITY
Start: 2025-06-15

## 2025-09-03 ENCOUNTER — APPOINTMENT (OUTPATIENT)
Dept: SPINE | Facility: CLINIC | Age: 65
End: 2025-09-03
Payer: COMMERCIAL

## 2025-09-03 VITALS
OXYGEN SATURATION: 97 % | BODY MASS INDEX: 46.32 KG/M2 | SYSTOLIC BLOOD PRESSURE: 111 MMHG | DIASTOLIC BLOOD PRESSURE: 65 MMHG | WEIGHT: 278 LBS | HEART RATE: 85 BPM | HEIGHT: 65 IN | RESPIRATION RATE: 16 BRPM

## 2025-09-03 DIAGNOSIS — D35.2 BENIGN NEOPLASM OF PITUITARY GLAND: ICD-10-CM

## 2025-09-03 PROCEDURE — 99213 OFFICE O/P EST LOW 20 MIN: CPT

## 2025-09-04 ENCOUNTER — OUTPATIENT (OUTPATIENT)
Dept: OUTPATIENT SERVICES | Facility: HOSPITAL | Age: 65
LOS: 1 days | End: 2025-09-04
Payer: COMMERCIAL

## 2025-09-04 ENCOUNTER — APPOINTMENT (OUTPATIENT)
Dept: MRI IMAGING | Facility: CLINIC | Age: 65
End: 2025-09-04
Payer: COMMERCIAL

## 2025-09-04 DIAGNOSIS — Z98.890 OTHER SPECIFIED POSTPROCEDURAL STATES: Chronic | ICD-10-CM

## 2025-09-04 DIAGNOSIS — D35.01 BENIGN NEOPLASM OF RIGHT ADRENAL GLAND: ICD-10-CM

## 2025-09-04 DIAGNOSIS — Z90.721 ACQUIRED ABSENCE OF OVARIES, UNILATERAL: Chronic | ICD-10-CM

## 2025-09-04 DIAGNOSIS — Z98.84 BARIATRIC SURGERY STATUS: Chronic | ICD-10-CM

## 2025-09-04 DIAGNOSIS — Z90.49 ACQUIRED ABSENCE OF OTHER SPECIFIED PARTS OF DIGESTIVE TRACT: Chronic | ICD-10-CM

## 2025-09-04 PROCEDURE — 74183 MRI ABD W/O CNTR FLWD CNTR: CPT

## 2025-09-04 PROCEDURE — A9585: CPT

## 2025-09-04 PROCEDURE — 74183 MRI ABD W/O CNTR FLWD CNTR: CPT | Mod: 26

## 2025-09-10 ENCOUNTER — APPOINTMENT (OUTPATIENT)
Dept: MRI IMAGING | Facility: CLINIC | Age: 65
End: 2025-09-10
Payer: COMMERCIAL

## 2025-09-10 PROCEDURE — 72197 MRI PELVIS W/O & W/DYE: CPT | Mod: 26

## 2025-09-12 ENCOUNTER — APPOINTMENT (OUTPATIENT)
Dept: ENDOCRINOLOGY | Facility: CLINIC | Age: 65
End: 2025-09-12
Payer: COMMERCIAL

## 2025-09-12 VITALS
HEIGHT: 65 IN | DIASTOLIC BLOOD PRESSURE: 78 MMHG | OXYGEN SATURATION: 98 % | RESPIRATION RATE: 16 BRPM | SYSTOLIC BLOOD PRESSURE: 118 MMHG | HEART RATE: 84 BPM | WEIGHT: 278 LBS | BODY MASS INDEX: 46.32 KG/M2 | TEMPERATURE: 97.6 F

## 2025-09-12 DIAGNOSIS — E11.9 TYPE 2 DIABETES MELLITUS W/OUT COMPLICATIONS: ICD-10-CM

## 2025-09-12 DIAGNOSIS — I10 ESSENTIAL (PRIMARY) HYPERTENSION: ICD-10-CM

## 2025-09-12 DIAGNOSIS — D35.02 BENIGN NEOPLASM OF LEFT ADRENAL GLAND: ICD-10-CM

## 2025-09-12 DIAGNOSIS — E66.813 OBESITY, CLASS 3: ICD-10-CM

## 2025-09-12 DIAGNOSIS — D35.01 BENIGN NEOPLASM OF RIGHT ADRENAL GLAND: ICD-10-CM

## 2025-09-12 DIAGNOSIS — E24.9 CUSHING'S SYNDROME, UNSPECIFIED: ICD-10-CM

## 2025-09-12 DIAGNOSIS — G47.33 OBSTRUCTIVE SLEEP APNEA (ADULT) (PEDIATRIC): ICD-10-CM

## 2025-09-12 DIAGNOSIS — E78.5 HYPERLIPIDEMIA, UNSPECIFIED: ICD-10-CM

## 2025-09-12 DIAGNOSIS — E03.9 HYPOTHYROIDISM, UNSPECIFIED: ICD-10-CM

## 2025-09-12 DIAGNOSIS — D35.2 BENIGN NEOPLASM OF PITUITARY GLAND: ICD-10-CM

## 2025-09-12 DIAGNOSIS — D35.02 BENIGN NEOPLASM OF RIGHT ADRENAL GLAND: ICD-10-CM

## 2025-09-12 PROCEDURE — G2211 COMPLEX E/M VISIT ADD ON: CPT

## 2025-09-12 PROCEDURE — 99215 OFFICE O/P EST HI 40 MIN: CPT

## 2025-09-12 PROCEDURE — 95251 CONT GLUC MNTR ANALYSIS I&R: CPT

## 2025-09-18 ENCOUNTER — APPOINTMENT (OUTPATIENT)
Dept: OPHTHALMOLOGY | Facility: CLINIC | Age: 65
End: 2025-09-18